# Patient Record
Sex: MALE | Race: WHITE | NOT HISPANIC OR LATINO | URBAN - METROPOLITAN AREA
[De-identification: names, ages, dates, MRNs, and addresses within clinical notes are randomized per-mention and may not be internally consistent; named-entity substitution may affect disease eponyms.]

---

## 2017-09-29 ENCOUNTER — EMERGENCY (EMERGENCY)
Facility: HOSPITAL | Age: 82
LOS: 1 days | Discharge: HOME | End: 2017-09-29
Attending: EMERGENCY MEDICINE | Admitting: EMERGENCY MEDICINE
Payer: COMMERCIAL

## 2017-09-29 VITALS
TEMPERATURE: 98 F | SYSTOLIC BLOOD PRESSURE: 139 MMHG | OXYGEN SATURATION: 94 % | HEART RATE: 97 BPM | WEIGHT: 210.1 LBS | HEIGHT: 69 IN | RESPIRATION RATE: 18 BRPM | DIASTOLIC BLOOD PRESSURE: 82 MMHG

## 2017-09-29 VITALS
SYSTOLIC BLOOD PRESSURE: 124 MMHG | HEART RATE: 72 BPM | OXYGEN SATURATION: 96 % | TEMPERATURE: 98 F | RESPIRATION RATE: 18 BRPM | DIASTOLIC BLOOD PRESSURE: 85 MMHG

## 2017-09-29 PROCEDURE — 99284 EMERGENCY DEPT VISIT MOD MDM: CPT

## 2017-09-29 PROCEDURE — 70450 CT HEAD/BRAIN W/O DYE: CPT

## 2017-09-29 PROCEDURE — 70450 CT HEAD/BRAIN W/O DYE: CPT | Mod: 26

## 2017-09-29 PROCEDURE — 99284 EMERGENCY DEPT VISIT MOD MDM: CPT | Mod: 25
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
## 2017-09-29 NOTE — ED PROVIDER NOTE - MEDICAL DECISION MAKING DETAILS
81 y/o M with headache, R cheek numbness x 3 days; r/o stroke, pt is NVI, no neuro deficits on exam; will get ct head and re-assess

## 2017-09-29 NOTE — ED PROVIDER NOTE - CARE PLAN
Principal Discharge DX:	Nonintractable headache, unspecified chronicity pattern, unspecified headache type  Secondary Diagnosis:	Numbness of face  Secondary Diagnosis:	Meningioma

## 2017-09-29 NOTE — ED ADULT NURSE NOTE - OBJECTIVE STATEMENT
The other day I felt right sided face numbness.  Head pressure.  B/L calf soreness.  Seen by cardiologist yesterday who said to go to ED if symptoms don't go away.

## 2017-09-29 NOTE — ED PROVIDER NOTE - ATTENDING CONTRIBUTION TO CARE
I, Dr Randhawa, performed the initial face to face bedside interview with this patient regarding history of present illness, review of symptoms and relevant past medical, social and family history.  I completed an independent physical examination.  I was the initial provider who evaluated this patient. I have signed out the follow up of any pending tests (i.e. labs, radiological studies) to the ACP.  I have communicated the patient’s plan of care and disposition with the ACP.

## 2017-09-29 NOTE — ED ADULT NURSE NOTE - CHPI ED SYMPTOMS NEG
no vomiting/no loss of consciousness/no change in level of consciousness/no chills/no weakness/no numbness/no blurred vision/no nausea/no fever/no syncope

## 2017-09-29 NOTE — ED PROVIDER NOTE - CHPI ED SYMPTOMS NEG
no nausea/no loss of consciousness/no blurred vision/no vomiting/no change in level of consciousness/no dizziness/no fever/no confusion/no weakness

## 2017-09-29 NOTE — ED PROVIDER NOTE - PROGRESS NOTE DETAILS
Pt was also seen by ED attending, Dr. Randhawa who agreed with disposition and plan.  Pt in NAD, NVI; no neuro deficits, ct head without Pt was also seen by ED attending, Dr. Randhawa who agreed with disposition and plan.  Pt in NAD, NVI; no neuro deficits, ct head without acute bleed. I discussed incidental findings of calcified meningioma on ct scan and report given to pt. Advised pt to f.u with neurologist, Dr. Kumar.

## 2017-09-29 NOTE — ED ADULT TRIAGE NOTE - CHIEF COMPLAINT QUOTE
The other day I felt right sided face numbness.  Head pressure.  B/L calf soreness.  Sent by cardiologist who said to go to ED if symptoms don't go away.

## 2017-09-29 NOTE — ED PROVIDER NOTE - CRANIAL NERVE AND PUPILLARY EXAM
+sensation equal on exam to B face, +5/5 strength B/L UE&LE/cranial nerves 2-12 intact +sensation equal on exam to B face, +5/5 strength B/L UE&LE, no slurred speech, no facial droop on exam B/cranial nerves 2-12 intact

## 2018-06-12 ENCOUNTER — EMERGENCY (EMERGENCY)
Facility: HOSPITAL | Age: 83
LOS: 1 days | Discharge: HOME | End: 2018-06-12
Attending: EMERGENCY MEDICINE | Admitting: EMERGENCY MEDICINE
Payer: COMMERCIAL

## 2018-06-12 VITALS
WEIGHT: 214.95 LBS | HEART RATE: 70 BPM | OXYGEN SATURATION: 96 % | TEMPERATURE: 98 F | DIASTOLIC BLOOD PRESSURE: 85 MMHG | RESPIRATION RATE: 16 BRPM | SYSTOLIC BLOOD PRESSURE: 138 MMHG | HEIGHT: 69 IN

## 2018-06-12 LAB
ALBUMIN SERPL ELPH-MCNC: 3.9 G/DL — SIGNIFICANT CHANGE UP (ref 3.3–5)
ALP SERPL-CCNC: 54 U/L — SIGNIFICANT CHANGE UP (ref 30–120)
ALT FLD-CCNC: 24 U/L DA — SIGNIFICANT CHANGE UP (ref 10–60)
ANION GAP SERPL CALC-SCNC: 8 MMOL/L — SIGNIFICANT CHANGE UP (ref 5–17)
AST SERPL-CCNC: 20 U/L — SIGNIFICANT CHANGE UP (ref 10–40)
BASE EXCESS BLDA CALC-SCNC: 0 MMOL/L — SIGNIFICANT CHANGE UP (ref -2–2)
BASOPHILS # BLD AUTO: 0.1 K/UL — SIGNIFICANT CHANGE UP (ref 0–0.2)
BASOPHILS NFR BLD AUTO: 1.7 % — SIGNIFICANT CHANGE UP (ref 0–2)
BILIRUB SERPL-MCNC: 1.1 MG/DL — SIGNIFICANT CHANGE UP (ref 0.2–1.2)
BLOOD GAS SOURCE: SIGNIFICANT CHANGE UP
BLOOD GAS SOURCE: SIGNIFICANT CHANGE UP
BUN SERPL-MCNC: 11 MG/DL — SIGNIFICANT CHANGE UP (ref 7–23)
CALCIUM SERPL-MCNC: 9.2 MG/DL — SIGNIFICANT CHANGE UP (ref 8.4–10.5)
CHLORIDE SERPL-SCNC: 107 MMOL/L — SIGNIFICANT CHANGE UP (ref 96–108)
CO2 SERPL-SCNC: 28 MMOL/L — SIGNIFICANT CHANGE UP (ref 22–31)
COHGB MFR BLDV: 1.2 % — SIGNIFICANT CHANGE UP (ref 0–1.5)
CREAT SERPL-MCNC: 1.02 MG/DL — SIGNIFICANT CHANGE UP (ref 0.5–1.3)
EOSINOPHIL # BLD AUTO: 0.1 K/UL — SIGNIFICANT CHANGE UP (ref 0–0.5)
EOSINOPHIL NFR BLD AUTO: 1.5 % — SIGNIFICANT CHANGE UP (ref 0–6)
GLUCOSE SERPL-MCNC: 112 MG/DL — HIGH (ref 70–99)
HCO3 BLDA-SCNC: 24 MMOL/L — SIGNIFICANT CHANGE UP (ref 21–29)
HCT VFR BLD CALC: 49.7 % — SIGNIFICANT CHANGE UP (ref 39–50)
HGB BLD-MCNC: 16.6 G/DL — SIGNIFICANT CHANGE UP (ref 13–17)
HOROWITZ INDEX BLDA+IHG-RTO: 21 — SIGNIFICANT CHANGE UP
LYMPHOCYTES # BLD AUTO: 1.2 K/UL — SIGNIFICANT CHANGE UP (ref 1–3.3)
LYMPHOCYTES # BLD AUTO: 21.2 % — SIGNIFICANT CHANGE UP (ref 13–44)
MCHC RBC-ENTMCNC: 31.5 PG — SIGNIFICANT CHANGE UP (ref 27–34)
MCHC RBC-ENTMCNC: 33.3 GM/DL — SIGNIFICANT CHANGE UP (ref 32–36)
MCV RBC AUTO: 94.5 FL — SIGNIFICANT CHANGE UP (ref 80–100)
MONOCYTES # BLD AUTO: 0.6 K/UL — SIGNIFICANT CHANGE UP (ref 0–0.9)
MONOCYTES NFR BLD AUTO: 10.1 % — SIGNIFICANT CHANGE UP (ref 2–14)
NEUTROPHILS # BLD AUTO: 3.7 K/UL — SIGNIFICANT CHANGE UP (ref 1.8–7.4)
NEUTROPHILS NFR BLD AUTO: 65.6 % — SIGNIFICANT CHANGE UP (ref 43–77)
PCO2 BLDA: 39 MMHG — SIGNIFICANT CHANGE UP (ref 32–46)
PH BLD: 7.4 — SIGNIFICANT CHANGE UP (ref 7.35–7.45)
PLATELET # BLD AUTO: 186 K/UL — SIGNIFICANT CHANGE UP (ref 150–400)
PO2 BLDA: 75 MMHG — SIGNIFICANT CHANGE UP (ref 74–108)
POTASSIUM SERPL-MCNC: 3.8 MMOL/L — SIGNIFICANT CHANGE UP (ref 3.5–5.3)
POTASSIUM SERPL-SCNC: 3.8 MMOL/L — SIGNIFICANT CHANGE UP (ref 3.5–5.3)
PROT SERPL-MCNC: 7.9 G/DL — SIGNIFICANT CHANGE UP (ref 6–8.3)
RBC # BLD: 5.26 M/UL — SIGNIFICANT CHANGE UP (ref 4.2–5.8)
RBC # FLD: 12.7 % — SIGNIFICANT CHANGE UP (ref 10.3–14.5)
SAO2 % BLDA: 95 % — SIGNIFICANT CHANGE UP (ref 92–96)
SODIUM SERPL-SCNC: 143 MMOL/L — SIGNIFICANT CHANGE UP (ref 135–145)
WBC # BLD: 5.6 K/UL — SIGNIFICANT CHANGE UP (ref 3.8–10.5)
WBC # FLD AUTO: 5.6 K/UL — SIGNIFICANT CHANGE UP (ref 3.8–10.5)

## 2018-06-12 PROCEDURE — 82375 ASSAY CARBOXYHB QUANT: CPT

## 2018-06-12 PROCEDURE — 96360 HYDRATION IV INFUSION INIT: CPT

## 2018-06-12 PROCEDURE — 80053 COMPREHEN METABOLIC PANEL: CPT

## 2018-06-12 PROCEDURE — 36415 COLL VENOUS BLD VENIPUNCTURE: CPT

## 2018-06-12 PROCEDURE — 99285 EMERGENCY DEPT VISIT HI MDM: CPT

## 2018-06-12 PROCEDURE — 99283 EMERGENCY DEPT VISIT LOW MDM: CPT | Mod: 25

## 2018-06-12 PROCEDURE — 82803 BLOOD GASES ANY COMBINATION: CPT

## 2018-06-12 PROCEDURE — 85027 COMPLETE CBC AUTOMATED: CPT

## 2018-06-12 RX ORDER — SODIUM CHLORIDE 9 MG/ML
1000 INJECTION INTRAMUSCULAR; INTRAVENOUS; SUBCUTANEOUS ONCE
Qty: 0 | Refills: 0 | Status: COMPLETED | OUTPATIENT
Start: 2018-06-12 | End: 2018-06-12

## 2018-06-12 RX ADMIN — SODIUM CHLORIDE 1000 MILLILITER(S): 9 INJECTION INTRAMUSCULAR; INTRAVENOUS; SUBCUTANEOUS at 20:44

## 2018-06-12 NOTE — ED PROVIDER NOTE - PROGRESS NOTE DETAILS
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
## 2019-01-05 ENCOUNTER — EMERGENCY (EMERGENCY)
Facility: HOSPITAL | Age: 84
LOS: 0 days | Discharge: HOME | End: 2019-01-06
Attending: EMERGENCY MEDICINE | Admitting: EMERGENCY MEDICINE
Payer: COMMERCIAL

## 2019-01-05 VITALS — WEIGHT: 197.98 LBS | HEIGHT: 70 IN

## 2019-01-05 DIAGNOSIS — I48.91 UNSPECIFIED ATRIAL FIBRILLATION: ICD-10-CM

## 2019-01-05 DIAGNOSIS — M79.661 PAIN IN RIGHT LOWER LEG: ICD-10-CM

## 2019-01-05 DIAGNOSIS — R20.2 PARESTHESIA OF SKIN: ICD-10-CM

## 2019-01-05 DIAGNOSIS — Z88.0 ALLERGY STATUS TO PENICILLIN: ICD-10-CM

## 2019-01-05 DIAGNOSIS — R25.2 CRAMP AND SPASM: ICD-10-CM

## 2019-01-05 DIAGNOSIS — M79.662 PAIN IN LEFT LOWER LEG: ICD-10-CM

## 2019-01-05 DIAGNOSIS — R60.9 EDEMA, UNSPECIFIED: ICD-10-CM

## 2019-01-05 DIAGNOSIS — K13.79 OTHER LESIONS OF ORAL MUCOSA: ICD-10-CM

## 2019-01-05 DIAGNOSIS — R20.0 ANESTHESIA OF SKIN: ICD-10-CM

## 2019-01-05 DIAGNOSIS — I10 ESSENTIAL (PRIMARY) HYPERTENSION: ICD-10-CM

## 2019-01-05 DIAGNOSIS — Z79.01 LONG TERM (CURRENT) USE OF ANTICOAGULANTS: ICD-10-CM

## 2019-01-05 LAB
BASOPHILS # BLD AUTO: 0.08 K/UL — SIGNIFICANT CHANGE UP (ref 0–0.2)
BASOPHILS NFR BLD AUTO: 1.1 % — SIGNIFICANT CHANGE UP (ref 0–2)
EOSINOPHIL # BLD AUTO: 0.16 K/UL — SIGNIFICANT CHANGE UP (ref 0–0.5)
EOSINOPHIL NFR BLD AUTO: 2.3 % — SIGNIFICANT CHANGE UP (ref 0–6)
HCT VFR BLD CALC: 46.2 % — SIGNIFICANT CHANGE UP (ref 39–50)
HGB BLD-MCNC: 15.7 G/DL — SIGNIFICANT CHANGE UP (ref 13–17)
IMM GRANULOCYTES NFR BLD AUTO: 0.3 % — SIGNIFICANT CHANGE UP (ref 0–1.5)
LYMPHOCYTES # BLD AUTO: 1.08 K/UL — SIGNIFICANT CHANGE UP (ref 1–3.3)
LYMPHOCYTES # BLD AUTO: 15.4 % — SIGNIFICANT CHANGE UP (ref 13–44)
MCHC RBC-ENTMCNC: 31.5 PG — SIGNIFICANT CHANGE UP (ref 27–34)
MCHC RBC-ENTMCNC: 34 GM/DL — SIGNIFICANT CHANGE UP (ref 32–36)
MCV RBC AUTO: 92.8 FL — SIGNIFICANT CHANGE UP (ref 80–100)
MONOCYTES # BLD AUTO: 0.76 K/UL — SIGNIFICANT CHANGE UP (ref 0–0.9)
MONOCYTES NFR BLD AUTO: 10.9 % — SIGNIFICANT CHANGE UP (ref 2–14)
NEUTROPHILS # BLD AUTO: 4.9 K/UL — SIGNIFICANT CHANGE UP (ref 1.8–7.4)
NEUTROPHILS NFR BLD AUTO: 70 % — SIGNIFICANT CHANGE UP (ref 43–77)
NRBC # BLD: 0 /100 WBCS — SIGNIFICANT CHANGE UP (ref 0–0)
PLATELET # BLD AUTO: 201 K/UL — SIGNIFICANT CHANGE UP (ref 150–400)
RBC # BLD: 4.98 M/UL — SIGNIFICANT CHANGE UP (ref 4.2–5.8)
RBC # FLD: 14.3 % — SIGNIFICANT CHANGE UP (ref 10.3–14.5)
S PYO AG SPEC QL IA: NEGATIVE — SIGNIFICANT CHANGE UP
WBC # BLD: 7 K/UL — SIGNIFICANT CHANGE UP (ref 3.8–10.5)
WBC # FLD AUTO: 7 K/UL — SIGNIFICANT CHANGE UP (ref 3.8–10.5)

## 2019-01-05 PROCEDURE — 70450 CT HEAD/BRAIN W/O DYE: CPT | Mod: 26

## 2019-01-05 PROCEDURE — 99284 EMERGENCY DEPT VISIT MOD MDM: CPT | Mod: 25

## 2019-01-05 NOTE — ED PROVIDER NOTE - ENMT, MLM
Airway patent, Nasal mucosa clear. Edentulous without any evidence of mouth ulcers; gums normal.  No evidence of thrush. No mass or pulsation visualized.  Throat has no vesicles, no oropharyngeal exudates and uvula is midline.

## 2019-01-05 NOTE — ED ADULT NURSE NOTE - OBJECTIVE STATEMENT
Pt presents to ER c/o burning sensation in b/l lower extremity and sore throat. Onset of symptoms began 3-4 days ago. B/l lower extremity are non erythremic, non swollen, good ROM, neuro intact. Denies CP/SOB/fever/chills. AO x 3 oriented to baseline, normal breathing pattern with no difficulty

## 2019-01-05 NOTE — ED PROVIDER NOTE - OBJECTIVE STATEMENT
Pt. is an 84 yo M BIB wife for 2 days of lower leg pain, ankle swelling, and posterior calf numbness "like there is bengay on my legs".  Pt. denies weakness or trouble walking.  He also states he has had a soreness on the roof of his mouth for 3 days and intermittent pulsating feeling.  Pt. has had thrush in mouth in the past and wanted it examined as it had felt the same in the past.  For months patient has had difficulty chewing and swallowing so he states he eats less because of this. Denies fever, bleeding, drainage of gums. Pt. used toothache numbing cream on upper gums today without relief.  No chest pain, headache, trauma, falls.  No abdominal pain or back pain.  [facility] [place]

## 2019-01-05 NOTE — ED PROVIDER NOTE - PROGRESS NOTE DETAILS
Patient ambulating normally.  Asymptomatic at this time.  States when he ripped/cut his socks, symptoms went away.  He denies back or abdominal pain or injury.  Currently does not have any numb feeling. Will see PMD for all symptoms.

## 2019-01-05 NOTE — ED PROVIDER NOTE - SKIN, MLM
Skin normal color for race, warm, dry and intact. No evidence of rash.  No discoloration.  Good cap refill.

## 2019-01-05 NOTE — ED ADULT NURSE NOTE - NSIMPLEMENTINTERV_GEN_ALL_ED
Implemented All Universal Safety Interventions:  [place] to call system. Call bell, personal items and telephone within reach. Instruct patient to call for assistance. Room bathroom lighting operational. Non-slip footwear when patient is off stretcher. Physically safe environment: no spills, clutter or unnecessary equipment. Stretcher in lowest position, wheels locked, appropriate side rails in place.

## 2019-01-05 NOTE — ED PROVIDER NOTE - CARE PLAN
Principal Discharge DX:	Peripheral edema  Secondary Diagnosis:	Leg cramping  Secondary Diagnosis:	Paresthesia  Secondary Diagnosis:	Mouth pain

## 2019-01-05 NOTE — ED PROVIDER NOTE - MEDICAL DECISION MAKING DETAILS
Generalized paresthesias and symptoms in mouth and legs after over the counter toothache numbing medicine.  Will check labs and will get US of legs and CT of head.  If these are negative, medicine to be discontinued and patient to see PMD in 1-2 days.

## 2019-01-05 NOTE — ED PROVIDER NOTE - MUSCULOSKELETAL, MLM
Spine appears normal, range of motion is not limited, no muscle or joint tenderness; no calf tenderness; normal 5/5 strength in arms and legs.

## 2019-01-06 VITALS
SYSTOLIC BLOOD PRESSURE: 133 MMHG | RESPIRATION RATE: 17 BRPM | OXYGEN SATURATION: 99 % | DIASTOLIC BLOOD PRESSURE: 88 MMHG | HEART RATE: 79 BPM | TEMPERATURE: 98 F

## 2019-01-06 PROBLEM — I10 ESSENTIAL (PRIMARY) HYPERTENSION: Chronic | Status: ACTIVE | Noted: 2017-09-29

## 2019-01-06 PROBLEM — I48.91 UNSPECIFIED ATRIAL FIBRILLATION: Chronic | Status: ACTIVE | Noted: 2017-09-29

## 2019-01-06 LAB
ALBUMIN SERPL ELPH-MCNC: 3.9 G/DL — SIGNIFICANT CHANGE UP (ref 3.3–5)
ALP SERPL-CCNC: 57 U/L — SIGNIFICANT CHANGE UP (ref 40–120)
ALT FLD-CCNC: 25 U/L — SIGNIFICANT CHANGE UP (ref 12–78)
ANION GAP SERPL CALC-SCNC: 8 MMOL/L — SIGNIFICANT CHANGE UP (ref 5–17)
APTT BLD: 36.2 SEC — SIGNIFICANT CHANGE UP (ref 27.5–36.3)
AST SERPL-CCNC: 17 U/L — SIGNIFICANT CHANGE UP (ref 15–37)
BILIRUB SERPL-MCNC: 0.9 MG/DL — SIGNIFICANT CHANGE UP (ref 0.2–1.2)
BUN SERPL-MCNC: 13 MG/DL — SIGNIFICANT CHANGE UP (ref 7–23)
CALCIUM SERPL-MCNC: 9.1 MG/DL — SIGNIFICANT CHANGE UP (ref 8.5–10.1)
CHLORIDE SERPL-SCNC: 107 MMOL/L — SIGNIFICANT CHANGE UP (ref 96–108)
CO2 SERPL-SCNC: 27 MMOL/L — SIGNIFICANT CHANGE UP (ref 22–31)
CREAT SERPL-MCNC: 1.01 MG/DL — SIGNIFICANT CHANGE UP (ref 0.5–1.3)
GLUCOSE SERPL-MCNC: 100 MG/DL — HIGH (ref 70–99)
INR BLD: 1.8 RATIO — HIGH (ref 0.88–1.16)
MAGNESIUM SERPL-MCNC: 2.1 MG/DL — SIGNIFICANT CHANGE UP (ref 1.6–2.6)
POTASSIUM SERPL-MCNC: 4 MMOL/L — SIGNIFICANT CHANGE UP (ref 3.5–5.3)
POTASSIUM SERPL-SCNC: 4 MMOL/L — SIGNIFICANT CHANGE UP (ref 3.5–5.3)
PROT SERPL-MCNC: 7.4 GM/DL — SIGNIFICANT CHANGE UP (ref 6–8.3)
PROTHROM AB SERPL-ACNC: 20.4 SEC — HIGH (ref 10–12.9)
SODIUM SERPL-SCNC: 142 MMOL/L — SIGNIFICANT CHANGE UP (ref 135–145)

## 2019-01-06 PROCEDURE — 93970 EXTREMITY STUDY: CPT | Mod: 26

## 2019-01-08 LAB
CULTURE RESULTS: SIGNIFICANT CHANGE UP
SPECIMEN SOURCE: SIGNIFICANT CHANGE UP

## 2019-04-02 PROBLEM — Z86.19 PERSONAL HISTORY OF OTHER INFECTIOUS AND PARASITIC DISEASES: Chronic | Status: ACTIVE | Noted: 2019-01-05

## 2019-04-17 ENCOUNTER — APPOINTMENT (OUTPATIENT)
Dept: FAMILY MEDICINE | Facility: CLINIC | Age: 84
End: 2019-04-17
Payer: MEDICARE

## 2019-04-17 VITALS
HEIGHT: 69.5 IN | OXYGEN SATURATION: 96 % | WEIGHT: 198 LBS | TEMPERATURE: 97.9 F | BODY MASS INDEX: 28.67 KG/M2 | DIASTOLIC BLOOD PRESSURE: 70 MMHG | RESPIRATION RATE: 16 BRPM | HEART RATE: 80 BPM | SYSTOLIC BLOOD PRESSURE: 114 MMHG

## 2019-04-17 DIAGNOSIS — Z83.79 FAMILY HISTORY OF OTHER DISEASES OF THE DIGESTIVE SYSTEM: ICD-10-CM

## 2019-04-17 DIAGNOSIS — Z13.1 ENCOUNTER FOR SCREENING FOR DIABETES MELLITUS: ICD-10-CM

## 2019-04-17 DIAGNOSIS — Z82.49 FAMILY HISTORY OF ISCHEMIC HEART DISEASE AND OTHER DISEASES OF THE CIRCULATORY SYSTEM: ICD-10-CM

## 2019-04-17 DIAGNOSIS — Z87.891 PERSONAL HISTORY OF NICOTINE DEPENDENCE: ICD-10-CM

## 2019-04-17 DIAGNOSIS — Z13.29 ENCOUNTER FOR SCREENING FOR OTHER SUSPECTED ENDOCRINE DISORDER: ICD-10-CM

## 2019-04-17 DIAGNOSIS — Z11.4 ENCOUNTER FOR SCREENING FOR HUMAN IMMUNODEFICIENCY VIRUS [HIV]: ICD-10-CM

## 2019-04-17 DIAGNOSIS — Z78.9 OTHER SPECIFIED HEALTH STATUS: ICD-10-CM

## 2019-04-17 DIAGNOSIS — Z13.220 ENCOUNTER FOR SCREENING FOR LIPOID DISORDERS: ICD-10-CM

## 2019-04-17 PROCEDURE — 99204 OFFICE O/P NEW MOD 45 MIN: CPT | Mod: 25

## 2019-04-17 PROCEDURE — 36415 COLL VENOUS BLD VENIPUNCTURE: CPT

## 2019-04-17 PROCEDURE — G0444 DEPRESSION SCREEN ANNUAL: CPT

## 2019-04-17 NOTE — HISTORY OF PRESENT ILLNESS
[FreeTextEntry1] : establish care [de-identified] : Patient is here today to establish care. \par It has been about 2 years since he last saw his previous primary care doctor. \par He has been following regularly with his cardiologist Dr. Martinez.  He was last seen one week ago.  He has his coumadin checked regularly as he has atrial fibrillation.  His last INR was 2.4 and he is taking 1mg of coumadin daily.  \par He takes amlodipine for hypertension and his BP has been well controlled. \par He does mention also that he has had a fungus on his feet that has been bothering him for awhile.  He saw a doctor for this and was given a cream that has been helping but has not cured the problem.  His insurance no longer wants to pay for the cream. \par \par

## 2019-04-17 NOTE — ASSESSMENT
[FreeTextEntry1] : Establish care\par - comprehensive labs\par - will get copy of recent EKG\par - negative depression screening\par - believes he is up to date with vaccines - will get records from PCP\par - had colonoscopy years ago, no longer indicated\par \par Afib/HTN\par - INR checked last week and therapeutic\par - takes coumadin 1mg dialy\par - on amlodipine for BP control - well managed\par - following with cardiology regularly\par - will get records of recent testing, EKG, etc\par \par Fungal infection on feet\par - mild hyperpigmentations on skin \par - ? if actually a fungal rash or just stasis dermatitis\par - try ketaconazole cream\par - if not improving in 1-2 weeks can follow up with dermatology

## 2019-04-17 NOTE — PHYSICAL EXAM
[No Acute Distress] : no acute distress [Well Nourished] : well nourished [Well Developed] : well developed [Normal Sclera/Conjunctiva] : normal sclera/conjunctiva [Well-Appearing] : well-appearing [EOMI] : extraocular movements intact [PERRL] : pupils equal round and reactive to light [Normal Oropharynx] : the oropharynx was normal [Normal TMs] : both tympanic membranes were normal [Normal Outer Ear/Nose] : the outer ears and nose were normal in appearance [Supple] : supple [No Lymphadenopathy] : no lymphadenopathy [Thyroid Normal, No Nodules] : the thyroid was normal and there were no nodules present [No Respiratory Distress] : no respiratory distress  [Clear to Auscultation] : lungs were clear to auscultation bilaterally [No Accessory Muscle Use] : no accessory muscle use [Normal Rate] : normal rate  [Normal S1, S2] : normal S1 and S2 [Pedal Pulses Present] : the pedal pulses are present [No Edema] : there was no peripheral edema [Soft] : abdomen soft [Non Tender] : non-tender [No HSM] : no HSM [Non-distended] : non-distended [No Masses] : no abdominal mass palpated [Normal Posterior Cervical Nodes] : no posterior cervical lymphadenopathy [Normal Anterior Cervical Nodes] : no anterior cervical lymphadenopathy [Normal Bowel Sounds] : normal bowel sounds [No Spinal Tenderness] : no spinal tenderness [Grossly Normal Strength/Tone] : grossly normal strength/tone [Normal Gait] : normal gait [No Focal Deficits] : no focal deficits [Normal Affect] : the affect was normal [Alert and Oriented x3] : oriented to person, place, and time [Normal Insight/Judgement] : insight and judgment were intact [de-identified] : red/ brown skin changes on tops of feet, toenail fungus bilateral all toes [de-identified] : irregular rhythm

## 2019-04-17 NOTE — HEALTH RISK ASSESSMENT
[Good] : ~his/her~  mood as  good [No falls in past year] : Patient reported no falls in the past year [0] : 2) Feeling down, depressed, or hopeless: Not at all (0) [HIV Test offered] : HIV Test offered [None] : None [With Family] : lives with family [Retired] : retired [Feels Safe at Home] : Feels safe at home [] :  [Fully functional (bathing, dressing, toileting, transferring, walking, feeding)] : Fully functional (bathing, dressing, toileting, transferring, walking, feeding) [Fully functional (using the telephone, shopping, preparing meals, housekeeping, doing laundry, using] : Fully functional and needs no help or supervision to perform IADLs (using the telephone, shopping, preparing meals, housekeeping, doing laundry, using transportation, managing medications and managing finances) [Smoke Detector] : smoke detector [Seat Belt] :  uses seat belt [Carbon Monoxide Detector] : carbon monoxide detector [With Patient/Caregiver] : With Patient/Caregiver [Name: ___] : Health Care Proxy's Name: [unfilled]  [Relationship: ___] : Relationship: [unfilled] [] : No [de-identified] : quit 60 years ago [de-identified] : occasional  [[id number]] : 0 [Change in mental status noted] : No change in mental status noted [Language] : denies difficulty with language [Behavior] : denies difficulty with behavior [Employed] : employed [Reasoning] : denies difficulty with reasoning [Reports changes in hearing] : Reports no changes in hearing [Reports changes in vision] : Reports no changes in vision [Reports changes in dental health] : Reports no changes in dental health [ColonoscopyComments] : no longer indicated, had one years ago [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

## 2019-04-18 LAB
25(OH)D3 SERPL-MCNC: 21.3 NG/ML
ALBUMIN SERPL ELPH-MCNC: 4.6 G/DL
ALP BLD-CCNC: 61 U/L
ALT SERPL-CCNC: 23 U/L
ANION GAP SERPL CALC-SCNC: 12 MMOL/L
APPEARANCE: CLEAR
AST SERPL-CCNC: 23 U/L
BASOPHILS # BLD AUTO: 0.09 K/UL
BASOPHILS NFR BLD AUTO: 1.3 %
BILIRUB SERPL-MCNC: 0.7 MG/DL
BILIRUBIN URINE: NEGATIVE
BLOOD URINE: NEGATIVE
BUN SERPL-MCNC: 13 MG/DL
CALCIUM SERPL-MCNC: 9.7 MG/DL
CHLORIDE SERPL-SCNC: 104 MMOL/L
CHOLEST SERPL-MCNC: 177 MG/DL
CHOLEST/HDLC SERPL: 3.6 RATIO
CO2 SERPL-SCNC: 25 MMOL/L
COLOR: YELLOW
CREAT SERPL-MCNC: 1.07 MG/DL
EOSINOPHIL # BLD AUTO: 0.12 K/UL
EOSINOPHIL NFR BLD AUTO: 1.7 %
ESTIMATED AVERAGE GLUCOSE: 111 MG/DL
GLUCOSE QUALITATIVE U: NEGATIVE
GLUCOSE SERPL-MCNC: 99 MG/DL
HBA1C MFR BLD HPLC: 5.5 %
HCT VFR BLD CALC: 51.2 %
HDLC SERPL-MCNC: 49 MG/DL
HGB BLD-MCNC: 16.8 G/DL
HIV1+2 AB SPEC QL IA.RAPID: NONREACTIVE
IMM GRANULOCYTES NFR BLD AUTO: 0.4 %
KETONES URINE: NEGATIVE
LDLC SERPL CALC-MCNC: 112 MG/DL
LEUKOCYTE ESTERASE URINE: NEGATIVE
LYMPHOCYTES # BLD AUTO: 1.18 K/UL
LYMPHOCYTES NFR BLD AUTO: 16.7 %
MAN DIFF?: NORMAL
MCHC RBC-ENTMCNC: 31.8 PG
MCHC RBC-ENTMCNC: 32.8 GM/DL
MCV RBC AUTO: 96.8 FL
MONOCYTES # BLD AUTO: 0.75 K/UL
MONOCYTES NFR BLD AUTO: 10.6 %
NEUTROPHILS # BLD AUTO: 4.9 K/UL
NEUTROPHILS NFR BLD AUTO: 69.3 %
NITRITE URINE: NEGATIVE
PH URINE: 5.5
PLATELET # BLD AUTO: 247 K/UL
POTASSIUM SERPL-SCNC: 4.5 MMOL/L
PROT SERPL-MCNC: 7.3 G/DL
PROTEIN URINE: NORMAL
RBC # BLD: 5.29 M/UL
RBC # FLD: 14.8 %
SODIUM SERPL-SCNC: 141 MMOL/L
SPECIFIC GRAVITY URINE: 1.02
T4 FREE SERPL-MCNC: 1.4 NG/DL
TRIGL SERPL-MCNC: 80 MG/DL
TSH SERPL-ACNC: 1.41 UIU/ML
UROBILINOGEN URINE: NORMAL
WBC # FLD AUTO: 7.07 K/UL

## 2019-04-23 ENCOUNTER — RX RENEWAL (OUTPATIENT)
Age: 84
End: 2019-04-23

## 2019-04-30 ENCOUNTER — APPOINTMENT (OUTPATIENT)
Dept: FAMILY MEDICINE | Facility: CLINIC | Age: 84
End: 2019-04-30
Payer: MEDICARE

## 2019-04-30 ENCOUNTER — APPOINTMENT (OUTPATIENT)
Dept: DERMATOLOGY | Facility: CLINIC | Age: 84
End: 2019-04-30
Payer: MEDICARE

## 2019-04-30 VITALS
HEART RATE: 83 BPM | DIASTOLIC BLOOD PRESSURE: 70 MMHG | HEIGHT: 69.5 IN | WEIGHT: 198 LBS | SYSTOLIC BLOOD PRESSURE: 120 MMHG | RESPIRATION RATE: 16 BRPM | OXYGEN SATURATION: 95 % | BODY MASS INDEX: 28.67 KG/M2 | TEMPERATURE: 98 F

## 2019-04-30 DIAGNOSIS — Z78.9 OTHER SPECIFIED HEALTH STATUS: ICD-10-CM

## 2019-04-30 DIAGNOSIS — Z77.22 CONTACT WITH AND (SUSPECTED) EXPOSURE TO ENVIRONMENTAL TOBACCO SMOKE (ACUTE) (CHRONIC): ICD-10-CM

## 2019-04-30 PROCEDURE — 99202 OFFICE O/P NEW SF 15 MIN: CPT

## 2019-04-30 PROCEDURE — 99213 OFFICE O/P EST LOW 20 MIN: CPT

## 2019-04-30 RX ORDER — VALACYCLOVIR 1 G/1
1 TABLET, FILM COATED ORAL
Qty: 21 | Refills: 0 | Status: COMPLETED | COMMUNITY
Start: 2018-11-09

## 2019-04-30 RX ORDER — CLOTRIMAZOLE AND BETAMETHASONE DIPROPIONATE 10; .5 MG/G; MG/G
1-0.05 CREAM TOPICAL
Qty: 45 | Refills: 0 | Status: COMPLETED | COMMUNITY
Start: 2019-04-03

## 2019-04-30 RX ORDER — GABAPENTIN 100 MG/1
100 CAPSULE ORAL
Qty: 60 | Refills: 0 | Status: COMPLETED | COMMUNITY
Start: 2018-11-23

## 2019-04-30 RX ORDER — CIPROFLOXACIN HYDROCHLORIDE 250 MG/1
250 TABLET, FILM COATED ORAL
Qty: 14 | Refills: 0 | Status: COMPLETED | COMMUNITY
Start: 2018-10-30

## 2019-04-30 RX ORDER — OSELTAMIVIR PHOSPHATE 75 MG/1
75 CAPSULE ORAL
Qty: 7 | Refills: 0 | Status: COMPLETED | COMMUNITY
Start: 2019-01-27

## 2019-04-30 RX ORDER — CLOTRIMAZOLE 10 MG/G
1 CREAM TOPICAL TWICE DAILY
Qty: 45 | Refills: 0 | Status: COMPLETED | COMMUNITY
Start: 2019-04-17 | End: 2019-04-30

## 2019-04-30 NOTE — CONSULT LETTER
[Dear  ___] : Dear  [unfilled], [Consult Letter:] : I had the pleasure of evaluating your patient, [unfilled]. [Consult Closing:] : Thank you very much for allowing me to participate in the care of this patient.  If you have any questions, please do not hesitate to contact me. [Sincerely,] : Sincerely, [FreeTextEntry2] : Allison Gracia DO [FreeTextEntry1] : He has a marked asteatotic dermatitis on the lower shins and dorsum of feet.\par \par Please see attached chart note for further details and treatment plan. [FreeTextEntry3] : Stephane Suero MD\par [address], [address]\par [place] [address]\par Tel ([phone])\par Fax ([phone])\par Private line ([phone])\par

## 2019-04-30 NOTE — PHYSICAL EXAM
[No Acute Distress] : no acute distress [Well Nourished] : well nourished [Well Developed] : well developed [Normal Sclera/Conjunctiva] : normal sclera/conjunctiva [PERRL] : pupils equal round and reactive to light [EOMI] : extraocular movements intact [Normal Outer Ear/Nose] : the outer ears and nose were normal in appearance [Normal Oropharynx] : the oropharynx was normal [Normal TMs] : both tympanic membranes were normal [Supple] : supple [No Lymphadenopathy] : no lymphadenopathy [No Respiratory Distress] : no respiratory distress  [Clear to Auscultation] : lungs were clear to auscultation bilaterally [No Accessory Muscle Use] : no accessory muscle use [Normal Rate] : normal rate  [Regular Rhythm] : with a regular rhythm [Normal S1, S2] : normal S1 and S2 [No Edema] : there was no peripheral edema [Normal Anterior Cervical Nodes] : no anterior cervical lymphadenopathy [No Spinal Tenderness] : no spinal tenderness [Grossly Normal Strength/Tone] : grossly normal strength/tone [Normal Gait] : normal gait [Normal Affect] : the affect was normal [Normal Insight/Judgement] : insight and judgment were intact [de-identified] : hyperpigmented rash on both legs, dry flaking skin

## 2019-04-30 NOTE — PHYSICAL EXAM
[Alert] : alert [Oriented x 3] : ~L oriented x 3 [Well Nourished] : well nourished [Face] : Face [Nose] : Nose [Eyelids] : Eyelids [Ears] : Ears [Lips] : Lips [FreeTextEntry3] : Symmetric erythema craquele-like changes present on the shins and dorsum of the feet\par Smaller erythematous, scaly patch present on the left arm as well

## 2019-04-30 NOTE — HISTORY OF PRESENT ILLNESS
[FreeTextEntry1] : Rash on legs and feet [de-identified] : first visit for 84-year-old white male, referred by Allison Gracia DO with a one-month history of an itchy rash on both legs and feet.  Most recently treated with 2% ketoconazole cream without improvement.  Patient current using a diphenhydramine cream with improvement in the .Itching.\par No previous episodes.\par Note-patient works as a .

## 2019-04-30 NOTE — ASSESSMENT
[FreeTextEntry1] : Dermatitis\par - patient has tried both antifungal creams and steroid creams\par - rash is not improving\par - recommend appointment with dermatology

## 2019-04-30 NOTE — HISTORY OF PRESENT ILLNESS
[FreeTextEntry1] : follow up rash [de-identified] : Patient is here for follow up for a rash on his legs. \par He has had the rash for about one month.  \par He has tried multiple creams - antifungals and steroids. \par The rash is persistent and has not gotten any better. \par The rash is very itchy so he uses an anti-itch cream which does help. \par
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
## 2019-05-14 ENCOUNTER — APPOINTMENT (OUTPATIENT)
Dept: DERMATOLOGY | Facility: CLINIC | Age: 84
End: 2019-05-14
Payer: MEDICARE

## 2019-05-14 PROCEDURE — 99213 OFFICE O/P EST LOW 20 MIN: CPT

## 2019-05-14 NOTE — PHYSICAL EXAM
[Alert] : alert [Oriented x 3] : ~L oriented x 3 [Well Nourished] : well nourished [FreeTextEntry3] : Diffuse faint pinkness and scaling present across the upper back, arms, lower chest\par Lower legs: Mild, dull erythematous patches with slight scaling, right greater than left
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
## 2019-05-28 ENCOUNTER — APPOINTMENT (OUTPATIENT)
Dept: DERMATOLOGY | Facility: CLINIC | Age: 84
End: 2019-05-28

## 2019-06-04 ENCOUNTER — APPOINTMENT (OUTPATIENT)
Dept: DERMATOLOGY | Facility: CLINIC | Age: 84
End: 2019-06-04
Payer: MEDICARE

## 2019-06-04 DIAGNOSIS — B07.8 OTHER VIRAL WARTS: ICD-10-CM

## 2019-06-04 DIAGNOSIS — R20.8 OTHER DISTURBANCES OF SKIN SENSATION: ICD-10-CM

## 2019-06-04 PROCEDURE — 17110 DESTRUCTION B9 LES UP TO 14: CPT

## 2019-06-04 PROCEDURE — 99212 OFFICE O/P EST SF 10 MIN: CPT | Mod: 25

## 2019-06-04 NOTE — PHYSICAL EXAM
[FreeTextEntry3] : Right distal lateral third finger: 2 adjacent 4-5 mm, tan, verrucous papules\par Lower legs, and dorsum of feet: Faint hyperpigmentation and early atrophy

## 2019-06-04 NOTE — HISTORY OF PRESENT ILLNESS
[FreeTextEntry1] : Itchy rash [de-identified] : Followup visit for a 4-year-old white male last seen by me on May 14, 2019, with a history of a severe asteatotic dermatitis on the legs, which more recently, spread to the arms, and chest.\par Treated with betamethasone dipropionate augmented cream 0.05% and diphenhydramine cream p.r.n.  Patient is no longer itchy.\par Patient also complains of tender bumps on the right third finger.

## 2019-07-30 ENCOUNTER — APPOINTMENT (OUTPATIENT)
Dept: FAMILY MEDICINE | Facility: CLINIC | Age: 84
End: 2019-07-30
Payer: MEDICARE

## 2019-07-30 VITALS
HEART RATE: 67 BPM | OXYGEN SATURATION: 97 % | DIASTOLIC BLOOD PRESSURE: 78 MMHG | HEIGHT: 69.5 IN | RESPIRATION RATE: 15 BRPM | SYSTOLIC BLOOD PRESSURE: 116 MMHG | WEIGHT: 198 LBS | BODY MASS INDEX: 28.67 KG/M2

## 2019-07-30 PROCEDURE — 99214 OFFICE O/P EST MOD 30 MIN: CPT

## 2019-07-30 NOTE — REVIEW OF SYSTEMS
[Constipation] : constipation [Skin Rash] : skin rash [Negative] : Musculoskeletal [de-identified] : tremor

## 2019-07-30 NOTE — ASSESSMENT
[FreeTextEntry1] : Dermatitis\par - trial of steroid cream\par - antihistamines\par - follow up with derm\par \par Constipation\par - increase water intake\par - increase fiber\par - eat more regular meals\par - stool softener as needed\par \par Tremor\par - resting hand tremor\par - now having facial tremors\par - recommend neurology evaluation\par \par Afib\par - follow up with cardiology this week\par - has been switched to eliquis but insurance does not cover\par - will discuss restarting coumadin

## 2019-07-30 NOTE — PHYSICAL EXAM
[No Acute Distress] : no acute distress [Well Developed] : well developed [Well Nourished] : well nourished [Normal Sclera/Conjunctiva] : normal sclera/conjunctiva [PERRL] : pupils equal round and reactive to light [EOMI] : extraocular movements intact [Normal Outer Ear/Nose] : the outer ears and nose were normal in appearance [Normal Oropharynx] : the oropharynx was normal [No Lymphadenopathy] : no lymphadenopathy [Supple] : supple [No Respiratory Distress] : no respiratory distress  [No Accessory Muscle Use] : no accessory muscle use [Clear to Auscultation] : lungs were clear to auscultation bilaterally [Normal Rate] : normal rate  [Normal S1, S2] : normal S1 and S2 [Soft] : abdomen soft [Non Tender] : non-tender [Non-distended] : non-distended [Normal Bowel Sounds] : normal bowel sounds [Normal Anterior Cervical Nodes] : no anterior cervical lymphadenopathy [No Spinal Tenderness] : no spinal tenderness [Grossly Normal Strength/Tone] : grossly normal strength/tone [No Focal Deficits] : no focal deficits [Normal Affect] : the affect was normal [Normal Gait] : normal gait [Normal Insight/Judgement] : insight and judgment were intact [de-identified] : irregular [de-identified] : rash on chest, erythematous papules [de-identified] : resting right hand tremor, facial tremor

## 2019-07-30 NOTE — HISTORY OF PRESENT ILLNESS
[FreeTextEntry1] : follow up  [de-identified] : Patient is here today for follow up. \par He has been having trouble with bowel movements.  He is more constipated than he had before.  \par He has been taking stool softener which does help regulate him.  He does admit that his diet is probably the cause as he does not eat regular meals and often eats unhealthy choices. \par \par He has a rash on his chest that has been bothering him for awhile.  He has red spots on his chest and they are very itchy.  He has not shown the dermatologist this yet. \par \par He is having resting tremor in his right hand which has been going on for about the past year. \par He also has noticed recently that he has a tremor in his face mostly in his lower lip.   \par While he was away in [place] he also had an episode of vertigo which lasted for about 5 days.  He got medication from a pharmacist in [place] which took it away.

## 2019-08-04 ENCOUNTER — EMERGENCY (EMERGENCY)
Facility: HOSPITAL | Age: 84
LOS: 1 days | Discharge: HOME | End: 2019-08-04
Attending: EMERGENCY MEDICINE | Admitting: EMERGENCY MEDICINE
Payer: MEDICARE

## 2019-08-04 VITALS
HEIGHT: 69 IN | DIASTOLIC BLOOD PRESSURE: 90 MMHG | HEART RATE: 105 BPM | RESPIRATION RATE: 16 BRPM | TEMPERATURE: 98 F | WEIGHT: 199.96 LBS | OXYGEN SATURATION: 96 % | SYSTOLIC BLOOD PRESSURE: 123 MMHG

## 2019-08-04 VITALS
DIASTOLIC BLOOD PRESSURE: 81 MMHG | OXYGEN SATURATION: 97 % | TEMPERATURE: 98 F | HEART RATE: 85 BPM | SYSTOLIC BLOOD PRESSURE: 125 MMHG | RESPIRATION RATE: 14 BRPM

## 2019-08-04 LAB
ALBUMIN SERPL ELPH-MCNC: 3.7 G/DL — SIGNIFICANT CHANGE UP (ref 3.3–5)
ALP SERPL-CCNC: 52 U/L — SIGNIFICANT CHANGE UP (ref 30–120)
ALT FLD-CCNC: 24 U/L DA — SIGNIFICANT CHANGE UP (ref 10–60)
ANION GAP SERPL CALC-SCNC: 9 MMOL/L — SIGNIFICANT CHANGE UP (ref 5–17)
APPEARANCE UR: CLEAR — SIGNIFICANT CHANGE UP
APTT BLD: 30.9 SEC — SIGNIFICANT CHANGE UP (ref 28.5–37)
AST SERPL-CCNC: 21 U/L — SIGNIFICANT CHANGE UP (ref 10–40)
BASOPHILS # BLD AUTO: 0.05 K/UL — SIGNIFICANT CHANGE UP (ref 0–0.2)
BASOPHILS NFR BLD AUTO: 0.9 % — SIGNIFICANT CHANGE UP (ref 0–2)
BILIRUB SERPL-MCNC: 0.8 MG/DL — SIGNIFICANT CHANGE UP (ref 0.2–1.2)
BILIRUB UR-MCNC: NEGATIVE — SIGNIFICANT CHANGE UP
BUN SERPL-MCNC: 11 MG/DL — SIGNIFICANT CHANGE UP (ref 7–23)
CALCIUM SERPL-MCNC: 9.3 MG/DL — SIGNIFICANT CHANGE UP (ref 8.4–10.5)
CHLORIDE SERPL-SCNC: 104 MMOL/L — SIGNIFICANT CHANGE UP (ref 96–108)
CO2 SERPL-SCNC: 25 MMOL/L — SIGNIFICANT CHANGE UP (ref 22–31)
COLOR SPEC: YELLOW — SIGNIFICANT CHANGE UP
CREAT SERPL-MCNC: 0.87 MG/DL — SIGNIFICANT CHANGE UP (ref 0.5–1.3)
DIFF PNL FLD: ABNORMAL
EOSINOPHIL # BLD AUTO: 0.06 K/UL — SIGNIFICANT CHANGE UP (ref 0–0.5)
EOSINOPHIL NFR BLD AUTO: 1 % — SIGNIFICANT CHANGE UP (ref 0–6)
GLUCOSE SERPL-MCNC: 103 MG/DL — HIGH (ref 70–99)
GLUCOSE UR QL: NEGATIVE MG/DL — SIGNIFICANT CHANGE UP
HCT VFR BLD CALC: 45.6 % — SIGNIFICANT CHANGE UP (ref 39–50)
HGB BLD-MCNC: 16.1 G/DL — SIGNIFICANT CHANGE UP (ref 13–17)
IMM GRANULOCYTES NFR BLD AUTO: 0.2 % — SIGNIFICANT CHANGE UP (ref 0–1.5)
INR BLD: 1.2 RATIO — HIGH (ref 0.88–1.16)
KETONES UR-MCNC: NEGATIVE — SIGNIFICANT CHANGE UP
LEUKOCYTE ESTERASE UR-ACNC: ABNORMAL
LYMPHOCYTES # BLD AUTO: 0.63 K/UL — LOW (ref 1–3.3)
LYMPHOCYTES # BLD AUTO: 10.8 % — LOW (ref 13–44)
MAGNESIUM SERPL-MCNC: 1.9 MG/DL — SIGNIFICANT CHANGE UP (ref 1.6–2.6)
MCHC RBC-ENTMCNC: 32.5 PG — SIGNIFICANT CHANGE UP (ref 27–34)
MCHC RBC-ENTMCNC: 35.3 GM/DL — SIGNIFICANT CHANGE UP (ref 32–36)
MCV RBC AUTO: 92.1 FL — SIGNIFICANT CHANGE UP (ref 80–100)
MONOCYTES # BLD AUTO: 0.7 K/UL — SIGNIFICANT CHANGE UP (ref 0–0.9)
MONOCYTES NFR BLD AUTO: 11.9 % — SIGNIFICANT CHANGE UP (ref 2–14)
NEUTROPHILS # BLD AUTO: 4.41 K/UL — SIGNIFICANT CHANGE UP (ref 1.8–7.4)
NEUTROPHILS NFR BLD AUTO: 75.2 % — SIGNIFICANT CHANGE UP (ref 43–77)
NITRITE UR-MCNC: NEGATIVE — SIGNIFICANT CHANGE UP
NRBC # BLD: 0 /100 WBCS — SIGNIFICANT CHANGE UP (ref 0–0)
PH UR: 6 — SIGNIFICANT CHANGE UP (ref 5–8)
PLATELET # BLD AUTO: 196 K/UL — SIGNIFICANT CHANGE UP (ref 150–400)
POTASSIUM SERPL-MCNC: 4.1 MMOL/L — SIGNIFICANT CHANGE UP (ref 3.5–5.3)
POTASSIUM SERPL-SCNC: 4.1 MMOL/L — SIGNIFICANT CHANGE UP (ref 3.5–5.3)
PROT SERPL-MCNC: 7.3 G/DL — SIGNIFICANT CHANGE UP (ref 6–8.3)
PROT UR-MCNC: NEGATIVE MG/DL — SIGNIFICANT CHANGE UP
PROTHROM AB SERPL-ACNC: 13.1 SEC — HIGH (ref 10–12.9)
RBC # BLD: 4.95 M/UL — SIGNIFICANT CHANGE UP (ref 4.2–5.8)
RBC # FLD: 13.8 % — SIGNIFICANT CHANGE UP (ref 10.3–14.5)
SODIUM SERPL-SCNC: 138 MMOL/L — SIGNIFICANT CHANGE UP (ref 135–145)
SP GR SPEC: 1.02 — SIGNIFICANT CHANGE UP (ref 1.01–1.02)
UROBILINOGEN FLD QL: NEGATIVE MG/DL — SIGNIFICANT CHANGE UP
WBC # BLD: 5.86 K/UL — SIGNIFICANT CHANGE UP (ref 3.8–10.5)
WBC # FLD AUTO: 5.86 K/UL — SIGNIFICANT CHANGE UP (ref 3.8–10.5)

## 2019-08-04 PROCEDURE — 85610 PROTHROMBIN TIME: CPT

## 2019-08-04 PROCEDURE — 80053 COMPREHEN METABOLIC PANEL: CPT

## 2019-08-04 PROCEDURE — 81001 URINALYSIS AUTO W/SCOPE: CPT

## 2019-08-04 PROCEDURE — 99283 EMERGENCY DEPT VISIT LOW MDM: CPT | Mod: 25

## 2019-08-04 PROCEDURE — 36415 COLL VENOUS BLD VENIPUNCTURE: CPT

## 2019-08-04 PROCEDURE — 83735 ASSAY OF MAGNESIUM: CPT

## 2019-08-04 PROCEDURE — 85730 THROMBOPLASTIN TIME PARTIAL: CPT

## 2019-08-04 PROCEDURE — 99283 EMERGENCY DEPT VISIT LOW MDM: CPT

## 2019-08-04 PROCEDURE — 85027 COMPLETE CBC AUTOMATED: CPT

## 2019-08-04 RX ORDER — ALPRAZOLAM 0.25 MG
0.25 TABLET ORAL ONCE
Refills: 0 | Status: DISCONTINUED | OUTPATIENT
Start: 2019-08-04 | End: 2019-08-04

## 2019-08-04 RX ADMIN — Medication 0.25 MILLIGRAM(S): at 14:31

## 2019-08-04 NOTE — ED PROVIDER NOTE - NEUROLOGICAL, MLM
Alert and oriented, no focal deficits, no motor or sensory deficits. + resting tremor, normal rapid alternating movement.

## 2019-08-04 NOTE — ED PROVIDER NOTE - CLINICAL SUMMARY MEDICAL DECISION MAKING FREE TEXT BOX
male present with ongoign tremor x months, has appt for cardio tomotorrow, saw PCP for same on friday and will make appt for naeuologist, likely parkinsons. Labs, EKG, f/up. male present with ongoing tremor x months, has appt for cardio tomorrow, saw PCP for same on Friday and will make appt for neurologist, likely parkinson's. Labs, EKG, f/up.

## 2019-08-04 NOTE — ED PROVIDER NOTE - CHPI ED SYMPTOMS NEG
no vomiting/no confusion/no change in level of consciousness/no blurred vision/no loss of consciousness/no nausea/no numbness/no weakness/no dizziness/no fever

## 2019-08-04 NOTE — ED PROVIDER NOTE - OBJECTIVE STATEMENT
85 yo male with h/o a-fib, HTN, anxiety present to ED c/o right side of body tremor. Has seen his PCP Dr. Watt for same on Friday. Per patient this has been ongoing x months. Has not gone to neurologist. When he visited his PCP on Friday she recc f/up neurologist. Patient was recently in [place] with his wife. Wife is still in [place]. Per son, patient suffers from anxiety. Denies chest pain, shortness of breath, headache, dizziness, lightheadedness.

## 2019-08-04 NOTE — ED ADULT TRIAGE NOTE - CHIEF COMPLAINT QUOTE
84 yr. male with c/o right sided tremors at 1220 last night lasting most of night.  Pt. states he has periods of tremors "for a long time".

## 2019-08-04 NOTE — ED PROVIDER NOTE - NSFOLLOWUPINSTRUCTIONS_ED_ALL_ED_FT
Follow up with your PCP this week. Make an appointment with Neurology. Your INR is 1.2 today. Follow up with PCP or cardiologist to titrate your INR.

## 2019-08-04 NOTE — ED PROVIDER NOTE - PROGRESS NOTE DETAILS
[facility] for Dr. Bejarano: 85 y/o male with a PMHx of Anxiety, Tremors, Afib, HTN presents to the ED c/o tremors. Pt saw PMD Dr. Howard this week for same. was told to make appointment with neurologist for further eval, probable Parkinson's, so pt came to the ED instead. Complaining of anxiety since his wife left several months ago. Tremor in right arm which has been bothering him, has appointment tomorrow with cardiologist Dr. Martinez. Recently restarted coumadin for Afib after returning from trip to [place]. Denies HA, visual disturbances, fever, chest pain, SOB, n/v or other symptoms. PE: Vitals are stable. Heart rate is 100. Irregular. No distress. HEENT is normal. Heart s1 s2 irregular, Neuro is mild tremor at rest, lips and right hand otherwise normal. Impression is tremor, likely Parkinson's needs out patient work up and anxiety. May need psych eval but does not want in hospital but will follow up in the coming week. Will check INR because pt is on AC. [facility] for Dr. Bejarano: 85 y/o male with a PMHx of Anxiety, Tremors, Afib, HTN presents to the ED c/o tremors. Pt saw PMD Dr. Cooper this week for same. was told to make appointment with neurologist for further eval, probable Parkinson's, so pt came to the ED instead. Complaining of anxiety since his wife left several months ago. Tremor in right arm which has been bothering him, has appointment tomorrow with cardiologist Dr. Martinez. Recently restarted coumadin for Afib after returning from trip to [place]. Denies HA, visual disturbances, fever, chest pain, SOB, n/v or other symptoms. PE: Vitals are stable. Heart rate is 100. Irregular. No distress. HEENT is normal. Heart s1 s2 irregular, Neuro is mild tremor at rest, lips and right hand otherwise normal. Impression is tremor, likely Parkinson's needs out patient work up and anxiety. May need psych eval but does not want in hospital but will follow up in the coming week. Will check INR because pt is on AC.

## 2019-08-07 ENCOUNTER — APPOINTMENT (OUTPATIENT)
Dept: NEUROLOGY | Facility: CLINIC | Age: 84
End: 2019-08-07
Payer: MEDICARE

## 2019-08-07 VITALS
WEIGHT: 188 LBS | HEART RATE: 94 BPM | HEIGHT: 69.5 IN | BODY MASS INDEX: 27.22 KG/M2 | DIASTOLIC BLOOD PRESSURE: 82 MMHG | SYSTOLIC BLOOD PRESSURE: 124 MMHG

## 2019-08-07 PROCEDURE — 99205 OFFICE O/P NEW HI 60 MIN: CPT

## 2019-08-07 NOTE — PHYSICAL EXAM
[FreeTextEntry1] : Examination:\par Constitutional: normal, no apparent distress\par Eyes: normal conjunctiva b/l, no ptosis, visual fields full\par Respiratory: no respiratory distress, normal effort, normal auscultation\par Cardiovascular: normal rate, rhythm, no murmurs\par Neck: supple, no masses\par Vascular: carotids normal\par Skin: normal color, no rashes\par Psych: normal mood, affect\par \par Neurological:\par Memory: normal memory, oriented to person, place, time\par Language intact/no aphasia\par Cranial Nerves: II-XII intact, Pupils equally round and reactive to light, ocular muscles/movements intact, no ptosis, no facial weakness, tongue protrudes normally in the midline,. Speech is somewhat gravelly. Mild hypophonia.\par Motor: normal tone, no pronator drift, full strength in all four extremities in the proximal and distal muscle groups\par Coordination: + rest tremor in right side as well as jaw tremor. Pill rolling quality, slightly inc in frequency. Dec. amplitude and precision in rapid alternating movements. Finger nose finger intact. No cogwheel rigidity.\par Sensory: intact to light touch, joint position sense, dec vibration in feet, negative Romberg examination\par DTRs: symmetric, 2+ in b/l triceps, 2+ in b/l biceps, 2+ in b/l brachioradialis, 1+ in bilateral patellars, 1+ in bilateral Achilles, Babinskis negative bilaterally\par Gait: narrow based, steady, able to walk on heels, toes, tandem gait\par \par

## 2019-08-07 NOTE — DATA REVIEWED
[de-identified] : CT brain 1/5/19:\par No acute intracranial hemorrhage, mass effect, or CT evidence of a large \par vascular territory infarct. 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 \par Unchanged sub-cm sella/suprasellar lesion, likely pituitary adenoma. \par Follow-up with MRI as clinically indicated. \par

## 2019-08-07 NOTE — CONSULT LETTER
[Dear  ___] : Dear  [unfilled], [Consult Letter:] : I had the pleasure of evaluating your patient, [unfilled]. [Please see my note below.] : Please see my note below. [Consult Closing:] : Thank you very much for allowing me to participate in the care of this patient.  If you have any questions, please do not hesitate to contact me. [FreeTextEntry2] : Allison Gracia [FreeTextEntry3] : Sincerely,\par \par \par Michelle Rasheed MD\par Diplomate, [employer] Neu[employer]\par Board Certified in the Subspecialty of Clinical Neurophysiology\par Board Certified in the Subspecialty of Sleep Medicine\par Board Certified in the Subspecialty of Epilepsy\par

## 2019-08-07 NOTE — DISCUSSION/SUMMARY
[FreeTextEntry1] : Mr. Hendricks is an 84 year old man presenting with right arm and jaw tremor.\par The right arm tremor is present at rest.\par There is also decreased amplitude/precision of rapid alternating movements and mild hypophonia. \par His gait is intact at this time.\par I am concerned about early Parkinson's Disease.\par He is not on any medications that would cause a secondary parkinsonism.\par I am ordering MRI brain to rule out any structural causes of parkinsonism.\par \par His symptoms are bothersome to him so I think a trial of medication would be worthwhile.\par I am prescribing carbidopa/levodopa 25/100 mg. He should start with 1 pill per day for 3-4 days, then 1 pill BID x 3-4 days and then 1 pill TID.\par We discussed common side effects.\par It is important to space out the dosing during waking hours (for example 7 AM, 12 PM, 5 PM).\par \par We discussed the importance of daily exercise.\par \par He should call if he has any problems with the medication. \par I will follow up with him in about one month, sooner if needed.

## 2019-08-07 NOTE — HISTORY OF PRESENT ILLNESS
[FreeTextEntry1] : He reports having some trembling in his right hand and his jaw when he lies on his right hand.\par He has noticed these symptoms over the last 7-8 months. \par He only notices the tremor in his right hand. There is no tremor in the left. The tremor does not bother him as much with activity. It is more evident at rest.\par He notices that the tremor calms down with alcohol.\par He does not notice any change in the tremor with caffeine.\par He says that once in a while his voice will get squeaky and low in volume.\par He has no difficulty in swallowing.\par He denies having any problems with sense of smell.\par He denies having any episodes of dream enactment behavior.\par He does little walking normally. 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 \par He feels that his memory is impaired. \par \par He was on Tranxene many years ago but not in 25 years.\par He has no history of neuroleptic use.

## 2019-08-07 NOTE — REVIEW OF SYSTEMS
[Dizziness] : dizziness [Negative] : Musculoskeletal [de-identified] : difficulty concentrating [de-identified] : tremors

## 2019-08-19 ENCOUNTER — FORM ENCOUNTER (OUTPATIENT)
Age: 84
End: 2019-08-19

## 2019-08-20 ENCOUNTER — APPOINTMENT (OUTPATIENT)
Dept: MRI IMAGING | Facility: CLINIC | Age: 84
End: 2019-08-20
Payer: MEDICARE

## 2019-08-20 ENCOUNTER — OUTPATIENT (OUTPATIENT)
Dept: OUTPATIENT SERVICES | Facility: HOSPITAL | Age: 84
LOS: 1 days | End: 2019-08-20
Payer: MEDICARE

## 2019-08-20 ENCOUNTER — APPOINTMENT (OUTPATIENT)
Dept: NEUROLOGY | Facility: CLINIC | Age: 84
End: 2019-08-20
Payer: MEDICARE

## 2019-08-20 VITALS
DIASTOLIC BLOOD PRESSURE: 90 MMHG | HEART RATE: 80 BPM | HEIGHT: 69 IN | WEIGHT: 188 LBS | SYSTOLIC BLOOD PRESSURE: 158 MMHG | BODY MASS INDEX: 27.85 KG/M2

## 2019-08-20 DIAGNOSIS — G20 PARKINSON'S DISEASE: ICD-10-CM

## 2019-08-20 DIAGNOSIS — G93.40 ENCEPHALOPATHY, UNSPECIFIED: ICD-10-CM

## 2019-08-20 PROCEDURE — 99213 OFFICE O/P EST LOW 20 MIN: CPT

## 2019-08-20 PROCEDURE — 70551 MRI BRAIN STEM W/O DYE: CPT

## 2019-08-20 PROCEDURE — 70551 MRI BRAIN STEM W/O DYE: CPT | Mod: 26

## 2019-08-20 PROCEDURE — 95816 EEG AWAKE AND DROWSY: CPT

## 2019-08-20 NOTE — HISTORY OF PRESENT ILLNESS
[FreeTextEntry1] : Mr. Hendricks was last seen on 8/7/19.\par He came to the office today asking to be seen for worsening symptoms.\par He states that eh has been feeling a little weak and reports that his head feels foggy. he states that he is not himself and he feels as if his brain is working more slowly than usual.\par He says that his vision is out of focus.\par He has been sleeping.\par He has been taking a medication for dizziness which he got in [place]. He is not sure of the name. \par He did not start the Sinemet because he was worried about side effects when he is home alone. His wife is still in [place]. She is due back at the end of the month. \par He reports feeling depressed and anxious but says that maybe he is just lonely.\par He had his MRI brain earlier today.

## 2019-08-20 NOTE — PHYSICAL EXAM
[FreeTextEntry1] : Examination:\par Constitutional: normal, no apparent distress\par Eyes: normal conjunctiva b/l, no ptosis, visual fields full\par Respiratory: no respiratory distress, normal effort, normal auscultation\par Cardiovascular: normal rate, rhythm, no murmurs\par Neck: supple, no masses\par Vascular: carotids normal\par Skin: normal color, no rashes\par Psych: normal mood, affect\par \par Neurological:\par Memory: normal memory, oriented to person, place, time\par Language intact/no aphasia\par Cranial Nerves: II-XII intact, Pupils equally round and reactive to light, ocular muscles/movements intact, no ptosis, no facial weakness, tongue protrudes normally in the midline, speech gravelly\par Motor: normal tone, no pronator drift, full strength in all four extremities in the proximal and distal muscle groups\par Coordination:+ rest tremor in right side as well as jaw tremor. Pill rolling quality, slightly inc in frequency. Dec. amplitude and precision in rapid alternating movements. Finger nose finger intact. No cogwheel rigidity.\par Sensory: intact to light touch\par DTRs: symmetric, normal\par Gait: narrow based, steady\par

## 2019-08-22 ENCOUNTER — OTHER (OUTPATIENT)
Age: 84
End: 2019-08-22

## 2019-08-23 ENCOUNTER — APPOINTMENT (OUTPATIENT)
Dept: NEUROLOGY | Facility: CLINIC | Age: 84
End: 2019-08-23

## 2019-09-29 ENCOUNTER — TRANSCRIPTION ENCOUNTER (OUTPATIENT)
Age: 84
End: 2019-09-29

## 2019-10-08 ENCOUNTER — APPOINTMENT (OUTPATIENT)
Dept: FAMILY MEDICINE | Facility: CLINIC | Age: 84
End: 2019-10-08
Payer: MEDICARE

## 2019-10-08 VITALS
DIASTOLIC BLOOD PRESSURE: 86 MMHG | BODY MASS INDEX: 26.07 KG/M2 | WEIGHT: 176 LBS | HEART RATE: 76 BPM | SYSTOLIC BLOOD PRESSURE: 142 MMHG | HEIGHT: 69 IN | RESPIRATION RATE: 15 BRPM | OXYGEN SATURATION: 98 %

## 2019-10-08 PROCEDURE — 99214 OFFICE O/P EST MOD 30 MIN: CPT

## 2019-10-08 RX ORDER — TRIAMCINOLONE ACETONIDE 1 MG/G
0.1 OINTMENT TOPICAL
Qty: 1 | Refills: 1 | Status: COMPLETED | COMMUNITY
Start: 2019-04-30 | End: 2019-10-08

## 2019-10-08 RX ORDER — SERTRALINE 25 MG/1
25 TABLET, FILM COATED ORAL DAILY
Qty: 30 | Refills: 1 | Status: COMPLETED | COMMUNITY
Start: 2019-08-20 | End: 2019-10-08

## 2019-10-08 RX ORDER — FLUTICASONE PROPIONATE 50 UG/1
50 SPRAY, METERED NASAL
Refills: 0 | Status: COMPLETED | COMMUNITY
End: 2019-10-08

## 2019-10-08 NOTE — HISTORY OF PRESENT ILLNESS
[FreeTextEntry8] : Patient is here today for an acute visit. \par He has been complaining of difficulty sleeping.\par He finds that his mind is racing at night. \par He does admit to feeling a little anxious and depressed at times.\par He was recently diagnosed with Parkinsons and has been fearful of starting the medication so he has not done so yet. \par He tried tylenol PM last night and he had the best sleep he has had in months. \par His tremors seem to be getting worse, in his hand and lips. \par \par He also mentions that he has been having some difficulty with bowel movements.  He started using colace once per day and he has been more regular.

## 2019-10-08 NOTE — PHYSICAL EXAM
[No Acute Distress] : no acute distress [Well Nourished] : well nourished [Well Developed] : well developed [Normal Sclera/Conjunctiva] : normal sclera/conjunctiva [PERRL] : pupils equal round and reactive to light [Normal Oropharynx] : the oropharynx was normal [No Lymphadenopathy] : no lymphadenopathy [Normal TMs] : both tympanic membranes were normal [Supple] : supple [No Respiratory Distress] : no respiratory distress  [No Accessory Muscle Use] : no accessory muscle use [Clear to Auscultation] : lungs were clear to auscultation bilaterally [Normal Rate] : normal rate  [Regular Rhythm] : with a regular rhythm [Soft] : abdomen soft [Normal S1, S2] : normal S1 and S2 [Non-distended] : non-distended [Non Tender] : non-tender [Normal Bowel Sounds] : normal bowel sounds [No Spinal Tenderness] : no spinal tenderness [Grossly Normal Strength/Tone] : grossly normal strength/tone [Normal Gait] : normal gait [Normal Affect] : the affect was normal [Normal Insight/Judgement] : insight and judgment were intact [de-identified] : resting tremor in right hand, lip tremors

## 2019-10-08 NOTE — ASSESSMENT
[FreeTextEntry1] : Anxiety/ depression\par - did not do well trying zoloft\par - prefers to not take any medication for this at this time\par \par Constipation\par - doing well on colace\par - continue once daily and increase fluids and fiber in diet\par \par Parkinsonism\par - diagnosed recently at neurology\par - was fearful of starting medication - Sinimet\par - discussed with patient that this medication would be very beneficial for numerous symptoms\par - he plans to start this weekend when he is off from work\par \par Insomnia\par - likely a side effect of parkinsons and anxiety\par - recommend starting the sinimet asap\par - can use tylenol PM once in awhile as needed since it helped tremendously\par - would consider another trial period on anti-depressants when patient is ready

## 2019-10-08 NOTE — REVIEW OF SYSTEMS
[Constipation] : constipation [Insomnia] : insomnia [Anxiety] : anxiety [Depression] : depression [Negative] : Integumentary [de-identified] : tremors in hands and lips

## 2019-10-15 ENCOUNTER — APPOINTMENT (OUTPATIENT)
Dept: FAMILY MEDICINE | Facility: CLINIC | Age: 84
End: 2019-10-15
Payer: MEDICARE

## 2019-10-15 ENCOUNTER — OTHER (OUTPATIENT)
Age: 84
End: 2019-10-15

## 2019-10-15 VITALS
DIASTOLIC BLOOD PRESSURE: 80 MMHG | OXYGEN SATURATION: 97 % | BODY MASS INDEX: 26.07 KG/M2 | WEIGHT: 176 LBS | HEIGHT: 69 IN | RESPIRATION RATE: 16 BRPM | SYSTOLIC BLOOD PRESSURE: 110 MMHG | HEART RATE: 81 BPM

## 2019-10-15 DIAGNOSIS — G47.00 INSOMNIA, UNSPECIFIED: ICD-10-CM

## 2019-10-15 PROCEDURE — 99214 OFFICE O/P EST MOD 30 MIN: CPT

## 2019-10-15 RX ORDER — CARBIDOPA AND LEVODOPA 25; 100 MG/1; MG/1
25-100 TABLET ORAL
Qty: 90 | Refills: 2 | Status: COMPLETED | COMMUNITY
Start: 2019-08-07 | End: 2019-10-15

## 2019-10-15 NOTE — PHYSICAL EXAM
[No Acute Distress] : no acute distress [Well Nourished] : well nourished [Well Developed] : well developed [Normal Sclera/Conjunctiva] : normal sclera/conjunctiva [PERRL] : pupils equal round and reactive to light [Normal Outer Ear/Nose] : the outer ears and nose were normal in appearance [Normal Oropharynx] : the oropharynx was normal [No Lymphadenopathy] : no lymphadenopathy [Supple] : supple [No Respiratory Distress] : no respiratory distress  [No Accessory Muscle Use] : no accessory muscle use [Clear to Auscultation] : lungs were clear to auscultation bilaterally [Normal Rate] : normal rate  [Normal S1, S2] : normal S1 and S2 [Non Tender] : non-tender [Soft] : abdomen soft [Non-distended] : non-distended [Normal Bowel Sounds] : normal bowel sounds [Grossly Normal Strength/Tone] : grossly normal strength/tone [No Focal Deficits] : no focal deficits [No Rash] : no rash [Normal Gait] : normal gait [Normal Affect] : the affect was normal [Normal Insight/Judgement] : insight and judgment were intact [de-identified] : tremor

## 2019-10-15 NOTE — HISTORY OF PRESENT ILLNESS
[FreeTextEntry1] : follow up  [de-identified] : Patient is here today for follow up to discuss medication side effects. \par He tried the sinimet over the weekend and had a bad reaction.\par He developed ringing in the ears, numbness around his mouth and tension headaches. \par He had some soreness in his legs as well. \par He stopped taking it and has started to feel a little better but he is feeling very anxious now. \par

## 2019-10-15 NOTE — ASSESSMENT
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

## 2019-10-15 NOTE — REVIEW OF SYSTEMS
[Muscle Pain] : muscle pain [Headache] : headache [Anxiety] : anxiety [Negative] : Integumentary [FreeTextEntry4] : tinnitus

## 2019-10-23 ENCOUNTER — TRANSCRIPTION ENCOUNTER (OUTPATIENT)
Age: 84
End: 2019-10-23

## 2019-10-31 ENCOUNTER — APPOINTMENT (OUTPATIENT)
Dept: NEUROLOGY | Facility: CLINIC | Age: 84
End: 2019-10-31
Payer: MEDICARE

## 2019-10-31 VITALS
BODY MASS INDEX: 25.92 KG/M2 | HEIGHT: 69 IN | HEART RATE: 79 BPM | SYSTOLIC BLOOD PRESSURE: 129 MMHG | DIASTOLIC BLOOD PRESSURE: 90 MMHG | WEIGHT: 175 LBS

## 2019-10-31 PROCEDURE — 99214 OFFICE O/P EST MOD 30 MIN: CPT

## 2019-10-31 NOTE — PHYSICAL EXAM
[FreeTextEntry1] : Examination:\par Constitutional: normal, no apparent distress\par Eyes: normal conjunctiva b/l, no ptosis, visual fields full\par Respiratory: no respiratory distress, normal effort, normal auscultation\par Cardiovascular: normal rate, rhythm, no murmurs\par Neck: supple, no masses\par Vascular: carotids normal\par Skin: normal color, no rashes\par Psych: normal mood, affect\par \par Neurological:\par Memory: normal memory, oriented to person, place, time\par Language intact/no aphasia\par Cranial Nerves: Pupils equally round and reactive to light, ocular muscles/movements intact, no ptosis, no facial weakness, tongue protrudes normally in the midline, hypophonia, mild masked facies\par Motor: + bradykinesia, no pronator drift, full strength in all four extremities in the proximal and distal muscle groups\par Coordination: + rest tremor on right, jaw tremor, slight cogwheeling, + bradykinesia, decreased amplitude of rapid alternating movements.\par Sensory: intact to light touch\par DTRs: symmetric, normal\par Gait: narrow based, steady\par

## 2019-10-31 NOTE — DISCUSSION/SUMMARY
[FreeTextEntry1] : Mr. Hendricks is an 84 year old man presenting with right arm and jaw tremor.\par The right arm tremor is present at rest.\par His exam is suggestive of Parkinson's Disease.\par \par He took Sinemet 25/100 once and felt strange so discontinued it.\par Very low dose Ropinirole has been started.\par He feels some benefit with increased energy. There have been some potential side effects (aggressive thoughts) which we will have to keep an eye on.\par We talked at length about potential side effects including compulsive behavior. I will question him on this at follow-up visits since there is a prior history of gambling.\par \par Continue ropinirole 0.25 mg TID. Will continue this low dose for another month and then gradually increase by 0.25 mg/day each week:\par 0.5-0.25-0.25\par 0.5-0.5-0.25\par 0.5-0.5-0.5\par \par He wakes up at 6 AM. Suggested dosage times are 6 AM- 12 PM - 6 PM.\par At this time, his motor function is not significantly impaired and he can continue to drive.\par \par Increase daily exercise.\par I will follow up with him in about two months. He should call before that time with any questions/concerns.

## 2019-10-31 NOTE — DATA REVIEWED
[de-identified] : MRI brain 8/20/19:\par IMPRESSION: mild periventricular, deep and subcortical white matter ischemia. 5 mm anterior \par medial RIGHT frontal meningioma noted, unchanged. Tiny old lacunar infarction seen in the RIGHT \par cerebellum. 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 Global atrophy. [de-identified] : Routine EEG 8/20/19: normal\par  [de-identified] : CT brain 1/5/19:\par No acute intracranial hemorrhage, mass effect, or CT evidence of a large \par vascular territory infarct. 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 \par Unchanged sub-cm sella/suprasellar lesion, likely pituitary adenoma. \par Follow-up with MRI as clinically indicated. \par

## 2019-10-31 NOTE — HISTORY OF PRESENT ILLNESS
[FreeTextEntry1] : I last saw Mr. Hendricks on 19.\par \par He tried Sinemet once. He felt as if he was "enclosed in a shell" but his mind was fully active and in control. He did not take a second dose.\par \par He has started ropinirole 0.25 mg TID.\par He says that sometimes he wakes up feeling like he is 20 years old.\par He is not sure if he has improvement in his tremors.\par He did have some aggressive thoughts since starting the medication but he did not act on them.\par He thinks this is improving. \par He says that he feels a little dizzy once in a while, but was diagnosed with vertigo in the past.\par Sometimes he hears a phrase and then it continues to repeat in his mind. He was able to stop it after saying a prayer. He has not had any other compulsive behavior.\par He does say that he likes to play horses. About 12 years ago he lost 250,000 in gambling after his wife passed away. He says that he is in complete control of this and he was intentionally trying to get rid of the money after his wife .\par \par He continues to drive a cab.\par He is not having any difficulty driving.\par He feels calmer when he is driving.\par \par He is sleeping well at night. He denies feeling sleepy during the day.\par His wife has not told him that he snores, but he thinks that he does snore.\par He is not aware of any dream enactment behavior.\par \par He denies having any significant loss of balance or falls. At times he feels "tipsy".\par \par He feels a pulsation in his face when he wakes up. He is describing this as a tremor.\par He feels that his palate has increased sensitivity and sometimes touch brings out the tremor.\par He feels better after urinating.\par \par He reports that his mood is "acceptance". He denies any significant depression or anxiety.\par

## 2019-11-22 ENCOUNTER — APPOINTMENT (OUTPATIENT)
Dept: FAMILY MEDICINE | Facility: CLINIC | Age: 84
End: 2019-11-22
Payer: MEDICARE

## 2019-11-22 VITALS
RESPIRATION RATE: 16 BRPM | DIASTOLIC BLOOD PRESSURE: 80 MMHG | SYSTOLIC BLOOD PRESSURE: 130 MMHG | HEART RATE: 83 BPM | BODY MASS INDEX: 27.25 KG/M2 | HEIGHT: 69 IN | OXYGEN SATURATION: 96 % | WEIGHT: 184 LBS

## 2019-11-22 DIAGNOSIS — B49 UNSPECIFIED MYCOSIS: ICD-10-CM

## 2019-11-22 DIAGNOSIS — Z86.69 PERSONAL HISTORY OF OTHER DISEASES OF THE NERVOUS SYSTEM AND SENSE ORGANS: ICD-10-CM

## 2019-11-22 DIAGNOSIS — B02.9 ZOSTER W/OUT COMPLICATIONS: ICD-10-CM

## 2019-11-22 DIAGNOSIS — Z76.89 PERSONS ENCOUNTERING HEALTH SERVICES IN OTHER SPECIFIED CIRCUMSTANCES: ICD-10-CM

## 2019-11-22 LAB — INR PPP: 2.6 RATIO

## 2019-11-22 PROCEDURE — 99213 OFFICE O/P EST LOW 20 MIN: CPT | Mod: 25

## 2019-11-22 PROCEDURE — 85610 PROTHROMBIN TIME: CPT | Mod: QW

## 2019-11-22 RX ORDER — TADALAFIL 5 MG/1
5 TABLET ORAL
Qty: 15 | Refills: 0 | Status: COMPLETED | COMMUNITY
Start: 2019-05-22 | End: 2019-11-22

## 2019-11-22 NOTE — ASSESSMENT
[FreeTextEntry1] : Afib\par - well controlled\par - INR today 2.6\par - continue with 1mg coumadin \par - follow up in one month for INR check\par \par Shingles\par - start valtrex \par - follow up if not improving \par \par Parkinsonism\par - following with Dr. Rasheed\par - started on ropinerole and doing better

## 2019-11-22 NOTE — HISTORY OF PRESENT ILLNESS
[FreeTextEntry1] : follow up  [de-identified] : Patient is here today for follow up. \par He needed an INR check. \par His cardiologist no longer does office INR checks.  \par He has bruising on his arms and his last INR two weeks ago was 4.01.  He held his coumadin for two days and has resumed his normal regimen as advised.\par He also notes that the back of his shoulder/ shoulder blade area on the left has been burning intermittently and he believes there is a rash there.  He has been putting cream on it but it does not seem to help.  He noticed it about 5 days ago.

## 2019-11-22 NOTE — REVIEW OF SYSTEMS
[de-identified] : bruising on arms, burning sensation and mild rash on right shoulder blade [Negative] : Musculoskeletal [de-identified] : hand tremor, lip tremor

## 2019-11-22 NOTE — PHYSICAL EXAM
[No Acute Distress] : no acute distress [Well Nourished] : well nourished [Normal Sclera/Conjunctiva] : normal sclera/conjunctiva [Well Developed] : well developed [PERRL] : pupils equal round and reactive to light [Normal Outer Ear/Nose] : the outer ears and nose were normal in appearance [Normal Oropharynx] : the oropharynx was normal [Normal TMs] : both tympanic membranes were normal [No Lymphadenopathy] : no lymphadenopathy [Supple] : supple [No Respiratory Distress] : no respiratory distress  [No Accessory Muscle Use] : no accessory muscle use [Normal Rate] : normal rate  [Clear to Auscultation] : lungs were clear to auscultation bilaterally [Normal S1, S2] : normal S1 and S2 [Soft] : abdomen soft [Non Tender] : non-tender [Non-distended] : non-distended [Normal Bowel Sounds] : normal bowel sounds [Grossly Normal Strength/Tone] : grossly normal strength/tone [No Focal Deficits] : no focal deficits [Normal Gait] : normal gait [Normal Affect] : the affect was normal [Normal Insight/Judgement] : insight and judgment were intact [de-identified] : mild rash on upper right back, no blistering - bruising on bilateral forearms

## 2019-12-19 ENCOUNTER — APPOINTMENT (OUTPATIENT)
Dept: FAMILY MEDICINE | Facility: CLINIC | Age: 84
End: 2019-12-19
Payer: MEDICARE

## 2019-12-19 LAB — INR PPP: 1.5 RATIO

## 2019-12-19 PROCEDURE — 85610 PROTHROMBIN TIME: CPT | Mod: QW

## 2019-12-19 PROCEDURE — 99214 OFFICE O/P EST MOD 30 MIN: CPT | Mod: 25

## 2019-12-19 RX ORDER — CETIRIZINE HYDROCHLORIDE 10 MG/1
10 TABLET, COATED ORAL
Qty: 30 | Refills: 3 | Status: COMPLETED | COMMUNITY
Start: 2019-07-30 | End: 2019-12-19

## 2019-12-20 VITALS
OXYGEN SATURATION: 96 % | HEART RATE: 89 BPM | RESPIRATION RATE: 16 BRPM | WEIGHT: 184 LBS | HEIGHT: 69 IN | BODY MASS INDEX: 27.25 KG/M2 | DIASTOLIC BLOOD PRESSURE: 78 MMHG | SYSTOLIC BLOOD PRESSURE: 132 MMHG

## 2019-12-20 NOTE — HISTORY OF PRESENT ILLNESS
[FreeTextEntry1] : follow up [de-identified] : Patient is here today for follow up.\par He needed INR check - 1.5 today.\par He also mentions that he has been having some vertigo symptoms for the past day. \par He had meclizine at home and used it but it was .  \par He also has a rash on his arm that is itchy and bumpy.  He has not used any new soaps, clothes, detergent.

## 2019-12-20 NOTE — PHYSICAL EXAM
[No Acute Distress] : no acute distress [Well Nourished] : well nourished [Well Developed] : well developed [Normal Sclera/Conjunctiva] : normal sclera/conjunctiva [PERRL] : pupils equal round and reactive to light [Normal Outer Ear/Nose] : the outer ears and nose were normal in appearance [EOMI] : extraocular movements intact [Normal Oropharynx] : the oropharynx was normal [Normal TMs] : both tympanic membranes were normal [No Lymphadenopathy] : no lymphadenopathy [Supple] : supple [No Respiratory Distress] : no respiratory distress  [No Accessory Muscle Use] : no accessory muscle use [Clear to Auscultation] : lungs were clear to auscultation bilaterally [Normal Rate] : normal rate  [Regular Rhythm] : with a regular rhythm [Normal S1, S2] : normal S1 and S2 [Soft] : abdomen soft [Non Tender] : non-tender [Non-distended] : non-distended [Normal Bowel Sounds] : normal bowel sounds [Grossly Normal Strength/Tone] : grossly normal strength/tone [No Focal Deficits] : no focal deficits [Normal Gait] : normal gait [Normal Affect] : the affect was normal [Normal Insight/Judgement] : insight and judgment were intact [de-identified] : papular rash on upper left arm

## 2019-12-20 NOTE — ASSESSMENT
[FreeTextEntry1] : Afib\par - INR 1.5\par - take 2mg tonight and tomorrow, then resume 1mg daily\par - follow up in 2 weeks for repeat\par \par Vertigo\par - renew meclizine\par \par Dermatitis\par - steroid cream\par - moisturize twice daily

## 2019-12-26 ENCOUNTER — RX RENEWAL (OUTPATIENT)
Age: 84
End: 2019-12-26

## 2020-01-03 ENCOUNTER — APPOINTMENT (OUTPATIENT)
Dept: FAMILY MEDICINE | Facility: CLINIC | Age: 85
End: 2020-01-03
Payer: MEDICARE

## 2020-01-03 VITALS
RESPIRATION RATE: 16 BRPM | DIASTOLIC BLOOD PRESSURE: 76 MMHG | HEIGHT: 69 IN | HEART RATE: 93 BPM | OXYGEN SATURATION: 97 % | SYSTOLIC BLOOD PRESSURE: 122 MMHG | WEIGHT: 184 LBS | BODY MASS INDEX: 27.25 KG/M2

## 2020-01-03 LAB — INR PPP: 2.1 RATIO

## 2020-01-03 PROCEDURE — 85610 PROTHROMBIN TIME: CPT | Mod: QW

## 2020-01-03 PROCEDURE — 99212 OFFICE O/P EST SF 10 MIN: CPT | Mod: 25

## 2020-01-16 ENCOUNTER — APPOINTMENT (OUTPATIENT)
Dept: FAMILY MEDICINE | Facility: CLINIC | Age: 85
End: 2020-01-16
Payer: MEDICARE

## 2020-01-16 VITALS
DIASTOLIC BLOOD PRESSURE: 76 MMHG | HEART RATE: 80 BPM | RESPIRATION RATE: 16 BRPM | WEIGHT: 190 LBS | BODY MASS INDEX: 28.14 KG/M2 | OXYGEN SATURATION: 97 % | SYSTOLIC BLOOD PRESSURE: 114 MMHG | HEIGHT: 69 IN

## 2020-01-16 LAB — INR PPP: 2 RATIO

## 2020-01-16 PROCEDURE — 85610 PROTHROMBIN TIME: CPT | Mod: QW

## 2020-01-16 PROCEDURE — 99212 OFFICE O/P EST SF 10 MIN: CPT | Mod: 25

## 2020-01-20 ENCOUNTER — RX RENEWAL (OUTPATIENT)
Age: 85
End: 2020-01-20

## 2020-01-21 ENCOUNTER — RX RENEWAL (OUTPATIENT)
Age: 85
End: 2020-01-21

## 2020-01-30 ENCOUNTER — APPOINTMENT (OUTPATIENT)
Dept: FAMILY MEDICINE | Facility: CLINIC | Age: 85
End: 2020-01-30

## 2020-01-30 ENCOUNTER — APPOINTMENT (OUTPATIENT)
Dept: FAMILY MEDICINE | Facility: CLINIC | Age: 85
End: 2020-01-30
Payer: MEDICARE

## 2020-01-30 VITALS
RESPIRATION RATE: 16 BRPM | WEIGHT: 190 LBS | BODY MASS INDEX: 28.14 KG/M2 | TEMPERATURE: 98.3 F | OXYGEN SATURATION: 97 % | DIASTOLIC BLOOD PRESSURE: 60 MMHG | SYSTOLIC BLOOD PRESSURE: 90 MMHG | HEIGHT: 69 IN

## 2020-01-30 LAB — INR PPP: 2.4 RATIO

## 2020-01-30 PROCEDURE — 85610 PROTHROMBIN TIME: CPT | Mod: QW

## 2020-01-30 PROCEDURE — 99212 OFFICE O/P EST SF 10 MIN: CPT | Mod: 25

## 2020-01-30 RX ORDER — VALACYCLOVIR 1 G/1
1 TABLET, FILM COATED ORAL 3 TIMES DAILY
Qty: 21 | Refills: 0 | Status: COMPLETED | COMMUNITY
Start: 2019-11-22 | End: 2020-01-30

## 2020-02-06 ENCOUNTER — APPOINTMENT (OUTPATIENT)
Dept: NEUROLOGY | Facility: CLINIC | Age: 85
End: 2020-02-06
Payer: MEDICARE

## 2020-02-06 VITALS
DIASTOLIC BLOOD PRESSURE: 90 MMHG | WEIGHT: 190 LBS | HEIGHT: 69 IN | BODY MASS INDEX: 28.14 KG/M2 | SYSTOLIC BLOOD PRESSURE: 124 MMHG | HEART RATE: 101 BPM

## 2020-02-06 PROCEDURE — 99213 OFFICE O/P EST LOW 20 MIN: CPT

## 2020-02-06 NOTE — DATA REVIEWED
[de-identified] : MRI brain 8/20/19:\par IMPRESSION: mild periventricular, deep and subcortical white matter ischemia. 5 mm anterior \par medial RIGHT frontal meningioma noted, unchanged. Tiny old lacunar infarction seen in the RIGHT \par cerebellum. 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 Global atrophy. [de-identified] : Routine EEG 8/20/19: normal\par  [de-identified] : CT brain 1/5/19:\par No acute intracranial hemorrhage, mass effect, or CT evidence of a large \par vascular territory infarct. 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 \par Unchanged sub-cm sella/suprasellar lesion, likely pituitary adenoma. \par Follow-up with MRI as clinically indicated. \par

## 2020-02-06 NOTE — DISCUSSION/SUMMARY
[FreeTextEntry1] : Mr. Hendricks is an 84 year old man who initially presented with right arm and jaw tremor.\par His exam is suggestive of Parkinson's Disease.\par \par He took Sinemet 25/100 once and felt strange so discontinued it.\par \par He is tolerating Ropinirole and it seems to be providing benefit.\par Review of the notes show that he had rash prior to start of ropinirole.\par \par Will continue Ropinirole 0.5 mg TID.\par Will change pills from 0.25 to 0.5 mg.\par \par Advised again to increase exercise.\par \par Can use hard candy/lollipops to help with excess salivation.\par \par f/u 3 months, sooner if needed

## 2020-02-06 NOTE — HISTORY OF PRESENT ILLNESS
[FreeTextEntry1] : I last saw Mr. Hendricks on 10/31/19.\par He says that he is doing pretty well.\par He says that he is taking Ropinirole diligently. He is taking 0.5 mg TID.\par He feels that his voice is improved. He notes mild improvement in his tremors.\par \par He has intermittent vertigo. He has been taking meclizine. It mostly occurs when he lies down in bed.\par His balance is pretty good when he is not having symptoms of vertigo.\par \par He is not aware of any side effects from Ropinirole.\par He is watching more television. He denies having any compulsive behaviors.\par \par He takes 1/4 of a pill of Tylenol PM when he cannot sleep.\par He is not aware of any dream enactment.\par His wife has not mentioned any recent abnormal movements of his legs during sleep.

## 2020-02-06 NOTE — PHYSICAL EXAM
[FreeTextEntry1] : Examination:\par Constitutional: normal, no apparent distress\par Eyes: normal conjunctiva b/l, no ptosis, visual fields full\par Respiratory: no respiratory distress, normal effort, normal auscultation\par Cardiovascular: normal rate, rhythm, no murmurs\par Neck: supple, no masses\par Vascular: carotids normal\par Skin: patchy raised areas on lower abdomen and back\par Psych: normal mood, affect\par \par Neurological:\par Memory: normal memory, oriented to person, place, time\par Language intact/no aphasia\par Cranial Nerves: Pupils equally round and reactive to light, ocular muscles/movements intact, no ptosis, no facial weakness, tongue protrudes normally in the midline, hypophonia, mild masked facies. Gravelly voice.\par Motor: + bradykinesia, no pronator drift, full strength in all four extremities in the proximal and distal muscle groups\par Coordination: + rest tremor on right, jaw tremor, slight cogwheeling, + bradykinesia, decreased amplitude of rapid alternating movements.\par Sensory: intact to light touch\par DTRs: symmetric, normal\par Gait: narrow based, steady

## 2020-02-12 ENCOUNTER — RX RENEWAL (OUTPATIENT)
Age: 85
End: 2020-02-12

## 2020-02-13 ENCOUNTER — APPOINTMENT (OUTPATIENT)
Dept: FAMILY MEDICINE | Facility: CLINIC | Age: 85
End: 2020-02-13
Payer: MEDICARE

## 2020-02-13 VITALS
HEART RATE: 87 BPM | SYSTOLIC BLOOD PRESSURE: 124 MMHG | RESPIRATION RATE: 16 BRPM | HEIGHT: 69 IN | TEMPERATURE: 97.7 F | WEIGHT: 192 LBS | OXYGEN SATURATION: 98 % | BODY MASS INDEX: 28.44 KG/M2 | DIASTOLIC BLOOD PRESSURE: 80 MMHG

## 2020-02-13 LAB — INR PPP: 2.4 RATIO

## 2020-02-13 PROCEDURE — 85610 PROTHROMBIN TIME: CPT | Mod: QW

## 2020-02-13 PROCEDURE — 99212 OFFICE O/P EST SF 10 MIN: CPT | Mod: 25

## 2020-02-13 RX ORDER — ROPINIROLE 0.25 MG/1
0.25 TABLET, FILM COATED ORAL
Qty: 180 | Refills: 2 | Status: COMPLETED | COMMUNITY
Start: 2019-10-15 | End: 2020-02-13

## 2020-02-13 RX ORDER — AMLODIPINE BESYLATE 5 MG/1
5 TABLET ORAL
Refills: 0 | Status: COMPLETED | COMMUNITY
End: 2020-02-13

## 2020-02-13 RX ORDER — ALPRAZOLAM 0.25 MG/1
0.25 TABLET ORAL
Qty: 10 | Refills: 0 | Status: COMPLETED | COMMUNITY
Start: 2019-08-05 | End: 2020-02-13

## 2020-02-15 ENCOUNTER — NON-APPOINTMENT (OUTPATIENT)
Age: 85
End: 2020-02-15

## 2020-02-27 ENCOUNTER — APPOINTMENT (OUTPATIENT)
Dept: FAMILY MEDICINE | Facility: CLINIC | Age: 85
End: 2020-02-27
Payer: MEDICARE

## 2020-02-27 LAB — INR PPP: 2.2 RATIO

## 2020-02-27 PROCEDURE — 99212 OFFICE O/P EST SF 10 MIN: CPT | Mod: 25

## 2020-02-27 PROCEDURE — 85610 PROTHROMBIN TIME: CPT | Mod: QW

## 2020-03-17 ENCOUNTER — RX RENEWAL (OUTPATIENT)
Age: 85
End: 2020-03-17

## 2020-03-19 ENCOUNTER — APPOINTMENT (OUTPATIENT)
Dept: FAMILY MEDICINE | Facility: CLINIC | Age: 85
End: 2020-03-19

## 2020-03-23 ENCOUNTER — RX RENEWAL (OUTPATIENT)
Age: 85
End: 2020-03-23

## 2020-03-31 ENCOUNTER — APPOINTMENT (OUTPATIENT)
Dept: INTERNAL MEDICINE | Facility: CLINIC | Age: 85
End: 2020-03-31

## 2020-04-01 ENCOUNTER — RX RENEWAL (OUTPATIENT)
Age: 85
End: 2020-04-01

## 2020-04-02 ENCOUNTER — RX RENEWAL (OUTPATIENT)
Age: 85
End: 2020-04-02

## 2020-04-20 ENCOUNTER — APPOINTMENT (OUTPATIENT)
Dept: FAMILY MEDICINE | Facility: CLINIC | Age: 85
End: 2020-04-20
Payer: MEDICARE

## 2020-04-20 PROCEDURE — 99442: CPT

## 2020-04-29 ENCOUNTER — NON-APPOINTMENT (OUTPATIENT)
Age: 85
End: 2020-04-29

## 2020-05-14 ENCOUNTER — APPOINTMENT (OUTPATIENT)
Dept: FAMILY MEDICINE | Facility: CLINIC | Age: 85
End: 2020-05-14
Payer: MEDICARE

## 2020-05-14 ENCOUNTER — RESULT CHARGE (OUTPATIENT)
Age: 85
End: 2020-05-14

## 2020-05-14 VITALS
BODY MASS INDEX: 29.33 KG/M2 | HEART RATE: 110 BPM | RESPIRATION RATE: 16 BRPM | OXYGEN SATURATION: 97 % | WEIGHT: 198 LBS | SYSTOLIC BLOOD PRESSURE: 130 MMHG | DIASTOLIC BLOOD PRESSURE: 90 MMHG | HEIGHT: 69 IN

## 2020-05-14 PROCEDURE — 99214 OFFICE O/P EST MOD 30 MIN: CPT | Mod: 25

## 2020-05-14 PROCEDURE — 36415 COLL VENOUS BLD VENIPUNCTURE: CPT

## 2020-05-14 PROCEDURE — 85610 PROTHROMBIN TIME: CPT | Mod: QW

## 2020-05-14 RX ORDER — PREDNISONE 10 MG/1
10 TABLET ORAL
Qty: 12 | Refills: 0 | Status: DISCONTINUED | COMMUNITY
Start: 2020-04-20 | End: 2020-05-01

## 2020-05-14 RX ORDER — BETAMETHASONE DIPROPIONATE 0.5 MG/G
0.05 CREAM, AUGMENTED TOPICAL
Qty: 50 | Refills: 0 | Status: DISCONTINUED | COMMUNITY
Start: 2019-05-14 | End: 2020-03-14

## 2020-05-14 NOTE — HISTORY OF PRESENT ILLNESS
[Hypertension] : Hypertension [Patient was last seen on ___] : Patient was last seen on [unfilled] [<130/90] : Target blood pressure is  <130/90 [Checks BP Sporadically] : The patient checks ~his/her~ blood pressure sporadically [Target goal met] : BP target goal met [FreeTextEntry6] : HTN stopped medication by Dr Gracia according to patient.\par BP has been high recently and he restarted medication\par Parkinsonism has follow up with Neurologist next week.\par A Fib on Coumadin on 1 mg daily. last INR 1.4\par Dermatitis seen by dermatologist, Bx showed eczema [de-identified] : 527/67

## 2020-05-14 NOTE — ASSESSMENT
[FreeTextEntry1] : Afib\par INR 1.8\par 1mg 5 days a week 2 mg Saturday and Sunday.\par follow up in 2-3 weeks for repeat\par Eczema\par  seen by dermatologist, Bx showed eczema\par steroid cream\par moisturize twice daily\par HTN: BP has been high recently and he restarted medication\par Parkinsonism has follow up with Neurologist next week.\par \par

## 2020-05-14 NOTE — PHYSICAL EXAM
[No Acute Distress] : no acute distress [Well Nourished] : well nourished [Well Developed] : well developed [PERRL] : pupils equal round and reactive to light [Normal Sclera/Conjunctiva] : normal sclera/conjunctiva [EOMI] : extraocular movements intact [Normal Outer Ear/Nose] : the outer ears and nose were normal in appearance [Normal Oropharynx] : the oropharynx was normal [Normal TMs] : both tympanic membranes were normal [No Lymphadenopathy] : no lymphadenopathy [Supple] : supple [No Respiratory Distress] : no respiratory distress  [No Accessory Muscle Use] : no accessory muscle use [Clear to Auscultation] : lungs were clear to auscultation bilaterally [Normal Rate] : normal rate  [Regular Rhythm] : with a regular rhythm [Normal S1, S2] : normal S1 and S2 [Soft] : abdomen soft [Non Tender] : non-tender [Normal Bowel Sounds] : normal bowel sounds [Non-distended] : non-distended [Grossly Normal Strength/Tone] : grossly normal strength/tone [No Focal Deficits] : no focal deficits [Normal Affect] : the affect was normal [Normal Gait] : normal gait [Normal Insight/Judgement] : insight and judgment were intact [de-identified] : papular rash on upper left arm

## 2020-05-18 ENCOUNTER — APPOINTMENT (OUTPATIENT)
Dept: NEUROLOGY | Facility: CLINIC | Age: 85
End: 2020-05-18
Payer: MEDICARE

## 2020-05-18 PROCEDURE — 99442: CPT

## 2020-05-19 LAB
ALBUMIN SERPL ELPH-MCNC: 4.3 G/DL
ALP BLD-CCNC: 58 U/L
ALT SERPL-CCNC: 20 U/L
ANION GAP SERPL CALC-SCNC: 14 MMOL/L
AST SERPL-CCNC: 19 U/L
BASOPHILS # BLD AUTO: 0.09 K/UL
BASOPHILS NFR BLD AUTO: 1.4 %
BILIRUB SERPL-MCNC: 0.6 MG/DL
BUN SERPL-MCNC: 17 MG/DL
CALCIUM SERPL-MCNC: 9.4 MG/DL
CHLORIDE SERPL-SCNC: 106 MMOL/L
CHOLEST SERPL-MCNC: 181 MG/DL
CHOLEST/HDLC SERPL: 4 RATIO
CO2 SERPL-SCNC: 23 MMOL/L
CREAT SERPL-MCNC: 1.1 MG/DL
EOSINOPHIL # BLD AUTO: 0.24 K/UL
EOSINOPHIL NFR BLD AUTO: 3.8 %
ESTIMATED AVERAGE GLUCOSE: 111 MG/DL
GLUCOSE SERPL-MCNC: 120 MG/DL
HBA1C MFR BLD HPLC: 5.5 %
HCT VFR BLD CALC: 49.2 %
HDLC SERPL-MCNC: 45 MG/DL
HGB BLD-MCNC: 16.1 G/DL
IMM GRANULOCYTES NFR BLD AUTO: 0.3 %
INR PPP: 1.8 RATIO
LDLC SERPL CALC-MCNC: 112 MG/DL
LYMPHOCYTES # BLD AUTO: 0.91 K/UL
LYMPHOCYTES NFR BLD AUTO: 14.5 %
MAN DIFF?: NORMAL
MCHC RBC-ENTMCNC: 30.8 PG
MCHC RBC-ENTMCNC: 32.7 GM/DL
MCV RBC AUTO: 94.1 FL
MONOCYTES # BLD AUTO: 0.62 K/UL
MONOCYTES NFR BLD AUTO: 9.9 %
NEUTROPHILS # BLD AUTO: 4.38 K/UL
NEUTROPHILS NFR BLD AUTO: 70.1 %
PLATELET # BLD AUTO: 234 K/UL
POCT-PROTHROMBIN TIME: 21.6 SECS
POTASSIUM SERPL-SCNC: 4.2 MMOL/L
PROT SERPL-MCNC: 7.3 G/DL
RBC # BLD: 5.23 M/UL
RBC # FLD: 15.2 %
SODIUM SERPL-SCNC: 143 MMOL/L
TRIGL SERPL-MCNC: 121 MG/DL
WBC # FLD AUTO: 6.26 K/UL

## 2020-06-19 ENCOUNTER — APPOINTMENT (OUTPATIENT)
Dept: INTERNAL MEDICINE | Facility: CLINIC | Age: 85
End: 2020-06-19
Payer: MEDICARE

## 2020-06-19 ENCOUNTER — LABORATORY RESULT (OUTPATIENT)
Age: 85
End: 2020-06-19

## 2020-06-19 VITALS
HEIGHT: 69 IN | TEMPERATURE: 98.1 F | WEIGHT: 200 LBS | OXYGEN SATURATION: 96 % | DIASTOLIC BLOOD PRESSURE: 70 MMHG | BODY MASS INDEX: 29.62 KG/M2 | SYSTOLIC BLOOD PRESSURE: 124 MMHG | HEART RATE: 68 BPM

## 2020-06-19 DIAGNOSIS — L29.9 PRURITUS, UNSPECIFIED: ICD-10-CM

## 2020-06-19 DIAGNOSIS — Z87.2 PERSONAL HISTORY OF DISEASES OF THE SKIN AND SUBCUTANEOUS TISSUE: ICD-10-CM

## 2020-06-19 DIAGNOSIS — Z11.59 ENCOUNTER FOR SCREENING FOR OTHER VIRAL DISEASES: ICD-10-CM

## 2020-06-19 LAB
INR PPP: 1.6 RATIO
POCT-PROTHROMBIN TIME: 19 SECS

## 2020-06-19 PROCEDURE — 36415 COLL VENOUS BLD VENIPUNCTURE: CPT

## 2020-06-19 PROCEDURE — 99214 OFFICE O/P EST MOD 30 MIN: CPT | Mod: 25

## 2020-06-19 PROCEDURE — G0444 DEPRESSION SCREEN ANNUAL: CPT

## 2020-06-19 PROCEDURE — 85610 PROTHROMBIN TIME: CPT | Mod: QW

## 2020-06-21 LAB
ALBUMIN SERPL ELPH-MCNC: 4.6 G/DL
ALP BLD-CCNC: 63 U/L
ALT SERPL-CCNC: 34 U/L
ANION GAP SERPL CALC-SCNC: 18 MMOL/L
AST SERPL-CCNC: 31 U/L
B BURGDOR IGG+IGM SER QL IB: NORMAL
BASOPHILS # BLD AUTO: 0.1 K/UL
BASOPHILS NFR BLD AUTO: 1.9 %
BILIRUB SERPL-MCNC: 0.7 MG/DL
BUN SERPL-MCNC: 15 MG/DL
CALCIUM SERPL-MCNC: 9.6 MG/DL
CHLORIDE SERPL-SCNC: 104 MMOL/L
CO2 SERPL-SCNC: 21 MMOL/L
CREAT SERPL-MCNC: 1.16 MG/DL
EOSINOPHIL # BLD AUTO: 0.31 K/UL
EOSINOPHIL NFR BLD AUTO: 5.9 %
ERYTHROCYTE [SEDIMENTATION RATE] IN BLOOD BY WESTERGREN METHOD: 35 MM/HR
GLUCOSE SERPL-MCNC: 103 MG/DL
HCT VFR BLD CALC: 53.1 %
HGB BLD-MCNC: 16.4 G/DL
IMM GRANULOCYTES NFR BLD AUTO: 0.4 %
LYMPHOCYTES # BLD AUTO: 0.72 K/UL
LYMPHOCYTES NFR BLD AUTO: 13.7 %
MAN DIFF?: NORMAL
MCHC RBC-ENTMCNC: 30.7 PG
MCHC RBC-ENTMCNC: 30.9 GM/DL
MCV RBC AUTO: 99.3 FL
MONOCYTES # BLD AUTO: 0.62 K/UL
MONOCYTES NFR BLD AUTO: 11.8 %
NEUTROPHILS # BLD AUTO: 3.49 K/UL
NEUTROPHILS NFR BLD AUTO: 66.3 %
PLATELET # BLD AUTO: 240 K/UL
POTASSIUM SERPL-SCNC: 4.5 MMOL/L
PROT SERPL-MCNC: 7.4 G/DL
RBC # BLD: 5.35 M/UL
RBC # FLD: 15.8 %
SARS-COV-2 IGG SERPL IA-ACNC: 0.1 INDEX
SARS-COV-2 IGG SERPL QL IA: NEGATIVE
SODIUM SERPL-SCNC: 143 MMOL/L
WBC # FLD AUTO: 5.26 K/UL

## 2020-06-21 NOTE — REVIEW OF SYSTEMS
[Negative] : Psychiatric [Fever] : no fever [Fatigue] : no fatigue [Chills] : no chills [Nasal Discharge] : no nasal discharge [Recent Change In Weight] : ~T no recent weight change [Earache] : no earache [Sore Throat] : no sore throat [Chest Pain] : no chest pain [Palpitations] : no palpitations [Shortness Of Breath] : no shortness of breath [Wheezing] : no wheezing [Lower Ext Edema] : no lower extremity edema [Nausea] : no nausea [Cough] : no cough [Dyspnea on Exertion] : not dyspnea on exertion [Abdominal Pain] : no abdominal pain [Diarrhea] : no diarrhea [Vomiting] : no vomiting [Anxiety] : no anxiety [Depression] : no depression [de-identified] : see HPI

## 2020-06-21 NOTE — REVIEW OF SYSTEMS
[Negative] : Psychiatric [Chills] : no chills [Fever] : no fever [Fatigue] : no fatigue [Recent Change In Weight] : ~T no recent weight change [Earache] : no earache [Nasal Discharge] : no nasal discharge [Chest Pain] : no chest pain [Palpitations] : no palpitations [Sore Throat] : no sore throat [Lower Ext Edema] : no lower extremity edema [Shortness Of Breath] : no shortness of breath [Wheezing] : no wheezing [Cough] : no cough [Dyspnea on Exertion] : not dyspnea on exertion [Abdominal Pain] : no abdominal pain [Nausea] : no nausea [Vomiting] : no vomiting [Diarrhea] : no diarrhea [Anxiety] : no anxiety [Depression] : no depression [de-identified] : see HPI

## 2020-06-21 NOTE — HISTORY OF PRESENT ILLNESS
[FreeTextEntry8] : Here report he has itching and rash in his groin.  He states he has tried OTC jock itch creams.  he states groin area is still itchy\par \par He states about 3 months ago he saw dermatologist and had bx on left lower side/flank area which got infected.  He sattes he was treated with antibiotics and it got better.   He states he has an oval red area in the dame area now and wonders to if could be infected.  He denies pin, fever/chills, discharge\par \par He states his arms are also very itchy from eczema.  he states he scratches so much he bruising because he is on Coumadin.  He states his dermatologist used to prescribed him triamcinolone 0.1% cream in a jar and he states this was only thing that did help a little

## 2020-06-21 NOTE — PHYSICAL EXAM
[No Acute Distress] : no acute distress [Normal Voice/Communication] : normal voice/communication [Well-Appearing] : well-appearing [Normal Oropharynx] : the oropharynx was normal [PERRL] : pupils equal round and reactive to light [Normal Sclera/Conjunctiva] : normal sclera/conjunctiva [Thyroid Normal, No Nodules] : the thyroid was normal and there were no nodules present [Supple] : supple [No Lymphadenopathy] : no lymphadenopathy [No JVD] : no jugular venous distention [Clear to Auscultation] : lungs were clear to auscultation bilaterally [No Accessory Muscle Use] : no accessory muscle use [No Respiratory Distress] : no respiratory distress  [Regular Rhythm] : with a regular rhythm [Normal S1, S2] : normal S1 and S2 [Normal Rate] : normal rate  [No Murmur] : no murmur heard [No Palpable Aorta] : no palpable aorta [No Edema] : there was no peripheral edema [Non Tender] : non-tender [Non-distended] : non-distended [Soft] : abdomen soft [No Extremity Clubbing/Cyanosis] : no extremity clubbing/cyanosis [No HSM] : no HSM [No Masses] : no abdominal mass palpated [Normal Bowel Sounds] : normal bowel sounds [No Spinal Tenderness] : no spinal tenderness [No Focal Deficits] : no focal deficits [No Joint Swelling] : no joint swelling [Alert and Oriented x3] : oriented to person, place, and time [Normal Insight/Judgement] : insight and judgment were intact [Normal Affect] : the affect was normal [Normal Mood] : the mood was normal [de-identified] : Left groin area he has a small area of erythema and skin appears a littl irritated.  ON B/L forearms he has erythema slightly eared papular skin lesion which appears dry.  He has some areas of eccymoses on B/L arms when he has been screatching.  Left lower flank area he has a 5 cm x 4 cm ovar area of erythema, minimally earmth, no tenderness, no fluctuance

## 2020-06-21 NOTE — ASSESSMENT
[FreeTextEntry1] : \par Parkinsons Disease:\par -he is followed by Neurology\par -he is on ropinirole\par \par Erythematous patch on left flank area\par -not clear if related to biopsy this far out\par -he does not know if he could have had a tick bite or spider bite\par -will check labs including lyme-I did adv he return to office if 6 weeks for repeat Lyme testing\par -he may have mild cellulitis\par -will tx with doxycycline 100mg PO BIS x 10 days  (R/B/A/side effects discussed including pills esophagitis and photosensitivity)\par \par Tinea cruris:\par -will tx with Nystatin /triamcinolone cream BIS x 1-2 weeks\par \par Eczema with chronic pruritus and scratching causing ecchymosis of arms\par -will refill triamcinolone cream ).1 % to affected areas BID prn\par -he should f/u with his dermatologist\par -I advise he use claritin 10mg daily prn for itching\par \par Vertigo:\par -uses meclizine prn\par \par Atrial fibrillation:\par -he denies chest pain, SOB, palpitations\par -he is currently rate controlled\par -INR today was low at 1.6\par -he is taking warfarin 1mg QHS-(appears he was previously told he needed to take 2 mg on St/Sun which he is not doing\par -I did adv he increase Coumadin to 2mg QHS on Friday and Saturday (starting today) and all other days 1mg QHS\par -he has appt to see Dr Paz next week and can have repeat INR done then\par \par HTN:\par -BP at goal on amlodipine\par \par HCM:\par \par CPE-has appt next week with DR Paz\par \par Depression screenin2020 PHQ 2 score 0\par \par Flu shot: he refuses\par \par Pneumovax-will discuss next week at CPE appt\par \par Shingles vaccine: will discuss next week at CPE appt\par \par Covid antibody test offered-he consented to testing\par \par F/U next week for CPE with DR Paz\par \par

## 2020-06-21 NOTE — PHYSICAL EXAM
[No Acute Distress] : no acute distress [Well-Appearing] : well-appearing [Normal Voice/Communication] : normal voice/communication [Normal Sclera/Conjunctiva] : normal sclera/conjunctiva [Normal Oropharynx] : the oropharynx was normal [PERRL] : pupils equal round and reactive to light [Supple] : supple [Thyroid Normal, No Nodules] : the thyroid was normal and there were no nodules present [No JVD] : no jugular venous distention [No Lymphadenopathy] : no lymphadenopathy [No Accessory Muscle Use] : no accessory muscle use [No Respiratory Distress] : no respiratory distress  [Clear to Auscultation] : lungs were clear to auscultation bilaterally [Normal Rate] : normal rate  [Normal S1, S2] : normal S1 and S2 [Regular Rhythm] : with a regular rhythm [No Murmur] : no murmur heard [No Palpable Aorta] : no palpable aorta [No Edema] : there was no peripheral edema [Soft] : abdomen soft [No Extremity Clubbing/Cyanosis] : no extremity clubbing/cyanosis [Non Tender] : non-tender [Non-distended] : non-distended [Normal Bowel Sounds] : normal bowel sounds [No HSM] : no HSM [No Masses] : no abdominal mass palpated [No Spinal Tenderness] : no spinal tenderness [No Focal Deficits] : no focal deficits [No Joint Swelling] : no joint swelling [Normal Insight/Judgement] : insight and judgment were intact [Alert and Oriented x3] : oriented to person, place, and time [Normal Affect] : the affect was normal [Normal Mood] : the mood was normal [de-identified] : Left groin area he has a small area of erythema and skin appears a littl irritated.  ON B/L forearms he has erythema slightly eared papular skin lesion which appears dry.  He has some areas of eccymoses on B/L arms when he has been screatching.  Left lower flank area he has a 5 cm x 4 cm ovar area of erythema, minimally earmth, no tenderness, no fluctuance

## 2020-06-25 ENCOUNTER — NON-APPOINTMENT (OUTPATIENT)
Age: 85
End: 2020-06-25

## 2020-06-25 ENCOUNTER — APPOINTMENT (OUTPATIENT)
Dept: FAMILY MEDICINE | Facility: CLINIC | Age: 85
End: 2020-06-25
Payer: MEDICARE

## 2020-06-25 VITALS
WEIGHT: 195 LBS | SYSTOLIC BLOOD PRESSURE: 128 MMHG | BODY MASS INDEX: 29.55 KG/M2 | OXYGEN SATURATION: 95 % | TEMPERATURE: 98.7 F | DIASTOLIC BLOOD PRESSURE: 78 MMHG | HEART RATE: 71 BPM | HEIGHT: 68 IN

## 2020-06-25 LAB — INR PPP: 1.7 RATIO

## 2020-06-25 PROCEDURE — G0442 ANNUAL ALCOHOL SCREEN 15 MIN: CPT

## 2020-06-25 PROCEDURE — 93000 ELECTROCARDIOGRAM COMPLETE: CPT | Mod: 59

## 2020-06-25 PROCEDURE — G0438: CPT

## 2020-06-25 PROCEDURE — 85610 PROTHROMBIN TIME: CPT | Mod: QW

## 2020-06-25 NOTE — HEALTH RISK ASSESSMENT
[Very Good] : ~his/her~ current health as very good [] : No [Yes] : Yes [1 or 2 (0 pts)] : 1 or 2 (0 points) [2 - 4 times a month (2 pts)] : 2-4 times a month (2 points) [Never (0 pts)] : Never (0 points) [No falls in past year] : Patient reported no falls in the past year [0] : 1) Little interest or pleasure doing things: Not at all (0) [Audit-CScore] : 2 [[id number]] : 0 [Language] : denies difficulty with language [Change in mental status noted] : No change in mental status noted [With Family] : lives with family [] :  [Feels Safe at Home] : Feels safe at home [Fully functional (bathing, dressing, toileting, transferring, walking, feeding)] : Fully functional (bathing, dressing, toileting, transferring, walking, feeding) [Fully functional (using the telephone, shopping, preparing meals, housekeeping, doing laundry, using] : Fully functional and needs no help or supervision to perform IADLs (using the telephone, shopping, preparing meals, housekeeping, doing laundry, using transportation, managing medications and managing finances) [Reports changes in vision] : Reports no changes in vision [Reports changes in hearing] : Reports no changes in hearing [Reports changes in dental health] : Reports no changes in dental health [Smoke Detector] : smoke detector [Carbon Monoxide Detector] : carbon monoxide detector [Sunscreen] : uses sunscreen [Seat Belt] :  uses seat belt [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

## 2020-06-25 NOTE — HISTORY OF PRESENT ILLNESS
[FreeTextEntry1] : Annual [de-identified] : Mr. AURORA SIBLEY is a 85 year old male presenting for an annual\par He has been following regularly with his cardiologist Dr. Martinez.  He has his coumadin checked regularly as he has atrial fibrillation.  His last INR was 2.4 and he is taking 1 mg of Coumadin daily.  \par He takes amlodipine for hypertension and his BP has been well controlled. \par He does mention also that he has had a fungus on his feet that has been bothering him for awhile.  He saw a doctor for this and was given a cream that has been helping but has not cured the problem.  His insurance no longer wants to pay for the cream. \par \par

## 2020-06-25 NOTE — PHYSICAL EXAM
[Well Nourished] : well nourished [No Acute Distress] : no acute distress [Well Developed] : well developed [Normal Sclera/Conjunctiva] : normal sclera/conjunctiva [Well-Appearing] : well-appearing [PERRL] : pupils equal round and reactive to light [Normal Outer Ear/Nose] : the outer ears and nose were normal in appearance [EOMI] : extraocular movements intact [Normal Oropharynx] : the oropharynx was normal [Normal TMs] : both tympanic membranes were normal [Supple] : supple [No Lymphadenopathy] : no lymphadenopathy [Thyroid Normal, No Nodules] : the thyroid was normal and there were no nodules present [No Respiratory Distress] : no respiratory distress  [Clear to Auscultation] : lungs were clear to auscultation bilaterally [No Accessory Muscle Use] : no accessory muscle use [Normal Rate] : normal rate  [Normal S1, S2] : normal S1 and S2 [Pedal Pulses Present] : the pedal pulses are present [Soft] : abdomen soft [No Edema] : there was no peripheral edema [Non-distended] : non-distended [No Masses] : no abdominal mass palpated [Non Tender] : non-tender [No HSM] : no HSM [Normal Bowel Sounds] : normal bowel sounds [Normal Posterior Cervical Nodes] : no posterior cervical lymphadenopathy [Normal Anterior Cervical Nodes] : no anterior cervical lymphadenopathy [No Spinal Tenderness] : no spinal tenderness [Normal Gait] : normal gait [No Focal Deficits] : no focal deficits [Grossly Normal Strength/Tone] : grossly normal strength/tone [Normal Insight/Judgement] : insight and judgment were intact [Alert and Oriented x3] : oriented to person, place, and time [Normal Affect] : the affect was normal [de-identified] : irregular rhythm  [de-identified] : red/ brown skin changes on tops of feet, toenail fungus bilateral all toes

## 2020-07-02 ENCOUNTER — APPOINTMENT (OUTPATIENT)
Dept: FAMILY MEDICINE | Facility: CLINIC | Age: 85
End: 2020-07-02
Payer: MEDICARE

## 2020-07-02 LAB — INR PPP: 1.9 RATIO

## 2020-07-02 PROCEDURE — 99212 OFFICE O/P EST SF 10 MIN: CPT | Mod: 25

## 2020-07-02 PROCEDURE — 85610 PROTHROMBIN TIME: CPT | Mod: QW

## 2020-07-16 ENCOUNTER — APPOINTMENT (OUTPATIENT)
Dept: FAMILY MEDICINE | Facility: CLINIC | Age: 85
End: 2020-07-16
Payer: MEDICARE

## 2020-07-16 LAB — INR PPP: 3.4 RATIO

## 2020-07-16 PROCEDURE — 85610 PROTHROMBIN TIME: CPT | Mod: QW

## 2020-07-16 PROCEDURE — 99212 OFFICE O/P EST SF 10 MIN: CPT | Mod: 25

## 2020-07-27 ENCOUNTER — APPOINTMENT (OUTPATIENT)
Dept: FAMILY MEDICINE | Facility: CLINIC | Age: 85
End: 2020-07-27
Payer: MEDICARE

## 2020-08-05 ENCOUNTER — APPOINTMENT (OUTPATIENT)
Dept: FAMILY MEDICINE | Facility: CLINIC | Age: 85
End: 2020-08-05
Payer: MEDICARE

## 2020-08-05 VITALS — BODY MASS INDEX: 29.55 KG/M2 | WEIGHT: 195 LBS | HEIGHT: 68 IN | HEART RATE: 70 BPM | RESPIRATION RATE: 15 BRPM

## 2020-08-05 DIAGNOSIS — B35.6 TINEA CRURIS: ICD-10-CM

## 2020-08-05 PROCEDURE — 99213 OFFICE O/P EST LOW 20 MIN: CPT | Mod: 25

## 2020-08-05 PROCEDURE — 85610 PROTHROMBIN TIME: CPT | Mod: QW

## 2020-08-05 RX ORDER — NYSTATIN AND TRIAMCINOLONE ACETONIDE 100000; 1 MG/G; MG/G
100000-0.1 CREAM TOPICAL
Qty: 1 | Refills: 0 | Status: DISCONTINUED | COMMUNITY
Start: 2020-06-19 | End: 2020-08-05

## 2020-08-05 RX ORDER — MOMETASONE FUROATE 1 MG/G
0.1 CREAM TOPICAL TWICE DAILY
Qty: 1 | Refills: 1 | Status: DISCONTINUED | COMMUNITY
Start: 2020-05-14 | End: 2020-06-05

## 2020-08-06 LAB — INR PPP: 5.7 RATIO

## 2020-08-10 ENCOUNTER — APPOINTMENT (OUTPATIENT)
Dept: FAMILY MEDICINE | Facility: CLINIC | Age: 85
End: 2020-08-10
Payer: MEDICARE

## 2020-08-10 VITALS — RESPIRATION RATE: 15 BRPM | HEIGHT: 68 IN | WEIGHT: 195 LBS | HEART RATE: 68 BPM | BODY MASS INDEX: 29.55 KG/M2

## 2020-08-10 DIAGNOSIS — S40.811S: ICD-10-CM

## 2020-08-10 LAB — INR PPP: 5 RATIO

## 2020-08-10 PROCEDURE — 85610 PROTHROMBIN TIME: CPT | Mod: QW

## 2020-08-10 PROCEDURE — 99213 OFFICE O/P EST LOW 20 MIN: CPT | Mod: 25

## 2020-08-10 NOTE — PHYSICAL EXAM
[Normal Sclera/Conjunctiva] : normal sclera/conjunctiva [Normal] : affect was normal and insight and judgment were intact [de-identified] : abrasion healing well. Wound clean.

## 2020-08-10 NOTE — ASSESSMENT
[FreeTextEntry1] : Afib\par  INR high. 5.0\par Skip 2 days then restart at  1 mg daily\par Recheck in 10 days\par Eczema on Triamcinolone\par See Dermatology. Referred to [facility] in [place]\par Abrasion:wound healing well\par Saline wet to dry\par

## 2020-08-10 NOTE — HISTORY OF PRESENT ILLNESS
[de-identified] : here for INR\par Abrasion right forearm healing well\par Eczema using Triamcinolone \par Scheduling Dermatology appointment. Reports skin is bruising more. [FreeTextEntry1] : follow up

## 2020-08-10 NOTE — REVIEW OF SYSTEMS
[Easy Bruising] : easy bruising [Easy Bleeding] : easy bleeding [de-identified] : see hpi [Negative] : Psychiatric

## 2020-08-26 ENCOUNTER — APPOINTMENT (OUTPATIENT)
Dept: FAMILY MEDICINE | Facility: CLINIC | Age: 85
End: 2020-08-26
Payer: MEDICARE

## 2020-08-26 DIAGNOSIS — Z23 ENCOUNTER FOR IMMUNIZATION: ICD-10-CM

## 2020-08-26 LAB — INR PPP: 2 RATIO

## 2020-08-26 PROCEDURE — 85610 PROTHROMBIN TIME: CPT | Mod: QW

## 2020-08-26 PROCEDURE — 90662 IIV NO PRSV INCREASED AG IM: CPT

## 2020-08-26 PROCEDURE — G0008: CPT

## 2020-08-26 PROCEDURE — 99212 OFFICE O/P EST SF 10 MIN: CPT | Mod: 25

## 2020-09-01 ENCOUNTER — RX RENEWAL (OUTPATIENT)
Age: 85
End: 2020-09-01

## 2020-09-02 ENCOUNTER — RX RENEWAL (OUTPATIENT)
Age: 85
End: 2020-09-02

## 2020-09-23 ENCOUNTER — APPOINTMENT (OUTPATIENT)
Dept: FAMILY MEDICINE | Facility: CLINIC | Age: 85
End: 2020-09-23
Payer: MEDICARE

## 2020-09-23 PROCEDURE — 85610 PROTHROMBIN TIME: CPT | Mod: QW

## 2020-09-23 PROCEDURE — 99212 OFFICE O/P EST SF 10 MIN: CPT | Mod: 25

## 2020-09-28 LAB — INR PPP: 1.9 RATIO

## 2020-10-07 ENCOUNTER — APPOINTMENT (OUTPATIENT)
Dept: NEUROLOGY | Facility: CLINIC | Age: 85
End: 2020-10-07
Payer: MEDICARE

## 2020-10-07 VITALS
SYSTOLIC BLOOD PRESSURE: 121 MMHG | WEIGHT: 190 LBS | DIASTOLIC BLOOD PRESSURE: 84 MMHG | HEIGHT: 69 IN | HEART RATE: 92 BPM | BODY MASS INDEX: 28.14 KG/M2 | TEMPERATURE: 98 F

## 2020-10-07 PROCEDURE — 99213 OFFICE O/P EST LOW 20 MIN: CPT

## 2020-10-07 NOTE — HISTORY OF PRESENT ILLNESS
[FreeTextEntry1] : I last saw Mr. Hendricks on 2/6/20.\par He also had a telephone visit on 5/18/20.\par He says that his he is feeling well.\par He thinks that the ropinirole is helping. He is taking 0.5 mg TD.\par He denies having any side effects.\par The tremors do not bother him but he becomes self conscious about his tremors when people stare at him. \par He does report some difficulty swallowing. He has been having this problem for ~ 25 years. It takes him a long time to finish a meal.\par He has vivid dreams but they are not violent. He does not have reenactment of his dreams.\par He has been taking a 20 minute walk each day.\par \par \par Sometimes if he moves too quickly in bed he feels the room spinning.\par He does take meclizine daily. He is afraid of losing his balance and falling.\par

## 2020-10-07 NOTE — PHYSICAL EXAM
[FreeTextEntry1] : \par Examination:\par Constitutional: normal, no apparent distress\par Eyes: normal conjunctiva b/l, no ptosis, visual fields full\par Respiratory: no respiratory distress, normal effort, normal auscultation\par Cardiovascular: normal rate, rhythm, no murmurs\par Neck: supple, no masses\par Vascular: carotids normal\par Skin: patchy raised areas on lower abdomen and back\par Psych: normal mood, affect\par \par Neurological:\par Memory: normal memory, oriented to person, place, time\par Language intact/no aphasia\par Cranial Nerves: Pupils equally round and reactive to light, ocular muscles/movements intact, no ptosis, no facial weakness, tongue protrudes normally in the midline, hypophonia, mild masked facies. Gravelly voice.\par Motor: + bradykinesia, no pronator drift, full strength in all four extremities in the proximal and distal muscle groups\par Coordination: + rest tremor on right, jaw tremor, slight cogwheeling, + bradykinesia, decreased amplitude of rapid alternating movements.\par Sensory: intact to light touch\par DTRs: symmetric, normal\par Gait: decreased arm swing, short steps\par

## 2020-10-07 NOTE — DISCUSSION/SUMMARY
[FreeTextEntry1] : Mr. Hendricks is an 85 year old man who initially presented with right arm and jaw tremor.\par His exam is suggestive of Parkinson's Disease.\par \par He took Sinemet 25/100 once and felt strange so discontinued it.\par \par He is tolerating Ropinirole and it seems to be providing benefit.\par Review of the notes show that he had rash prior to start of ropinirole.\par \par Continue Ropinirole 0.5 mg TID.\par Can try to increase first dose to 1 mg to see if he has improvement in symptoms. If so, can gradually increase other doses to 1 mg.\par \par Continue daily exercise.\par Can use hard candy/lollipops to help with excess salivation.\par \par Continue meclizine as needed.\par \par f/u 3-4 months, sooner if needed. \par \par

## 2020-10-07 NOTE — DATA REVIEWED
[de-identified] : MRI brain 8/20/19:\par IMPRESSION: mild periventricular, deep and subcortical white matter ischemia. 5 mm anterior \par medial RIGHT frontal meningioma noted, unchanged. Tiny old lacunar infarction seen in the RIGHT \par cerebellum. 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 Global atrophy. [de-identified] : Routine EEG 8/20/19: normal\par  [de-identified] : CT brain 1/5/19:\par No acute intracranial hemorrhage, mass effect, or CT evidence of a large \par vascular territory infarct. 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 \par Unchanged sub-cm sella/suprasellar lesion, likely pituitary adenoma. \par Follow-up with MRI as clinically indicated. \par

## 2020-10-23 ENCOUNTER — APPOINTMENT (OUTPATIENT)
Dept: FAMILY MEDICINE | Facility: CLINIC | Age: 85
End: 2020-10-23
Payer: MEDICARE

## 2020-10-23 ENCOUNTER — TRANSCRIPTION ENCOUNTER (OUTPATIENT)
Age: 85
End: 2020-10-23

## 2020-10-23 VITALS
HEART RATE: 93 BPM | TEMPERATURE: 98.2 F | OXYGEN SATURATION: 93 % | HEIGHT: 69 IN | WEIGHT: 196 LBS | BODY MASS INDEX: 29.03 KG/M2 | SYSTOLIC BLOOD PRESSURE: 100 MMHG | DIASTOLIC BLOOD PRESSURE: 60 MMHG | RESPIRATION RATE: 15 BRPM

## 2020-10-23 PROCEDURE — 99212 OFFICE O/P EST SF 10 MIN: CPT | Mod: 25

## 2020-10-23 PROCEDURE — 99072 ADDL SUPL MATRL&STAF TM PHE: CPT

## 2020-10-23 PROCEDURE — 85610 PROTHROMBIN TIME: CPT | Mod: QW

## 2020-10-26 LAB — INR PPP: 2.3 RATIO

## 2020-11-05 ENCOUNTER — APPOINTMENT (OUTPATIENT)
Dept: FAMILY MEDICINE | Facility: CLINIC | Age: 85
End: 2020-11-05
Payer: MEDICARE

## 2020-11-05 ENCOUNTER — RESULT CHARGE (OUTPATIENT)
Age: 85
End: 2020-11-05

## 2020-11-05 VITALS
BODY MASS INDEX: 29.62 KG/M2 | SYSTOLIC BLOOD PRESSURE: 104 MMHG | HEART RATE: 97 BPM | DIASTOLIC BLOOD PRESSURE: 70 MMHG | HEIGHT: 69 IN | TEMPERATURE: 98.7 F | WEIGHT: 200 LBS | RESPIRATION RATE: 16 BRPM | OXYGEN SATURATION: 94 %

## 2020-11-05 LAB
BILIRUB UR QL STRIP: NORMAL
GLUCOSE UR-MCNC: NORMAL
HCG UR QL: 1 EU/DL
HGB UR QL STRIP.AUTO: NORMAL
INR PPP: 2 RATIO
KETONES UR-MCNC: NORMAL
LEUKOCYTE ESTERASE UR QL STRIP: NORMAL
NITRITE UR QL STRIP: NORMAL
PH UR STRIP: 5.5
PROT UR STRIP-MCNC: NORMAL
SP GR UR STRIP: 1.03

## 2020-11-05 PROCEDURE — 81003 URINALYSIS AUTO W/O SCOPE: CPT | Mod: QW

## 2020-11-05 PROCEDURE — 85610 PROTHROMBIN TIME: CPT | Mod: QW

## 2020-11-05 PROCEDURE — 99214 OFFICE O/P EST MOD 30 MIN: CPT | Mod: 25

## 2020-11-05 PROCEDURE — 99072 ADDL SUPL MATRL&STAF TM PHE: CPT

## 2020-11-05 NOTE — ASSESSMENT
[FreeTextEntry1] : Hematuria: hold Coumadin for 2 days then restart\par Recheck INR in 2 weeks.\par Advised to see cardiology, ref given\par Reports he saw Dermatologist last week for rash on testicles he is being treated for a fungus. Using anti fungal.\par

## 2020-11-05 NOTE — REVIEW OF SYSTEMS
[Easy Bleeding] : easy bleeding [Easy Bruising] : easy bruising [Negative] : Psychiatric [de-identified] : see hpi

## 2020-11-05 NOTE — HISTORY OF PRESENT ILLNESS
[FreeTextEntry8] : hematuria last week. Went to  they gave him antibiotics. patient reports he stopped the medication for a few days and restarted Coumadin Monday 4 days ago.\par He states his urine is sometimes pink and sometimes bright red.\par He is not sure when he saw his cardiologist last.

## 2020-11-20 ENCOUNTER — APPOINTMENT (OUTPATIENT)
Dept: FAMILY MEDICINE | Facility: CLINIC | Age: 85
End: 2020-11-20
Payer: MEDICARE

## 2020-11-20 VITALS
BODY MASS INDEX: 29.62 KG/M2 | HEART RATE: 58 BPM | TEMPERATURE: 98.6 F | HEIGHT: 69 IN | DIASTOLIC BLOOD PRESSURE: 80 MMHG | SYSTOLIC BLOOD PRESSURE: 124 MMHG | WEIGHT: 200 LBS | OXYGEN SATURATION: 95 % | RESPIRATION RATE: 16 BRPM

## 2020-11-20 LAB — INR PPP: 2.8 RATIO

## 2020-11-20 PROCEDURE — 85610 PROTHROMBIN TIME: CPT | Mod: QW

## 2020-11-20 PROCEDURE — 99214 OFFICE O/P EST MOD 30 MIN: CPT | Mod: 25

## 2020-11-23 NOTE — PHYSICAL EXAM
[Normal Sclera/Conjunctiva] : normal sclera/conjunctiva [Normal Rate] : normal rate  [Normal S1, S2] : normal S1 and S2 [Normal] : affect was normal and insight and judgment were intact [de-identified] : A Fib [de-identified] : abrasion healing well. Wound clean.

## 2020-11-23 NOTE — REVIEW OF SYSTEMS
[Easy Bleeding] : easy bleeding [Easy Bruising] : easy bruising [Negative] : Psychiatric [de-identified] : see hpi

## 2020-11-23 NOTE — HISTORY OF PRESENT ILLNESS
[FreeTextEntry1] : follow up [de-identified] : here for INR\par HAs hematuria gross on and off\par another episode this week \par Spoke to Dr Vang his cardiologist who stated  he ahs retired but patient calls for advise.\par \par Scrotal rash seen by derm twice now on Triamcinolone, helps a little\par Eczema as per Bx \par

## 2020-11-23 NOTE — ASSESSMENT
[FreeTextEntry1] : A fib\par Gross hematuria\par Hold Coumadin\par Go for renal / Bladder US\par See Cardiology Dr Tamayo/ Dr Em

## 2020-12-02 ENCOUNTER — NON-APPOINTMENT (OUTPATIENT)
Age: 85
End: 2020-12-02

## 2020-12-02 ENCOUNTER — APPOINTMENT (OUTPATIENT)
Dept: CARDIOLOGY | Facility: CLINIC | Age: 85
End: 2020-12-02
Payer: MEDICARE

## 2020-12-02 ENCOUNTER — APPOINTMENT (OUTPATIENT)
Dept: DERMATOLOGY | Facility: CLINIC | Age: 85
End: 2020-12-02
Payer: MEDICARE

## 2020-12-02 VITALS
HEART RATE: 97 BPM | WEIGHT: 200 LBS | HEIGHT: 69 IN | BODY MASS INDEX: 29.62 KG/M2 | OXYGEN SATURATION: 97 % | DIASTOLIC BLOOD PRESSURE: 68 MMHG | SYSTOLIC BLOOD PRESSURE: 126 MMHG

## 2020-12-02 DIAGNOSIS — L53.9 ERYTHEMATOUS CONDITION, UNSPECIFIED: ICD-10-CM

## 2020-12-02 PROCEDURE — 93000 ELECTROCARDIOGRAM COMPLETE: CPT

## 2020-12-02 PROCEDURE — 99072 ADDL SUPL MATRL&STAF TM PHE: CPT

## 2020-12-02 PROCEDURE — 99204 OFFICE O/P NEW MOD 45 MIN: CPT | Mod: 25

## 2020-12-02 PROCEDURE — 99213 OFFICE O/P EST LOW 20 MIN: CPT

## 2020-12-02 NOTE — HISTORY OF PRESENT ILLNESS
[FreeTextEntry1] : Pt is an 84 y/o M who is referred here today by their PCP for evaluation.  Pt has PMH AF on coumadin dx about 2008, Parkinsons, HTN.  He states that he has been getting hematuria for the past few weeks.  He saw Dr Paz who stopped his coumadin.  The hematuria has resolved.  Overall he is feeling well and denies CP, SOB, diaphoresis, palpitations, dizziness, syncope, LE edema, PND, orthopnea, fevers, dysuria.   He denies history of stroke, HF, DM\par \par PMH: AF on coumadin, HTN, Parkinsons, vertigo\par Smoking status: quit in his 20's\par social ETOH\par no drug use\par Current exercise: walking\par Daily water intake: none\par Daily caffeine intake: 1 cup coffee\par OTC medications: none\par Family hx: mother MI 60's\par Previous cardiac testing: TTE 2018 "normal"\par Previous hospitalizations: AF\par

## 2020-12-02 NOTE — DISCUSSION/SUMMARY
[FreeTextEntry1] : Pt is an 84 y/o M with PMH AF on coumadin, HTN, Parkinsons who p/w hematuria\par Coumadin has been stopped.  I have advised that he see a urologist.  \par CHADSVasc = 3 (age, HTN)\par Continue to hold coumadin.  Discussed risk of CVA.  He will see urology.  We will discuss risks/benefits of restarting coumadin after urology workup complete\par Will check transthoracic echocardiogram to evaluate left ventricular function and assess for any structural abnormalities\par check CUS and J CARLOS\par HTN: well controlled, c/w current meds\par Pt will return in 1-2 mnths or sooner as needed but I encouraged communication via phone or portal if necessary.\par The described plan was discussed with the pt.  All questions and concerns were addressed to the best of my knowledge.

## 2020-12-02 NOTE — PHYSICAL EXAM
[Alert] : alert [Oriented x 3] : ~L oriented x 3 [Well Nourished] : well nourished [FreeTextEntry3] : Patient wearing a face mask\par \par Focal erythematous scaly patches present on the dorsum of the hands and arms\par Back: Diffuse thin erythematous scaly patches\par Scrotum: Diffuse moderate erythema with fine scaling and underlying atrophy\par Legs: Diffuse ill-defined scaling

## 2020-12-02 NOTE — ASSESSMENT
[FreeTextEntry1] : Eczematous dermatitis,? Asteatotic eczema, present on the arms, legs, and back\par Erythema and atrophy on scrotum-? Exacerbated by prior use of Lotrisone cream

## 2020-12-02 NOTE — HISTORY OF PRESENT ILLNESS
[FreeTextEntry1] : Rash on scrotum [de-identified] : Unscheduled followup visit for 85-year-old white male complaining of a "red scrotum". Apparently not itchy.\par Treated at a walk-in clinic for? Fungus and yeast infection with a variety of topical agents and an oral medication. Not currently using any treatment.\par Patient complains of a diffuse pruritic rash as well-worse at night.  Self treated with over-the-counter cream without improvement.\par Note: Patient has history of eczema treated in the past.

## 2020-12-02 NOTE — PHYSICAL EXAM
[General Appearance - Well Developed] : well developed [Normal Appearance] : normal appearance [Well Groomed] : well groomed [General Appearance - Well Nourished] : well nourished [No Deformities] : no deformities [General Appearance - In No Acute Distress] : no acute distress [Normal Conjunctiva] : the conjunctiva exhibited no abnormalities [Eyelids - No Xanthelasma] : the eyelids demonstrated no xanthelasmas [Normal Oral Mucosa] : normal oral mucosa [No Oral Pallor] : no oral pallor [No Oral Cyanosis] : no oral cyanosis [Heart Sounds] : normal S1 and S2 [Murmurs] : no murmurs present [Arterial Pulses Normal] : the arterial pulses were normal [Edema] : no peripheral edema present [Respiration, Rhythm And Depth] : normal respiratory rhythm and effort [Exaggerated Use Of Accessory Muscles For Inspiration] : no accessory muscle use [Auscultation Breath Sounds / Voice Sounds] : lungs were clear to auscultation bilaterally [Abdomen Soft] : soft [Abdomen Tenderness] : non-tender [Abdomen Mass (___ Cm)] : no abdominal mass palpated [Abnormal Walk] : normal gait [Gait - Sufficient For Exercise Testing] : the gait was sufficient for exercise testing [Nail Clubbing] : no clubbing of the fingernails [Cyanosis, Localized] : no localized cyanosis [Petechial Hemorrhages (___cm)] : no petechial hemorrhages [] : no ischemic changes [Oriented To Time, Place, And Person] : oriented to person, place, and time [Impaired Insight] : insight and judgment were intact [Affect] : the affect was normal [Mood] : the mood was normal [No Anxiety] : not feeling anxious [FreeTextEntry1] : no JVD or bruits  [Irregularly Irregular] : the rhythm was irregularly irregular

## 2020-12-15 ENCOUNTER — APPOINTMENT (OUTPATIENT)
Dept: FAMILY MEDICINE | Facility: CLINIC | Age: 85
End: 2020-12-15
Payer: MEDICARE

## 2020-12-15 VITALS
OXYGEN SATURATION: 97 % | TEMPERATURE: 97.6 F | DIASTOLIC BLOOD PRESSURE: 86 MMHG | RESPIRATION RATE: 14 BRPM | WEIGHT: 198 LBS | BODY MASS INDEX: 29.33 KG/M2 | HEART RATE: 63 BPM | SYSTOLIC BLOOD PRESSURE: 118 MMHG | HEIGHT: 69 IN

## 2020-12-15 DIAGNOSIS — L30.8 OTHER SPECIFIED DERMATITIS: ICD-10-CM

## 2020-12-15 PROCEDURE — 85610 PROTHROMBIN TIME: CPT | Mod: QW

## 2020-12-15 PROCEDURE — 99214 OFFICE O/P EST MOD 30 MIN: CPT | Mod: 25

## 2020-12-15 PROCEDURE — 99072 ADDL SUPL MATRL&STAF TM PHE: CPT

## 2020-12-15 NOTE — ASSESSMENT
[FreeTextEntry1] : A fib\par INR 1.1\par Gross hematuria resolved\par Hold Coumadin\par See Urologistr renal / Bladder US\par \par Seen by Cardiologist Dr Tamayo\par Advised to see urologist\par Which he has no’t done\par CHADS score 3.\par Aware of risk of stroke\par Will restart Coumadin after urology w/u\par \par dermatitis/ Eczema\par Scrotal skin redness\par seen by Dermatologist\par Triamcinolone avoid scrotum

## 2020-12-15 NOTE — PHYSICAL EXAM
[Normal Sclera/Conjunctiva] : normal sclera/conjunctiva [Normal Rate] : normal rate  [Normal S1, S2] : normal S1 and S2 [Normal] : affect was normal and insight and judgment were intact [de-identified] : A Fib [de-identified] : multiple ecchymosis on arms

## 2020-12-15 NOTE — REVIEW OF SYSTEMS
[Easy Bleeding] : easy bleeding [Easy Bruising] : easy bruising [Negative] : Psychiatric [de-identified] : see hpi

## 2020-12-15 NOTE — HISTORY OF PRESENT ILLNESS
[FreeTextEntry1] : follow up [de-identified] : a fib off Coumadin for 4 weeks\par Seen by Cardiologist Dr Tamayo\par Advised to see urologist\par Which he has no’t done\par CHADS score 3.\par dermatitis/ Eczema\par Scrotal skin reness\par seen by Dermatologist\par

## 2020-12-18 LAB — INR PPP: 1.1 RATIO

## 2020-12-23 ENCOUNTER — APPOINTMENT (OUTPATIENT)
Dept: DERMATOLOGY | Facility: CLINIC | Age: 85
End: 2020-12-23
Payer: MEDICARE

## 2020-12-23 ENCOUNTER — NON-APPOINTMENT (OUTPATIENT)
Age: 85
End: 2020-12-23

## 2020-12-23 DIAGNOSIS — L85.3 XEROSIS CUTIS: ICD-10-CM

## 2020-12-23 PROCEDURE — 99213 OFFICE O/P EST LOW 20 MIN: CPT

## 2020-12-23 PROCEDURE — 99072 ADDL SUPL MATRL&STAF TM PHE: CPT

## 2020-12-23 NOTE — PHYSICAL EXAM
[Alert] : alert [Oriented x 3] : ~L oriented x 3 [Well Nourished] : well nourished [FreeTextEntry3] : Patient wearing a face mask\par \par Forearms: Diffuse mild atrophy and marked purpura, left greater than right\par Back: Mild to moderate ill-defined erythematous scaly plaques present, especially on the upper mid back area\par Scrotum: Mild to moderate diffuse erythema, left greater than right\par Legs:  Mild ill-defined scaling with slight smooth erythema present in the left lower shin

## 2020-12-23 NOTE — HISTORY OF PRESENT ILLNESS
[FreeTextEntry1] : Itchy rash [de-identified] : Emergency followup visit for 85-year-old white male last seen by me in December 2, 2020, with an eczematous dermatitis on the arms, back and legs treated with triamcinolone cream 0.1%.\par Patient also has a history of diffuse moderate erythema and underlying atrophy of the scrotum probably exacerbated by prior usage of Lotrisone cream. This was discontinued at the last visit.\par Complains of marked dryness and itching of the skin.\par Note: Patient is applying the medication to his back with a back scratcher.

## 2020-12-27 NOTE — DISCUSSION/SUMMARY
[FreeTextEntry1] : Mr. Hendricks is an 84 year old man presenting with right arm and jaw tremor.\par The right arm tremor is present at rest.\par His exam is suggestive of Parkinson's Disease.\par \par Today he is concerned about a feeling of fogginess and also reports feeling depressed. \par \par His blood pressure is elevated today, possibly related to anxiety.\par \par -MRI brain performed today, results pending.\par -EEG will be performed today to look for any suggestion of encephalopathy.\par -Advised to stop medication for dizziness at it is possible that it is sedating.\par -Low dose sertraline can be tried for depression - 25 mg daily. I explained that this will not work immediately. It is possible to have worsening depression before improvement and he should call immediately with any side effects or worsening of mood.\par -He is holding off on starting Sinemet until his wife comes home.\par \par followup within 3 weeks. Unknown if ever smoked

## 2021-01-04 ENCOUNTER — APPOINTMENT (OUTPATIENT)
Dept: DERMATOLOGY | Facility: CLINIC | Age: 86
End: 2021-01-04
Payer: MEDICARE

## 2021-01-04 PROCEDURE — 99213 OFFICE O/P EST LOW 20 MIN: CPT

## 2021-01-04 PROCEDURE — 99072 ADDL SUPL MATRL&STAF TM PHE: CPT

## 2021-01-04 RX ORDER — CIPROFLOXACIN HYDROCHLORIDE 500 MG/1
500 TABLET, FILM COATED ORAL
Qty: 14 | Refills: 0 | Status: DISCONTINUED | COMMUNITY
Start: 2020-10-23

## 2021-01-04 NOTE — HISTORY OF PRESENT ILLNESS
[FreeTextEntry1] : Itchy rash [de-identified] : Followup visit for 85-year-old white male last seen by me on December 23, 2020, and eczematous dermatitis on the arms, back, and legs.  This was initially treated with triamcinolone cream 0.1% with some improvement in the arms and legs but the rash persisted on the back. (Patient was applying the medication with a back scratcher.)\par This was changed to last visit to betamethasone dipropionate augmented ointment 0.05% to the back.\par Patient also had atrophy on the scrotum, possibly secondary to prolonged use of Lotrisone cream. This had been discontinued although patient continues to have mild to moderate diffuse erythema of the scrotum.\par \par Patient states the upper back is feeling better but now complains of marked itching on the arms,thighs, and lower back

## 2021-01-04 NOTE — PHYSICAL EXAM
[Alert] : alert [Oriented x 3] : ~L oriented x 3 [Well Nourished] : well nourished [FreeTextEntry3] : Volar forearms: Extensive pink scaly patches present on the left forearm\par More focal in the right forearm\par Extensive ecchymoses present on the right arm and forearm, and left arm\par Lower back: Ill-defined scaling present\par Upper and mid back: Clear\par Thighs: Moderate erythematous scaly patches present on the lateral thighs and hips

## 2021-01-05 ENCOUNTER — APPOINTMENT (OUTPATIENT)
Dept: ULTRASOUND IMAGING | Facility: CLINIC | Age: 86
End: 2021-01-05
Payer: MEDICARE

## 2021-01-05 ENCOUNTER — OUTPATIENT (OUTPATIENT)
Dept: OUTPATIENT SERVICES | Facility: HOSPITAL | Age: 86
LOS: 1 days | End: 2021-01-05
Payer: MEDICARE

## 2021-01-05 DIAGNOSIS — R31.0 GROSS HEMATURIA: ICD-10-CM

## 2021-01-05 PROCEDURE — 76770 US EXAM ABDO BACK WALL COMP: CPT | Mod: 26

## 2021-01-05 PROCEDURE — 76770 US EXAM ABDO BACK WALL COMP: CPT

## 2021-01-06 ENCOUNTER — APPOINTMENT (OUTPATIENT)
Dept: UROLOGY | Facility: CLINIC | Age: 86
End: 2021-01-06
Payer: MEDICARE

## 2021-01-06 VITALS
DIASTOLIC BLOOD PRESSURE: 82 MMHG | HEIGHT: 69 IN | HEART RATE: 95 BPM | SYSTOLIC BLOOD PRESSURE: 125 MMHG | BODY MASS INDEX: 29.33 KG/M2 | OXYGEN SATURATION: 94 % | WEIGHT: 198 LBS

## 2021-01-06 DIAGNOSIS — Z87.442 PERSONAL HISTORY OF URINARY CALCULI: ICD-10-CM

## 2021-01-06 DIAGNOSIS — R31.0 GROSS HEMATURIA: ICD-10-CM

## 2021-01-06 DIAGNOSIS — R68.82 DECREASED LIBIDO: ICD-10-CM

## 2021-01-06 PROCEDURE — 99203 OFFICE O/P NEW LOW 30 MIN: CPT

## 2021-01-06 PROCEDURE — 99072 ADDL SUPL MATRL&STAF TM PHE: CPT

## 2021-01-07 LAB
APPEARANCE: CLEAR
BACTERIA: NEGATIVE
BILIRUBIN URINE: NEGATIVE
BLOOD URINE: ABNORMAL
COLOR: NORMAL
GLUCOSE QUALITATIVE U: NEGATIVE
HYALINE CASTS: 0 /LPF
KETONES URINE: NEGATIVE
LEUKOCYTE ESTERASE URINE: NEGATIVE
MICROSCOPIC-UA: NORMAL
NITRITE URINE: NEGATIVE
PH URINE: 5.5
PROTEIN URINE: NORMAL
RED BLOOD CELLS URINE: 20 /HPF
SPECIFIC GRAVITY URINE: 1.02
SQUAMOUS EPITHELIAL CELLS: 1 /HPF
UROBILINOGEN URINE: NORMAL
WHITE BLOOD CELLS URINE: 2 /HPF

## 2021-01-08 LAB — BACTERIA UR CULT: NORMAL

## 2021-01-08 NOTE — ASSESSMENT
[FreeTextEntry1] : Labs, CAT scan and Xray are ordered. Patient will follow up in the office for a cystoscopy and penile duplex.

## 2021-01-08 NOTE — HISTORY OF PRESENT ILLNESS
[FreeTextEntry1] : This patient presents with a complaint of an episode of gross hematuria after he was started on Coumadin 7 weeks ago. Patient states the hematuria seems to be resolving now. He has no other urinary complaints and his sonogram shows 1 cm right renal nonobstructing stone. He would also like to be evaluated for erectile dysfunction.

## 2021-01-11 ENCOUNTER — APPOINTMENT (OUTPATIENT)
Dept: NEUROLOGY | Facility: CLINIC | Age: 86
End: 2021-01-11
Payer: MEDICARE

## 2021-01-11 VITALS
WEIGHT: 198 LBS | BODY MASS INDEX: 29.33 KG/M2 | SYSTOLIC BLOOD PRESSURE: 122 MMHG | HEART RATE: 92 BPM | HEIGHT: 69 IN | TEMPERATURE: 97.5 F | DIASTOLIC BLOOD PRESSURE: 82 MMHG

## 2021-01-11 PROCEDURE — 99072 ADDL SUPL MATRL&STAF TM PHE: CPT

## 2021-01-11 PROCEDURE — 99213 OFFICE O/P EST LOW 20 MIN: CPT

## 2021-01-11 NOTE — DISCUSSION/SUMMARY
[FreeTextEntry1] : Mr. Hendricks is an 85 year old man who initially presented with right arm and jaw tremor.\par His exam is suggestive of Parkinson's Disease.\par \par He took Sinemet 25/100 once and felt strange so discontinued it.\par \par He is tolerating Ropinirole and it seems to be providing benefit.\par Review of the notes show that he had rash prior to start of ropinirole.\par \par Continue Ropinirole 0.5 mg TID.\par Can try to increase first dose to 1 mg to see if he has improvement in symptoms. If so, can gradually increase other doses to 1 mg.\par \par Increase exercise.\par Suggested swallow study but he would like to hold off for now.\par Can use hard candy/lollipops to help with excess salivation.\par \par Continue meclizine as needed for vertigo. Offered referral to vestibular therapy but he rather not.\par \par f/u 2 months, sooner if needed. \par

## 2021-01-11 NOTE — PHYSICAL EXAM
[FreeTextEntry1] : Examination:\par Constitutional: normal, no apparent distress\par Eyes: normal conjunctiva b/l, no ptosis, visual fields full\par Respiratory: no respiratory distress, normal effort, normal auscultation\par Cardiovascular: normal rate, rhythm, no murmurs\par Neck: supple, no masses\par Vascular: carotids normal\par Skin: patchy raised areas on lower abdomen and back\par Psych: normal mood, affect\par \par Neurological:\par Memory: normal memory, oriented to person, place, time\par Language intact/no aphasia\par Cranial Nerves: Pupils equally round and reactive to light, ocular muscles/movements intact, no ptosis, no facial weakness, tongue protrudes normally in the midline, hypophonia, mild masked facies. Gravelly voice.\par Motor: + bradykinesia, no pronator drift, full strength in all four extremities in the proximal and distal muscle groups\par Coordination: + rest tremor on right, jaw tremor, slight cogwheeling, + bradykinesia, decreased amplitude of rapid alternating movements.\par Sensory: intact to light touch\par DTRs: symmetric, normal\par Gait: decreased arm swing, short steps\par

## 2021-01-11 NOTE — DATA REVIEWED
[de-identified] : MRI brain 8/20/19:\par IMPRESSION: mild periventricular, deep and subcortical white matter ischemia. 5 mm anterior \par medial RIGHT frontal meningioma noted, unchanged. Tiny old lacunar infarction seen in the RIGHT \par cerebellum. 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 Global atrophy. [de-identified] : Routine EEG 8/20/19: normal\par  [de-identified] : CT brain 1/5/19:\par No acute intracranial hemorrhage, mass effect, or CT evidence of a large \par vascular territory infarct. 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 \par Unchanged sub-cm sella/suprasellar lesion, likely pituitary adenoma. \par Follow-up with MRI as clinically indicated. \par

## 2021-01-11 NOTE — HISTORY OF PRESENT ILLNESS
[FreeTextEntry1] : I last saw Mr. Hendricks on 10/7/20.\par \par He has some worsening of tremors in his right hand and in his face.\par \par He says that walking and balance is fine.\par \par He is having increased salivation.\par \par He has a hollow feeling when he swallows and every now and then he has a coughing fit.\par \par He notices more dreams but they are not violent and he is not acting them out.\par \par He is currently taking ropinirole 0.5 mg TID.\par \par He denies having any side effects. He plays online poker but not with real money.\par \par He is not exercising as much as he thinks he should due to the cold weather.\par \par He still has some vertigo and takes meclizine as needed.\par \par He is seeing a urologist for hematuria.

## 2021-01-13 ENCOUNTER — EMERGENCY (EMERGENCY)
Facility: HOSPITAL | Age: 86
LOS: 0 days | Discharge: HOME | End: 2021-01-13
Attending: EMERGENCY MEDICINE | Admitting: SURGERY
Payer: MEDICARE

## 2021-01-13 ENCOUNTER — TRANSCRIPTION ENCOUNTER (OUTPATIENT)
Age: 86
End: 2021-01-13

## 2021-01-13 ENCOUNTER — APPOINTMENT (OUTPATIENT)
Dept: CT IMAGING | Facility: CLINIC | Age: 86
End: 2021-01-13
Payer: MEDICARE

## 2021-01-13 ENCOUNTER — OUTPATIENT (OUTPATIENT)
Dept: OUTPATIENT SERVICES | Facility: HOSPITAL | Age: 86
LOS: 1 days | End: 2021-01-13
Payer: MEDICARE

## 2021-01-13 ENCOUNTER — APPOINTMENT (OUTPATIENT)
Dept: RADIOLOGY | Facility: CLINIC | Age: 86
End: 2021-01-13
Payer: MEDICARE

## 2021-01-13 VITALS
HEART RATE: 87 BPM | RESPIRATION RATE: 18 BRPM | SYSTOLIC BLOOD PRESSURE: 125 MMHG | OXYGEN SATURATION: 100 % | DIASTOLIC BLOOD PRESSURE: 76 MMHG | TEMPERATURE: 98 F

## 2021-01-13 VITALS — WEIGHT: 195.11 LBS | HEIGHT: 78 IN

## 2021-01-13 DIAGNOSIS — I10 ESSENTIAL (PRIMARY) HYPERTENSION: ICD-10-CM

## 2021-01-13 DIAGNOSIS — Z88.0 ALLERGY STATUS TO PENICILLIN: ICD-10-CM

## 2021-01-13 DIAGNOSIS — K38.8 OTHER SPECIFIED DISEASES OF APPENDIX: ICD-10-CM

## 2021-01-13 DIAGNOSIS — R31.9 HEMATURIA, UNSPECIFIED: ICD-10-CM

## 2021-01-13 DIAGNOSIS — Z79.01 LONG TERM (CURRENT) USE OF ANTICOAGULANTS: ICD-10-CM

## 2021-01-13 DIAGNOSIS — I48.91 UNSPECIFIED ATRIAL FIBRILLATION: ICD-10-CM

## 2021-01-13 DIAGNOSIS — Z00.8 ENCOUNTER FOR OTHER GENERAL EXAMINATION: ICD-10-CM

## 2021-01-13 DIAGNOSIS — N20.0 CALCULUS OF KIDNEY: ICD-10-CM

## 2021-01-13 LAB
ALBUMIN SERPL ELPH-MCNC: 3.9 G/DL — SIGNIFICANT CHANGE UP (ref 3.3–5)
ALP SERPL-CCNC: 71 U/L — SIGNIFICANT CHANGE UP (ref 40–120)
ALT FLD-CCNC: 30 U/L — SIGNIFICANT CHANGE UP (ref 12–78)
ANION GAP SERPL CALC-SCNC: 3 MMOL/L — LOW (ref 5–17)
APPEARANCE UR: CLEAR — SIGNIFICANT CHANGE UP
APTT BLD: 36.4 SEC — HIGH (ref 27.5–35.5)
AST SERPL-CCNC: 22 U/L — SIGNIFICANT CHANGE UP (ref 15–37)
BASOPHILS # BLD AUTO: 0.09 K/UL — SIGNIFICANT CHANGE UP (ref 0–0.2)
BASOPHILS NFR BLD AUTO: 1.1 % — SIGNIFICANT CHANGE UP (ref 0–2)
BILIRUB SERPL-MCNC: 0.5 MG/DL — SIGNIFICANT CHANGE UP (ref 0.2–1.2)
BILIRUB UR-MCNC: NEGATIVE — SIGNIFICANT CHANGE UP
BUN SERPL-MCNC: 15 MG/DL — SIGNIFICANT CHANGE UP (ref 7–23)
CALCIUM SERPL-MCNC: 9.3 MG/DL — SIGNIFICANT CHANGE UP (ref 8.5–10.1)
CHLORIDE SERPL-SCNC: 107 MMOL/L — SIGNIFICANT CHANGE UP (ref 96–108)
CO2 SERPL-SCNC: 31 MMOL/L — SIGNIFICANT CHANGE UP (ref 22–31)
COLOR SPEC: YELLOW — SIGNIFICANT CHANGE UP
CREAT SERPL-MCNC: 1.12 MG/DL — SIGNIFICANT CHANGE UP (ref 0.5–1.3)
DIFF PNL FLD: ABNORMAL
EOSINOPHIL # BLD AUTO: 0.33 K/UL — SIGNIFICANT CHANGE UP (ref 0–0.5)
EOSINOPHIL NFR BLD AUTO: 4.1 % — SIGNIFICANT CHANGE UP (ref 0–6)
GLUCOSE SERPL-MCNC: 94 MG/DL — SIGNIFICANT CHANGE UP (ref 70–99)
GLUCOSE UR QL: NEGATIVE MG/DL — SIGNIFICANT CHANGE UP
HCT VFR BLD CALC: 48.4 % — SIGNIFICANT CHANGE UP (ref 39–50)
HGB BLD-MCNC: 16.2 G/DL — SIGNIFICANT CHANGE UP (ref 13–17)
IMM GRANULOCYTES NFR BLD AUTO: 0.4 % — SIGNIFICANT CHANGE UP (ref 0–1.5)
INR BLD: 1.07 RATIO — SIGNIFICANT CHANGE UP (ref 0.88–1.16)
KETONES UR-MCNC: NEGATIVE — SIGNIFICANT CHANGE UP
LEUKOCYTE ESTERASE UR-ACNC: ABNORMAL
LYMPHOCYTES # BLD AUTO: 0.78 K/UL — LOW (ref 1–3.3)
LYMPHOCYTES # BLD AUTO: 9.7 % — LOW (ref 13–44)
MCHC RBC-ENTMCNC: 31.6 PG — SIGNIFICANT CHANGE UP (ref 27–34)
MCHC RBC-ENTMCNC: 33.5 GM/DL — SIGNIFICANT CHANGE UP (ref 32–36)
MCV RBC AUTO: 94.3 FL — SIGNIFICANT CHANGE UP (ref 80–100)
MONOCYTES # BLD AUTO: 0.83 K/UL — SIGNIFICANT CHANGE UP (ref 0–0.9)
MONOCYTES NFR BLD AUTO: 10.3 % — SIGNIFICANT CHANGE UP (ref 2–14)
NEUTROPHILS # BLD AUTO: 6 K/UL — SIGNIFICANT CHANGE UP (ref 1.8–7.4)
NEUTROPHILS NFR BLD AUTO: 74.4 % — SIGNIFICANT CHANGE UP (ref 43–77)
NITRITE UR-MCNC: NEGATIVE — SIGNIFICANT CHANGE UP
PH UR: 5 — SIGNIFICANT CHANGE UP (ref 5–8)
PLATELET # BLD AUTO: 239 K/UL — SIGNIFICANT CHANGE UP (ref 150–400)
POTASSIUM SERPL-MCNC: 3.9 MMOL/L — SIGNIFICANT CHANGE UP (ref 3.5–5.3)
POTASSIUM SERPL-SCNC: 3.9 MMOL/L — SIGNIFICANT CHANGE UP (ref 3.5–5.3)
PROT SERPL-MCNC: 8.6 GM/DL — HIGH (ref 6–8.3)
PROT UR-MCNC: 30 MG/DL
PROTHROM AB SERPL-ACNC: 12.5 SEC — SIGNIFICANT CHANGE UP (ref 10.6–13.6)
RBC # BLD: 5.13 M/UL — SIGNIFICANT CHANGE UP (ref 4.2–5.8)
RBC # FLD: 14.2 % — SIGNIFICANT CHANGE UP (ref 10.3–14.5)
SODIUM SERPL-SCNC: 141 MMOL/L — SIGNIFICANT CHANGE UP (ref 135–145)
SP GR SPEC: 1.01 — SIGNIFICANT CHANGE UP (ref 1.01–1.02)
UROBILINOGEN FLD QL: NEGATIVE MG/DL — SIGNIFICANT CHANGE UP
WBC # BLD: 8.06 K/UL — SIGNIFICANT CHANGE UP (ref 3.8–10.5)
WBC # FLD AUTO: 8.06 K/UL — SIGNIFICANT CHANGE UP (ref 3.8–10.5)

## 2021-01-13 PROCEDURE — 86901 BLOOD TYPING SEROLOGIC RH(D): CPT

## 2021-01-13 PROCEDURE — 74178 CT ABD&PLV WO CNTR FLWD CNTR: CPT

## 2021-01-13 PROCEDURE — 71045 X-RAY EXAM CHEST 1 VIEW: CPT | Mod: 26

## 2021-01-13 PROCEDURE — 74178 CT ABD&PLV WO CNTR FLWD CNTR: CPT | Mod: 26

## 2021-01-13 PROCEDURE — 99285 EMERGENCY DEPT VISIT HI MDM: CPT

## 2021-01-13 PROCEDURE — U0003: CPT

## 2021-01-13 PROCEDURE — 85610 PROTHROMBIN TIME: CPT

## 2021-01-13 PROCEDURE — 99234 HOSP IP/OBS SM DT SF/LOW 45: CPT

## 2021-01-13 PROCEDURE — 82565 ASSAY OF CREATININE: CPT

## 2021-01-13 PROCEDURE — 80053 COMPREHEN METABOLIC PANEL: CPT

## 2021-01-13 PROCEDURE — 36415 COLL VENOUS BLD VENIPUNCTURE: CPT

## 2021-01-13 PROCEDURE — 81001 URINALYSIS AUTO W/SCOPE: CPT

## 2021-01-13 PROCEDURE — 85025 COMPLETE CBC W/AUTO DIFF WBC: CPT

## 2021-01-13 PROCEDURE — 71045 X-RAY EXAM CHEST 1 VIEW: CPT

## 2021-01-13 PROCEDURE — 99284 EMERGENCY DEPT VISIT MOD MDM: CPT | Mod: 25

## 2021-01-13 PROCEDURE — U0005: CPT

## 2021-01-13 PROCEDURE — 93005 ELECTROCARDIOGRAM TRACING: CPT

## 2021-01-13 PROCEDURE — 86850 RBC ANTIBODY SCREEN: CPT

## 2021-01-13 PROCEDURE — 86900 BLOOD TYPING SEROLOGIC ABO: CPT

## 2021-01-13 PROCEDURE — 85730 THROMBOPLASTIN TIME PARTIAL: CPT

## 2021-01-13 RX ORDER — WARFARIN SODIUM 2.5 MG/1
1.5 TABLET ORAL
Qty: 0 | Refills: 0 | DISCHARGE

## 2021-01-13 RX ORDER — HEPARIN SODIUM 5000 [USP'U]/ML
5000 INJECTION INTRAVENOUS; SUBCUTANEOUS EVERY 8 HOURS
Refills: 0 | Status: DISCONTINUED | OUTPATIENT
Start: 2021-01-13 | End: 2021-01-13

## 2021-01-13 RX ORDER — SODIUM CHLORIDE 9 MG/ML
1000 INJECTION INTRAMUSCULAR; INTRAVENOUS; SUBCUTANEOUS
Refills: 0 | Status: DISCONTINUED | OUTPATIENT
Start: 2021-01-13 | End: 2021-01-13

## 2021-01-13 RX ORDER — AMLODIPINE BESYLATE 2.5 MG/1
5 TABLET ORAL DAILY
Refills: 0 | Status: DISCONTINUED | OUTPATIENT
Start: 2021-01-13 | End: 2021-01-13

## 2021-01-13 RX ORDER — AMLODIPINE BESYLATE 2.5 MG/1
1 TABLET ORAL
Qty: 0 | Refills: 0 | DISCHARGE
Start: 2021-01-13

## 2021-01-13 NOTE — ED ADULT TRIAGE NOTE - CHIEF COMPLAINT QUOTE
prt states he has had blood in his urine for 1.5 months and today had a CT scan of abdomen and kidney. pt states he was todl to go to ED for abnormality on Ct scan involving his appendix. pt denies pain NVD fever  family

## 2021-01-13 NOTE — DISCHARGE NOTE PROVIDER - CARE PROVIDER_API CALL
Sophia Salmeron)  Surgery; Surgical Critical Care  [address] [facility], [address] [place] [address]  Phone: [phone]  Fax: [phone]  Follow Up Time: 1 week    PMD< cardiologist , primary GI,   Phone: (   )    -  Fax: (   )    -  Follow Up Time:

## 2021-01-13 NOTE — H&P ADULT - NSHPPHYSICALEXAM_GEN_ALL_CORE
Vital Signs Last 24 Hrs  T(C): 36.7 (13 Jan 2021 23:07), Max: 36.7 (13 Jan 2021 23:07)  T(F): 98 (13 Jan 2021 23:07), Max: 98 (13 Jan 2021 23:07)  HR: 87 (13 Jan 2021 23:07) (87 - 91)  BP: 125/76 (13 Jan 2021 23:07) (125/76 - 127/84)  BP(mean): 98 (13 Jan 2021 19:09) (98 - 98)  RR: 18 (13 Jan 2021 23:07) (17 - 18)  SpO2: 100% (13 Jan 2021 23:07) (96% - 100%)    PHYSICAL EXAM:  Constitutional: NAD, GCS: 15/15, has tremors,   AOX3  Eyes:  WNL  ENMT:  WNL  Neck:  WNL, non tender  Back: Non tender  Respiratory: CTABL  Cardiovascular:  S1+S2+0  Gastrointestinal: Soft, ND , NT  Genitourinary:  WNL  Extremities: NV intact  Vascular:  Intact  Neurological: No focal neurological deficit,  CN, motor and sensory system grossly intact.  Skin: WNL  Musculoskeletal: WNL  Psychiatric: Grossly WNL

## 2021-01-13 NOTE — DISCHARGE NOTE PROVIDER - HOSPITAL COURSE
85 Y old male with Afib , parkinson's on A/C with hematuria, had out pt ct  as hematuria work up found ot have mucocele of appendix with possible rupture of cyst. Pt has no  abdominal pain,no fever, no diarrhea. Last colonoscopy was 1o years ago. Pt wa admitted for pre op work up and surgical intervention , but he is requesting to leave tonight an will be  back in 12 hours. he is instructed to see us in office , will get medical, cardiac clearness and GI eval out patient and then schedule for surgery , report to ER if  develop, fever, abdominal pain etc.

## 2021-01-13 NOTE — DISCHARGE NOTE PROVIDER - NSDCFUADDINST_GEN_ALL_CORE_FT
follow up in office ASAP next week, will need medical, cardiac clearance, will need GI for colonoscopy . Seek medical attention if develop fever, abdominal pain.

## 2021-01-13 NOTE — H&P ADULT - NSHPLABSRESULTS_GEN_ALL_CORE
Labs:                          16.2   8.06  )-----------( 239      ( 13 Jan 2021 19:38 )             48.4       01-13    141  |  107  |  15  ----------------------------<  94  3.9   |  31  |  1.12    Ca    9.3      13 Jan 2021 19:38    TPro  8.6<H>  /  Alb  3.9  /  TBili  0.5  /  DBili  x   /  AST  22  /  ALT  30  /  AlkPhos  71  01-13      PT/INR - ( 13 Jan 2021 19:38 )   PT: 12.5 sec;   INR: 1.07 ratio       KIDNEYS/URETERS: Bilateral renal cysts measuring up to 7.2 cm on the right. 1.3 x 1.0 cm nonobstructing stone in the left renal pelvis. 5 mm nonobstructing stone in the lower pole of the right kidney with an adjacent 1 to 2 mm nonobstructing stone. On the delayed images, no gross urothelial lesion is identified.    BLADDER: Partially opacified and unremarkable.  REPRODUCTIVE ORGANS: Mildly prominent prostate gland projecting into the base of the bladder    BOWEL: No bowel obstruction. The appendix is very dilated without thick wall. This may represent a mucocele. There is a collection measuring greater than simple fluid in density just inferior with a small calcification measuring approximately 4.9 x 4.3 cm. No peripheral enhancement is clearly identified.  PERITONEUM: 4.9 x 4.3 cm collection inferior to a distended appendix that resembles a mucocele. This may be due to a perforated mucocele. The collection is measuring greater than simple fluid in density. A small calcification is present. There is mild fluid in the pelvis posterior to the bladder measuring greater than simple fluid in density. Fluid in a right inguinal hernia. Mild enhancement of the periphery.  VESSELS: Infrarenal abdominal aortic aneurysm measuring up to 5.1 x 5.6 cm with a functional lumen at this level of 3.2 x 2.6 cm.  RETROPERITONEUM/LYMPH NODES: No lymphadenopathy.  ABDOMINAL WALL: Right inguinal hernia as above.  BONES: Degenerative changes of bone.    IMPRESSION:  Nonobstructing right staghorn calculus and stones in the lower pole of the right kidney. No gross urothelial lesion.    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 Collection inferior and extending into a right inguinal hernia and fluid in the pelvis measuring greater than simple fluid in density. This critical value was discussed with Dr. Salazar covering for Dr. George at 5:07 PM on 1/13/2021.      PTT - ( 13 Jan 2021 19:38 )  PTT:36.4 sec

## 2021-01-13 NOTE — H&P ADULT - ASSESSMENT
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

## 2021-01-13 NOTE — DISCHARGE NOTE PROVIDER - NSDCCPCAREPLAN_GEN_ALL_CORE_FT
PRINCIPAL DISCHARGE DIAGNOSIS  Diagnosis: Mucocele of appendix  Assessment and Plan of Treatment:

## 2021-01-13 NOTE — H&P ADULT - HISTORY OF PRESENT ILLNESS
85 Y old male with A fib on Coumadin had hematuria  for 1 month, which resolved after stopping  coumadin. He had CT as work up   85 Y old male with A fib on Coumadin had hematuria  for 1 month, which resolved after stopping  coumadin. He had CT as work up of hematuria and found to have stag horn Rt kidney stone, also had incidental finding of mucocele with possible rupture of cyst. Pt has no C/o abdominal pain, nausea fever, any diarrhea or constipation No sick contacts. Last colonoscopy was more then 10 years ago. no recent wt loss

## 2021-01-13 NOTE — ED STATDOCS - OBJECTIVE STATEMENT
84 y/o M with PMHx of HTN and Afib on Coumadin presents to the ED sent by urologist c/o +hematuria x1.5 months. Had outpatient CT abd/pelvis showing mucocele with perforation so called pt and told to come to ED. No abd pain, N/V/D, or fever. Normal appetite. Allergic to penicillin. PCP: Dr. Rita Paz. Urologist: Dr. George.

## 2021-01-13 NOTE — DISCHARGE NOTE NURSING/CASE MANAGEMENT/SOCIAL WORK - PATIENT PORTAL LINK FT
You can access the FollowMyHealth Patient Portal offered by [facility] by registering at the following website: [email or web address]. By joining [employer]’s FollowMyHealth portal, you will also be able to view your health information using other applications (apps) compatible with our system.

## 2021-01-13 NOTE — DISCHARGE NOTE PROVIDER - PROVIDER TOKENS
PROVIDER:[TOKEN:[[id number]],FOLLOWUP:[1 week]],FREE:[LAST:[PMD< cardiologist , primary GI],PHONE:[(   )    -],FAX:[(   )    -]]

## 2021-01-13 NOTE — ED ADULT NURSE NOTE - OBJECTIVE STATEMENT
pt presents to the ED sent by urologist c/o +hematuria x1.5 months. Had outpatient CT abd/pelvis showing mucocele with perforation so called pt and told to come to ED. No abd pain, N/V/D, or fever. Pt AOx4, breathing symmetrical and unlabored, ambulatory to bathroom, urine sample provided and collected, #20 IV inserted into L. AC, bloods drawn and sent to lab, pt in no acute distress at this time

## 2021-01-13 NOTE — DISCHARGE NOTE PROVIDER - NSDCFUSCHEDAPPT_GEN_ALL_CORE_FT
[facility] ; 01/19/2021 ; [facility] [address]  [facility] ; 01/27/2021 ; [facility] [address] [facility] ; 01/27/2021 ; [facility] [address]  [facility] ; 02/08/2021 ; [facility] [address]  [facility] ; 02/08/2021 ; [facility] U[facility] [address]  [facility] ; 02/09/2021 ; [address] [facility] ; 02/11/2021 ; [facility] [address] ; 02/11/2021 ; [facility] Cardio [address] ; 02/11/2021 ; [facility] Cardio [address] ; 02/11/2021 ; [facility] Cardio [address] ; 03/15/2021 ; [facility] [place] [facility] [address]

## 2021-01-13 NOTE — ED STATDOCS - CHIEF COMPLAINT
The patient is a 85y year old Male complaining of see chief complaint quote.
Yes - the patient is able to be screened

## 2021-01-13 NOTE — ED ADULT NURSE REASSESSMENT NOTE - NS ED NURSE REASSESS COMMENT FT1
Patient does not want to stay, requesting to come back in 12 hours. MD Salmeron called to come speak with patient. MD spoke with patient with RN at bedside. All parties in agreement, patient can go home with strict follow up to MD Christensen office. Will call office in AM. All questions answered. Patient cleared for discharge.

## 2021-01-13 NOTE — DISCHARGE NOTE PROVIDER - NSDCMRMEDTOKEN_GEN_ALL_CORE_FT
amLODIPine 5 mg oral tablet: 1 tab(s) orally once a day  Coumadin 1 mg oral tablet: 1 tab(s) orally once a day  Flonase 50 mcg/inh nasal spray: 1 spray(s) nasal once a day

## 2021-01-13 NOTE — ED STATDOCS - PROGRESS NOTE DETAILS
[facility] for ED attending, Dr. Fernández: Spoke with Dr. Salmeron, will consult pt. 86 y/o M presents with abnormal outpt ct. Pt has been having hematuria for over a month, had outpt ct showing mucocele w/ perforation. No other complaints at this time. -Félix Roach PA-C Pt evaluated by Dr. Salmeron who will admit pt. -Félix Roach PA-C

## 2021-01-14 ENCOUNTER — NON-APPOINTMENT (OUTPATIENT)
Age: 86
End: 2021-01-14

## 2021-01-14 ENCOUNTER — APPOINTMENT (OUTPATIENT)
Dept: CARDIOLOGY | Facility: CLINIC | Age: 86
End: 2021-01-14
Payer: MEDICARE

## 2021-01-14 ENCOUNTER — APPOINTMENT (OUTPATIENT)
Dept: FAMILY MEDICINE | Facility: CLINIC | Age: 86
End: 2021-01-14
Payer: MEDICARE

## 2021-01-14 VITALS
WEIGHT: 199 LBS | BODY MASS INDEX: 29.47 KG/M2 | OXYGEN SATURATION: 97 % | HEIGHT: 69 IN | HEART RATE: 89 BPM | DIASTOLIC BLOOD PRESSURE: 80 MMHG | SYSTOLIC BLOOD PRESSURE: 122 MMHG

## 2021-01-14 VITALS
BODY MASS INDEX: 29.47 KG/M2 | WEIGHT: 199 LBS | OXYGEN SATURATION: 96 % | SYSTOLIC BLOOD PRESSURE: 112 MMHG | HEIGHT: 69 IN | HEART RATE: 52 BPM | TEMPERATURE: 98.6 F | RESPIRATION RATE: 14 BRPM | DIASTOLIC BLOOD PRESSURE: 76 MMHG

## 2021-01-14 DIAGNOSIS — Z01.810 ENCOUNTER FOR PREPROCEDURAL CARDIOVASCULAR EXAMINATION: ICD-10-CM

## 2021-01-14 LAB
INR PPP: 1 RATIO
SARS-COV-2 RNA SPEC QL NAA+PROBE: SIGNIFICANT CHANGE UP

## 2021-01-14 PROCEDURE — 99213 OFFICE O/P EST LOW 20 MIN: CPT | Mod: 25

## 2021-01-14 PROCEDURE — 99072 ADDL SUPL MATRL&STAF TM PHE: CPT

## 2021-01-14 PROCEDURE — 85610 PROTHROMBIN TIME: CPT | Mod: QW

## 2021-01-14 PROCEDURE — 99214 OFFICE O/P EST MOD 30 MIN: CPT | Mod: 25

## 2021-01-14 PROCEDURE — 93000 ELECTROCARDIOGRAM COMPLETE: CPT | Mod: NC

## 2021-01-14 NOTE — PHYSICAL EXAM
[General Appearance - Well Developed] : well developed [Normal Appearance] : normal appearance [Well Groomed] : well groomed [General Appearance - Well Nourished] : well nourished [No Deformities] : no deformities [General Appearance - In No Acute Distress] : no acute distress [Normal Conjunctiva] : the conjunctiva exhibited no abnormalities [Eyelids - No Xanthelasma] : the eyelids demonstrated no xanthelasmas [Normal Oral Mucosa] : normal oral mucosa [No Oral Pallor] : no oral pallor [No Oral Cyanosis] : no oral cyanosis [Respiration, Rhythm And Depth] : normal respiratory rhythm and effort [Exaggerated Use Of Accessory Muscles For Inspiration] : no accessory muscle use [Auscultation Breath Sounds / Voice Sounds] : lungs were clear to auscultation bilaterally [Heart Sounds] : normal S1 and S2 [Murmurs] : no murmurs present [Arterial Pulses Normal] : the arterial pulses were normal [Edema] : no peripheral edema present [Irregularly Irregular] : the rhythm was irregularly irregular [Abdomen Soft] : soft [Abdomen Tenderness] : non-tender [Abdomen Mass (___ Cm)] : no abdominal mass palpated [Abnormal Walk] : normal gait [Gait - Sufficient For Exercise Testing] : the gait was sufficient for exercise testing [Nail Clubbing] : no clubbing of the fingernails [Cyanosis, Localized] : no localized cyanosis [Petechial Hemorrhages (___cm)] : no petechial hemorrhages [] : no ischemic changes [Oriented To Time, Place, And Person] : oriented to person, place, and time [Impaired Insight] : insight and judgment were intact [Affect] : the affect was normal [Mood] : the mood was normal [No Anxiety] : not feeling anxious [FreeTextEntry1] : no JVD or bruits

## 2021-01-14 NOTE — DISCUSSION/SUMMARY
[FreeTextEntry1] : Pt is an 84 y/o M who presents today for evaluation prior to appendectomy.  Pt has PMH AF NOT currently on coumadin (due to hematuria) dx about 2008, Parkinsons, HTN.  He states that he was found to have dilated appendix with surrounding collection.  He is meeting with a surgeon, Dr Villegas, next week to discuss possible surgery.   He was also found to have infrarenal AAA measuring 5.1x5.6cm\par CHADSVasc = 3 (age, HTN)\par Continue to hold coumadin.  Discussed risk of CVA.  We will try to restart coumadin after possible appendectomy\par Will check transthoracic echocardiogram to evaluate left ventricular function and assess for any structural abnormalities\par check nuclear stress test to eval for ischemia\par Further preop recommendations will be made once diagnostic testing is complete. \par HTN: well controlled, c/w current meds\par AAA: will refer to vascular for evaluation\par Pt will return in 3 mnths or sooner as needed but I encouraged communication via phone or portal if necessary.\par The described plan was discussed with the pt.  All questions and concerns were addressed to the best of my knowledge.

## 2021-01-14 NOTE — HISTORY OF PRESENT ILLNESS
[FreeTextEntry1] : Pt is an 86 y/o M who presents today for evaluation prior to appendectomy.  Pt has PMH AF NOT currently on coumadin (due to hematuria) dx about 2008, Parkinsons, HTN.  He states that he was found to have dilated appendix with surrounding collection.  He is meeting with a surgeon, Dr Villegas, next week to discuss possible surgery.   He was also found to have infrarenal AAA measuring 5.1x5.6cm\par Overall he is feeling well and denies CP, SOB, diaphoresis, palpitations, dizziness, syncope, LE edema, PND, orthopnea, fevers, dysuria.   He denies history of stroke, HF, DM\par \par PMH: AF on coumadin, HTN, Parkinsons, vertigo\par Smoking status: quit in his 20's\par social ETOH\par no drug use\par Current exercise: walking\par Daily water intake: none\par Daily caffeine intake: 1 cup coffee\par OTC medications: none\par Family hx: mother MI 60's\par Previous cardiac testing: TTE 2018 "normal"\par Previous hospitalizations: AF\par

## 2021-01-14 NOTE — REASON FOR VISIT
[Follow-Up - Clinic] : a clinic follow-up of [Atrial Fibrillation] : atrial fibrillation [Hypertension] : hypertension [FreeTextEntry1] : preop

## 2021-01-16 LAB — URINE CYTOLOGY: NORMAL

## 2021-01-17 NOTE — ASSESSMENT
[FreeTextEntry1] : CT shows Mucocele/ hernia ? appendicitis\par Seen in ER for possible Mucocele/ Appendicitis\par Seen by Surgeon Dr Villegas, has follow up 1/21.\par Seen by Dr Geroge for Hematuria\par Ct was done for w/u\par hematuria resolved after Coumadin stopped. 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 Will refer to vascular and f/u US in 6 months\par Schedule cardiology clearance.

## 2021-01-17 NOTE — PHYSICAL EXAM
[Normal Sclera/Conjunctiva] : normal sclera/conjunctiva [Normal Rate] : normal rate  [Normal S1, S2] : normal S1 and S2 [Normal] : affect was normal and insight and judgment were intact [de-identified] : A Fib [de-identified] : multiple ecchymosis on arms

## 2021-01-17 NOTE — HISTORY OF PRESENT ILLNESS
[FreeTextEntry8] : Seen in ER for possible Mucocele/ Appendicitis\par Seen by Surgeon Dr Villegas, has follow up 1/21.\par Seen by Dr George for Hematuria\par Ct was done for w/u\par hematuria resolved after Coumadin stopped. 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 \par \par

## 2021-01-18 ENCOUNTER — APPOINTMENT (OUTPATIENT)
Dept: DERMATOLOGY | Facility: CLINIC | Age: 86
End: 2021-01-18
Payer: MEDICARE

## 2021-01-18 ENCOUNTER — APPOINTMENT (OUTPATIENT)
Dept: CARDIOLOGY | Facility: CLINIC | Age: 86
End: 2021-01-18
Payer: MEDICARE

## 2021-01-18 DIAGNOSIS — L30.8 OTHER SPECIFIED DERMATITIS: ICD-10-CM

## 2021-01-18 DIAGNOSIS — L90.9 ATROPHIC DISORDER OF SKIN, UNSPECIFIED: ICD-10-CM

## 2021-01-18 PROCEDURE — 99213 OFFICE O/P EST LOW 20 MIN: CPT

## 2021-01-18 PROCEDURE — 93306 TTE W/DOPPLER COMPLETE: CPT

## 2021-01-18 PROCEDURE — 99072 ADDL SUPL MATRL&STAF TM PHE: CPT

## 2021-01-18 NOTE — HISTORY OF PRESENT ILLNESS
[FreeTextEntry1] : Itchy rash [de-identified] : Followup visit for 85-year-old white male last seen by me on January 4, 2021 with an eczematous dermatitis on the arms, back and legs. Initially treated with triamcinolone cream 0.1% with improvement in the arms and legs the persistence of the back. Treatment regimen was changed to triamcinolone ointment 0.1% and betamethasone dipropionate augmented ointment 0.05% to back twice a day as needed.\par Patient also had atrophy of the scrotum, possibly secondary to prolonged usage of Lotrisone cream. This has been discontinued.\par \par Patient stopped using the medications and had no itching at all for 24 hours. Has since  had some itching on the thighs.\par \par

## 2021-01-18 NOTE — ASSESSMENT
[FreeTextEntry1] : Eczematous dermatitis of some type with atrophy from usage of topical steroids on the forearms and antecubital fossa

## 2021-01-18 NOTE — PHYSICAL EXAM
[Alert] : alert [Oriented x 3] : ~L oriented x 3 [Well Nourished] : well nourished [FreeTextEntry3] : Patient wearing a facemask\par \par Left volar forearm: Moderate ill-defined pink patches present with overlying ecchymoses and atrophy present over both antecubital fossa\par Back: Practically clear\par Eyes: Moderate ill-defined pinkness with crusted excoriations present on the lateral portions bilaterally

## 2021-01-19 ENCOUNTER — NON-APPOINTMENT (OUTPATIENT)
Age: 86
End: 2021-01-19

## 2021-01-19 ENCOUNTER — APPOINTMENT (OUTPATIENT)
Dept: DERMATOLOGY | Facility: CLINIC | Age: 86
End: 2021-01-19
Payer: MEDICARE

## 2021-01-21 ENCOUNTER — APPOINTMENT (OUTPATIENT)
Dept: SURGERY | Facility: CLINIC | Age: 86
End: 2021-01-21
Payer: MEDICARE

## 2021-01-21 VITALS
OXYGEN SATURATION: 95 % | DIASTOLIC BLOOD PRESSURE: 74 MMHG | BODY MASS INDEX: 29.47 KG/M2 | WEIGHT: 199 LBS | HEIGHT: 69 IN | SYSTOLIC BLOOD PRESSURE: 108 MMHG | HEART RATE: 96 BPM | TEMPERATURE: 97.4 F

## 2021-01-21 PROCEDURE — 99202 OFFICE O/P NEW SF 15 MIN: CPT

## 2021-01-21 PROCEDURE — 99072 ADDL SUPL MATRL&STAF TM PHE: CPT

## 2021-01-22 ENCOUNTER — APPOINTMENT (OUTPATIENT)
Dept: CARDIOLOGY | Facility: CLINIC | Age: 86
End: 2021-01-22
Payer: MEDICARE

## 2021-01-22 PROCEDURE — 78452 HT MUSCLE IMAGE SPECT MULT: CPT

## 2021-01-22 PROCEDURE — 99072 ADDL SUPL MATRL&STAF TM PHE: CPT

## 2021-01-22 PROCEDURE — 93018 CV STRESS TEST I&R ONLY: CPT

## 2021-01-22 PROCEDURE — A9500: CPT

## 2021-01-22 PROCEDURE — 93017 CV STRESS TEST TRACING ONLY: CPT

## 2021-01-25 ENCOUNTER — APPOINTMENT (OUTPATIENT)
Dept: VASCULAR SURGERY | Facility: CLINIC | Age: 86
End: 2021-01-25
Payer: MEDICARE

## 2021-01-25 VITALS
TEMPERATURE: 97.4 F | OXYGEN SATURATION: 95 % | WEIGHT: 199 LBS | SYSTOLIC BLOOD PRESSURE: 137 MMHG | HEART RATE: 75 BPM | DIASTOLIC BLOOD PRESSURE: 81 MMHG | BODY MASS INDEX: 29.47 KG/M2 | HEIGHT: 69 IN

## 2021-01-25 PROCEDURE — 99204 OFFICE O/P NEW MOD 45 MIN: CPT

## 2021-01-25 PROCEDURE — 99072 ADDL SUPL MATRL&STAF TM PHE: CPT

## 2021-01-25 NOTE — HISTORY OF PRESENT ILLNESS
[FreeTextEntry1] : 85 year old man referred for evaluation of a recently found, incidental 5.6 cm infrarenal AAA.\par As part of a work-up for hematuria, patient underwent a CT abdomen of the abdomen and pelvis which revealed a ruptured appendiceal mucocele, renal calculi and a non ruptured infrarenal AAA.\par \par Patient was initially planned for an appendectomy but due to the large aneurysm size and the potential need for colectomy in addition the appendectomy, the surgery has been postponed until the AAA is repaired\par \par Patient denies previous knowledge of his aneurysm. No family history of aneurysmal disease. Patient quit smoking over 60 years ago but his wife is an active smoker. He states that he is able to walk 1 mile without stopping. He denies CP, SOB, diaphoresis, palpitations, dizziness, syncope, LE edema, PND, orthopnea. No previous stroke or MI\par

## 2021-01-25 NOTE — DATA REVIEWED
[FreeTextEntry1] : CT abdomen 1/13/21 reviewed.\par 5.6 cm infrarenal AAA with good proximal and distal landing zones for EVAR

## 2021-01-25 NOTE — ASSESSMENT
[FreeTextEntry1] : 85 year old male with an incidentally found 5.6 cm infrarenal AAA. Patient also has an appendiceal mucocele and is planned for appendectomy and possible right colectomy.\par I have discussed with his general surgeon (Dr Salmeron). Patient was initially planned for an appendectomy but due to the large aneurysm size and the potential need for colectomy in addition the appendectomy, the surgery has been postponed until the AAA is repaired.\par -Based on his recent contrast CT, patient is a good candidate for endovascular repair (EVAR)\par -I have discussed the planned operation as well as the risks and benefits. He will like to proceed\par -Will obtain cardiac risk assessment from Dr Tamayo and plan for EVAR

## 2021-01-25 NOTE — PHYSICAL EXAM
[Normal Breath Sounds] : Normal breath sounds [2+] : left 2+ [1+] : left 1+ [Varicose Veins Of Lower Extremities] : bilaterally [Ankle Swelling On The Right] : mild [No Rash or Lesion] : No rash or lesion [JVD] : no jugular venous distention  [Ankle Swelling (On Exam)] : not present [] : not present [de-identified] : Well appearing [de-identified] : Pulsatile abdominal mass, non tender

## 2021-01-26 ENCOUNTER — OUTPATIENT (OUTPATIENT)
Dept: OUTPATIENT SERVICES | Facility: HOSPITAL | Age: 86
LOS: 1 days | End: 2021-01-26
Payer: MEDICARE

## 2021-01-26 ENCOUNTER — APPOINTMENT (OUTPATIENT)
Dept: FAMILY MEDICINE | Facility: CLINIC | Age: 86
End: 2021-01-26
Payer: MEDICARE

## 2021-01-26 ENCOUNTER — APPOINTMENT (OUTPATIENT)
Dept: RADIOLOGY | Facility: CLINIC | Age: 86
End: 2021-01-26
Payer: MEDICARE

## 2021-01-26 VITALS
TEMPERATURE: 97.7 F | WEIGHT: 199 LBS | SYSTOLIC BLOOD PRESSURE: 116 MMHG | DIASTOLIC BLOOD PRESSURE: 72 MMHG | OXYGEN SATURATION: 97 % | BODY MASS INDEX: 29.47 KG/M2 | HEIGHT: 69 IN | HEART RATE: 80 BPM | RESPIRATION RATE: 16 BRPM

## 2021-01-26 DIAGNOSIS — Z01.818 ENCOUNTER FOR OTHER PREPROCEDURAL EXAMINATION: ICD-10-CM

## 2021-01-26 PROCEDURE — 99214 OFFICE O/P EST MOD 30 MIN: CPT

## 2021-01-26 PROCEDURE — 71046 X-RAY EXAM CHEST 2 VIEWS: CPT | Mod: 26

## 2021-01-26 PROCEDURE — 99072 ADDL SUPL MATRL&STAF TM PHE: CPT

## 2021-01-26 PROCEDURE — 71046 X-RAY EXAM CHEST 2 VIEWS: CPT

## 2021-01-26 NOTE — ASSESSMENT
[Patient Optimized for Surgery] : Patient optimized for surgery [No Further Testing Recommended] : no further testing recommended [As per surgery] : as per surgery [FreeTextEntry4] : Mr. AURORA SIBLEY is a 85 year old male presenting for pre op evaluation\par CT showed an incidental finding of AAA 5 cm.\par Seen by Vascular surgeon scheduled for repair.\par Cardiac clearance by Dr Tamayo\par CXR 1/13 negative\par COVID test 1/13 negative.\par Recent Cough; Exam normal\par Chest xray ordered. Use saline spray. COVID testing scheduled pre op in 4-5 days.

## 2021-01-26 NOTE — PHYSICAL EXAM
[Normal Sclera/Conjunctiva] : normal sclera/conjunctiva [Normal Rate] : normal rate  [Normal S1, S2] : normal S1 and S2 [Normal] : affect was normal and insight and judgment were intact [de-identified] : A Fib [de-identified] : resolved ecchymosis on arms

## 2021-01-26 NOTE — RESULTS/DATA
[] : results reviewed [de-identified] : 1/14/2021 [de-identified] : 1/14/2021 [de-identified] : normal done today

## 2021-01-26 NOTE — HISTORY OF PRESENT ILLNESS
[Atrial Fibrillation] : atrial fibrillation [No Adverse Anesthesia Reaction] : no adverse anesthesia reaction in self or family member [(Patient denies any chest pain, claudication, dyspnea on exertion, orthopnea, palpitations or syncope)] : Patient denies any chest pain, claudication, dyspnea on exertion, orthopnea, palpitations or syncope [Aortic Stenosis] : no aortic stenosis [Coronary Artery Disease] : no coronary artery disease [Recent Myocardial Infarction] : no recent myocardial infarction [Asthma] : no asthma [COPD] : no COPD [Sleep Apnea] : no sleep apnea [Smoker] : not a smoker [Chronic Anticoagulation] : no chronic anticoagulation [Chronic Kidney Disease] : no chronic kidney disease [Diabetes] : no diabetes [FreeTextEntry1] : Endovascular repair of AA [FreeTextEntry2] : 2/2/2021 [FreeTextEntry3] : Dr Martinez [FreeTextEntry4] : Mr. AURORA SIBLEY is a 85 year old male presenting for pre op evaluation\par CT showed an incidental finding of AAA 5 cm.\par Seen by Vascular surgeon scheduled for repair.\par Seen by Cardiologist Dr Tamayo 1/14.\par Will be scheduled for Appendectomy/ Colectomy after this repair.\par Dry cough for about 10 days. Had CXR 1/13 at hospital was negative.\par He states every winter he gets a dry cough. No Fever. No Sick contacts.\par \par \par

## 2021-01-27 ENCOUNTER — APPOINTMENT (OUTPATIENT)
Dept: UROLOGY | Facility: CLINIC | Age: 86
End: 2021-01-27

## 2021-01-27 ENCOUNTER — NON-APPOINTMENT (OUTPATIENT)
Age: 86
End: 2021-01-27

## 2021-01-28 ENCOUNTER — OUTPATIENT (OUTPATIENT)
Dept: OUTPATIENT SERVICES | Facility: HOSPITAL | Age: 86
LOS: 1 days | End: 2021-01-28
Payer: MEDICARE

## 2021-01-28 VITALS
TEMPERATURE: 98 F | SYSTOLIC BLOOD PRESSURE: 115 MMHG | WEIGHT: 195.11 LBS | RESPIRATION RATE: 16 BRPM | DIASTOLIC BLOOD PRESSURE: 73 MMHG | OXYGEN SATURATION: 98 % | HEART RATE: 69 BPM | HEIGHT: 69 IN

## 2021-01-28 DIAGNOSIS — Z01.818 ENCOUNTER FOR OTHER PREPROCEDURAL EXAMINATION: ICD-10-CM

## 2021-01-28 DIAGNOSIS — Z98.890 OTHER SPECIFIED POSTPROCEDURAL STATES: Chronic | ICD-10-CM

## 2021-01-28 DIAGNOSIS — Z29.9 ENCOUNTER FOR PROPHYLACTIC MEASURES, UNSPECIFIED: ICD-10-CM

## 2021-01-28 DIAGNOSIS — I71.4 ABDOMINAL AORTIC ANEURYSM, WITHOUT RUPTURE: ICD-10-CM

## 2021-01-28 LAB
ANION GAP SERPL CALC-SCNC: 2 MMOL/L — LOW (ref 5–17)
APPEARANCE UR: CLEAR — SIGNIFICANT CHANGE UP
APTT BLD: 31.7 SEC — SIGNIFICANT CHANGE UP (ref 27.5–35.5)
BASOPHILS # BLD AUTO: 0.1 K/UL — SIGNIFICANT CHANGE UP (ref 0–0.2)
BASOPHILS NFR BLD AUTO: 1.5 % — SIGNIFICANT CHANGE UP (ref 0–2)
BILIRUB UR-MCNC: NEGATIVE — SIGNIFICANT CHANGE UP
BUN SERPL-MCNC: 15 MG/DL — SIGNIFICANT CHANGE UP (ref 7–23)
CALCIUM SERPL-MCNC: 9.4 MG/DL — SIGNIFICANT CHANGE UP (ref 8.5–10.1)
CHLORIDE SERPL-SCNC: 109 MMOL/L — HIGH (ref 96–108)
CO2 SERPL-SCNC: 31 MMOL/L — SIGNIFICANT CHANGE UP (ref 22–31)
COLOR SPEC: YELLOW — SIGNIFICANT CHANGE UP
CREAT SERPL-MCNC: 1.07 MG/DL — SIGNIFICANT CHANGE UP (ref 0.5–1.3)
DIFF PNL FLD: ABNORMAL
EOSINOPHIL # BLD AUTO: 0.26 K/UL — SIGNIFICANT CHANGE UP (ref 0–0.5)
EOSINOPHIL NFR BLD AUTO: 3.8 % — SIGNIFICANT CHANGE UP (ref 0–6)
GLUCOSE SERPL-MCNC: 95 MG/DL — SIGNIFICANT CHANGE UP (ref 70–99)
GLUCOSE UR QL: NEGATIVE MG/DL — SIGNIFICANT CHANGE UP
HCT VFR BLD CALC: 48.1 % — SIGNIFICANT CHANGE UP (ref 39–50)
HGB BLD-MCNC: 15.8 G/DL — SIGNIFICANT CHANGE UP (ref 13–17)
IMM GRANULOCYTES NFR BLD AUTO: 0.4 % — SIGNIFICANT CHANGE UP (ref 0–1.5)
INR BLD: 1.13 RATIO — SIGNIFICANT CHANGE UP (ref 0.88–1.16)
KETONES UR-MCNC: ABNORMAL
LEUKOCYTE ESTERASE UR-ACNC: ABNORMAL
LYMPHOCYTES # BLD AUTO: 0.98 K/UL — LOW (ref 1–3.3)
LYMPHOCYTES # BLD AUTO: 14.5 % — SIGNIFICANT CHANGE UP (ref 13–44)
MCHC RBC-ENTMCNC: 31.2 PG — SIGNIFICANT CHANGE UP (ref 27–34)
MCHC RBC-ENTMCNC: 32.8 GM/DL — SIGNIFICANT CHANGE UP (ref 32–36)
MCV RBC AUTO: 95.1 FL — SIGNIFICANT CHANGE UP (ref 80–100)
MONOCYTES # BLD AUTO: 0.76 K/UL — SIGNIFICANT CHANGE UP (ref 0–0.9)
MONOCYTES NFR BLD AUTO: 11.2 % — SIGNIFICANT CHANGE UP (ref 2–14)
NEUTROPHILS # BLD AUTO: 4.63 K/UL — SIGNIFICANT CHANGE UP (ref 1.8–7.4)
NEUTROPHILS NFR BLD AUTO: 68.6 % — SIGNIFICANT CHANGE UP (ref 43–77)
NITRITE UR-MCNC: NEGATIVE — SIGNIFICANT CHANGE UP
PH UR: 5 — SIGNIFICANT CHANGE UP (ref 5–8)
PLATELET # BLD AUTO: 256 K/UL — SIGNIFICANT CHANGE UP (ref 150–400)
POTASSIUM SERPL-MCNC: 4.1 MMOL/L — SIGNIFICANT CHANGE UP (ref 3.5–5.3)
POTASSIUM SERPL-SCNC: 4.1 MMOL/L — SIGNIFICANT CHANGE UP (ref 3.5–5.3)
PROT UR-MCNC: 30 MG/DL
PROTHROM AB SERPL-ACNC: 13 SEC — SIGNIFICANT CHANGE UP (ref 10.6–13.6)
RBC # BLD: 5.06 M/UL — SIGNIFICANT CHANGE UP (ref 4.2–5.8)
RBC # FLD: 14.5 % — SIGNIFICANT CHANGE UP (ref 10.3–14.5)
SODIUM SERPL-SCNC: 142 MMOL/L — SIGNIFICANT CHANGE UP (ref 135–145)
SP GR SPEC: 1.02 — SIGNIFICANT CHANGE UP (ref 1.01–1.02)
UROBILINOGEN FLD QL: NEGATIVE MG/DL — SIGNIFICANT CHANGE UP
WBC # BLD: 6.76 K/UL — SIGNIFICANT CHANGE UP (ref 3.8–10.5)
WBC # FLD AUTO: 6.76 K/UL — SIGNIFICANT CHANGE UP (ref 3.8–10.5)

## 2021-01-28 PROCEDURE — 36415 COLL VENOUS BLD VENIPUNCTURE: CPT

## 2021-01-28 PROCEDURE — 80048 BASIC METABOLIC PNL TOTAL CA: CPT

## 2021-01-28 PROCEDURE — 85610 PROTHROMBIN TIME: CPT

## 2021-01-28 PROCEDURE — 85730 THROMBOPLASTIN TIME PARTIAL: CPT

## 2021-01-28 PROCEDURE — 86923 COMPATIBILITY TEST ELECTRIC: CPT

## 2021-01-28 PROCEDURE — G0463: CPT | Mod: 25

## 2021-01-28 PROCEDURE — 85025 COMPLETE CBC W/AUTO DIFF WBC: CPT

## 2021-01-28 PROCEDURE — 86900 BLOOD TYPING SEROLOGIC ABO: CPT

## 2021-01-28 PROCEDURE — 86901 BLOOD TYPING SEROLOGIC RH(D): CPT

## 2021-01-28 PROCEDURE — 86850 RBC ANTIBODY SCREEN: CPT

## 2021-01-28 PROCEDURE — 81001 URINALYSIS AUTO W/SCOPE: CPT

## 2021-01-28 RX ORDER — WARFARIN SODIUM 2.5 MG/1
1 TABLET ORAL
Qty: 0 | Refills: 0 | DISCHARGE

## 2021-01-28 RX ORDER — FLUTICASONE PROPIONATE 50 MCG
1 SPRAY, SUSPENSION NASAL
Qty: 0 | Refills: 0 | DISCHARGE

## 2021-01-28 NOTE — H&P PST ADULT - MALLAMPATI CLASS
Take 20mg Lasix (not 40mg); BMP (labs) in 1 week and review with Anshul Tucker and Rosendo.  
Class III - visualization of the soft palate and the base of the uvula

## 2021-01-28 NOTE — H&P PST ADULT - NSANTHOSAYNRD_GEN_A_CORE
No. MANOJ screening performed.  STOP BANG Legend: 0-2 = LOW Risk; 3-4 = INTERMEDIATE Risk; 5-8 = HIGH Risk

## 2021-01-28 NOTE — H&P PST ADULT - ASSESSMENT
85 year old male presents to PST for planned AAA repair    Plan:  1. PST instructions given ; NPO status instructions to be given by ASU   2. Pt instructed to take following meds with sip of water :   3. Pt instructed to take routine evening medications unless indicated   4. Stop NSAIDS ( Aspirin Alev Motrin Mobic Diclofenac), herbal supplements , MVI , Vitamin fish oil 7 days prior to surgery  unless   directed by surgeon or cardiologist;   5. Medical Optimization  with    6. EZ wash instructions given & mupirocin instructions given  7. Labs EKG CXR as per surgeon request   8. Pt instructed to self quarantine   9. Covid Testing scheduled Pt notified and aware  10. Pt denies covid symptoms shortness of breath fever cough       CAPRINI SCORE [CLOT]    AGE RELATED RISK FACTORS                                                       MOBILITY RELATED FACTORS  [ ] Age 41-60 years                                            (1 Point)                  [ ] Bed rest                                                        (1 Point)  [ ] Age: 61-74 years                                           (2 Points)                 [ ] Plaster cast                                                   (2 Points)  [x ] Age= 75 years                                              (3 Points)                 [ ] Bed bound for more than 72 hours                 (2 Points)    DISEASE RELATED RISK FACTORS                                               GENDER SPECIFIC FACTORS  [ ] Edema in the lower extremities                       (1 Point)                  [ ] Pregnancy                                                     (1 Point)  [ ] Varicose veins                                               (1 Point)                  [ ] Post-partum < 6 weeks                                   (1 Point)             [x ] BMI > 25 Kg/m2                                            (1 Point)                  [ ] Hormonal therapy  or oral contraception          (1 Point)                 [ ] Sepsis (in the previous month)                        (1 Point)                  [ ] History of pregnancy complications                 (1 point)  [ ] Pneumonia or serious lung disease                                               [ ] Unexplained or recurrent                     (1 Point)           (in the previous month)                               (1 Point)  [ ] Abnormal pulmonary function test                     (1 Point)                 SURGERY RELATED RISK FACTORS  [ ] Acute myocardial infarction                              (1 Point)                 [ ]  Section                                             (1 Point)  [ ] Congestive heart failure (in the previous month)  (1 Point)               [ ] Minor surgery                                                  (1 Point)   [ ] Inflammatory bowel disease                             (1 Point)                 [ ] Arthroscopic surgery                                        (2 Points)  [ ] Central venous access                                      (2 Points)                [ x] General surgery lasting more than 45 minutes   (2 Points)       [ ] Stroke (in the previous month)                          (5 Points)               [ ] Elective arthroplasty                                         (5 Points)            ( ) presents and past malignancy                           (2 points)                                                                                                         HEMATOLOGY RELATED FACTORS                                                 TRAUMA RELATED RISK FACTORS  [ ] Prior episodes of VTE                                     (3 Points)                 [ ] Fracture of the hip, pelvis, or leg                       (5 Points)  [ ] Positive family history for VTE                         (3 Points)                 [ ] Acute spinal cord injury (in the previous month)  (5 Points)  [ ] Prothrombin 00453 A                                     (3 Points)                 [ ] Paralysis  (less than 1 month)                             (5 Points)  [ ] Factor V Leiden                                             (3 Points)                  [ ] Multiple Trauma within 1 month                        (5 Points)  [ ] Lupus anticoagulants                                     (3 Points)                                                           [ ] Anticardiolipin antibodies                               (3 Points)                                                       [ ] High homocysteine in the blood                      (3 Points)                                             [ ] Other congenital or acquired thrombophilia      (3 Points)                                                [ ] Heparin induced thrombocytopenia                  (3 Points)                                          Total Score [    6      ] 85 year old male presents to PST for planned AAA repair    Plan:  1. PST instructions given ; NPO status instructions to be given by ASU   2. Pt instructed to take following meds with sip of water : ropinorole amlodipine metoprolol  3. Pt instructed to take routine evening medications unless indicated   4. Stop NSAIDS ( Aspirin Alev Motrin Mobic Diclofenac), herbal supplements , MVI , Vitamin fish oil 7 days prior to surgery  unless   directed by surgeon or cardiologist;   5. Medical Optimization  with Dr Jackson Tamayo   6. EZ wash instructions given   7. Labs EKG CXR as per surgeon request   8. Pt instructed to self quarantine   9. Covid Testing scheduled Pt notified and aware  10. Pt denies covid symptoms shortness of breath fever cough       CAPRINI SCORE [CLOT]    AGE RELATED RISK FACTORS                                                       MOBILITY RELATED FACTORS  [ ] Age 41-60 years                                            (1 Point)                  [ ] Bed rest                                                        (1 Point)  [ ] Age: 61-74 years                                           (2 Points)                 [ ] Plaster cast                                                   (2 Points)  [x ] Age= 75 years                                              (3 Points)                 [ ] Bed bound for more than 72 hours                 (2 Points)    DISEASE RELATED RISK FACTORS                                               GENDER SPECIFIC FACTORS  [ ] Edema in the lower extremities                       (1 Point)                  [ ] Pregnancy                                                     (1 Point)  [ ] Varicose veins                                               (1 Point)                  [ ] Post-partum < 6 weeks                                   (1 Point)             [x ] BMI > 25 Kg/m2                                            (1 Point)                  [ ] Hormonal therapy  or oral contraception          (1 Point)                 [ ] Sepsis (in the previous month)                        (1 Point)                  [ ] History of pregnancy complications                 (1 point)  [ ] Pneumonia or serious lung disease                                               [ ] Unexplained or recurrent                     (1 Point)           (in the previous month)                               (1 Point)  [ ] Abnormal pulmonary function test                     (1 Point)                 SURGERY RELATED RISK FACTORS  [ ] Acute myocardial infarction                              (1 Point)                 [ ]  Section                                             (1 Point)  [ ] Congestive heart failure (in the previous month)  (1 Point)               [ ] Minor surgery                                                  (1 Point)   [ ] Inflammatory bowel disease                             (1 Point)                 [ ] Arthroscopic surgery                                        (2 Points)  [ ] Central venous access                                      (2 Points)                [ x] General surgery lasting more than 45 minutes   (2 Points)       [ ] Stroke (in the previous month)                          (5 Points)               [ ] Elective arthroplasty                                         (5 Points)            ( ) presents and past malignancy                           (2 points)                                                                                                         HEMATOLOGY RELATED FACTORS                                                 TRAUMA RELATED RISK FACTORS  [ ] Prior episodes of VTE                                     (3 Points)                 [ ] Fracture of the hip, pelvis, or leg                       (5 Points)  [ ] Positive family history for VTE                         (3 Points)                 [ ] Acute spinal cord injury (in the previous month)  (5 Points)  [ ] Prothrombin 55360 A                                     (3 Points)                 [ ] Paralysis  (less than 1 month)                             (5 Points)  [ ] Factor V Leiden                                             (3 Points)                  [ ] Multiple Trauma within 1 month                        (5 Points)  [ ] Lupus anticoagulants                                     (3 Points)                                                           [ ] Anticardiolipin antibodies                               (3 Points)                                                       [ ] High homocysteine in the blood                      (3 Points)                                             [ ] Other congenital or acquired thrombophilia      (3 Points)                                                [ ] Heparin induced thrombocytopenia                  (3 Points)                                          Total Score [    6      ]

## 2021-01-28 NOTE — H&P PST ADULT - ATTENDING COMMENTS
85 year old man with a 5.6cm infrarenal AAA.  Plan is for EVAR today  Risks and benefits of the planned procedure including bleeding, infection, perioperative MI, limb ischemia, endoleaks, infection and death discussed with the patient. He expresses understanding and wants to proceed.

## 2021-01-28 NOTE — H&P PST ADULT - NSICDXPASTMEDICALHX_GEN_ALL_CORE_FT
PAST MEDICAL HISTORY:  AAA (abdominal aortic aneurysm)     Anxiety     Appendicitis     Atrial fibrillation     Eczema     H/O candidiasis of mouth     H/O scarlet fever     [place] (hard of hearing)     HTN (hypertension)     Parkinson disease     Renal calculi     Seasonal allergies      PAST MEDICAL HISTORY:  AAA (abdominal aortic aneurysm)     Anxiety     Atrial fibrillation     Eczema     H/O candidiasis of mouth     H/O scarlet fever     History of shingles     [place] (hard of hearing)     HTN (hypertension)     Mucocele, appendix     Parkinson disease     Renal calculi     Seasonal allergies      PAST MEDICAL HISTORY:  AAA (abdominal aortic aneurysm)     Anxiety     Atrial fibrillation     Eczema     H/O candidiasis of mouth     H/O scarlet fever     History of shingles     [place] (hard of hearing)     HTN (hypertension)     Mucocele, appendix     Parkinson disease     Patent foramen ovale dilated left artrium severe echo 1/2021    Renal calculi     Seasonal allergies

## 2021-01-28 NOTE — H&P PST ADULT - HISTORY OF PRESENT ILLNESS
85 year old male presents to PST for planned AAA repair  85 year old male with incidental  finding of AAA 5 cm; pt pmhx of  afib on coumadin; he developed hematuria and CT scan of abdomen was done; currently off coumadin denies abdominal pain ; he presents  to PST for planned endovascular AAA repair

## 2021-01-29 ENCOUNTER — NON-APPOINTMENT (OUTPATIENT)
Age: 86
End: 2021-01-29

## 2021-01-29 DIAGNOSIS — Z01.818 ENCOUNTER FOR OTHER PREPROCEDURAL EXAMINATION: ICD-10-CM

## 2021-01-29 DIAGNOSIS — I71.4 ABDOMINAL AORTIC ANEURYSM, WITHOUT RUPTURE: ICD-10-CM

## 2021-01-30 ENCOUNTER — APPOINTMENT (OUTPATIENT)
Dept: DISASTER EMERGENCY | Facility: CLINIC | Age: 86
End: 2021-01-30

## 2021-01-30 LAB — SARS-COV-2 N GENE NPH QL NAA+PROBE: NOT DETECTED

## 2021-02-01 RX ORDER — ONDANSETRON 8 MG/1
4 TABLET, FILM COATED ORAL EVERY 6 HOURS
Refills: 0 | Status: DISCONTINUED | OUTPATIENT
Start: 2021-02-02 | End: 2021-02-02

## 2021-02-01 RX ORDER — OXYCODONE HYDROCHLORIDE 5 MG/1
5 TABLET ORAL ONCE
Refills: 0 | Status: DISCONTINUED | OUTPATIENT
Start: 2021-02-02 | End: 2021-02-02

## 2021-02-01 RX ORDER — SODIUM CHLORIDE 9 MG/ML
1000 INJECTION, SOLUTION INTRAVENOUS
Refills: 0 | Status: DISCONTINUED | OUTPATIENT
Start: 2021-02-02 | End: 2021-02-02

## 2021-02-01 RX ORDER — FENTANYL CITRATE 50 UG/ML
50 INJECTION INTRAVENOUS
Refills: 0 | Status: DISCONTINUED | OUTPATIENT
Start: 2021-02-02 | End: 2021-02-02

## 2021-02-02 ENCOUNTER — INPATIENT (INPATIENT)
Facility: HOSPITAL | Age: 86
LOS: 0 days | Discharge: HOME | DRG: 269 | End: 2021-02-03
Payer: MEDICARE

## 2021-02-02 ENCOUNTER — TRANSCRIPTION ENCOUNTER (OUTPATIENT)
Age: 86
End: 2021-02-02

## 2021-02-02 VITALS
HEIGHT: 69 IN | OXYGEN SATURATION: 98 % | WEIGHT: 195.11 LBS | RESPIRATION RATE: 16 BRPM | TEMPERATURE: 98 F | SYSTOLIC BLOOD PRESSURE: 126 MMHG | HEART RATE: 76 BPM | DIASTOLIC BLOOD PRESSURE: 95 MMHG

## 2021-02-02 DIAGNOSIS — I71.4 ABDOMINAL AORTIC ANEURYSM, WITHOUT RUPTURE: ICD-10-CM

## 2021-02-02 DIAGNOSIS — Z98.890 OTHER SPECIFIED POSTPROCEDURAL STATES: Chronic | ICD-10-CM

## 2021-02-02 PROCEDURE — 36415 COLL VENOUS BLD VENIPUNCTURE: CPT

## 2021-02-02 PROCEDURE — C1874: CPT

## 2021-02-02 PROCEDURE — C1894: CPT

## 2021-02-02 PROCEDURE — 34713 PERQ ACCESS & CLSR FEM ART: CPT

## 2021-02-02 PROCEDURE — 85027 COMPLETE CBC AUTOMATED: CPT

## 2021-02-02 PROCEDURE — C9399: CPT

## 2021-02-02 PROCEDURE — C1889: CPT

## 2021-02-02 PROCEDURE — 84100 ASSAY OF PHOSPHORUS: CPT

## 2021-02-02 PROCEDURE — C1760: CPT

## 2021-02-02 PROCEDURE — 83735 ASSAY OF MAGNESIUM: CPT

## 2021-02-02 PROCEDURE — C1887: CPT

## 2021-02-02 PROCEDURE — 80048 BASIC METABOLIC PNL TOTAL CA: CPT

## 2021-02-02 PROCEDURE — 34705 EVAC RPR A-BIILIAC NDGFT: CPT

## 2021-02-02 PROCEDURE — 76000 FLUOROSCOPY <1 HR PHYS/QHP: CPT

## 2021-02-02 PROCEDURE — C1769: CPT

## 2021-02-02 PROCEDURE — C1725: CPT

## 2021-02-02 RX ORDER — CLOPIDOGREL BISULFATE 75 MG/1
75 TABLET, FILM COATED ORAL DAILY
Refills: 0 | Status: DISCONTINUED | OUTPATIENT
Start: 2021-02-02 | End: 2021-02-03

## 2021-02-02 RX ORDER — METOPROLOL TARTRATE 50 MG
12.5 TABLET ORAL
Refills: 0 | Status: DISCONTINUED | OUTPATIENT
Start: 2021-02-02 | End: 2021-02-03

## 2021-02-02 RX ORDER — ASPIRIN/CALCIUM CARB/MAGNESIUM 324 MG
1 TABLET ORAL
Qty: 0 | Refills: 0 | DISCHARGE
Start: 2021-02-02

## 2021-02-02 RX ORDER — ACETAMINOPHEN 500 MG
1 TABLET ORAL
Qty: 28 | Refills: 0
Start: 2021-02-02 | End: 2021-02-08

## 2021-02-02 RX ORDER — ACETAMINOPHEN 500 MG
975 TABLET ORAL ONCE
Refills: 0 | Status: COMPLETED | OUTPATIENT
Start: 2021-02-02 | End: 2021-02-02

## 2021-02-02 RX ORDER — ASPIRIN/CALCIUM CARB/MAGNESIUM 324 MG
1 TABLET ORAL
Qty: 30 | Refills: 3
Start: 2021-02-02

## 2021-02-02 RX ORDER — ROPINIROLE 8 MG/1
0.5 TABLET, FILM COATED, EXTENDED RELEASE ORAL THREE TIMES A DAY
Refills: 0 | Status: DISCONTINUED | OUTPATIENT
Start: 2021-02-02 | End: 2021-02-03

## 2021-02-02 RX ORDER — ASPIRIN/CALCIUM CARB/MAGNESIUM 324 MG
81 TABLET ORAL DAILY
Refills: 0 | Status: DISCONTINUED | OUTPATIENT
Start: 2021-02-02 | End: 2021-02-03

## 2021-02-02 RX ORDER — CLOPIDOGREL BISULFATE 75 MG/1
1 TABLET, FILM COATED ORAL
Qty: 0 | Refills: 0 | DISCHARGE
Start: 2021-02-02

## 2021-02-02 RX ORDER — SODIUM CHLORIDE 9 MG/ML
1000 INJECTION, SOLUTION INTRAVENOUS
Refills: 0 | Status: DISCONTINUED | OUTPATIENT
Start: 2021-02-02 | End: 2021-02-03

## 2021-02-02 RX ORDER — ACETAMINOPHEN 500 MG
650 TABLET ORAL EVERY 6 HOURS
Refills: 0 | Status: DISCONTINUED | OUTPATIENT
Start: 2021-02-02 | End: 2021-02-03

## 2021-02-02 RX ORDER — OXYCODONE HYDROCHLORIDE 5 MG/1
5 TABLET ORAL EVERY 6 HOURS
Refills: 0 | Status: DISCONTINUED | OUTPATIENT
Start: 2021-02-02 | End: 2021-02-03

## 2021-02-02 RX ORDER — FAMOTIDINE 10 MG/ML
20 INJECTION INTRAVENOUS ONCE
Refills: 0 | Status: COMPLETED | OUTPATIENT
Start: 2021-02-02 | End: 2021-02-02

## 2021-02-02 RX ORDER — CLOPIDOGREL BISULFATE 75 MG/1
1 TABLET, FILM COATED ORAL
Qty: 30 | Refills: 3
Start: 2021-02-02

## 2021-02-02 RX ORDER — ACETAMINOPHEN 500 MG
2 TABLET ORAL
Qty: 0 | Refills: 0 | DISCHARGE
Start: 2021-02-02

## 2021-02-02 RX ORDER — AMLODIPINE BESYLATE 2.5 MG/1
5 TABLET ORAL DAILY
Refills: 0 | Status: DISCONTINUED | OUTPATIENT
Start: 2021-02-02 | End: 2021-02-03

## 2021-02-02 RX ADMIN — FAMOTIDINE 20 MILLIGRAM(S): 10 INJECTION INTRAVENOUS at 06:54

## 2021-02-02 RX ADMIN — CLOPIDOGREL BISULFATE 75 MILLIGRAM(S): 75 TABLET, FILM COATED ORAL at 18:05

## 2021-02-02 RX ADMIN — Medication 12.5 MILLIGRAM(S): at 21:43

## 2021-02-02 RX ADMIN — ROPINIROLE 0.5 MILLIGRAM(S): 8 TABLET, FILM COATED, EXTENDED RELEASE ORAL at 21:42

## 2021-02-02 RX ADMIN — ROPINIROLE 0.5 MILLIGRAM(S): 8 TABLET, FILM COATED, EXTENDED RELEASE ORAL at 16:22

## 2021-02-02 RX ADMIN — Medication 975 MILLIGRAM(S): at 06:54

## 2021-02-02 RX ADMIN — SODIUM CHLORIDE 75 MILLILITER(S): 9 INJECTION, SOLUTION INTRAVENOUS at 11:16

## 2021-02-02 RX ADMIN — Medication 81 MILLIGRAM(S): at 18:05

## 2021-02-02 NOTE — DISCHARGE NOTE PROVIDER - HOSPITAL COURSE
84 yo M presents with AAA repaired endo-vascularly. Patient tolerated procedure well stabel for DC home with aspirin and Plavix.

## 2021-02-02 NOTE — DISCHARGE NOTE PROVIDER - NSDCCPTREATMENT_GEN_ALL_CORE_FT
PRINCIPAL PROCEDURE  Procedure: Endovascular repair of infrarenal abdominal aortic aneurysm with extension prosthesis  Findings and Treatment:

## 2021-02-02 NOTE — DISCHARGE NOTE PROVIDER - NSDCCPCAREPLAN_GEN_ALL_CORE_FT
PRINCIPAL DISCHARGE DIAGNOSIS  Diagnosis: H/O abdominal aortic aneurysm repair  Assessment and Plan of Treatment:

## 2021-02-02 NOTE — BRIEF OPERATIVE NOTE - NSICDXBRIEFPROCEDURE_GEN_ALL_CORE_FT
PROCEDURES:  Endovascular repair of descending aortic aneurysm 02-Feb-2021 10:40:26  MATY VARELA  Transcatheter delivery of fixation device to endograft 02-Feb-2021 10:38:49  MATY VARELA

## 2021-02-02 NOTE — DISCHARGE NOTE PROVIDER - NSDCMRMEDTOKEN_GEN_ALL_CORE_FT
acetaminophen 325 mg oral tablet: 2 tab(s) orally every 6 hours, As needed, Temp greater or equal to 38C (100.4F), Mild Pain (1 - 3)  amLODIPine 5 mg oral tablet: 1 tab(s) orally once a day  aspirin 81 mg oral tablet, chewable: 1 tab(s) orally once a day  clopidogrel 75 mg oral tablet: 1 tab(s) orally once a day  Metoprolol Tartrate 25 mg oral tablet: 1/2  tab(s) orally 2 times a day  rOPINIRole 0.5 mg oral tablet: 1 tab(s) orally 3 times a day   acetaminophen 325 mg oral tablet: 2 tab(s) orally every 6 hours, As needed, Temp greater or equal to 38C (100.4F), Mild Pain (1 - 3)  amLODIPine 5 mg oral tablet: 1 tab(s) orally once a day  aspirin 81 mg oral delayed release tablet: 1 tab(s) orally once a day   aspirin 81 mg oral tablet, chewable: 1 tab(s) orally once a day  clopidogrel 75 mg oral tablet: 1 tab(s) orally once a day  clopidogrel 75 mg oral tablet: 1 tab(s) orally once a day   Metoprolol Tartrate 25 mg oral tablet: 1/2  tab(s) orally 2 times a day  rOPINIRole 0.5 mg oral tablet: 1 tab(s) orally 3 times a day  Tylenol 325 mg oral tablet: 1 tab(s) orally every 6 hours as needed for pain

## 2021-02-02 NOTE — DISCHARGE NOTE PROVIDER - CARE PROVIDER_API CALL
Cisco Martinez)  Surgery  [address], [address] [place] [address]  Phone: [phone]  Fax: [phone]  Follow Up Time:    Cisco Martinez)  Surgery  [address], [address] [place] [address]  Phone: [phone]  Fax: [phone]  Follow Up Time: 1 week

## 2021-02-03 ENCOUNTER — TRANSCRIPTION ENCOUNTER (OUTPATIENT)
Age: 86
End: 2021-02-03

## 2021-02-03 VITALS
DIASTOLIC BLOOD PRESSURE: 72 MMHG | OXYGEN SATURATION: 96 % | HEART RATE: 96 BPM | RESPIRATION RATE: 31 BRPM | SYSTOLIC BLOOD PRESSURE: 121 MMHG

## 2021-02-03 LAB
ANION GAP SERPL CALC-SCNC: 5 MMOL/L — SIGNIFICANT CHANGE UP (ref 5–17)
BUN SERPL-MCNC: 11 MG/DL — SIGNIFICANT CHANGE UP (ref 7–23)
CALCIUM SERPL-MCNC: 9 MG/DL — SIGNIFICANT CHANGE UP (ref 8.5–10.1)
CHLORIDE SERPL-SCNC: 111 MMOL/L — HIGH (ref 96–108)
CO2 SERPL-SCNC: 28 MMOL/L — SIGNIFICANT CHANGE UP (ref 22–31)
CREAT SERPL-MCNC: 0.93 MG/DL — SIGNIFICANT CHANGE UP (ref 0.5–1.3)
GLUCOSE SERPL-MCNC: 108 MG/DL — HIGH (ref 70–99)
HCT VFR BLD CALC: 42 % — SIGNIFICANT CHANGE UP (ref 39–50)
HGB BLD-MCNC: 14.2 G/DL — SIGNIFICANT CHANGE UP (ref 13–17)
MAGNESIUM SERPL-MCNC: 2.1 MG/DL — SIGNIFICANT CHANGE UP (ref 1.6–2.6)
MCHC RBC-ENTMCNC: 31.3 PG — SIGNIFICANT CHANGE UP (ref 27–34)
MCHC RBC-ENTMCNC: 33.8 GM/DL — SIGNIFICANT CHANGE UP (ref 32–36)
MCV RBC AUTO: 92.5 FL — SIGNIFICANT CHANGE UP (ref 80–100)
PHOSPHATE SERPL-MCNC: 3.1 MG/DL — SIGNIFICANT CHANGE UP (ref 2.5–4.5)
PLATELET # BLD AUTO: 191 K/UL — SIGNIFICANT CHANGE UP (ref 150–400)
POTASSIUM SERPL-MCNC: 4 MMOL/L — SIGNIFICANT CHANGE UP (ref 3.5–5.3)
POTASSIUM SERPL-SCNC: 4 MMOL/L — SIGNIFICANT CHANGE UP (ref 3.5–5.3)
RBC # BLD: 4.54 M/UL — SIGNIFICANT CHANGE UP (ref 4.2–5.8)
RBC # FLD: 14.1 % — SIGNIFICANT CHANGE UP (ref 10.3–14.5)
SODIUM SERPL-SCNC: 144 MMOL/L — SIGNIFICANT CHANGE UP (ref 135–145)
WBC # BLD: 11.5 K/UL — HIGH (ref 3.8–10.5)
WBC # FLD AUTO: 11.5 K/UL — HIGH (ref 3.8–10.5)

## 2021-02-03 RX ADMIN — ROPINIROLE 0.5 MILLIGRAM(S): 8 TABLET, FILM COATED, EXTENDED RELEASE ORAL at 05:18

## 2021-02-03 RX ADMIN — CLOPIDOGREL BISULFATE 75 MILLIGRAM(S): 75 TABLET, FILM COATED ORAL at 10:03

## 2021-02-03 RX ADMIN — Medication 81 MILLIGRAM(S): at 10:03

## 2021-02-03 RX ADMIN — Medication 12.5 MILLIGRAM(S): at 09:58

## 2021-02-03 RX ADMIN — AMLODIPINE BESYLATE 5 MILLIGRAM(S): 2.5 TABLET ORAL at 09:58

## 2021-02-03 NOTE — DISCHARGE NOTE NURSING/CASE MANAGEMENT/SOCIAL WORK - PATIENT PORTAL LINK FT
You can access the FollowMyHealth Patient Portal offered by [facility] by registering at the following website: [email or web address]. By joining [employer]’s FollowMyHealth portal, you will also be able to view your health information using other applications (apps) compatible with our system.

## 2021-02-04 ENCOUNTER — NON-APPOINTMENT (OUTPATIENT)
Age: 86
End: 2021-02-04

## 2021-02-05 ENCOUNTER — APPOINTMENT (OUTPATIENT)
Dept: CARDIOLOGY | Facility: CLINIC | Age: 86
End: 2021-02-05
Payer: MEDICARE

## 2021-02-05 PROBLEM — K38.8 OTHER SPECIFIED DISEASES OF APPENDIX: Chronic | Status: ACTIVE | Noted: 2021-01-28

## 2021-02-05 PROBLEM — Z86.19 PERSONAL HISTORY OF OTHER INFECTIOUS AND PARASITIC DISEASES: Chronic | Status: ACTIVE | Noted: 2021-01-28

## 2021-02-05 PROBLEM — J30.2 OTHER SEASONAL ALLERGIC RHINITIS: Chronic | Status: ACTIVE | Noted: 2021-01-28

## 2021-02-05 PROBLEM — N20.0 CALCULUS OF KIDNEY: Chronic | Status: ACTIVE | Noted: 2021-01-28

## 2021-02-05 PROBLEM — F41.9 ANXIETY DISORDER, UNSPECIFIED: Chronic | Status: ACTIVE | Noted: 2021-01-28

## 2021-02-05 PROBLEM — G20 PARKINSON'S DISEASE: Chronic | Status: ACTIVE | Noted: 2021-01-28

## 2021-02-05 PROBLEM — Q21.1 ATRIAL SEPTAL DEFECT: Chronic | Status: ACTIVE | Noted: 2021-01-28

## 2021-02-05 PROBLEM — H91.90 UNSPECIFIED HEARING LOSS, UNSPECIFIED EAR: Chronic | Status: ACTIVE | Noted: 2021-01-28

## 2021-02-05 PROBLEM — I71.4 ABDOMINAL AORTIC ANEURYSM, WITHOUT RUPTURE: Chronic | Status: ACTIVE | Noted: 2021-01-28

## 2021-02-05 PROBLEM — L30.9 DERMATITIS, UNSPECIFIED: Chronic | Status: ACTIVE | Noted: 2021-01-28

## 2021-02-05 PROCEDURE — 99442: CPT

## 2021-02-08 ENCOUNTER — APPOINTMENT (OUTPATIENT)
Dept: UROLOGY | Facility: CLINIC | Age: 86
End: 2021-02-08

## 2021-02-08 ENCOUNTER — APPOINTMENT (OUTPATIENT)
Dept: VASCULAR SURGERY | Facility: CLINIC | Age: 86
End: 2021-02-08
Payer: MEDICARE

## 2021-02-08 VITALS
DIASTOLIC BLOOD PRESSURE: 75 MMHG | BODY MASS INDEX: 29.47 KG/M2 | HEIGHT: 69 IN | TEMPERATURE: 97.5 F | OXYGEN SATURATION: 95 % | WEIGHT: 199 LBS | HEART RATE: 91 BPM | SYSTOLIC BLOOD PRESSURE: 117 MMHG

## 2021-02-08 DIAGNOSIS — I48.91 UNSPECIFIED ATRIAL FIBRILLATION: ICD-10-CM

## 2021-02-08 DIAGNOSIS — Z79.01 LONG TERM (CURRENT) USE OF ANTICOAGULANTS: ICD-10-CM

## 2021-02-08 DIAGNOSIS — I10 ESSENTIAL (PRIMARY) HYPERTENSION: ICD-10-CM

## 2021-02-08 DIAGNOSIS — F41.9 ANXIETY DISORDER, UNSPECIFIED: ICD-10-CM

## 2021-02-08 DIAGNOSIS — I71.4 ABDOMINAL AORTIC ANEURYSM, WITHOUT RUPTURE: ICD-10-CM

## 2021-02-08 PROCEDURE — 99024 POSTOP FOLLOW-UP VISIT: CPT

## 2021-02-08 NOTE — HISTORY OF PRESENT ILLNESS
[de-identified] : Incidental finding of possible mucocele of appendix, no pain,no fever, ha renal stones, also 5 cm AAA. was seen in ED, offered admission and work up, could not stay and now presenting outpatient. LAst colonoscopy many years ago was WNL.

## 2021-02-08 NOTE — HISTORY OF PRESENT ILLNESS
[FreeTextEntry1] : 85 year old man referred for evaluation of a recently found, incidental 5.6 cm infrarenal AAA.\par As part of a work-up for hematuria, patient underwent a CT abdomen of the abdomen and pelvis which revealed a ruptured appendiceal mucocele, renal calculi and a non ruptured infrarenal AAA.\par \par Patient was initially planned for an appendectomy but due to the large aneurysm size and the potential need for colectomy in addition the appendectomy, the surgery has been postponed until the AAA is repaired\par \par Patient denies previous knowledge of his aneurysm. No family history of aneurysmal disease. Patient quit smoking over 60 years ago but his wife is an active smoker. He states that he is able to walk 1 mile without stopping. He denies CP, SOB, diaphoresis, palpitations, dizziness, syncope, LE edema, PND, orthopnea. No previous stroke or MI\par  [de-identified] : Patient is s/p percutaneous EVAR on 2/1/21. He did well and was discharged home on 2/2/21\par He denies groin pain or swelling.\par Patient was started on DAPT and has now developed significant bruising in his extremities.

## 2021-02-08 NOTE — ASSESSMENT
[FreeTextEntry1] : 85 year old male with an incidentally found 5.6 cm infrarenal AAA. Patient also has an appendiceal mucocele and is planned for appendectomy and possible right colectomy.\par He is now s/p percutaneous EVAR on 2/1/21.\par Procedure was uncomplicated and patient is doing well\par -I have discontinued his Plavix due to his significant bruising. He should continue daily ASA 81mg\par -Scheduled to see his cardiologist, who would decide whether to restart Coumadin for his atrial fibrillation. There are no vascular objections to therapeutic AC.\par -He is also scheduled to follow up with general surgery for his mucocele. No vascular contraindications to planned appendectomy, possible colectomy\par -He will return to see me in 2 months. I will obtain a CTA prior to that visit for endograft evaluation

## 2021-02-08 NOTE — ASSESSMENT
[FreeTextEntry1] : 82 y old man with incidental mucocele of appendix, larger then 5X5 cm AAA, \par  Medical clearance\par Pt to get stress test  tomorrow\par VAscular consult for AAA, may need repair ist\par Will need bowel prep \par Will schedule for laparoscopic appendectomy, possible rt colectomy after cardiac, clearance, colonoscopy and vascular evaluation.

## 2021-02-08 NOTE — PHYSICAL EXAM
[JVD] : no jugular venous distention  [Normal Breath Sounds] : Normal breath sounds [2+] : left 2+ [1+] : left 1+ [Ankle Swelling (On Exam)] : not present [Varicose Veins Of Lower Extremities] : bilaterally [Ankle Swelling On The Right] : mild [] : not present [No Rash or Lesion] : No rash or lesion [de-identified] : Well appearing [FreeTextEntry1] : No access site hematoma [de-identified] : No pulsatile abdominal mass, non tender

## 2021-02-09 ENCOUNTER — APPOINTMENT (OUTPATIENT)
Dept: FAMILY MEDICINE | Facility: CLINIC | Age: 86
End: 2021-02-09
Payer: MEDICARE

## 2021-02-09 VITALS
TEMPERATURE: 98.8 F | HEART RATE: 82 BPM | WEIGHT: 198 LBS | OXYGEN SATURATION: 97 % | RESPIRATION RATE: 16 BRPM | HEIGHT: 69 IN | BODY MASS INDEX: 29.33 KG/M2 | DIASTOLIC BLOOD PRESSURE: 80 MMHG | SYSTOLIC BLOOD PRESSURE: 110 MMHG

## 2021-02-09 DIAGNOSIS — R58 HEMORRHAGE, NOT ELSEWHERE CLASSIFIED: ICD-10-CM

## 2021-02-09 DIAGNOSIS — Z79.01 LONG TERM (CURRENT) USE OF ANTICOAGULANTS: ICD-10-CM

## 2021-02-09 LAB — INR PPP: 1.1 RATIO

## 2021-02-09 PROCEDURE — 99072 ADDL SUPL MATRL&STAF TM PHE: CPT

## 2021-02-09 PROCEDURE — 99214 OFFICE O/P EST MOD 30 MIN: CPT | Mod: 25

## 2021-02-09 PROCEDURE — 85610 PROTHROMBIN TIME: CPT | Mod: QW

## 2021-02-09 RX ORDER — WARFARIN 1 MG/1
1 TABLET ORAL
Qty: 90 | Refills: 1 | Status: DISCONTINUED | COMMUNITY
End: 2020-11-09

## 2021-02-09 NOTE — HISTORY OF PRESENT ILLNESS
[FreeTextEntry1] : follow up [de-identified] : Mr. AURORA SIBLEY is a 85 year old male presenting for a follow up\par He had AAA repair by Dr Martinez doing well\par Was on Plavix was discontinued by Vascular surgeon because of extensive ecchymosis. Now on Aspirin 81 mg.\par a fib off Coumadin for 3 months\par Seen by Cardiologist Dr Tamayo\par CHADS score 3.\par Mucocele appendix has not followed up with surgeon\par Nasal discharge wants Flonase renewed.

## 2021-02-09 NOTE — PHYSICAL EXAM
[Normal Sclera/Conjunctiva] : normal sclera/conjunctiva [Normal Rate] : normal rate  [Normal S1, S2] : normal S1 and S2 [Normal] : affect was normal and insight and judgment were intact [de-identified] : A Fib [de-identified] : resolved ecchymosis on arms

## 2021-02-09 NOTE — ASSESSMENT
[FreeTextEntry1] : A Fib CHADs score 3\par Held Coumadin, will readdress risk after appendectomy. Cardiologist wants to restart after surgery.\par Parkinsons: stable\par Seen by Neurologist regularly.\par \par CT shows Mucocele/ hernia ? appendicitis with possible perforation\par Seen in ER for possible Mucocele/ Appendicitis\par Seen by Surgeon Dr Villegas,  follow up 1/21. was monitoring \par Did not schedule follow up. Patient concerned about getting surgery, wants second opinion. See Dr Ochoa.\par \par Hematuria on Coumadin\par Seen by Dr George for Hematuria\par Ct was done for w/u\par hematuria resolved after Coumadin stopped. Staghorn calculi seen on CT.\par \par AAA infrarenal 5 cm s/p repair doing well post op s/p percutaneous EVAR.\par no contraindication for anticoagulation. patient has concerns about the ecchymosis.\par

## 2021-02-10 ENCOUNTER — APPOINTMENT (OUTPATIENT)
Dept: CARDIOLOGY | Facility: CLINIC | Age: 86
End: 2021-02-10
Payer: MEDICARE

## 2021-02-10 PROCEDURE — 99441: CPT

## 2021-02-11 ENCOUNTER — APPOINTMENT (OUTPATIENT)
Dept: CARDIOLOGY | Facility: CLINIC | Age: 86
End: 2021-02-11
Payer: MEDICARE

## 2021-02-11 VITALS — HEART RATE: 81 BPM | SYSTOLIC BLOOD PRESSURE: 125 MMHG | DIASTOLIC BLOOD PRESSURE: 81 MMHG

## 2021-02-11 PROCEDURE — 99072 ADDL SUPL MATRL&STAF TM PHE: CPT

## 2021-02-11 PROCEDURE — 93923 UPR/LXTR ART STDY 3+ LVLS: CPT

## 2021-02-11 PROCEDURE — 93880 EXTRACRANIAL BILAT STUDY: CPT | Mod: 26

## 2021-02-11 PROCEDURE — 99214 OFFICE O/P EST MOD 30 MIN: CPT

## 2021-02-11 NOTE — HISTORY OF PRESENT ILLNESS
[FreeTextEntry1] : Pt is an 84 y/o M PMH AAA s/p EVAR with Dr Martinez 02/2021, AF NOT currently on coumadin (due to hematuria) dx about 2008, Parkinsons, HTN.  He states that he was found to have dilated appendix with surrounding collection.  He is meeting with a surgeon to discuss possible surgery.   \par Overall he is feeling well and denies CP, SOB, diaphoresis, palpitations, dizziness, syncope, LE edema, PND, orthopnea, fevers, dysuria.   He denies history of stroke, HF, DM\par He was on plavix and ASA but his arms have had bruising and so plavix has been stopped\par TTE 01/2021 EF 50%\par \par PMH: AF on coumadin, HTN, Parkinsons, vertigo\par Smoking status: quit in his 20's\par social ETOH\par no drug use\par Current exercise: walking\par Daily water intake: none\par Daily caffeine intake: 1 cup coffee\par OTC medications: none\par Family hx: mother MI 60's\par Previous cardiac testing: TTE 2018 "normal"\par Previous hospitalizations: AF\par

## 2021-02-11 NOTE — PHYSICAL EXAM
[General Appearance - Well Developed] : well developed [Normal Appearance] : normal appearance [Well Groomed] : well groomed [General Appearance - Well Nourished] : well nourished [No Deformities] : no deformities [General Appearance - In No Acute Distress] : no acute distress [Normal Conjunctiva] : the conjunctiva exhibited no abnormalities [Eyelids - No Xanthelasma] : the eyelids demonstrated no xanthelasmas [Normal Oral Mucosa] : normal oral mucosa [No Oral Pallor] : no oral pallor [No Oral Cyanosis] : no oral cyanosis [Respiration, Rhythm And Depth] : normal respiratory rhythm and effort [Exaggerated Use Of Accessory Muscles For Inspiration] : no accessory muscle use [Auscultation Breath Sounds / Voice Sounds] : lungs were clear to auscultation bilaterally [Heart Sounds] : normal S1 and S2 [Murmurs] : no murmurs present [Arterial Pulses Normal] : the arterial pulses were normal [Edema] : no peripheral edema present [Irregularly Irregular] : the rhythm was irregularly irregular [Abdomen Soft] : soft [Abdomen Tenderness] : non-tender [Abdomen Mass (___ Cm)] : no abdominal mass palpated [Abnormal Walk] : normal gait [Gait - Sufficient For Exercise Testing] : the gait was sufficient for exercise testing [Nail Clubbing] : no clubbing of the fingernails [Cyanosis, Localized] : no localized cyanosis [Petechial Hemorrhages (___cm)] : no petechial hemorrhages [] : no ischemic changes [Oriented To Time, Place, And Person] : oriented to person, place, and time [Impaired Insight] : insight and judgment were intact [Affect] : the affect was normal [Mood] : the mood was normal [No Anxiety] : not feeling anxious [FreeTextEntry1] : bruising b/l arms

## 2021-02-11 NOTE — DISCUSSION/SUMMARY
[FreeTextEntry1] : Pt is an 84 y/o M PMH AAA s/p EVAR with Dr Martinez 02/2021, AF NOT currently on coumadin (due to hematuria) dx about 2008, Parkinsons, HTN.  He states that he was found to have dilated appendix with surrounding collection.  He is meeting with a surgeon to discuss possible surgery.   \par Overall he is feeling well and denies CP, SOB, diaphoresis, palpitations, dizziness, syncope, LE edema, PND, orthopnea, fevers, dysuria.   He denies history of stroke, HF, DM\par He was on plavix and ASA but his arms have had bruising and so plavix has been stopped\par CHADSVasc = 3 (age, HTN)\par Continue to hold coumadin.  Discussed risk of CVA.  We will try to restart coumadin after possible surgery\par c/w ASA, plavix stopped\par HTN: well controlled, c/w current meds\par Pt will return in 4-6 mnths or sooner as needed but I encouraged communication via phone or portal if necessary.\par The described plan was discussed with the pt.  All questions and concerns were addressed to the best of my knowledge.

## 2021-02-15 ENCOUNTER — APPOINTMENT (OUTPATIENT)
Dept: FAMILY MEDICINE | Facility: CLINIC | Age: 86
End: 2021-02-15
Payer: MEDICARE

## 2021-02-15 ENCOUNTER — APPOINTMENT (OUTPATIENT)
Dept: SURGERY | Facility: CLINIC | Age: 86
End: 2021-02-15
Payer: MEDICARE

## 2021-02-15 VITALS
WEIGHT: 195 LBS | TEMPERATURE: 98.1 F | DIASTOLIC BLOOD PRESSURE: 72 MMHG | OXYGEN SATURATION: 96 % | HEIGHT: 69 IN | SYSTOLIC BLOOD PRESSURE: 118 MMHG | BODY MASS INDEX: 28.88 KG/M2 | HEART RATE: 54 BPM | RESPIRATION RATE: 14 BRPM

## 2021-02-15 PROCEDURE — 99214 OFFICE O/P EST MOD 30 MIN: CPT

## 2021-02-15 PROCEDURE — 99072 ADDL SUPL MATRL&STAF TM PHE: CPT

## 2021-02-15 RX ORDER — AZITHROMYCIN 500 MG/1
0 TABLET, FILM COATED ORAL
Qty: 0 | Refills: 0 | DISCHARGE
Start: 2021-02-15 | End: 2021-02-19

## 2021-02-15 NOTE — PHYSICAL EXAM
[Normal Sclera/Conjunctiva] : normal sclera/conjunctiva [Normal Rate] : normal rate  [Normal S1, S2] : normal S1 and S2 [No Rash] : no rash [Normal] : affect was normal and insight and judgment were intact

## 2021-02-15 NOTE — PHYSICAL EXAM
[JVD] : no jugular venous distention  [Normal Breath Sounds] : Normal breath sounds [Abdominal Masses] : No abdominal masses [Normal Heart Sounds] : normal heart sounds [Abdomen Tenderness] : ~T ~M No abdominal tenderness [Enlarged] : not enlarged [Tender] : was nontender [Alert] : alert [No Rash or Lesion] : No rash or lesion [Oriented to Person] : oriented to person [Oriented to Place] : oriented to place [Oriented to Time] : oriented to time [de-identified] : NAD [de-identified] : NC/AT

## 2021-02-15 NOTE — REVIEW OF SYSTEMS
[Fever] : no fever [Chills] : no chills [Feeling Poorly] : not feeling poorly [Discharge From Eyes] : no purulent discharge from the eyes [Eye Pain] : no eye pain [Earache] : no earache [Loss Of Hearing] : no hearing loss [Heart Rate Is Slow] : the heart rate was not slow [Chest Pain] : no chest pain [Dry Skin] : dry skin [Convulsions] : no convulsions [Confused] : no confusion [Dizziness] : no dizziness

## 2021-02-16 LAB — SARS-COV-2 N GENE NPH QL NAA+PROBE: NOT DETECTED

## 2021-02-23 NOTE — REVIEW OF SYSTEMS
[Wheezing] : wheezing [Cough] : cough [Easy Bleeding] : easy bleeding [Easy Bruising] : easy bruising [Negative] : Psychiatric [Shortness Of Breath] : no shortness of breath [Dyspnea on Exertion] : not dyspnea on exertion

## 2021-02-23 NOTE — ASSESSMENT
[FreeTextEntry1] : Cough\par Symptomatic treatment\par PCR covid done\par Z apck.\par \par \par Addendum: 2/23/2021\par Patient is Scheduled for surgery 3/8. Appendectomy.\par Cardiac Clearance will be done by Dr Tamayo\par No contraindication to proposed procedure pending cardiac clearance.

## 2021-02-23 NOTE — HISTORY OF PRESENT ILLNESS
[Cold Symptoms] : cold symptoms [Mild] : mild [___ Days ago] : [unfilled] days ago [Congestion] : congestion [Cough] : cough [Wheezing] : wheezing [Stable] : stable [Sore Throat] : no sore throat [Chills] : no chills [Anorexia] : no anorexia [Shortness Of Breath] : no shortness of breath [Earache] : no earache [Fatigue] : not fatigue [Fever] : no fever [Headache] : no headache [FreeTextEntry8] : Denies any sick contacts, here to see surgeon today.\par

## 2021-02-26 ENCOUNTER — OUTPATIENT (OUTPATIENT)
Dept: OUTPATIENT SERVICES | Facility: HOSPITAL | Age: 86
LOS: 1 days | End: 2021-02-26
Payer: MEDICARE

## 2021-02-26 DIAGNOSIS — K38.8 OTHER SPECIFIED DISEASES OF APPENDIX: ICD-10-CM

## 2021-02-26 DIAGNOSIS — Z98.890 OTHER SPECIFIED POSTPROCEDURAL STATES: Chronic | ICD-10-CM

## 2021-02-26 DIAGNOSIS — Z01.818 ENCOUNTER FOR OTHER PREPROCEDURAL EXAMINATION: ICD-10-CM

## 2021-02-26 LAB
ANION GAP SERPL CALC-SCNC: 5 MMOL/L — SIGNIFICANT CHANGE UP (ref 5–17)
APPEARANCE UR: CLEAR — SIGNIFICANT CHANGE UP
APTT BLD: 30.7 SEC — SIGNIFICANT CHANGE UP (ref 27.5–35.5)
BASOPHILS # BLD AUTO: 0.09 K/UL — SIGNIFICANT CHANGE UP (ref 0–0.2)
BASOPHILS NFR BLD AUTO: 1.5 % — SIGNIFICANT CHANGE UP (ref 0–2)
BILIRUB UR-MCNC: NEGATIVE — SIGNIFICANT CHANGE UP
BUN SERPL-MCNC: 14 MG/DL — SIGNIFICANT CHANGE UP (ref 7–23)
CALCIUM SERPL-MCNC: 9.2 MG/DL — SIGNIFICANT CHANGE UP (ref 8.5–10.1)
CHLORIDE SERPL-SCNC: 108 MMOL/L — SIGNIFICANT CHANGE UP (ref 96–108)
CO2 SERPL-SCNC: 29 MMOL/L — SIGNIFICANT CHANGE UP (ref 22–31)
COLOR SPEC: YELLOW — SIGNIFICANT CHANGE UP
CREAT SERPL-MCNC: 1.15 MG/DL — SIGNIFICANT CHANGE UP (ref 0.5–1.3)
DIFF PNL FLD: ABNORMAL
EOSINOPHIL # BLD AUTO: 0.37 K/UL — SIGNIFICANT CHANGE UP (ref 0–0.5)
EOSINOPHIL NFR BLD AUTO: 6.1 % — HIGH (ref 0–6)
GLUCOSE SERPL-MCNC: 99 MG/DL — SIGNIFICANT CHANGE UP (ref 70–99)
GLUCOSE UR QL: NEGATIVE MG/DL — SIGNIFICANT CHANGE UP
HCT VFR BLD CALC: 45.1 % — SIGNIFICANT CHANGE UP (ref 39–50)
HGB BLD-MCNC: 14.8 G/DL — SIGNIFICANT CHANGE UP (ref 13–17)
IMM GRANULOCYTES NFR BLD AUTO: 0.3 % — SIGNIFICANT CHANGE UP (ref 0–1.5)
INR BLD: 1.13 RATIO — SIGNIFICANT CHANGE UP (ref 0.88–1.16)
KETONES UR-MCNC: NEGATIVE — SIGNIFICANT CHANGE UP
LEUKOCYTE ESTERASE UR-ACNC: ABNORMAL
LYMPHOCYTES # BLD AUTO: 0.79 K/UL — LOW (ref 1–3.3)
LYMPHOCYTES # BLD AUTO: 12.9 % — LOW (ref 13–44)
MCHC RBC-ENTMCNC: 30.6 PG — SIGNIFICANT CHANGE UP (ref 27–34)
MCHC RBC-ENTMCNC: 32.8 GM/DL — SIGNIFICANT CHANGE UP (ref 32–36)
MCV RBC AUTO: 93.4 FL — SIGNIFICANT CHANGE UP (ref 80–100)
MONOCYTES # BLD AUTO: 0.68 K/UL — SIGNIFICANT CHANGE UP (ref 0–0.9)
MONOCYTES NFR BLD AUTO: 11.1 % — SIGNIFICANT CHANGE UP (ref 2–14)
NEUTROPHILS # BLD AUTO: 4.16 K/UL — SIGNIFICANT CHANGE UP (ref 1.8–7.4)
NEUTROPHILS NFR BLD AUTO: 68.1 % — SIGNIFICANT CHANGE UP (ref 43–77)
NITRITE UR-MCNC: NEGATIVE — SIGNIFICANT CHANGE UP
PH UR: 5 — SIGNIFICANT CHANGE UP (ref 5–8)
PLATELET # BLD AUTO: 252 K/UL — SIGNIFICANT CHANGE UP (ref 150–400)
POTASSIUM SERPL-MCNC: 4.1 MMOL/L — SIGNIFICANT CHANGE UP (ref 3.5–5.3)
POTASSIUM SERPL-SCNC: 4.1 MMOL/L — SIGNIFICANT CHANGE UP (ref 3.5–5.3)
PROT UR-MCNC: 15 MG/DL
PROTHROM AB SERPL-ACNC: 13 SEC — SIGNIFICANT CHANGE UP (ref 10.6–13.6)
RBC # BLD: 4.83 M/UL — SIGNIFICANT CHANGE UP (ref 4.2–5.8)
RBC # FLD: 14.1 % — SIGNIFICANT CHANGE UP (ref 10.3–14.5)
SODIUM SERPL-SCNC: 142 MMOL/L — SIGNIFICANT CHANGE UP (ref 135–145)
SP GR SPEC: 1.02 — SIGNIFICANT CHANGE UP (ref 1.01–1.02)
UROBILINOGEN FLD QL: NEGATIVE MG/DL — SIGNIFICANT CHANGE UP
WBC # BLD: 6.11 K/UL — SIGNIFICANT CHANGE UP (ref 3.8–10.5)
WBC # FLD AUTO: 6.11 K/UL — SIGNIFICANT CHANGE UP (ref 3.8–10.5)

## 2021-02-26 PROCEDURE — 86850 RBC ANTIBODY SCREEN: CPT

## 2021-02-26 PROCEDURE — 86901 BLOOD TYPING SEROLOGIC RH(D): CPT

## 2021-02-26 PROCEDURE — 86900 BLOOD TYPING SEROLOGIC ABO: CPT

## 2021-02-26 PROCEDURE — 85025 COMPLETE CBC W/AUTO DIFF WBC: CPT

## 2021-02-26 PROCEDURE — 85610 PROTHROMBIN TIME: CPT

## 2021-02-26 PROCEDURE — 85730 THROMBOPLASTIN TIME PARTIAL: CPT

## 2021-02-26 PROCEDURE — 93005 ELECTROCARDIOGRAM TRACING: CPT

## 2021-02-26 PROCEDURE — 81001 URINALYSIS AUTO W/SCOPE: CPT

## 2021-02-26 PROCEDURE — 80048 BASIC METABOLIC PNL TOTAL CA: CPT

## 2021-02-26 PROCEDURE — 36415 COLL VENOUS BLD VENIPUNCTURE: CPT

## 2021-02-26 PROCEDURE — 93010 ELECTROCARDIOGRAM REPORT: CPT

## 2021-02-26 RX ORDER — ROPINIROLE 8 MG/1
1 TABLET, FILM COATED, EXTENDED RELEASE ORAL
Qty: 0 | Refills: 0 | DISCHARGE

## 2021-02-26 NOTE — ASU PATIENT PROFILE, ADULT - PMH
AAA (abdominal aortic aneurysm)    Anxiety    Atrial fibrillation    Bruise  to bilateral upper extremity  Eczema    H/O candidiasis of mouth    H/O scarlet fever    History of shingles    [place] (hard of hearing)    HTN (hypertension)    Mucocele, appendix    Parkinson disease    Patent foramen ovale  dilated left artrium severe echo 1/2021  Renal calculi    Seasonal allergies    Vertigo

## 2021-02-26 NOTE — CHART NOTE - NSCHARTNOTEFT_GEN_A_CORE
Vital Signs  Pulse 47  Temperature 98.5  Respirations 16  B/P 117/73  SpO2 96%    Plan   1. NPO after midnight  2. Take the following medications with sips of water on the day of procedure:  Amlodipine, Metoprolol  3. Use E-Z sponge as directed  4. Covid swab scheduled for 3/5/2021  5. Drink a quart of extra  fluids the day before your surgery.  6 Medical optimization for surgery with Dr. Paz and Cardiac evaluation with Dr. Tamayo  7. CBC, BMP, PT/ INR and PTT, Urinalysis, Type and Screen, sent to lab  8. EKG done

## 2021-02-27 DIAGNOSIS — Z01.818 ENCOUNTER FOR OTHER PREPROCEDURAL EXAMINATION: ICD-10-CM

## 2021-02-27 DIAGNOSIS — K38.8 OTHER SPECIFIED DISEASES OF APPENDIX: ICD-10-CM

## 2021-03-04 ENCOUNTER — NON-APPOINTMENT (OUTPATIENT)
Age: 86
End: 2021-03-04

## 2021-03-04 DIAGNOSIS — Z01.818 ENCOUNTER FOR OTHER PREPROCEDURAL EXAMINATION: ICD-10-CM

## 2021-03-05 ENCOUNTER — APPOINTMENT (OUTPATIENT)
Dept: DISASTER EMERGENCY | Facility: CLINIC | Age: 86
End: 2021-03-05

## 2021-03-05 RX ORDER — FENTANYL CITRATE 50 UG/ML
50 INJECTION INTRAVENOUS
Refills: 0 | Status: DISCONTINUED | OUTPATIENT
Start: 2021-03-08 | End: 2021-03-08

## 2021-03-05 RX ORDER — SODIUM CHLORIDE 9 MG/ML
1000 INJECTION, SOLUTION INTRAVENOUS
Refills: 0 | Status: DISCONTINUED | OUTPATIENT
Start: 2021-03-08 | End: 2021-03-08

## 2021-03-05 RX ORDER — ONDANSETRON 8 MG/1
4 TABLET, FILM COATED ORAL ONCE
Refills: 0 | Status: DISCONTINUED | OUTPATIENT
Start: 2021-03-08 | End: 2021-03-08

## 2021-03-05 RX ORDER — OXYCODONE HYDROCHLORIDE 5 MG/1
5 TABLET ORAL ONCE
Refills: 0 | Status: DISCONTINUED | OUTPATIENT
Start: 2021-03-08 | End: 2021-03-08

## 2021-03-06 LAB — SARS-COV-2 N GENE NPH QL NAA+PROBE: NOT DETECTED

## 2021-03-08 ENCOUNTER — RESULT REVIEW (OUTPATIENT)
Age: 86
End: 2021-03-08

## 2021-03-08 ENCOUNTER — INPATIENT (INPATIENT)
Facility: HOSPITAL | Age: 86
LOS: 6 days | Discharge: HOME HEALTH | DRG: 330 | End: 2021-03-15
Attending: SURGERY | Admitting: SURGERY
Payer: MEDICARE

## 2021-03-08 VITALS
TEMPERATURE: 98 F | SYSTOLIC BLOOD PRESSURE: 124 MMHG | DIASTOLIC BLOOD PRESSURE: 84 MMHG | OXYGEN SATURATION: 98 % | WEIGHT: 199.96 LBS | RESPIRATION RATE: 16 BRPM | HEART RATE: 82 BPM | HEIGHT: 69 IN

## 2021-03-08 DIAGNOSIS — D37.3 NEOPLASM OF UNCERTAIN BEHAVIOR OF APPENDIX: ICD-10-CM

## 2021-03-08 DIAGNOSIS — Z98.890 OTHER SPECIFIED POSTPROCEDURAL STATES: Chronic | ICD-10-CM

## 2021-03-08 DIAGNOSIS — K38.8 OTHER SPECIFIED DISEASES OF APPENDIX: ICD-10-CM

## 2021-03-08 PROCEDURE — 88307 TISSUE EXAM BY PATHOLOGIST: CPT | Mod: 26

## 2021-03-08 PROCEDURE — 88304 TISSUE EXAM BY PATHOLOGIST: CPT | Mod: 26

## 2021-03-08 PROCEDURE — 88305 TISSUE EXAM BY PATHOLOGIST: CPT | Mod: 26

## 2021-03-08 RX ORDER — SODIUM CHLORIDE 9 MG/ML
1000 INJECTION INTRAMUSCULAR; INTRAVENOUS; SUBCUTANEOUS
Refills: 0 | Status: DISCONTINUED | OUTPATIENT
Start: 2021-03-08 | End: 2021-03-12

## 2021-03-08 RX ORDER — HEPARIN SODIUM 5000 [USP'U]/ML
5000 INJECTION INTRAVENOUS; SUBCUTANEOUS EVERY 8 HOURS
Refills: 0 | Status: DISCONTINUED | OUTPATIENT
Start: 2021-03-08 | End: 2021-03-09

## 2021-03-08 RX ORDER — AMLODIPINE BESYLATE 2.5 MG/1
5 TABLET ORAL DAILY
Refills: 0 | Status: DISCONTINUED | OUTPATIENT
Start: 2021-03-08 | End: 2021-03-09

## 2021-03-08 RX ORDER — METOPROLOL TARTRATE 50 MG
25 TABLET ORAL
Refills: 0 | Status: DISCONTINUED | OUTPATIENT
Start: 2021-03-08 | End: 2021-03-09

## 2021-03-08 RX ORDER — ASPIRIN/CALCIUM CARB/MAGNESIUM 324 MG
81 TABLET ORAL DAILY
Refills: 0 | Status: DISCONTINUED | OUTPATIENT
Start: 2021-03-08 | End: 2021-03-09

## 2021-03-08 RX ORDER — ROPINIROLE 8 MG/1
0.5 TABLET, FILM COATED, EXTENDED RELEASE ORAL DAILY
Refills: 0 | Status: DISCONTINUED | OUTPATIENT
Start: 2021-03-08 | End: 2021-03-08

## 2021-03-08 RX ORDER — OXYCODONE HYDROCHLORIDE 5 MG/1
5 TABLET ORAL EVERY 6 HOURS
Refills: 0 | Status: DISCONTINUED | OUTPATIENT
Start: 2021-03-08 | End: 2021-03-09

## 2021-03-08 RX ORDER — ACETAMINOPHEN 500 MG
1000 TABLET ORAL ONCE
Refills: 0 | Status: COMPLETED | OUTPATIENT
Start: 2021-03-08 | End: 2021-03-08

## 2021-03-08 RX ORDER — MECLIZINE HCL 12.5 MG
25 TABLET ORAL DAILY
Refills: 0 | Status: DISCONTINUED | OUTPATIENT
Start: 2021-03-08 | End: 2021-03-09

## 2021-03-08 RX ORDER — ROPINIROLE 8 MG/1
0.5 TABLET, FILM COATED, EXTENDED RELEASE ORAL
Refills: 0 | Status: DISCONTINUED | OUTPATIENT
Start: 2021-03-08 | End: 2021-03-09

## 2021-03-08 RX ORDER — ROPINIROLE 8 MG/1
0.5 TABLET, FILM COATED, EXTENDED RELEASE ORAL
Refills: 0 | Status: DISCONTINUED | OUTPATIENT
Start: 2021-03-08 | End: 2021-03-08

## 2021-03-08 RX ORDER — MORPHINE SULFATE 50 MG/1
4 CAPSULE, EXTENDED RELEASE ORAL EVERY 4 HOURS
Refills: 0 | Status: DISCONTINUED | OUTPATIENT
Start: 2021-03-08 | End: 2021-03-09

## 2021-03-08 RX ADMIN — FENTANYL CITRATE 50 MICROGRAM(S): 50 INJECTION INTRAVENOUS at 09:04

## 2021-03-08 RX ADMIN — OXYCODONE HYDROCHLORIDE 5 MILLIGRAM(S): 5 TABLET ORAL at 09:34

## 2021-03-08 RX ADMIN — HEPARIN SODIUM 5000 UNIT(S): 5000 INJECTION INTRAVENOUS; SUBCUTANEOUS at 21:57

## 2021-03-08 RX ADMIN — ROPINIROLE 0.5 MILLIGRAM(S): 8 TABLET, FILM COATED, EXTENDED RELEASE ORAL at 17:52

## 2021-03-08 RX ADMIN — HEPARIN SODIUM 5000 UNIT(S): 5000 INJECTION INTRAVENOUS; SUBCUTANEOUS at 15:41

## 2021-03-08 RX ADMIN — MORPHINE SULFATE 4 MILLIGRAM(S): 50 CAPSULE, EXTENDED RELEASE ORAL at 13:26

## 2021-03-08 RX ADMIN — MORPHINE SULFATE 4 MILLIGRAM(S): 50 CAPSULE, EXTENDED RELEASE ORAL at 18:28

## 2021-03-08 RX ADMIN — MORPHINE SULFATE 4 MILLIGRAM(S): 50 CAPSULE, EXTENDED RELEASE ORAL at 12:53

## 2021-03-08 RX ADMIN — FENTANYL CITRATE 50 MICROGRAM(S): 50 INJECTION INTRAVENOUS at 10:20

## 2021-03-08 RX ADMIN — Medication 100 MILLIGRAM(S): at 15:40

## 2021-03-08 RX ADMIN — OXYCODONE HYDROCHLORIDE 5 MILLIGRAM(S): 5 TABLET ORAL at 09:04

## 2021-03-08 RX ADMIN — MORPHINE SULFATE 4 MILLIGRAM(S): 50 CAPSULE, EXTENDED RELEASE ORAL at 17:52

## 2021-03-08 RX ADMIN — Medication 25 MILLIGRAM(S): at 21:57

## 2021-03-08 RX ADMIN — FENTANYL CITRATE 50 MICROGRAM(S): 50 INJECTION INTRAVENOUS at 09:20

## 2021-03-08 RX ADMIN — Medication 100 MILLIGRAM(S): at 22:00

## 2021-03-08 RX ADMIN — MORPHINE SULFATE 4 MILLIGRAM(S): 50 CAPSULE, EXTENDED RELEASE ORAL at 21:57

## 2021-03-08 RX ADMIN — Medication 400 MILLIGRAM(S): at 23:09

## 2021-03-08 RX ADMIN — FENTANYL CITRATE 50 MICROGRAM(S): 50 INJECTION INTRAVENOUS at 10:05

## 2021-03-08 RX ADMIN — SODIUM CHLORIDE 100 MILLILITER(S): 9 INJECTION INTRAMUSCULAR; INTRAVENOUS; SUBCUTANEOUS at 21:59

## 2021-03-08 RX ADMIN — FENTANYL CITRATE 50 MICROGRAM(S): 50 INJECTION INTRAVENOUS at 09:19

## 2021-03-08 RX ADMIN — MORPHINE SULFATE 4 MILLIGRAM(S): 50 CAPSULE, EXTENDED RELEASE ORAL at 22:45

## 2021-03-08 RX ADMIN — FENTANYL CITRATE 50 MICROGRAM(S): 50 INJECTION INTRAVENOUS at 09:35

## 2021-03-08 NOTE — ASU PATIENT PROFILE, ADULT - PMH
AAA (abdominal aortic aneurysm)    Anxiety    Atrial fibrillation    Bruise  to bilateral upper extremity  Eczema    H/O candidiasis of mouth    H/O scarlet fever    History of shingles    [place] (hard of hearing)    HTN (hypertension)    Mucocele, appendix    Parkinson disease    Patent foramen ovale  dilated left artrium severe echo 1/2021  Renal calculi    Seasonal allergies    Vertigo

## 2021-03-09 ENCOUNTER — RESULT REVIEW (OUTPATIENT)
Age: 86
End: 2021-03-09

## 2021-03-09 LAB
ANION GAP SERPL CALC-SCNC: 4 MMOL/L — LOW (ref 5–17)
BASOPHILS # BLD AUTO: 0.05 K/UL — SIGNIFICANT CHANGE UP (ref 0–0.2)
BASOPHILS NFR BLD AUTO: 0.4 % — SIGNIFICANT CHANGE UP (ref 0–2)
BUN SERPL-MCNC: 16 MG/DL — SIGNIFICANT CHANGE UP (ref 7–23)
CALCIUM SERPL-MCNC: 8.3 MG/DL — LOW (ref 8.5–10.1)
CHLORIDE SERPL-SCNC: 112 MMOL/L — HIGH (ref 96–108)
CO2 SERPL-SCNC: 29 MMOL/L — SIGNIFICANT CHANGE UP (ref 22–31)
CREAT SERPL-MCNC: 1.19 MG/DL — SIGNIFICANT CHANGE UP (ref 0.5–1.3)
EOSINOPHIL # BLD AUTO: 0.02 K/UL — SIGNIFICANT CHANGE UP (ref 0–0.5)
EOSINOPHIL NFR BLD AUTO: 0.2 % — SIGNIFICANT CHANGE UP (ref 0–6)
GLUCOSE SERPL-MCNC: 114 MG/DL — HIGH (ref 70–99)
HCT VFR BLD CALC: 41.3 % — SIGNIFICANT CHANGE UP (ref 39–50)
HGB BLD-MCNC: 13.5 G/DL — SIGNIFICANT CHANGE UP (ref 13–17)
IMM GRANULOCYTES NFR BLD AUTO: 0.4 % — SIGNIFICANT CHANGE UP (ref 0–1.5)
INR BLD: 1.48 RATIO — HIGH (ref 0.88–1.16)
LYMPHOCYTES # BLD AUTO: 0.7 K/UL — LOW (ref 1–3.3)
LYMPHOCYTES # BLD AUTO: 5.7 % — LOW (ref 13–44)
MCHC RBC-ENTMCNC: 31 PG — SIGNIFICANT CHANGE UP (ref 27–34)
MCHC RBC-ENTMCNC: 32.7 GM/DL — SIGNIFICANT CHANGE UP (ref 32–36)
MCV RBC AUTO: 94.9 FL — SIGNIFICANT CHANGE UP (ref 80–100)
MONOCYTES # BLD AUTO: 1.13 K/UL — HIGH (ref 0–0.9)
MONOCYTES NFR BLD AUTO: 9.2 % — SIGNIFICANT CHANGE UP (ref 2–14)
NEUTROPHILS # BLD AUTO: 10.29 K/UL — HIGH (ref 1.8–7.4)
NEUTROPHILS NFR BLD AUTO: 84.1 % — HIGH (ref 43–77)
PLATELET # BLD AUTO: 180 K/UL — SIGNIFICANT CHANGE UP (ref 150–400)
POTASSIUM SERPL-MCNC: 4.5 MMOL/L — SIGNIFICANT CHANGE UP (ref 3.5–5.3)
POTASSIUM SERPL-SCNC: 4.5 MMOL/L — SIGNIFICANT CHANGE UP (ref 3.5–5.3)
PROTHROM AB SERPL-ACNC: 17 SEC — HIGH (ref 10.6–13.6)
RBC # BLD: 4.35 M/UL — SIGNIFICANT CHANGE UP (ref 4.2–5.8)
RBC # FLD: 14.9 % — HIGH (ref 10.3–14.5)
SARS-COV-2 RNA SPEC QL NAA+PROBE: SIGNIFICANT CHANGE UP
SODIUM SERPL-SCNC: 145 MMOL/L — SIGNIFICANT CHANGE UP (ref 135–145)
WBC # BLD: 12.24 K/UL — HIGH (ref 3.8–10.5)
WBC # FLD AUTO: 12.24 K/UL — HIGH (ref 3.8–10.5)

## 2021-03-09 PROCEDURE — 85027 COMPLETE CBC AUTOMATED: CPT

## 2021-03-09 PROCEDURE — 85025 COMPLETE CBC W/AUTO DIFF WBC: CPT

## 2021-03-09 PROCEDURE — 81001 URINALYSIS AUTO W/SCOPE: CPT

## 2021-03-09 PROCEDURE — 36415 COLL VENOUS BLD VENIPUNCTURE: CPT

## 2021-03-09 PROCEDURE — 88309 TISSUE EXAM BY PATHOLOGIST: CPT | Mod: 26

## 2021-03-09 PROCEDURE — 82962 GLUCOSE BLOOD TEST: CPT

## 2021-03-09 PROCEDURE — 80048 BASIC METABOLIC PNL TOTAL CA: CPT

## 2021-03-09 PROCEDURE — 80053 COMPREHEN METABOLIC PANEL: CPT

## 2021-03-09 PROCEDURE — 12345: CPT | Mod: NC

## 2021-03-09 PROCEDURE — 0031A: CPT

## 2021-03-09 PROCEDURE — 44140 PARTIAL REMOVAL OF COLON: CPT | Mod: 78

## 2021-03-09 RX ORDER — METOPROLOL TARTRATE 50 MG
5 TABLET ORAL ONCE
Refills: 0 | Status: COMPLETED | OUTPATIENT
Start: 2021-03-09 | End: 2021-03-09

## 2021-03-09 RX ORDER — ACETAMINOPHEN 500 MG
1000 TABLET ORAL ONCE
Refills: 0 | Status: COMPLETED | OUTPATIENT
Start: 2021-03-09 | End: 2021-03-09

## 2021-03-09 RX ORDER — SODIUM CHLORIDE 9 MG/ML
500 INJECTION INTRAMUSCULAR; INTRAVENOUS; SUBCUTANEOUS ONCE
Refills: 0 | Status: COMPLETED | OUTPATIENT
Start: 2021-03-09 | End: 2021-03-09

## 2021-03-09 RX ORDER — OXYCODONE HYDROCHLORIDE 5 MG/1
5 TABLET ORAL ONCE
Refills: 0 | Status: DISCONTINUED | OUTPATIENT
Start: 2021-03-09 | End: 2021-03-09

## 2021-03-09 RX ORDER — FENTANYL CITRATE 50 UG/ML
50 INJECTION INTRAVENOUS
Refills: 0 | Status: DISCONTINUED | OUTPATIENT
Start: 2021-03-09 | End: 2021-03-09

## 2021-03-09 RX ORDER — HYDROMORPHONE HYDROCHLORIDE 2 MG/ML
1 INJECTION INTRAMUSCULAR; INTRAVENOUS; SUBCUTANEOUS EVERY 4 HOURS
Refills: 0 | Status: DISCONTINUED | OUTPATIENT
Start: 2021-03-09 | End: 2021-03-15

## 2021-03-09 RX ORDER — METOPROLOL TARTRATE 50 MG
5 TABLET ORAL EVERY 6 HOURS
Refills: 0 | Status: DISCONTINUED | OUTPATIENT
Start: 2021-03-09 | End: 2021-03-12

## 2021-03-09 RX ORDER — MORPHINE SULFATE 50 MG/1
6 CAPSULE, EXTENDED RELEASE ORAL EVERY 4 HOURS
Refills: 0 | Status: DISCONTINUED | OUTPATIENT
Start: 2021-03-09 | End: 2021-03-09

## 2021-03-09 RX ORDER — MORPHINE SULFATE 50 MG/1
2 CAPSULE, EXTENDED RELEASE ORAL
Refills: 0 | Status: DISCONTINUED | OUTPATIENT
Start: 2021-03-09 | End: 2021-03-09

## 2021-03-09 RX ORDER — SODIUM CHLORIDE 9 MG/ML
1000 INJECTION, SOLUTION INTRAVENOUS
Refills: 0 | Status: DISCONTINUED | OUTPATIENT
Start: 2021-03-09 | End: 2021-03-09

## 2021-03-09 RX ORDER — HEPARIN SODIUM 5000 [USP'U]/ML
5000 INJECTION INTRAVENOUS; SUBCUTANEOUS EVERY 8 HOURS
Refills: 0 | Status: DISCONTINUED | OUTPATIENT
Start: 2021-03-11 | End: 2021-03-15

## 2021-03-09 RX ORDER — HYDROMORPHONE HYDROCHLORIDE 2 MG/ML
0.5 INJECTION INTRAMUSCULAR; INTRAVENOUS; SUBCUTANEOUS
Refills: 0 | Status: DISCONTINUED | OUTPATIENT
Start: 2021-03-09 | End: 2021-03-15

## 2021-03-09 RX ORDER — ONDANSETRON 8 MG/1
4 TABLET, FILM COATED ORAL EVERY 6 HOURS
Refills: 0 | Status: DISCONTINUED | OUTPATIENT
Start: 2021-03-09 | End: 2021-03-09

## 2021-03-09 RX ADMIN — AMLODIPINE BESYLATE 5 MILLIGRAM(S): 2.5 TABLET ORAL at 09:33

## 2021-03-09 RX ADMIN — SODIUM CHLORIDE 75 MILLILITER(S): 9 INJECTION, SOLUTION INTRAVENOUS at 17:25

## 2021-03-09 RX ADMIN — ROPINIROLE 0.5 MILLIGRAM(S): 8 TABLET, FILM COATED, EXTENDED RELEASE ORAL at 06:05

## 2021-03-09 RX ADMIN — Medication 25 MILLIGRAM(S): at 09:33

## 2021-03-09 RX ADMIN — Medication 100 MILLIGRAM(S): at 23:01

## 2021-03-09 RX ADMIN — SODIUM CHLORIDE 500 MILLILITER(S): 9 INJECTION INTRAMUSCULAR; INTRAVENOUS; SUBCUTANEOUS at 01:09

## 2021-03-09 RX ADMIN — HYDROMORPHONE HYDROCHLORIDE 1 MILLIGRAM(S): 2 INJECTION INTRAMUSCULAR; INTRAVENOUS; SUBCUTANEOUS at 23:07

## 2021-03-09 RX ADMIN — SODIUM CHLORIDE 100 MILLILITER(S): 9 INJECTION INTRAMUSCULAR; INTRAVENOUS; SUBCUTANEOUS at 23:03

## 2021-03-09 RX ADMIN — Medication 1000 MILLIGRAM(S): at 18:02

## 2021-03-09 RX ADMIN — FENTANYL CITRATE 50 MICROGRAM(S): 50 INJECTION INTRAVENOUS at 18:30

## 2021-03-09 RX ADMIN — FENTANYL CITRATE 50 MICROGRAM(S): 50 INJECTION INTRAVENOUS at 19:30

## 2021-03-09 RX ADMIN — Medication 81 MILLIGRAM(S): at 09:33

## 2021-03-09 RX ADMIN — MORPHINE SULFATE 6 MILLIGRAM(S): 50 CAPSULE, EXTENDED RELEASE ORAL at 09:33

## 2021-03-09 RX ADMIN — Medication 1000 MILLIGRAM(S): at 00:30

## 2021-03-09 RX ADMIN — FENTANYL CITRATE 50 MICROGRAM(S): 50 INJECTION INTRAVENOUS at 18:45

## 2021-03-09 RX ADMIN — SODIUM CHLORIDE 100 MILLILITER(S): 9 INJECTION INTRAMUSCULAR; INTRAVENOUS; SUBCUTANEOUS at 17:25

## 2021-03-09 RX ADMIN — FENTANYL CITRATE 50 MICROGRAM(S): 50 INJECTION INTRAVENOUS at 17:32

## 2021-03-09 RX ADMIN — HEPARIN SODIUM 5000 UNIT(S): 5000 INJECTION INTRAVENOUS; SUBCUTANEOUS at 06:05

## 2021-03-09 RX ADMIN — Medication 5 MILLIGRAM(S): at 18:40

## 2021-03-09 RX ADMIN — HYDROMORPHONE HYDROCHLORIDE 1 MILLIGRAM(S): 2 INJECTION INTRAMUSCULAR; INTRAVENOUS; SUBCUTANEOUS at 23:30

## 2021-03-09 RX ADMIN — Medication 400 MILLIGRAM(S): at 17:32

## 2021-03-09 NOTE — PROGRESS NOTE ADULT - SUBJECTIVE AND OBJECTIVE BOX
Subjective: POD#1    Objective: tumor    Heent: N/C, AT PER no scleral icterus, No JVD  Pul: clear  Cor: RRR  Abdomen: soft, normal BS. Wound - clean  Extremities: without edema, motor/sensory intact    03-09    145  |  112<H>  |  16  ----------------------------<  114<H>  4.5   |  29  |  1.19    Ca    8.3<L>      09 Mar 2021 06:17                              13.5   12.24 )-----------( 180      ( 09 Mar 2021 06:17 )             41.3

## 2021-03-09 NOTE — PROGRESS NOTE ADULT - SUBJECTIVE AND OBJECTIVE BOX
Post-Op Note    Pt resting in bed, + Mild Abd discomfort, No CP or dyspnea, N/V    Vital Signs Last 24 Hrs  T(C): 36.7 (09 Mar 2021 19:30), Max: 37.9 (08 Mar 2021 21:56)  T(F): 98.1 (09 Mar 2021 19:30), Max: 100.2 (08 Mar 2021 21:56)  HR: 99 (09 Mar 2021 19:45) (80 - 125)  BP: 113/58 (09 Mar 2021 19:45) (84/48 - 144/62)  BP(mean): --  RR: 15 (09 Mar 2021 19:45) (15 - 20)  SpO2: 99% (09 Mar 2021 19:45) (95% - 99%)    I&O's Detail    08 Mar 2021 07:01  -  09 Mar 2021 07:00  --------------------------------------------------------  IN:    Lactated Ringers: 167 mL    Other (mL): 700 mL    sodium chloride 0.9%: 1350 mL  Total IN: 2217 mL    OUT:    Voided (mL): 350 mL  Total OUT: 350 mL    Total NET: 1867 mL      09 Mar 2021 07:01  -  09 Mar 2021 20:28  --------------------------------------------------------  IN:    Lactated Ringers: 320 mL    Other (mL): 600 mL  Total IN: 920 mL    OUT:    Voided (mL): 300 mL  Total OUT: 300 mL    Total NET: 620 mL    Pt has NOT voided Post-Op, > 500 ml on bladder scan    Heart: S1S2 irate 105, irregular     Lungs:  CTA B/L    Abdomen: ND. midline dressing C/D/I , +hypoactive BS, Soft, mild tenderness to palpation, no rebound/guarding    Extremeties: Venodynes on B/L, soft, Nt, no swelling    A/P:  S/P R hemicolectomy  POD #1 S/P Appendectomy, Cecectomy  NPO  IVF  pain control, PCA if needed  DVT PPX  Incentive Spirometry  Pt has not voided post-op with > 500 ml urine on bladder scan  Insert mejia catheter/monitor I&Os  Above as discussed with Dr Ochoa

## 2021-03-09 NOTE — BRIEF OPERATIVE NOTE - NSICDXBRIEFPROCEDURE_GEN_ALL_CORE_FT
PROCEDURES:  Cecectomy with terminal ileum resection 08-Mar-2021 09:14:39  Desi Smyth  
PROCEDURES:  Right hemicolectomy 09-Mar-2021 17:09:31  Adolfo Sagastume

## 2021-03-09 NOTE — PROGRESS NOTE ADULT - SUBJECTIVE AND OBJECTIVE BOX
Pt resting in bed, was C/O Abd pain-relieved by Dialudid , No CP , dyspnea, N/V    Heart: S1S2 RRR    Lungs: CTA B/L    Abdomen: midline  dressing C/D/I, ND. + mild tenderness, no guarding/rebound    A/P:  S/P R Hemicolectomy  POD # 2 S/P appendectomy, cecectomy  pain management  DVT PPX  Goodson inserted, > 700 cc clear yellow urine  continue to monitor  F/U AM labs

## 2021-03-10 LAB
ANION GAP SERPL CALC-SCNC: 4 MMOL/L — LOW (ref 5–17)
APPEARANCE UR: ABNORMAL
BILIRUB UR-MCNC: NEGATIVE — SIGNIFICANT CHANGE UP
BUN SERPL-MCNC: 20 MG/DL — SIGNIFICANT CHANGE UP (ref 7–23)
CALCIUM SERPL-MCNC: 7.8 MG/DL — LOW (ref 8.5–10.1)
CHLORIDE SERPL-SCNC: 114 MMOL/L — HIGH (ref 96–108)
CO2 SERPL-SCNC: 27 MMOL/L — SIGNIFICANT CHANGE UP (ref 22–31)
COLOR SPEC: ABNORMAL
CREAT SERPL-MCNC: 1.1 MG/DL — SIGNIFICANT CHANGE UP (ref 0.5–1.3)
DIFF PNL FLD: ABNORMAL
GLUCOSE SERPL-MCNC: 136 MG/DL — HIGH (ref 70–99)
GLUCOSE UR QL: NEGATIVE MG/DL — SIGNIFICANT CHANGE UP
HCT VFR BLD CALC: 41.4 % — SIGNIFICANT CHANGE UP (ref 39–50)
HGB BLD-MCNC: 13.2 G/DL — SIGNIFICANT CHANGE UP (ref 13–17)
KETONES UR-MCNC: NEGATIVE — SIGNIFICANT CHANGE UP
LEUKOCYTE ESTERASE UR-ACNC: ABNORMAL
MCHC RBC-ENTMCNC: 30.8 PG — SIGNIFICANT CHANGE UP (ref 27–34)
MCHC RBC-ENTMCNC: 31.9 GM/DL — LOW (ref 32–36)
MCV RBC AUTO: 96.5 FL — SIGNIFICANT CHANGE UP (ref 80–100)
NITRITE UR-MCNC: NEGATIVE — SIGNIFICANT CHANGE UP
PH UR: 5 — SIGNIFICANT CHANGE UP (ref 5–8)
PLATELET # BLD AUTO: 164 K/UL — SIGNIFICANT CHANGE UP (ref 150–400)
POTASSIUM SERPL-MCNC: 4.3 MMOL/L — SIGNIFICANT CHANGE UP (ref 3.5–5.3)
POTASSIUM SERPL-SCNC: 4.3 MMOL/L — SIGNIFICANT CHANGE UP (ref 3.5–5.3)
PROT UR-MCNC: 30 MG/DL
RBC # BLD: 4.29 M/UL — SIGNIFICANT CHANGE UP (ref 4.2–5.8)
RBC # FLD: 14.8 % — HIGH (ref 10.3–14.5)
SODIUM SERPL-SCNC: 145 MMOL/L — SIGNIFICANT CHANGE UP (ref 135–145)
SP GR SPEC: 1.02 — SIGNIFICANT CHANGE UP (ref 1.01–1.02)
UROBILINOGEN FLD QL: NEGATIVE MG/DL — SIGNIFICANT CHANGE UP
WBC # BLD: 13.57 K/UL — HIGH (ref 3.8–10.5)
WBC # FLD AUTO: 13.57 K/UL — HIGH (ref 3.8–10.5)

## 2021-03-10 RX ORDER — SODIUM CHLORIDE 9 MG/ML
500 INJECTION INTRAMUSCULAR; INTRAVENOUS; SUBCUTANEOUS ONCE
Refills: 0 | Status: COMPLETED | OUTPATIENT
Start: 2021-03-10 | End: 2021-03-10

## 2021-03-10 RX ADMIN — HYDROMORPHONE HYDROCHLORIDE 1 MILLIGRAM(S): 2 INJECTION INTRAMUSCULAR; INTRAVENOUS; SUBCUTANEOUS at 05:48

## 2021-03-10 RX ADMIN — Medication 100 MILLIGRAM(S): at 22:34

## 2021-03-10 RX ADMIN — HYDROMORPHONE HYDROCHLORIDE 0.5 MILLIGRAM(S): 2 INJECTION INTRAMUSCULAR; INTRAVENOUS; SUBCUTANEOUS at 17:05

## 2021-03-10 RX ADMIN — SODIUM CHLORIDE 500 MILLILITER(S): 9 INJECTION INTRAMUSCULAR; INTRAVENOUS; SUBCUTANEOUS at 18:42

## 2021-03-10 RX ADMIN — Medication 100 MILLIGRAM(S): at 06:32

## 2021-03-10 RX ADMIN — Medication 5 MILLIGRAM(S): at 16:50

## 2021-03-10 RX ADMIN — HYDROMORPHONE HYDROCHLORIDE 1 MILLIGRAM(S): 2 INJECTION INTRAMUSCULAR; INTRAVENOUS; SUBCUTANEOUS at 06:05

## 2021-03-10 RX ADMIN — HYDROMORPHONE HYDROCHLORIDE 1 MILLIGRAM(S): 2 INJECTION INTRAMUSCULAR; INTRAVENOUS; SUBCUTANEOUS at 22:33

## 2021-03-10 RX ADMIN — HYDROMORPHONE HYDROCHLORIDE 0.5 MILLIGRAM(S): 2 INJECTION INTRAMUSCULAR; INTRAVENOUS; SUBCUTANEOUS at 16:50

## 2021-03-10 RX ADMIN — Medication 100 MILLIGRAM(S): at 14:14

## 2021-03-10 RX ADMIN — HYDROMORPHONE HYDROCHLORIDE 0.5 MILLIGRAM(S): 2 INJECTION INTRAMUSCULAR; INTRAVENOUS; SUBCUTANEOUS at 11:31

## 2021-03-10 RX ADMIN — HYDROMORPHONE HYDROCHLORIDE 0.5 MILLIGRAM(S): 2 INJECTION INTRAMUSCULAR; INTRAVENOUS; SUBCUTANEOUS at 11:45

## 2021-03-10 RX ADMIN — HYDROMORPHONE HYDROCHLORIDE 1 MILLIGRAM(S): 2 INJECTION INTRAMUSCULAR; INTRAVENOUS; SUBCUTANEOUS at 22:34

## 2021-03-10 NOTE — PROGRESS NOTE ADULT - SUBJECTIVE AND OBJECTIVE BOX
Subjective: POD#1    Objective: R colon    Heent: N/C, AT PER no scleral icterus, No JVD  Pul: clear  Cor: RRR  Abdomen: soft, normal BS. Wound - clean  Extremities: without edema, motor/sensory intact    03-10    145  |  114<H>  |  20  ----------------------------<  136<H>  4.3   |  27  |  1.10    Ca    7.8<L>      10 Mar 2021 07:29                              13.2   13.57 )-----------( 164      ( 10 Mar 2021 07:29 )             41.4

## 2021-03-11 LAB
ALBUMIN SERPL ELPH-MCNC: 2.5 G/DL — LOW (ref 3.3–5)
ALP SERPL-CCNC: 49 U/L — SIGNIFICANT CHANGE UP (ref 40–120)
ALT FLD-CCNC: 15 U/L — SIGNIFICANT CHANGE UP (ref 12–78)
ANION GAP SERPL CALC-SCNC: 4 MMOL/L — LOW (ref 5–17)
AST SERPL-CCNC: 20 U/L — SIGNIFICANT CHANGE UP (ref 15–37)
BILIRUB SERPL-MCNC: 0.9 MG/DL — SIGNIFICANT CHANGE UP (ref 0.2–1.2)
BUN SERPL-MCNC: 23 MG/DL — SIGNIFICANT CHANGE UP (ref 7–23)
CALCIUM SERPL-MCNC: 8.2 MG/DL — LOW (ref 8.5–10.1)
CHLORIDE SERPL-SCNC: 113 MMOL/L — HIGH (ref 96–108)
CO2 SERPL-SCNC: 26 MMOL/L — SIGNIFICANT CHANGE UP (ref 22–31)
CREAT SERPL-MCNC: 0.97 MG/DL — SIGNIFICANT CHANGE UP (ref 0.5–1.3)
GLUCOSE SERPL-MCNC: 118 MG/DL — HIGH (ref 70–99)
HCT VFR BLD CALC: 38.3 % — LOW (ref 39–50)
HGB BLD-MCNC: 12.5 G/DL — LOW (ref 13–17)
MCHC RBC-ENTMCNC: 31 PG — SIGNIFICANT CHANGE UP (ref 27–34)
MCHC RBC-ENTMCNC: 32.6 GM/DL — SIGNIFICANT CHANGE UP (ref 32–36)
MCV RBC AUTO: 95 FL — SIGNIFICANT CHANGE UP (ref 80–100)
PLATELET # BLD AUTO: 172 K/UL — SIGNIFICANT CHANGE UP (ref 150–400)
POTASSIUM SERPL-MCNC: 4.6 MMOL/L — SIGNIFICANT CHANGE UP (ref 3.5–5.3)
POTASSIUM SERPL-SCNC: 4.6 MMOL/L — SIGNIFICANT CHANGE UP (ref 3.5–5.3)
PROT SERPL-MCNC: 6.5 GM/DL — SIGNIFICANT CHANGE UP (ref 6–8.3)
RBC # BLD: 4.03 M/UL — LOW (ref 4.2–5.8)
RBC # FLD: 14.8 % — HIGH (ref 10.3–14.5)
SODIUM SERPL-SCNC: 143 MMOL/L — SIGNIFICANT CHANGE UP (ref 135–145)
WBC # BLD: 11.86 K/UL — HIGH (ref 3.8–10.5)
WBC # FLD AUTO: 11.86 K/UL — HIGH (ref 3.8–10.5)

## 2021-03-11 RX ORDER — POLYETHYLENE GLYCOL 3350 17 G/17G
17 POWDER, FOR SOLUTION ORAL ONCE
Refills: 0 | Status: COMPLETED | OUTPATIENT
Start: 2021-03-11 | End: 2021-03-11

## 2021-03-11 RX ORDER — ACETAMINOPHEN 500 MG
1000 TABLET ORAL ONCE
Refills: 0 | Status: COMPLETED | OUTPATIENT
Start: 2021-03-11 | End: 2021-03-11

## 2021-03-11 RX ADMIN — HEPARIN SODIUM 5000 UNIT(S): 5000 INJECTION INTRAVENOUS; SUBCUTANEOUS at 05:36

## 2021-03-11 RX ADMIN — Medication 5 MILLIGRAM(S): at 15:18

## 2021-03-11 RX ADMIN — Medication 400 MILLIGRAM(S): at 15:40

## 2021-03-11 RX ADMIN — HEPARIN SODIUM 5000 UNIT(S): 5000 INJECTION INTRAVENOUS; SUBCUTANEOUS at 21:46

## 2021-03-11 RX ADMIN — POLYETHYLENE GLYCOL 3350 17 GRAM(S): 17 POWDER, FOR SOLUTION ORAL at 15:40

## 2021-03-11 RX ADMIN — Medication 5 MILLIGRAM(S): at 08:39

## 2021-03-11 RX ADMIN — Medication 5 MILLIGRAM(S): at 13:06

## 2021-03-11 RX ADMIN — SODIUM CHLORIDE 100 MILLILITER(S): 9 INJECTION INTRAMUSCULAR; INTRAVENOUS; SUBCUTANEOUS at 08:40

## 2021-03-11 RX ADMIN — Medication 1000 MILLIGRAM(S): at 16:04

## 2021-03-11 RX ADMIN — Medication 100 MILLIGRAM(S): at 05:35

## 2021-03-11 RX ADMIN — HEPARIN SODIUM 5000 UNIT(S): 5000 INJECTION INTRAVENOUS; SUBCUTANEOUS at 13:06

## 2021-03-11 NOTE — PROGRESS NOTE ADULT - SUBJECTIVE AND OBJECTIVE BOX
Subjective: POD#2    Objective: R colon    Heent: N/C, AT PER no scleral icterus, No JVD  Pul: clear  Cor: RRR  Abdomen: soft, normal BS. Wound - clean  Extremities: without edema, motor/sensory intact    03-11    143  |  113<H>  |  23  ----------------------------<  118<H>  4.6   |  26  |  0.97    Ca    8.2<L>      11 Mar 2021 07:13    TPro  6.5  /  Alb  2.5<L>  /  TBili  0.9  /  DBili  x   /  AST  20  /  ALT  15  /  AlkPhos  49  03-11                            12.5   11.86 )-----------( 172      ( 11 Mar 2021 07:13 )             38.3        Subjective: POD#2    Objective: R colon    Heent: N/C, AT PER no scleral icterus, No JVD  Pul: clear  Cor: IRR  Abdomen: soft, normal BS. Wound - clean  Extremities: without edema, motor/sensory intact    03-11    143  |  113<H>  |  23  ----------------------------<  118<H>  4.6   |  26  |  0.97    Ca    8.2<L>      11 Mar 2021 07:13    TPro  6.5  /  Alb  2.5<L>  /  TBili  0.9  /  DBili  x   /  AST  20  /  ALT  15  /  AlkPhos  49  03-11                            12.5   11.86 )-----------( 172      ( 11 Mar 2021 07:13 )             38.3

## 2021-03-12 LAB
ANION GAP SERPL CALC-SCNC: 5 MMOL/L — SIGNIFICANT CHANGE UP (ref 5–17)
BASOPHILS # BLD AUTO: 0.06 K/UL — SIGNIFICANT CHANGE UP (ref 0–0.2)
BASOPHILS NFR BLD AUTO: 0.6 % — SIGNIFICANT CHANGE UP (ref 0–2)
BUN SERPL-MCNC: 12 MG/DL — SIGNIFICANT CHANGE UP (ref 7–23)
CALCIUM SERPL-MCNC: 8.5 MG/DL — SIGNIFICANT CHANGE UP (ref 8.5–10.1)
CHLORIDE SERPL-SCNC: 110 MMOL/L — HIGH (ref 96–108)
CO2 SERPL-SCNC: 25 MMOL/L — SIGNIFICANT CHANGE UP (ref 22–31)
CREAT SERPL-MCNC: 0.75 MG/DL — SIGNIFICANT CHANGE UP (ref 0.5–1.3)
EOSINOPHIL # BLD AUTO: 0.24 K/UL — SIGNIFICANT CHANGE UP (ref 0–0.5)
EOSINOPHIL NFR BLD AUTO: 2.3 % — SIGNIFICANT CHANGE UP (ref 0–6)
GLUCOSE SERPL-MCNC: 111 MG/DL — HIGH (ref 70–99)
HCT VFR BLD CALC: 39.4 % — SIGNIFICANT CHANGE UP (ref 39–50)
HGB BLD-MCNC: 13.3 G/DL — SIGNIFICANT CHANGE UP (ref 13–17)
IMM GRANULOCYTES NFR BLD AUTO: 0.8 % — SIGNIFICANT CHANGE UP (ref 0–1.5)
LYMPHOCYTES # BLD AUTO: 0.59 K/UL — LOW (ref 1–3.3)
LYMPHOCYTES # BLD AUTO: 5.7 % — LOW (ref 13–44)
MCHC RBC-ENTMCNC: 30.7 PG — SIGNIFICANT CHANGE UP (ref 27–34)
MCHC RBC-ENTMCNC: 33.8 GM/DL — SIGNIFICANT CHANGE UP (ref 32–36)
MCV RBC AUTO: 91 FL — SIGNIFICANT CHANGE UP (ref 80–100)
MONOCYTES # BLD AUTO: 0.92 K/UL — HIGH (ref 0–0.9)
MONOCYTES NFR BLD AUTO: 8.9 % — SIGNIFICANT CHANGE UP (ref 2–14)
NEUTROPHILS # BLD AUTO: 8.44 K/UL — HIGH (ref 1.8–7.4)
NEUTROPHILS NFR BLD AUTO: 81.7 % — HIGH (ref 43–77)
PLATELET # BLD AUTO: 226 K/UL — SIGNIFICANT CHANGE UP (ref 150–400)
POTASSIUM SERPL-MCNC: 3.3 MMOL/L — LOW (ref 3.5–5.3)
POTASSIUM SERPL-SCNC: 3.3 MMOL/L — LOW (ref 3.5–5.3)
RBC # BLD: 4.33 M/UL — SIGNIFICANT CHANGE UP (ref 4.2–5.8)
RBC # FLD: 14.3 % — SIGNIFICANT CHANGE UP (ref 10.3–14.5)
SODIUM SERPL-SCNC: 140 MMOL/L — SIGNIFICANT CHANGE UP (ref 135–145)
WBC # BLD: 10.33 K/UL — SIGNIFICANT CHANGE UP (ref 3.8–10.5)
WBC # FLD AUTO: 10.33 K/UL — SIGNIFICANT CHANGE UP (ref 3.8–10.5)

## 2021-03-12 RX ORDER — POTASSIUM CHLORIDE 20 MEQ
40 PACKET (EA) ORAL ONCE
Refills: 0 | Status: COMPLETED | OUTPATIENT
Start: 2021-03-12 | End: 2021-03-12

## 2021-03-12 RX ORDER — POTASSIUM CHLORIDE 20 MEQ
20 PACKET (EA) ORAL ONCE
Refills: 0 | Status: DISCONTINUED | OUTPATIENT
Start: 2021-03-12 | End: 2021-03-12

## 2021-03-12 RX ORDER — ASPIRIN/CALCIUM CARB/MAGNESIUM 324 MG
81 TABLET ORAL DAILY
Refills: 0 | Status: DISCONTINUED | OUTPATIENT
Start: 2021-03-12 | End: 2021-03-15

## 2021-03-12 RX ORDER — OXYCODONE HYDROCHLORIDE 5 MG/1
5 TABLET ORAL EVERY 4 HOURS
Refills: 0 | Status: DISCONTINUED | OUTPATIENT
Start: 2021-03-12 | End: 2021-03-15

## 2021-03-12 RX ORDER — ACETAMINOPHEN 500 MG
1000 TABLET ORAL ONCE
Refills: 0 | Status: COMPLETED | OUTPATIENT
Start: 2021-03-12 | End: 2021-03-12

## 2021-03-12 RX ORDER — OXYCODONE HYDROCHLORIDE 5 MG/1
10 TABLET ORAL EVERY 6 HOURS
Refills: 0 | Status: DISCONTINUED | OUTPATIENT
Start: 2021-03-12 | End: 2021-03-15

## 2021-03-12 RX ORDER — METOPROLOL TARTRATE 50 MG
25 TABLET ORAL
Refills: 0 | Status: DISCONTINUED | OUTPATIENT
Start: 2021-03-12 | End: 2021-03-15

## 2021-03-12 RX ORDER — ACETAMINOPHEN 500 MG
650 TABLET ORAL EVERY 4 HOURS
Refills: 0 | Status: DISCONTINUED | OUTPATIENT
Start: 2021-03-12 | End: 2021-03-15

## 2021-03-12 RX ORDER — MECLIZINE HCL 12.5 MG
25 TABLET ORAL DAILY
Refills: 0 | Status: DISCONTINUED | OUTPATIENT
Start: 2021-03-12 | End: 2021-03-15

## 2021-03-12 RX ORDER — ROPINIROLE 8 MG/1
0.5 TABLET, FILM COATED, EXTENDED RELEASE ORAL DAILY
Refills: 0 | Status: DISCONTINUED | OUTPATIENT
Start: 2021-03-12 | End: 2021-03-15

## 2021-03-12 RX ORDER — AMLODIPINE BESYLATE 2.5 MG/1
5 TABLET ORAL DAILY
Refills: 0 | Status: DISCONTINUED | OUTPATIENT
Start: 2021-03-12 | End: 2021-03-15

## 2021-03-12 RX ADMIN — AMLODIPINE BESYLATE 5 MILLIGRAM(S): 2.5 TABLET ORAL at 12:57

## 2021-03-12 RX ADMIN — Medication 25 MILLIGRAM(S): at 21:48

## 2021-03-12 RX ADMIN — Medication 81 MILLIGRAM(S): at 12:57

## 2021-03-12 RX ADMIN — HYDROMORPHONE HYDROCHLORIDE 1 MILLIGRAM(S): 2 INJECTION INTRAMUSCULAR; INTRAVENOUS; SUBCUTANEOUS at 02:58

## 2021-03-12 RX ADMIN — Medication 40 MILLIEQUIVALENT(S): at 17:55

## 2021-03-12 RX ADMIN — Medication 1000 MILLIGRAM(S): at 20:32

## 2021-03-12 RX ADMIN — ROPINIROLE 0.5 MILLIGRAM(S): 8 TABLET, FILM COATED, EXTENDED RELEASE ORAL at 12:57

## 2021-03-12 RX ADMIN — Medication 400 MILLIGRAM(S): at 20:17

## 2021-03-12 RX ADMIN — Medication 5 MILLIGRAM(S): at 00:29

## 2021-03-12 RX ADMIN — Medication 25 MILLIGRAM(S): at 12:57

## 2021-03-12 RX ADMIN — HEPARIN SODIUM 5000 UNIT(S): 5000 INJECTION INTRAVENOUS; SUBCUTANEOUS at 05:18

## 2021-03-12 RX ADMIN — HYDROMORPHONE HYDROCHLORIDE 1 MILLIGRAM(S): 2 INJECTION INTRAMUSCULAR; INTRAVENOUS; SUBCUTANEOUS at 02:59

## 2021-03-12 RX ADMIN — Medication 5 MILLIGRAM(S): at 09:18

## 2021-03-12 RX ADMIN — Medication 5 MILLIGRAM(S): at 08:40

## 2021-03-12 RX ADMIN — HEPARIN SODIUM 5000 UNIT(S): 5000 INJECTION INTRAVENOUS; SUBCUTANEOUS at 21:48

## 2021-03-12 RX ADMIN — HEPARIN SODIUM 5000 UNIT(S): 5000 INJECTION INTRAVENOUS; SUBCUTANEOUS at 14:03

## 2021-03-12 NOTE — PROGRESS NOTE ADULT - SUBJECTIVE AND OBJECTIVE BOX
Subjective: POD#3    Objective: R colon    Heent: N/C, AT PER no scleral icterus, No JVD  Pul: clear  Cor: IRR  Abdomen: soft, normal BS. Wound - clean  Extremities: without edema, motor/sensory intact    03-12    140  |  110<H>  |  12  ----------------------------<  111<H>  3.3<L>   |  25  |  0.75    Ca    8.5      12 Mar 2021 08:03    TPro  6.5  /  Alb  2.5<L>  /  TBili  0.9  /  DBili  x   /  AST  20  /  ALT  15  /  AlkPhos  49  03-11                            13.3   10.33 )-----------( 226      ( 12 Mar 2021 08:03 )             39.4

## 2021-03-13 LAB
ANION GAP SERPL CALC-SCNC: 5 MMOL/L — SIGNIFICANT CHANGE UP (ref 5–17)
BUN SERPL-MCNC: 12 MG/DL — SIGNIFICANT CHANGE UP (ref 7–23)
CALCIUM SERPL-MCNC: 8 MG/DL — LOW (ref 8.5–10.1)
CHLORIDE SERPL-SCNC: 110 MMOL/L — HIGH (ref 96–108)
CO2 SERPL-SCNC: 27 MMOL/L — SIGNIFICANT CHANGE UP (ref 22–31)
CREAT SERPL-MCNC: 0.7 MG/DL — SIGNIFICANT CHANGE UP (ref 0.5–1.3)
GLUCOSE SERPL-MCNC: 116 MG/DL — HIGH (ref 70–99)
HCT VFR BLD CALC: 33.9 % — LOW (ref 39–50)
HGB BLD-MCNC: 11.8 G/DL — LOW (ref 13–17)
MCHC RBC-ENTMCNC: 31.1 PG — SIGNIFICANT CHANGE UP (ref 27–34)
MCHC RBC-ENTMCNC: 34.8 GM/DL — SIGNIFICANT CHANGE UP (ref 32–36)
MCV RBC AUTO: 89.2 FL — SIGNIFICANT CHANGE UP (ref 80–100)
PLATELET # BLD AUTO: 237 K/UL — SIGNIFICANT CHANGE UP (ref 150–400)
POTASSIUM SERPL-MCNC: 3.3 MMOL/L — LOW (ref 3.5–5.3)
POTASSIUM SERPL-SCNC: 3.3 MMOL/L — LOW (ref 3.5–5.3)
RBC # BLD: 3.8 M/UL — LOW (ref 4.2–5.8)
RBC # FLD: 14.1 % — SIGNIFICANT CHANGE UP (ref 10.3–14.5)
SODIUM SERPL-SCNC: 142 MMOL/L — SIGNIFICANT CHANGE UP (ref 135–145)
WBC # BLD: 11.49 K/UL — HIGH (ref 3.8–10.5)
WBC # FLD AUTO: 11.49 K/UL — HIGH (ref 3.8–10.5)

## 2021-03-13 RX ORDER — ACETAMINOPHEN 500 MG
1000 TABLET ORAL ONCE
Refills: 0 | Status: COMPLETED | OUTPATIENT
Start: 2021-03-13 | End: 2021-03-13

## 2021-03-13 RX ORDER — POTASSIUM CHLORIDE 20 MEQ
20 PACKET (EA) ORAL ONCE
Refills: 0 | Status: COMPLETED | OUTPATIENT
Start: 2021-03-13 | End: 2021-03-13

## 2021-03-13 RX ADMIN — OXYCODONE HYDROCHLORIDE 5 MILLIGRAM(S): 5 TABLET ORAL at 11:53

## 2021-03-13 RX ADMIN — AMLODIPINE BESYLATE 5 MILLIGRAM(S): 2.5 TABLET ORAL at 11:23

## 2021-03-13 RX ADMIN — Medication 20 MILLIEQUIVALENT(S): at 11:24

## 2021-03-13 RX ADMIN — HEPARIN SODIUM 5000 UNIT(S): 5000 INJECTION INTRAVENOUS; SUBCUTANEOUS at 21:37

## 2021-03-13 RX ADMIN — Medication 25 MILLIGRAM(S): at 21:37

## 2021-03-13 RX ADMIN — OXYCODONE HYDROCHLORIDE 5 MILLIGRAM(S): 5 TABLET ORAL at 23:50

## 2021-03-13 RX ADMIN — OXYCODONE HYDROCHLORIDE 5 MILLIGRAM(S): 5 TABLET ORAL at 17:23

## 2021-03-13 RX ADMIN — Medication 25 MILLIGRAM(S): at 11:24

## 2021-03-13 RX ADMIN — Medication 650 MILLIGRAM(S): at 23:38

## 2021-03-13 RX ADMIN — HEPARIN SODIUM 5000 UNIT(S): 5000 INJECTION INTRAVENOUS; SUBCUTANEOUS at 05:03

## 2021-03-13 RX ADMIN — OXYCODONE HYDROCHLORIDE 5 MILLIGRAM(S): 5 TABLET ORAL at 17:53

## 2021-03-13 RX ADMIN — OXYCODONE HYDROCHLORIDE 5 MILLIGRAM(S): 5 TABLET ORAL at 23:20

## 2021-03-13 RX ADMIN — Medication 400 MILLIGRAM(S): at 06:51

## 2021-03-13 RX ADMIN — ROPINIROLE 0.5 MILLIGRAM(S): 8 TABLET, FILM COATED, EXTENDED RELEASE ORAL at 11:25

## 2021-03-13 RX ADMIN — HEPARIN SODIUM 5000 UNIT(S): 5000 INJECTION INTRAVENOUS; SUBCUTANEOUS at 14:08

## 2021-03-13 RX ADMIN — Medication 81 MILLIGRAM(S): at 11:25

## 2021-03-13 RX ADMIN — OXYCODONE HYDROCHLORIDE 5 MILLIGRAM(S): 5 TABLET ORAL at 11:23

## 2021-03-13 RX ADMIN — Medication 25 MILLIGRAM(S): at 11:25

## 2021-03-13 RX ADMIN — Medication 1000 MILLIGRAM(S): at 07:06

## 2021-03-13 NOTE — PROGRESS NOTE ADULT - SUBJECTIVE AND OBJECTIVE BOX
Subjective: POD#4    Objective: R colon    Heent: N/C, AT PER no scleral icterus, No JVD  Pul: clear  Cor: IRR  Abdomen: soft, normal BS. Wound - clean  Extremities: without edema, motor/sensory intact    03-12    140  |  110<H>  |  12  ----------------------------<  111<H>  3.3<L>   |  25  |  0.75    Ca    8.5      12 Mar 2021 08:03                              11.8   11.49 )-----------( 237      ( 13 Mar 2021 07:18 )             33.9

## 2021-03-14 LAB
ANION GAP SERPL CALC-SCNC: 4 MMOL/L — LOW (ref 5–17)
BUN SERPL-MCNC: 15 MG/DL — SIGNIFICANT CHANGE UP (ref 7–23)
CALCIUM SERPL-MCNC: 7.7 MG/DL — LOW (ref 8.5–10.1)
CHLORIDE SERPL-SCNC: 108 MMOL/L — SIGNIFICANT CHANGE UP (ref 96–108)
CO2 SERPL-SCNC: 30 MMOL/L — SIGNIFICANT CHANGE UP (ref 22–31)
CREAT SERPL-MCNC: 0.9 MG/DL — SIGNIFICANT CHANGE UP (ref 0.5–1.3)
GLUCOSE SERPL-MCNC: 93 MG/DL — SIGNIFICANT CHANGE UP (ref 70–99)
POTASSIUM SERPL-MCNC: 3.8 MMOL/L — SIGNIFICANT CHANGE UP (ref 3.5–5.3)
POTASSIUM SERPL-SCNC: 3.8 MMOL/L — SIGNIFICANT CHANGE UP (ref 3.5–5.3)
SODIUM SERPL-SCNC: 142 MMOL/L — SIGNIFICANT CHANGE UP (ref 135–145)

## 2021-03-14 RX ADMIN — AMLODIPINE BESYLATE 5 MILLIGRAM(S): 2.5 TABLET ORAL at 10:45

## 2021-03-14 RX ADMIN — Medication 25 MILLIGRAM(S): at 10:46

## 2021-03-14 RX ADMIN — Medication 650 MILLIGRAM(S): at 00:15

## 2021-03-14 RX ADMIN — HEPARIN SODIUM 5000 UNIT(S): 5000 INJECTION INTRAVENOUS; SUBCUTANEOUS at 05:49

## 2021-03-14 RX ADMIN — OXYCODONE HYDROCHLORIDE 5 MILLIGRAM(S): 5 TABLET ORAL at 22:13

## 2021-03-14 RX ADMIN — OXYCODONE HYDROCHLORIDE 5 MILLIGRAM(S): 5 TABLET ORAL at 11:16

## 2021-03-14 RX ADMIN — Medication 25 MILLIGRAM(S): at 22:13

## 2021-03-14 RX ADMIN — OXYCODONE HYDROCHLORIDE 5 MILLIGRAM(S): 5 TABLET ORAL at 23:10

## 2021-03-14 RX ADMIN — HEPARIN SODIUM 5000 UNIT(S): 5000 INJECTION INTRAVENOUS; SUBCUTANEOUS at 13:38

## 2021-03-14 RX ADMIN — HEPARIN SODIUM 5000 UNIT(S): 5000 INJECTION INTRAVENOUS; SUBCUTANEOUS at 22:13

## 2021-03-14 RX ADMIN — OXYCODONE HYDROCHLORIDE 5 MILLIGRAM(S): 5 TABLET ORAL at 10:46

## 2021-03-14 RX ADMIN — Medication 25 MILLIGRAM(S): at 10:47

## 2021-03-14 RX ADMIN — ROPINIROLE 0.5 MILLIGRAM(S): 8 TABLET, FILM COATED, EXTENDED RELEASE ORAL at 10:47

## 2021-03-14 RX ADMIN — Medication 81 MILLIGRAM(S): at 10:47

## 2021-03-14 NOTE — PROGRESS NOTE ADULT - SUBJECTIVE AND OBJECTIVE BOX
Subjective: POD#5    Objective: R colon    Heent: N/C, AT PER no scleral icterus, No JVD  Pul: clear  Cor: IRR  Abdomen: soft, normal BS. Wound - clean  Extremities: without edema, motor/sensory intact    03-14    142  |  108  |  15  ----------------------------<  93  3.8   |  30  |  0.90    Ca    7.7<L>      14 Mar 2021 06:49                              11.8   11.49 )-----------( 237      ( 13 Mar 2021 07:18 )             33.9

## 2021-03-15 ENCOUNTER — TRANSCRIPTION ENCOUNTER (OUTPATIENT)
Age: 86
End: 2021-03-15

## 2021-03-15 ENCOUNTER — APPOINTMENT (OUTPATIENT)
Dept: NEUROLOGY | Facility: CLINIC | Age: 86
End: 2021-03-15

## 2021-03-15 VITALS
DIASTOLIC BLOOD PRESSURE: 83 MMHG | RESPIRATION RATE: 16 BRPM | OXYGEN SATURATION: 95 % | SYSTOLIC BLOOD PRESSURE: 127 MMHG | HEART RATE: 103 BPM | TEMPERATURE: 101 F

## 2021-03-15 RX ORDER — ENOXAPARIN SODIUM 100 MG/ML
1 INJECTION SUBCUTANEOUS
Qty: 14 | Refills: 0
Start: 2021-03-15 | End: 2021-03-28

## 2021-03-15 RX ORDER — JNJ-78436735 50000000000 [PFU]/.5ML
0.5 SUSPENSION INTRAMUSCULAR ONCE
Refills: 0 | Status: COMPLETED | OUTPATIENT
Start: 2021-03-15 | End: 2021-03-15

## 2021-03-15 RX ADMIN — ROPINIROLE 0.5 MILLIGRAM(S): 8 TABLET, FILM COATED, EXTENDED RELEASE ORAL at 09:47

## 2021-03-15 RX ADMIN — HEPARIN SODIUM 5000 UNIT(S): 5000 INJECTION INTRAVENOUS; SUBCUTANEOUS at 05:27

## 2021-03-15 RX ADMIN — Medication 25 MILLIGRAM(S): at 09:47

## 2021-03-15 RX ADMIN — AMLODIPINE BESYLATE 5 MILLIGRAM(S): 2.5 TABLET ORAL at 09:47

## 2021-03-15 RX ADMIN — Medication 81 MILLIGRAM(S): at 09:47

## 2021-03-15 RX ADMIN — JNJ-78436735 0.5 MILLILITER(S): 50000000000 SUSPENSION INTRAMUSCULAR at 12:03

## 2021-03-15 NOTE — PROGRESS NOTE ADULT - SUBJECTIVE AND OBJECTIVE BOX
Subjective: POD#6    Objective: R colon    Heent: N/C, AT PER no scleral icterus, No JVD  Pul: clear  Cor: IRR  Abdomen: soft, normal BS. Midline wound - opened above and below umbilicus. RLQ wound clean.  Extremities: without edema, motor/sensory intact    03-14    142  |  108  |  15  ----------------------------<  93  3.8   |  30  |  0.90    Ca    7.7<L>      14 Mar 2021 06:49

## 2021-03-15 NOTE — DISCHARGE NOTE PROVIDER - NSDCMRMEDTOKEN_GEN_ALL_CORE_FT
amLODIPine 5 mg oral tablet: 1 tab(s) orally once a day  aspirin 81 mg oral delayed release tablet: 1 tab(s) orally once a day   enoxaparin 40 mg/0.4 mL injectable solution: 1 application subcutaneously once a day   meclizine 25 mg oral tablet: 1 tab(s) orally once a day  Metoprolol Tartrate 25 mg oral tablet: 1/2  tab(s) orally 2 times a day  oxycodone-acetaminophen 5 mg-325 mg oral tablet: 1 tab(s) orally every 6 hours, As Needed -for moderate pain MDD:4 tabs   rOPINIRole 0.5 mg oral tablet: 2 tab(s) orally once a day (in the morning)  rOPINIRole 0.5 mg oral tablet: 1 tab(s) orally 2 times a day

## 2021-03-15 NOTE — PROGRESS NOTE ADULT - ASSESSMENT
Labs reviewed. Operative findings discussed with pt and wife(by phone). Recommend R colectomy because of findings of free mucus in the abdomen.  Pt consents. OR called. Awaiting time.
Multiple BM overnight. Still good BS. Check K+. Pt likely to stay until Monday.
Only 2 BM. Wound clean. Labs OK. Likely home tomorrow. Lovenox. Await path.
Transfer to floor. Restart oral meds (metaprolol and sinimet). Adv diet. Labs in AM.
Will irrigate wound once prior to d/c. Home with Lovenox. Plan 6 weeks if path + for tumor. See me in 1 week.
Labs OK. D/C mejia and ABC. Sips clears. OOB. Doing well.
JENNY stable. Labs OK. Juan David. Hamzah x24h. Labs in AM. Cont SD care.

## 2021-03-15 NOTE — DISCHARGE NOTE PROVIDER - HOSPITAL COURSE
Tolerated surgical procedures well. Final path pending. Tolerated po. Tolerated surgical procedures well. Final path pending. Tolerated po.    homecare for dry dressing changes over abdominal midline wound twice daily. Tolerated surgical procedures well. small tear in kidney cyst incidental finding. no complication. Tolerated po.    homecare for dry dressing changes over abdominal midline wound twice daily.     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    Surgical Final Report  TUMOR  Histologic Type: Low-grade appendiceal mucinous neoplasm  Histologic Grade: Low-grade  Tumor Size: Cannot be determined - Involves dilated appendix  Tumor Deposits: Intraperitoneal metastasis only, including  peritoneal mucinous deposits containing tumor cells  Tumor Extension: Tumor invades lamina propria or muscularis  mucosa

## 2021-03-15 NOTE — DISCHARGE NOTE PROVIDER - CARE PROVIDERS DIRECT ADDRESSES
[email or web address] of [email or web address]

## 2021-03-15 NOTE — DISCHARGE NOTE PROVIDER - CARE PROVIDER_API CALL
Cornelio Ochoa)  Surgery; Vascular Surgery  [facility], [address] [place] [address]  Phone: [phone]  Fax: [phone]  Follow Up Time:

## 2021-03-15 NOTE — DISCHARGE NOTE PROVIDER - NSDCCPCAREPLAN_GEN_ALL_CORE_FT
PRINCIPAL DISCHARGE DIAGNOSIS  Diagnosis: Benign tumor of appendix  Assessment and Plan of Treatment:

## 2021-03-15 NOTE — DISCHARGE NOTE NURSING/CASE MANAGEMENT/SOCIAL WORK - PATIENT PORTAL LINK FT
You can access the FollowMyHealth Patient Portal offered by [facility] by registering at the following website: [email or web address]. By joining [employer]’s FollowMyHealth portal, you will also be able to view your health information using other applications (apps) compatible with our system.

## 2021-03-16 ENCOUNTER — NON-APPOINTMENT (OUTPATIENT)
Age: 86
End: 2021-03-16

## 2021-03-16 PROBLEM — T14.8XXA OTHER INJURY OF UNSPECIFIED BODY REGION, INITIAL ENCOUNTER: Chronic | Status: ACTIVE | Noted: 2021-02-26

## 2021-03-16 PROBLEM — R42 DIZZINESS AND GIDDINESS: Chronic | Status: ACTIVE | Noted: 2021-02-26

## 2021-03-18 ENCOUNTER — INPATIENT (INPATIENT)
Facility: HOSPITAL | Age: 86
LOS: 10 days | Discharge: HOME | DRG: 344 | End: 2021-03-29
Attending: SURGERY | Admitting: SURGERY
Payer: MEDICARE

## 2021-03-18 ENCOUNTER — APPOINTMENT (OUTPATIENT)
Dept: FAMILY MEDICINE | Facility: CLINIC | Age: 86
End: 2021-03-18

## 2021-03-18 VITALS
RESPIRATION RATE: 20 BRPM | SYSTOLIC BLOOD PRESSURE: 111 MMHG | WEIGHT: 195.11 LBS | TEMPERATURE: 98 F | HEIGHT: 69 IN | DIASTOLIC BLOOD PRESSURE: 59 MMHG | OXYGEN SATURATION: 97 % | HEART RATE: 65 BPM

## 2021-03-18 DIAGNOSIS — Z90.49 ACQUIRED ABSENCE OF OTHER SPECIFIED PARTS OF DIGESTIVE TRACT: Chronic | ICD-10-CM

## 2021-03-18 DIAGNOSIS — Z98.890 OTHER SPECIFIED POSTPROCEDURAL STATES: Chronic | ICD-10-CM

## 2021-03-18 DIAGNOSIS — A41.9 SEPSIS, UNSPECIFIED ORGANISM: ICD-10-CM

## 2021-03-18 LAB
ALBUMIN SERPL ELPH-MCNC: 2.1 G/DL — LOW (ref 3.3–5)
ALP SERPL-CCNC: 77 U/L — SIGNIFICANT CHANGE UP (ref 40–120)
ALT FLD-CCNC: 48 U/L — SIGNIFICANT CHANGE UP (ref 12–78)
ANION GAP SERPL CALC-SCNC: 11 MMOL/L — SIGNIFICANT CHANGE UP (ref 5–17)
APPEARANCE UR: CLEAR — SIGNIFICANT CHANGE UP
APTT BLD: 29 SEC — SIGNIFICANT CHANGE UP (ref 27.5–35.5)
AST SERPL-CCNC: 25 U/L — SIGNIFICANT CHANGE UP (ref 15–37)
BACTERIA # UR AUTO: ABNORMAL
BASE EXCESS BLDV CALC-SCNC: -4.3 MMOL/L — LOW (ref -2–2)
BASOPHILS # BLD AUTO: 0.28 K/UL — HIGH (ref 0–0.2)
BASOPHILS NFR BLD AUTO: 1 % — SIGNIFICANT CHANGE UP (ref 0–2)
BILIRUB SERPL-MCNC: 0.5 MG/DL — SIGNIFICANT CHANGE UP (ref 0.2–1.2)
BILIRUB UR-MCNC: NEGATIVE — SIGNIFICANT CHANGE UP
BUN SERPL-MCNC: 45 MG/DL — HIGH (ref 7–23)
CALCIUM SERPL-MCNC: 8.6 MG/DL — SIGNIFICANT CHANGE UP (ref 8.5–10.1)
CHLORIDE SERPL-SCNC: 106 MMOL/L — SIGNIFICANT CHANGE UP (ref 96–108)
CO2 SERPL-SCNC: 22 MMOL/L — SIGNIFICANT CHANGE UP (ref 22–31)
COLOR SPEC: YELLOW — SIGNIFICANT CHANGE UP
CREAT SERPL-MCNC: 1.76 MG/DL — HIGH (ref 0.5–1.3)
DIFF PNL FLD: ABNORMAL
EOSINOPHIL # BLD AUTO: 0 K/UL — SIGNIFICANT CHANGE UP (ref 0–0.5)
EOSINOPHIL NFR BLD AUTO: 0 % — SIGNIFICANT CHANGE UP (ref 0–6)
EPI CELLS # UR: SIGNIFICANT CHANGE UP
GLUCOSE SERPL-MCNC: 227 MG/DL — HIGH (ref 70–99)
GLUCOSE UR QL: NEGATIVE MG/DL — SIGNIFICANT CHANGE UP
HCO3 BLDV-SCNC: 20 MMOL/L — LOW (ref 21–29)
HCT VFR BLD CALC: 21.4 % — LOW (ref 39–50)
HCT VFR BLD CALC: 22.3 % — LOW (ref 39–50)
HCT VFR BLD CALC: 23.4 % — LOW (ref 39–50)
HGB BLD-MCNC: 7.2 G/DL — LOW (ref 13–17)
HGB BLD-MCNC: 7.3 G/DL — LOW (ref 13–17)
HGB BLD-MCNC: 7.6 G/DL — LOW (ref 13–17)
INR BLD: 1.46 RATIO — HIGH (ref 0.88–1.16)
KETONES UR-MCNC: NEGATIVE — SIGNIFICANT CHANGE UP
LACTATE SERPL-SCNC: 2.4 MMOL/L — HIGH (ref 0.7–2)
LACTATE SERPL-SCNC: 6.1 MMOL/L — CRITICAL HIGH (ref 0.7–2)
LACTATE SERPL-SCNC: 8.8 MMOL/L — CRITICAL HIGH (ref 0.7–2)
LEUKOCYTE ESTERASE UR-ACNC: ABNORMAL
LIDOCAIN IGE QN: 187 U/L — SIGNIFICANT CHANGE UP (ref 73–393)
LYMPHOCYTES # BLD AUTO: 0.84 K/UL — LOW (ref 1–3.3)
LYMPHOCYTES # BLD AUTO: 3 % — LOW (ref 13–44)
MCHC RBC-ENTMCNC: 30.6 PG — SIGNIFICANT CHANGE UP (ref 27–34)
MCHC RBC-ENTMCNC: 30.8 PG — SIGNIFICANT CHANGE UP (ref 27–34)
MCHC RBC-ENTMCNC: 32.7 GM/DL — SIGNIFICANT CHANGE UP (ref 32–36)
MCHC RBC-ENTMCNC: 33.6 GM/DL — SIGNIFICANT CHANGE UP (ref 32–36)
MCV RBC AUTO: 91.1 FL — SIGNIFICANT CHANGE UP (ref 80–100)
MCV RBC AUTO: 94.1 FL — SIGNIFICANT CHANGE UP (ref 80–100)
MONOCYTES # BLD AUTO: 0.56 K/UL — SIGNIFICANT CHANGE UP (ref 0–0.9)
MONOCYTES NFR BLD AUTO: 2 % — SIGNIFICANT CHANGE UP (ref 2–14)
NEUTROPHILS # BLD AUTO: 26.24 K/UL — HIGH (ref 1.8–7.4)
NEUTROPHILS NFR BLD AUTO: 94 % — HIGH (ref 43–77)
NITRITE UR-MCNC: NEGATIVE — SIGNIFICANT CHANGE UP
NRBC # BLD: SIGNIFICANT CHANGE UP /100 WBCS (ref 0–0)
PCO2 BLDV: 38 MMHG — SIGNIFICANT CHANGE UP (ref 35–50)
PH BLDV: 7.35 — SIGNIFICANT CHANGE UP (ref 7.35–7.45)
PH UR: 5 — SIGNIFICANT CHANGE UP (ref 5–8)
PLATELET # BLD AUTO: 347 K/UL — SIGNIFICANT CHANGE UP (ref 150–400)
PLATELET # BLD AUTO: 501 K/UL — HIGH (ref 150–400)
PO2 BLDV: 119 MMHG — HIGH (ref 25–45)
POTASSIUM SERPL-MCNC: 3.7 MMOL/L — SIGNIFICANT CHANGE UP (ref 3.5–5.3)
POTASSIUM SERPL-SCNC: 3.7 MMOL/L — SIGNIFICANT CHANGE UP (ref 3.5–5.3)
PROT SERPL-MCNC: 5.4 GM/DL — LOW (ref 6–8.3)
PROT UR-MCNC: 15 MG/DL
PROTHROM AB SERPL-ACNC: 16.6 SEC — HIGH (ref 10.6–13.6)
RAPID RVP RESULT: SIGNIFICANT CHANGE UP
RBC # BLD: 2.35 M/UL — LOW (ref 4.2–5.8)
RBC # BLD: 2.37 M/UL — LOW (ref 4.2–5.8)
RBC # FLD: 15.2 % — HIGH (ref 10.3–14.5)
RBC # FLD: 15.3 % — HIGH (ref 10.3–14.5)
RBC CASTS # UR COMP ASSIST: SIGNIFICANT CHANGE UP /HPF (ref 0–4)
SAO2 % BLDV: 99 % — HIGH (ref 67–88)
SARS-COV-2 RNA SPEC QL NAA+PROBE: SIGNIFICANT CHANGE UP
SODIUM SERPL-SCNC: 139 MMOL/L — SIGNIFICANT CHANGE UP (ref 135–145)
SP GR SPEC: 1.01 — SIGNIFICANT CHANGE UP (ref 1.01–1.02)
UROBILINOGEN FLD QL: NEGATIVE MG/DL — SIGNIFICANT CHANGE UP
WBC # BLD: 20.33 K/UL — HIGH (ref 3.8–10.5)
WBC # BLD: 27.92 K/UL — HIGH (ref 3.8–10.5)
WBC # FLD AUTO: 20.33 K/UL — HIGH (ref 3.8–10.5)
WBC # FLD AUTO: 27.92 K/UL — HIGH (ref 3.8–10.5)
WBC UR QL: ABNORMAL

## 2021-03-18 PROCEDURE — P9016: CPT

## 2021-03-18 PROCEDURE — 36430 TRANSFUSION BLD/BLD COMPNT: CPT

## 2021-03-18 PROCEDURE — 82962 GLUCOSE BLOOD TEST: CPT

## 2021-03-18 PROCEDURE — 85027 COMPLETE CBC AUTOMATED: CPT

## 2021-03-18 PROCEDURE — 97162 PT EVAL MOD COMPLEX 30 MIN: CPT | Mod: GP

## 2021-03-18 PROCEDURE — 83605 ASSAY OF LACTIC ACID: CPT

## 2021-03-18 PROCEDURE — 85018 HEMOGLOBIN: CPT

## 2021-03-18 PROCEDURE — 81001 URINALYSIS AUTO W/SCOPE: CPT

## 2021-03-18 PROCEDURE — 86850 RBC ANTIBODY SCREEN: CPT

## 2021-03-18 PROCEDURE — C9113: CPT

## 2021-03-18 PROCEDURE — 99291 CRITICAL CARE FIRST HOUR: CPT

## 2021-03-18 PROCEDURE — 97116 GAIT TRAINING THERAPY: CPT | Mod: GP

## 2021-03-18 PROCEDURE — 86769 SARS-COV-2 COVID-19 ANTIBODY: CPT

## 2021-03-18 PROCEDURE — 86900 BLOOD TYPING SEROLOGIC ABO: CPT

## 2021-03-18 PROCEDURE — 86923 COMPATIBILITY TEST ELECTRIC: CPT

## 2021-03-18 PROCEDURE — 93005 ELECTROCARDIOGRAM TRACING: CPT

## 2021-03-18 PROCEDURE — 83735 ASSAY OF MAGNESIUM: CPT

## 2021-03-18 PROCEDURE — P9037: CPT

## 2021-03-18 PROCEDURE — 82803 BLOOD GASES ANY COMBINATION: CPT

## 2021-03-18 PROCEDURE — 85025 COMPLETE CBC W/AUTO DIFF WBC: CPT

## 2021-03-18 PROCEDURE — 85014 HEMATOCRIT: CPT

## 2021-03-18 PROCEDURE — 97530 THERAPEUTIC ACTIVITIES: CPT | Mod: GP

## 2021-03-18 PROCEDURE — 85610 PROTHROMBIN TIME: CPT

## 2021-03-18 PROCEDURE — 74177 CT ABD & PELVIS W/CONTRAST: CPT | Mod: 26

## 2021-03-18 PROCEDURE — 99223 1ST HOSP IP/OBS HIGH 75: CPT

## 2021-03-18 PROCEDURE — 93010 ELECTROCARDIOGRAM REPORT: CPT

## 2021-03-18 PROCEDURE — 71045 X-RAY EXAM CHEST 1 VIEW: CPT

## 2021-03-18 PROCEDURE — 85730 THROMBOPLASTIN TIME PARTIAL: CPT

## 2021-03-18 PROCEDURE — 80048 BASIC METABOLIC PNL TOTAL CA: CPT

## 2021-03-18 PROCEDURE — 86901 BLOOD TYPING SEROLOGIC RH(D): CPT

## 2021-03-18 PROCEDURE — 36415 COLL VENOUS BLD VENIPUNCTURE: CPT

## 2021-03-18 PROCEDURE — 85384 FIBRINOGEN ACTIVITY: CPT

## 2021-03-18 PROCEDURE — 71045 X-RAY EXAM CHEST 1 VIEW: CPT | Mod: 26

## 2021-03-18 PROCEDURE — P9059: CPT

## 2021-03-18 PROCEDURE — 80053 COMPREHEN METABOLIC PANEL: CPT

## 2021-03-18 PROCEDURE — 87086 URINE CULTURE/COLONY COUNT: CPT

## 2021-03-18 PROCEDURE — 74177 CT ABD & PELVIS W/CONTRAST: CPT

## 2021-03-18 PROCEDURE — 99292 CRITICAL CARE ADDL 30 MIN: CPT

## 2021-03-18 PROCEDURE — 84100 ASSAY OF PHOSPHORUS: CPT

## 2021-03-18 RX ORDER — ROPINIROLE 8 MG/1
0.5 TABLET, FILM COATED, EXTENDED RELEASE ORAL
Refills: 0 | Status: DISCONTINUED | OUTPATIENT
Start: 2021-03-18 | End: 2021-03-29

## 2021-03-18 RX ORDER — SODIUM CHLORIDE 9 MG/ML
2000 INJECTION INTRAMUSCULAR; INTRAVENOUS; SUBCUTANEOUS ONCE
Refills: 0 | Status: COMPLETED | OUTPATIENT
Start: 2021-03-18 | End: 2021-03-18

## 2021-03-18 RX ORDER — CHLORHEXIDINE GLUCONATE 213 G/1000ML
1 SOLUTION TOPICAL
Refills: 0 | Status: DISCONTINUED | OUTPATIENT
Start: 2021-03-18 | End: 2021-03-29

## 2021-03-18 RX ORDER — OXYCODONE HYDROCHLORIDE 5 MG/1
10 TABLET ORAL EVERY 4 HOURS
Refills: 0 | Status: DISCONTINUED | OUTPATIENT
Start: 2021-03-18 | End: 2021-03-20

## 2021-03-18 RX ORDER — PANTOPRAZOLE SODIUM 20 MG/1
8 TABLET, DELAYED RELEASE ORAL
Qty: 80 | Refills: 0 | Status: DISCONTINUED | OUTPATIENT
Start: 2021-03-18 | End: 2021-03-19

## 2021-03-18 RX ORDER — CEFEPIME 1 G/1
2000 INJECTION, POWDER, FOR SOLUTION INTRAMUSCULAR; INTRAVENOUS EVERY 12 HOURS
Refills: 0 | Status: DISCONTINUED | OUTPATIENT
Start: 2021-03-19 | End: 2021-03-20

## 2021-03-18 RX ORDER — CEFEPIME 1 G/1
2000 INJECTION, POWDER, FOR SOLUTION INTRAMUSCULAR; INTRAVENOUS ONCE
Refills: 0 | Status: COMPLETED | OUTPATIENT
Start: 2021-03-18 | End: 2021-03-18

## 2021-03-18 RX ORDER — OXYCODONE HYDROCHLORIDE 5 MG/1
5 TABLET ORAL EVERY 4 HOURS
Refills: 0 | Status: DISCONTINUED | OUTPATIENT
Start: 2021-03-18 | End: 2021-03-25

## 2021-03-18 RX ORDER — PANTOPRAZOLE SODIUM 20 MG/1
80 TABLET, DELAYED RELEASE ORAL ONCE
Refills: 0 | Status: COMPLETED | OUTPATIENT
Start: 2021-03-18 | End: 2021-03-18

## 2021-03-18 RX ORDER — SODIUM CHLORIDE 9 MG/ML
1000 INJECTION, SOLUTION INTRAVENOUS
Refills: 0 | Status: DISCONTINUED | OUTPATIENT
Start: 2021-03-18 | End: 2021-03-21

## 2021-03-18 RX ORDER — METOPROLOL TARTRATE 50 MG
12.5 TABLET ORAL
Refills: 0 | Status: DISCONTINUED | OUTPATIENT
Start: 2021-03-18 | End: 2021-03-18

## 2021-03-18 RX ORDER — SODIUM CHLORIDE 9 MG/ML
1000 INJECTION INTRAMUSCULAR; INTRAVENOUS; SUBCUTANEOUS ONCE
Refills: 0 | Status: COMPLETED | OUTPATIENT
Start: 2021-03-18 | End: 2021-03-18

## 2021-03-18 RX ORDER — SODIUM CHLORIDE 9 MG/ML
1000 INJECTION INTRAMUSCULAR; INTRAVENOUS; SUBCUTANEOUS
Refills: 0 | Status: DISCONTINUED | OUTPATIENT
Start: 2021-03-18 | End: 2021-03-18

## 2021-03-18 RX ORDER — ACETAMINOPHEN 500 MG
1000 TABLET ORAL ONCE
Refills: 0 | Status: COMPLETED | OUTPATIENT
Start: 2021-03-18 | End: 2021-03-18

## 2021-03-18 RX ORDER — AMLODIPINE BESYLATE 2.5 MG/1
5 TABLET ORAL DAILY
Refills: 0 | Status: DISCONTINUED | OUTPATIENT
Start: 2021-03-18 | End: 2021-03-18

## 2021-03-18 RX ORDER — ROPINIROLE 8 MG/1
1 TABLET, FILM COATED, EXTENDED RELEASE ORAL DAILY
Refills: 0 | Status: DISCONTINUED | OUTPATIENT
Start: 2021-03-18 | End: 2021-03-29

## 2021-03-18 RX ORDER — VANCOMYCIN HCL 1 G
1250 VIAL (EA) INTRAVENOUS ONCE
Refills: 0 | Status: COMPLETED | OUTPATIENT
Start: 2021-03-18 | End: 2021-03-18

## 2021-03-18 RX ADMIN — PANTOPRAZOLE SODIUM 10 MG/HR: 20 TABLET, DELAYED RELEASE ORAL at 20:37

## 2021-03-18 RX ADMIN — Medication 166.67 MILLIGRAM(S): at 16:50

## 2021-03-18 RX ADMIN — Medication 1000 MILLIGRAM(S): at 19:28

## 2021-03-18 RX ADMIN — SODIUM CHLORIDE 1000 MILLILITER(S): 9 INJECTION INTRAMUSCULAR; INTRAVENOUS; SUBCUTANEOUS at 16:49

## 2021-03-18 RX ADMIN — SODIUM CHLORIDE 100 MILLILITER(S): 9 INJECTION, SOLUTION INTRAVENOUS at 22:47

## 2021-03-18 RX ADMIN — Medication 400 MILLIGRAM(S): at 15:51

## 2021-03-18 RX ADMIN — PANTOPRAZOLE SODIUM 80 MILLIGRAM(S): 20 TABLET, DELAYED RELEASE ORAL at 19:49

## 2021-03-18 RX ADMIN — ROPINIROLE 0.5 MILLIGRAM(S): 8 TABLET, FILM COATED, EXTENDED RELEASE ORAL at 23:22

## 2021-03-18 RX ADMIN — CEFEPIME 100 MILLIGRAM(S): 1 INJECTION, POWDER, FOR SOLUTION INTRAMUSCULAR; INTRAVENOUS at 15:55

## 2021-03-18 RX ADMIN — SODIUM CHLORIDE 2000 MILLILITER(S): 9 INJECTION INTRAMUSCULAR; INTRAVENOUS; SUBCUTANEOUS at 16:49

## 2021-03-18 RX ADMIN — OXYCODONE HYDROCHLORIDE 5 MILLIGRAM(S): 5 TABLET ORAL at 23:35

## 2021-03-18 NOTE — CONSULT NOTE ADULT - SUBJECTIVE AND OBJECTIVE BOX
Patient is a 85y old  Male who presents with a chief complaint of GI Bleed (18 Mar 2021 17:24)      BRIEF HOSPITAL COURSE:  85 year old male with PMH of HTN, parkinson's Disease, vertigo, patent foramen ovale, shingles, anxiety, AAA s/p repair, AFib, mucocele appendix s/p right hemicolectomy on 3/8 with Dr. Ochoa d/jennie home on [facility], who presents to ED s/p fall. Patient states he has had melanotic stool for the past 24 hours. Today, he took multiple laxatives and had 5 episodes of black tarry stool. He was en route to his doctor for a follow-up visit and had a sudden urge to have a BM. They stopped at a laundromat, and while on the way in his "feet became tangled" and he tripped and fell. He states he caught himself, denies any sort of trauma, head trauma, or LOC. No preceding dizziness or lightheadedness. He was brought by EMS to ED, and upon arrival, was found to be hypotensive, tachycardic, and febrile 101.3'F MO. His labs revealed     Pt was on his way to outpt follow up appt and he felt a sudden urge to have a BM. He stopped at a laundromat and fell on the way in. He was BIBA and was found to have a GI Bleed in the ED. He states he took aspirin and laxatives today. Pt was hypotensive, tachycardic, with a fever of 101. He received 2 units PRBC and 2 fluid boluses in ED. Pt is now stable. He admits to chills but no N/V, fevers, CP, SOB, Abdominal Pain, Diarrhea or dark stools at home.      Events last 24 hours: ***      PAST MEDICAL & SURGICAL HISTORY:  Vertigo    Bruise  to bilateral upper extremity    Patent foramen ovale  dilated left artrium severe echo 1/2021    History of shingles    Mucocele, appendix    Eczema    Anxiety    Seasonal allergies    Renal calculi    Parkinson disease    [place] (hard of hearing)    H/O scarlet fever    AAA (abdominal aortic aneurysm)    H/O candidiasis of mouth    HTN (hypertension)    Atrial fibrillation    H/O right hemicolectomy    History of appendectomy    S/P AAA repair  1/2021    H/O colonoscopy            SOCIAL HISTORY:        Review of Systems:  CONSTITUTIONAL: No fever, chills, or fatigue  EYES: No visual disturbances  ENMT:  No difficulty hearing  NECK: No pain  RESPIRATORY: No cough. No shortness of breath  CARDIOVASCULAR: No chest pain, palpitations, or leg swelling  GASTROINTESTINAL: No abdominal pain. No nausea, vomiting, diarrhea, or constipation. No hematemesis, melena or hematochezia  GENITOURINARY: No dysuria, frequency, hematuria, or incontinence  NEUROLOGICAL: No headaches, loss of strength, numbness, or tremors  SKIN: No rashes  MUSCULOSKELETAL: No back or extremity pain. No calf pain  PSYCHIATRIC: No depression, anxiety, or difficulty sleeping      [  ] Due to altered mental status/intubation, subjective information was not able to be obtained from the patient. History was obtained, to the extent possible, from review of the chart and collateral sources of information.        Medications:        oxyCODONE    IR 5 milliGRAM(s) Oral every 4 hours PRN  oxyCODONE    IR 10 milliGRAM(s) Oral every 4 hours PRN  rOPINIRole 1 milliGRAM(s) Oral daily  rOPINIRole 0.5 milliGRAM(s) Oral two times a day              sodium chloride 0.9%. 1000 milliLiter(s) IV Continuous <Continuous>      chlorhexidine 4% Liquid 1 Application(s) Topical <User Schedule>            ICU Vital Signs Last 24 Hrs  T(C): 36.4 (18 Mar 2021 17:50), Max: 38.5 (18 Mar 2021 14:50)  T(F): 97.6 (18 Mar 2021 17:50), Max: 101.3 (18 Mar 2021 14:50)  HR: 97 (18 Mar 2021 17:50) (65 - 124)  BP: 98/58 (18 Mar 2021 17:50) (89/63 - 111/59)  BP(mean): 71 (18 Mar 2021 17:50) (62 - 92)  ABP: --  ABP(mean): --  RR: 17 (18 Mar 2021 17:50) (16 - 20)  SpO2: 98% (18 Mar 2021 17:50) (94% - 99%)          I&O's Detail        LABS:                        7.6    x     )-----------( x        ( 18 Mar 2021 16:05 )             23.4     03-18    139  |  106  |  45<H>  ----------------------------<  227<H>  3.7   |  22  |  1.76<H>    Ca    8.6      18 Mar 2021 15:22    TPro  5.4<L>  /  Alb  2.1<L>  /  TBili  0.5  /  DBili  x   /  AST  25  /  ALT  48  /  AlkPhos  77  03-18          CAPILLARY BLOOD GLUCOSE        PT/INR - ( 18 Mar 2021 15:22 )   PT: 16.6 sec;   INR: 1.46 ratio         PTT - ( 18 Mar 2021 15:22 )  PTT:29.0 sec    CULTURES:  Rapid RVP Result: NotDetec (03-18 @ 15:57)            Physical Examination:    General: No acute distress.      HEENT: Pupils equal, reactive to light.  Symmetric.    PULM: Clear to auscultation bilaterally, no significant sputum production    CVS: Regular rate and rhythm, no murmurs, rubs, or gallops    ABD: Soft, nondistended, nontender, normoactive bowel sounds, no masses    EXT: No edema, nontender    SKIN: Warm and well perfused, no rashes noted.    NEURO: Alert, oriented, interactive, nonfocal        RADIOLOGY: ***        INVASIVE LINES:  INDWELLING [place]JICA:  VTE PROPHYLAXIS:  CAM ICU:  CODE STATUS:        CRITICAL CARE TIME SPENT: *** minutes spent performing frequent bedside reassessments and augmenting plan of care to address problems of acute critical illness that pose high probability of life threatening deterioration and/or end organ damage/dysfunction and discussing goals of care, non-inclusive of time spent on procedures performed. Patient is a 85y old  Male who presents with a chief complaint of GI Bleed (18 Mar 2021 17:24)      BRIEF HOSPITAL COURSE:  85 year old male with PMH of HTN, parkinson's Disease, vertigo, patent foramen ovale, shingles, anxiety, AAA s/p repair, AFib, mucocele appendix s/p right hemicolectomy on 3/8 with Dr. Ochoa d/jennie home on [facility], who presents to ED s/p fall. Patient states he has had melanotic stool for the past 24 hours. Today, he took multiple laxatives and had 5 episodes of black tarry stool. He was en route to his doctor for a follow-up visit and had a sudden urge to have a BM. They stopped at a laundromat, and while on the way in his "feet became tangled" and he tripped and fell. He states he caught himself, denies any sort of trauma, head trauma, or LOC. No preceding dizziness or lightheadedness.     He was brought by EMS to ED, and upon arrival, was found to be hypotensive, tachycardic, and febrile 101.3'F MO. His labs revealed acute blood loss anemia with Hgb 7.3 down from 11.8 on discharge 5 days prior. Also noted to have lactate 8.8 and CAM.        PAST MEDICAL & SURGICAL HISTORY:  Vertigo    Bruise  to bilateral upper extremity    Patent foramen ovale  dilated left artrium severe echo 1/2021    History of shingles    Mucocele, appendix    Eczema    Anxiety    Seasonal allergies    Renal calculi    Parkinson disease    [place] (hard of hearing)    H/O scarlet fever    AAA (abdominal aortic aneurysm)    H/O candidiasis of mouth    HTN (hypertension)    Atrial fibrillation    H/O right hemicolectomy    History of appendectomy    S/P AAA repair  1/2021    H/O colonoscopy          Review of Systems: see HPI         Medications:  oxyCODONE    IR 5 milliGRAM(s) Oral every 4 hours PRN  oxyCODONE    IR 10 milliGRAM(s) Oral every 4 hours PRN  rOPINIRole 1 milliGRAM(s) Oral daily  rOPINIRole 0.5 milliGRAM(s) Oral two times a day  sodium chloride 0.9%. 1000 milliLiter(s) IV Continuous <Continuous>  chlorhexidine 4% Liquid 1 Application(s) Topical <User Schedule>          ICU Vital Signs Last 24 Hrs  T(C): 36.4 (18 Mar 2021 17:50), Max: 38.5 (18 Mar 2021 14:50)  T(F): 97.6 (18 Mar 2021 17:50), Max: 101.3 (18 Mar 2021 14:50)  HR: 97 (18 Mar 2021 17:50) (65 - 124)  BP: 98/58 (18 Mar 2021 17:50) (89/63 - 111/59)  BP(mean): 71 (18 Mar 2021 17:50) (62 - 92)  ABP: --  ABP(mean): --  RR: 17 (18 Mar 2021 17:50) (16 - 20)  SpO2: 98% (18 Mar 2021 17:50) (94% - 99%)          I&O's Detail        LABS:                        7.6    x     )-----------( x        ( 18 Mar 2021 16:05 )             23.4     03-18    139  |  106  |  45<H>  ----------------------------<  227<H>  3.7   |  22  |  1.76<H>    Ca    8.6      18 Mar 2021 15:22    TPro  5.4<L>  /  Alb  2.1<L>  /  TBili  0.5  /  DBili  x   /  AST  25  /  ALT  48  /  AlkPhos  77  03-18          CAPILLARY BLOOD GLUCOSE        PT/INR - ( 18 Mar 2021 15:22 )   PT: 16.6 sec;   INR: 1.46 ratio         PTT - ( 18 Mar 2021 15:22 )  PTT:29.0 sec    CULTURES:  Rapid RVP Result: NotDetec (03-18 @ 15:57)            Physical Examination:    General: No acute distress.      HEENT: Pupils equal, reactive to light.  Symmetric.    PULM: Clear to auscultation bilaterally, no significant sputum production    CVS: Regular rate and rhythm, no murmurs, rubs, or gallops    ABD: Softly distended, nontender, normoactive bowel sounds, surgical incision with staples and dressing in place     EXT: No edema, nontender    SKIN: Warm and well perfused, no rashes noted.    NEURO: Alert, oriented, interactive, nonfocal        RADIOLOGY: < from: CT Abdomen and Pelvis w/ IV Cont (03.18.21 @ 16:32) >    EXAM:  CT ABDOMEN AND PELVIS IC                            PROCEDURE DATE:  03/18/2021          INTERPRETATION:  CLINICAL INFORMATION: Sepsis. Surgical dehiscence. Fall    COMPARISON: 1/13/2021    CONTRAST/COMPLICATIONS:  IV Contrast: Omnipaque 350 90 cc administered   10 cc discarded  Oral Contrast: NONE  Complications: None reported at time of study completion    PROCEDURE:  CT of the Abdomen and Pelvis was performed.  Sagittal and coronal reformats were performed.    FINDINGS:  LOWER CHEST:Coronary artery calcification is seen    LIVER: Tiny hypodensity in the lateral segment of the left lobe on image 24 is unchanged from the prior study and of unlikely clinical significance.  BILE DUCTS: Normal caliber.  GALLBLADDER: Within normal limits.  SPLEEN: Within normal limits.  PANCREAS: Within normal limits.  ADRENALS: Within normal limits.  KIDNEYS/URETERS: Large right renal cysts in the upper pole and smaller right lower pole cyst. Large left lower pole renal cyst and smaller upper pole left renal cysts. Large right renal pelvis calculus again seen measuring up to 1.3 cm..    BLADDER: Mojica catheter.  REPRODUCTIVE ORGANS: Prostate within normal limits.    BOWEL: No bowel obstruction. Appendix surgically absent. Stomach is moderately distended. There is a suture line appreciated in the ascending colon status post right hemicolectomy. There is diverticulosis without diverticulitis  PERITONEUM: No ascites.  VESSELS: Since the prior study, patient has undergone endovascular repair of abdominal aortic aneurysm with patent lumen of the bypass..  RETROPERITONEUM/LYMPH NODES: No lymphadenopathy.  ABDOMINAL WALL: Staple line is seen in the right lower anterior abdominal wall. There are also midline anterior abdominal wall staples. Small amount of fluid and air in the subcutaneous soft tissues of the right lower anterior abdominal wall consistent with recent postoperative state. There is fluid again seen in the right inguinal canal. There is diastases of the rectus abdominis muscles with fat seen between the muscles.  BONES: Degenerative changes.    IMPRESSION:  Status post right hemicolectomy and endovascular abdominal aortic aneurysm repair.  Diverticulosis without diverticulitis.  Bilateral renal cysts  Large right renal pelvis calculus without obstruction      NADIYA ZABALA MD; Attending Radiologist  This document has been electronically signed. Mar 18 2021  4:48PM        INVASIVE LINES:  INDWELLING MOJICA:  VTE PROPHYLAXIS:  CAM ICU:  CODE STATUS:        CRITICAL CARE TIME SPENT: 60 minutes spent performing frequent bedside reassessments and augmenting plan of care to address problems of acute critical illness that pose high probability of life threatening deterioration and/or end organ damage/dysfunction and discussing goals of care, non-inclusive of time spent on procedures performed.

## 2021-03-18 NOTE — ED PROVIDER NOTE - PMH
AAA (abdominal aortic aneurysm)    Anxiety    Atrial fibrillation    Bruise  to bilateral upper extremity  Eczema    H/O candidiasis of mouth    H/O scarlet fever    History of shingles    [place] (hard of hearing)    HTN (hypertension)    Mucocele, appendix    Parkinson disease    Patent foramen ovale  dilated left artrium severe echo 1/2021  Renal calculi    Seasonal allergies    Vertigo

## 2021-03-18 NOTE — H&P ADULT - HISTORY OF PRESENT ILLNESS
85 year old male with PMH of HTN, parkinson's Disease, vertigo, patent foramen ovale, shingles, anxiety, AAA s/p repair, afib on Lovenox, mucocele appendix with recent right colectomy with Dr. Ochoa a week ago presents to ED s/p syncopal episode and fall. Pt was on his way to outpt follow up appt and he felt a sudden urge to have a BM. He stopped at a laundromat and fell on the way in. He was BIBA and was found to have a GI Bleed in the ED. He states he took aspirin and laxatives today. Pt was hypotensive, tachycardic, with a fever of 101. He received 2 units PRBC and 2 fluid boluses in ED. Pt is now stable. He admits to chills but no N/V, fevers, CP, SOB, Abdominal Pain, Diarrhea or dark stools at home.

## 2021-03-18 NOTE — SEPSIS NOTE - ASSESSMENT
85 year old male with PMH of HTN, parkinson's Disease, vertigo, patent foramen ovale, shingles, anxiety, AAA s/p repair, AFib, mucocele appendix s/p right hemicolectomy on 3/8 with Dr. Ochoa d/c home on Lovenox, with 1 day history of melena who presents to ED s/p non-traumatic mechanical fall, found to be febrile and hypotensive, labs revealed acute blood loss anemia, metabolic lactic acidosis and CAM.     Admit to ICU with hypovolemic shock, r/o septic shock  s/p 30cc/kg IBW fluid resuscitation and blood product resuscitation (received 2u PRBC) with improvement in hemodynamics  MAP borderline, start vasopressors if needed to keep MAP >65  Repeat H/H and lactate improved in response, will trend closely may need additional resuscitation  Monitoring end points of perfusion  Blood cultures sent and are pending, CT without abscess, check CXR and UA for possible focus of infection. RVP and COVID-19 negative.  Given empiric abx, continue Cefepime   Start PPI gtt for presumed UGIB. GI consult, may need EGD keep NPO   No further pharmacologic DVT ppx due to GI bleed

## 2021-03-18 NOTE — H&P ADULT - NSICDXPASTMEDICALHX_GEN_ALL_CORE_FT
PAST MEDICAL HISTORY:  AAA (abdominal aortic aneurysm)     Anxiety     Atrial fibrillation     Bruise to bilateral upper extremity    Eczema     H/O candidiasis of mouth     H/O scarlet fever     History of shingles     [place] (hard of hearing)     HTN (hypertension)     Mucocele, appendix     Parkinson disease     Patent foramen ovale dilated left artrium severe echo 1/2021    Renal calculi     Seasonal allergies     Vertigo

## 2021-03-18 NOTE — ED PROVIDER NOTE - CLINICAL SUMMARY MEDICAL DECISION MAKING FREE TEXT BOX
despite recorded triage vitals, pt found to be hypotensived to 70s SBP and tachycardic when first at pt bedside at 3pm.  pt additionally found to have large melenic BM.  MTP called in addition to crystalloid resuscitation.  surgery called.  Found to be febrile, abx ordered and sepsis care initiated.  Dr Tan came to bedside until Dr Ochoa could arrive.  Trauma alert also called given fall and h/o AC, though INR found to be subtherapeutic and no traumatic injury on exam.  pt admitted to surgical service.  pt treated for septic and hemorrhagic shock.

## 2021-03-18 NOTE — ED PROVIDER NOTE - CARE PLAN
Principal Discharge DX:	Septic shock  Secondary Diagnosis:	Hemorrhagic shock  Secondary Diagnosis:	Wound dehiscence

## 2021-03-18 NOTE — ED PROVIDER NOTE - GASTROINTESTINAL, MLM
Abdomen soft, non-tender, no guarding. abd distended, diffusely TTP, + serosanguinous soaked gauze dressing to ventral surgical wound

## 2021-03-18 NOTE — ED PROVIDER NOTE - PSH
H/O colonoscopy    S/P AAA repair  1/2021   H/O colonoscopy    H/O right hemicolectomy    History of appendectomy    S/P AAA repair  1/2021

## 2021-03-18 NOTE — H&P ADULT - NSHPPHYSICALEXAM_GEN_ALL_CORE
PHYSICAL EXAM:  GENERAL: NAD, lying in bed comfortably  HEAD:  Atraumatic, Normocephalic  EYES: EOMI, PERRLA, conjunctiva and sclera clear  ENT: dry mucous membranes  NECK: Supple, No JVD  CHEST/LUNG: Clear to auscultation bilaterally; No rales, rhonchi, wheezing, or rubs. Unlabored respirations  HEART: Regular rate and rhythm; No murmurs, rubs, or gallops  ABDOMEN: Bowel sounds present; Soft, Nontender, Nondistended. midline incision with staples- upper portion of wound with about 2 inch opening draining serous fluid   EXTREMITIES:  2+ Peripheral Pulses, brisk capillary refill. No clubbing, cyanosis, or edema  NERVOUS SYSTEM:  Alert & Oriented X3, speech clear. No deficits   MSK: FROM all 4 extremities, full and equal strength  SKIN: No rashes or lesions

## 2021-03-18 NOTE — ED PROVIDER NOTE - ENMT, MLM
Airway patent, Nasal mucosa clear. Mouth with normal mucosa. Throat has no vesicles, no oropharyngeal exudates and uvula is midline. Airway patent, dry MM

## 2021-03-18 NOTE — ED PROVIDER NOTE - SKIN, MLM
Skin normal color for race, warm, dry and intact. No evidence of rash. pale but skin otherwise warm and dry no rash

## 2021-03-18 NOTE — SEPSIS NOTE - NSSUBJFT_GEN_A_CORE
85 year old male with PMH of HTN, parkinson's Disease, vertigo, patent foramen ovale, shingles, anxiety, AAA s/p repair, AFib, mucocele appendix s/p right hemicolectomy on 3/8 with Dr. Don llanes/jennie home on [facility], who presents to ED s/p fall. Patient states he has had melanotic stool for the past 24 hours. Today, he took multiple laxatives and had 5 episodes of black tarry stool. He was en route to his doctor for a follow-up visit and had a sudden urge to have a BM. They stopped at a laundromat, and while on the way in his "feet became tangled" and he tripped and fell. He states he caught himself, denies any sort of trauma, head trauma, or LOC. No preceding dizziness or lightheadedness.     He was brought by EMS to ED, and upon arrival, was found to be hypotensive, tachycardic, and febrile 101.3'F ND. His labs revealed acute blood loss anemia with Hgb 7.3 down from 11.8 on discharge 5 days prior. Also noted to have lactate 8.8 and CAM.    Blood cultures were drawn, he was given 3L IVF crystalloid resuscitation, and started on empiric antibiotics with Cefepime and Vancomycin. Repeat lactate downtrending in response to resuscitation and hemodynamics stabilized with MAP >65 without need for vasopressors.

## 2021-03-18 NOTE — H&P ADULT - NSICDXPASTSURGICALHX_GEN_ALL_CORE_FT
PAST SURGICAL HISTORY:  H/O colonoscopy     H/O right hemicolectomy     History of appendectomy     S/P AAA repair 1/2021

## 2021-03-18 NOTE — ED PROVIDER NOTE - CONSTITUTIONAL, MLM
normal... Well appearing, awake, alert, oriented to person, place, time/situation and in no apparent distress. awake, alert, NAD, GCS 15

## 2021-03-18 NOTE — H&P ADULT - ASSESSMENT
85 year old male presented with septic shock/hemorrhagic shock with GI Bleed s/p 2 units PRBC and IVF boluses is now stable  -admit to ICU for monitoring  -hold lovenox/aspirin  -NPO  -IVF  -repeat H/H q6  -?reverse anticoagulation, FFP  plan d/w Dr. Ochoa

## 2021-03-18 NOTE — CONSULT NOTE ADULT - ASSESSMENT
85 year old man with HTN, parkinson's, AAA s/p endovascular repair, appendiceal mucocele s/p R hemicolectomy d/c'd on lovenox a week ago, admitted with GI bleeding.     With brbpr and recent surgery and since on A/C, most likely bleeding from staple line. Since he stabilized hemodynamically with blood and h/h now stable (last measurement 7), likely stopped/self limited. After my discussion with Dr. Ochoa, endoscopic eval would be safe 1 week post operatively, but nonetheless not ideal. Discussed prepping patient empirically but will hold off for now. Will just watch. The CT did not show any concerning findings in terms of infection, feel like white count and fever could have been a stress response. NPO for now. 
85 year old male with PMH of HTN, parkinson's Disease, vertigo, patent foramen ovale, shingles, anxiety, AAA s/p repair, AFib, mucocele appendix s/p right hemicolectomy on 3/8 with Dr. Ochoa d/c home on Lovenox, with 1 day history of melena who presents to ED s/p non-traumatic mechanical fall, found to be febrile and hypotensive, labs revealed acute blood loss anemia, metabolic lactic acidosis and CAM.     Admit to ICU with hypovolemic shock, r/o septic shock  s/p 30cc/kg IBW fluid resuscitation and blood product resuscitation (received 2u PRBC) with improvement in hemodynamics  MAP borderline, start vasopressors if needed to keep MAP >65  Repeat H/H and lactate improved in response, will trend closely may need additional resuscitation  Monitoring end points of perfusion  Blood cultures sent and are pending, CT without abscess, check CXR and UA for possible focus of infection. RVP and COVID-19 negative.  Given empiric abx, continue Cefepime   Start PPI gtt for presumed UGIB. GI consult, may need EGD keep NPO   No further pharmacologic DVT ppx due to GI bleed

## 2021-03-18 NOTE — ED PROVIDER NOTE - OBJECTIVE STATEMENT
84 y/o male with PMH of vertigo, patent foramen ovale, shingles, mucocele appendix, eczema, anxiety, renal calculi, Parkinson disease, [place], scarlet fever, AAA s/p repair, candidiasis of mouth, HTN, Afib on Coumadin presents to ED s/p fall. Pt is s/p appendectomy last week. Pt was on his way to outpatient follow up and felt a sudden urge to have a BM. Went to the nearest bathroom in a laundromat where he fell.  Took 3 baby ASA, a laxative, Coumadin. 86 y/o male with PMH of vertigo, patent foramen ovale, shingles, mucocele appendix, eczema, anxiety, renal calculi, Parkinson disease, [place], scarlet fever, AAA s/p repair, candidiasis of mouth, HTN, Afib on Coumadin presents to ED s/p fall. Pt is s/p R hemicolectomy last week with Dr Ochoa. Pt was on his way to outpatient follow up and felt a sudden urge to have a BM. Went to the nearest bathroom in a laundromat where he fell.  then BIBA.  Took 3 baby ASA, a laxative, Coumadin.  denies pain.  denies head trauma.  denies dark blood stools at home.  denies f/c/n/v/d.  states his wife has been caring for his wound at home.

## 2021-03-18 NOTE — CONSULT NOTE ADULT - SUBJECTIVE AND OBJECTIVE BOX
Patient is a 85y old  Male who presents with a chief complaint of GI Bleed (18 Mar 2021 17:24)    85 year old man with HTN, parkinson's, AAA s/p endovascular repair, appendiceal mucocele s/p R hemicolectomy d/c'd on Lovenox a week ago, admitted with GI bleeding.     Patient states that he was on his way to an appointment and felt weak/dizzy and had a fall in a laundromat. He was brought in to the hospital where he was hypotensive, tachycardic and febrile with bright red blood per rectum. MTP was called in the ED but was called off after two units of PRBC were given. Prior to these events patient was feeling in his usual state of healthy and otherwise denies any overt signs of GI bleeding. No [place] or vomiting, no hematemesis.       PAST MEDICAL & SURGICAL HISTORY:  Vertigo    Bruise  to bilateral upper extremity    Patent foramen ovale  dilated left artrium severe echo 1/2021    History of shingles    Mucocele, appendix    Eczema    Anxiety    Seasonal allergies    Renal calculi    Parkinson disease    [place] (hard of hearing)    H/O scarlet fever    AAA (abdominal aortic aneurysm)    H/O candidiasis of mouth    HTN (hypertension)    Atrial fibrillation    H/O right hemicolectomy    History of appendectomy    S/P AAA repair  1/2021    H/O colonoscopy        MEDICATIONS  (STANDING):  chlorhexidine 4% Liquid 1 Application(s) Topical <User Schedule>  lactated ringers. 1000 milliLiter(s) (100 mL/Hr) IV Continuous <Continuous>  pantoprazole Infusion 8 mG/Hr (10 mL/Hr) IV Continuous <Continuous>  rOPINIRole 1 milliGRAM(s) Oral daily  rOPINIRole 0.5 milliGRAM(s) Oral two times a day    MEDICATIONS  (PRN):  oxyCODONE    IR 5 milliGRAM(s) Oral every 4 hours PRN Moderate Pain (4 - 6)  oxyCODONE    IR 10 milliGRAM(s) Oral every 4 hours PRN Severe Pain (7 - 10)      Allergies    penicillin (Rash)    Intolerances        SOCIAL HISTORY:    FAMILY HISTORY:  No pertinent family history in first degree relatives        Vital Signs Last 24 Hrs  T(C): 36.9 (18 Mar 2021 20:04), Max: 38.5 (18 Mar 2021 14:50)  T(F): 98.5 (18 Mar 2021 20:04), Max: 101.3 (18 Mar 2021 14:50)  HR: 89 (18 Mar 2021 20:04) (65 - 124)  BP: 89/56 (18 Mar 2021 20:04) (89/56 - 111/59)  BP(mean): 69 (18 Mar 2021 20:04) (62 - 92)  RR: 16 (18 Mar 2021 20:04) (16 - 20)  SpO2: 100% (18 Mar 2021 20:04) (94% - 100%)    PHYSICAL EXAM:  Gen: critically ill in ED  overt bleeding/brbpr    LABS:                        7.2    20.33 )-----------( 347      ( 18 Mar 2021 20:34 )             21.4     03-18    139  |  106  |  45<H>  ----------------------------<  227<H>  3.7   |  22  |  1.76<H>    Ca    8.6      18 Mar 2021 15:22    TPro  5.4<L>  /  Alb  2.1<L>  /  TBili  0.5  /  DBili  x   /  AST  25  /  ALT  48  /  AlkPhos  77  03-18    PT/INR - ( 18 Mar 2021 15:22 )   PT: 16.6 sec;   INR: 1.46 ratio         PTT - ( 18 Mar 2021 15:22 )  PTT:29.0 sec  LIVER FUNCTIONS - ( 18 Mar 2021 15:22 )  Alb: 2.1 g/dL / Pro: 5.4 gm/dL / ALK PHOS: 77 U/L / ALT: 48 U/L / AST: 25 U/L / GGT: x             RADIOLOGY & ADDITIONAL STUDIES: CT reviewed, post operative changes

## 2021-03-18 NOTE — ED ADULT TRIAGE NOTE - CHIEF COMPLAINT QUOTE
Pt arrives to ED s/p unwitnessed fall in laundromat. pt had appendectomy last week. some light red drainage from wound site. pt denies hitting head, denies LOC. blood glucose 329. denies daily blood thinners.

## 2021-03-18 NOTE — H&P ADULT - NSHPLABSRESULTS_GEN_ALL_CORE
ICU Vital Signs Last 24 Hrs  T(C): 36.6 (18 Mar 2021 14:24), Max: 36.6 (18 Mar 2021 14:24)  T(F): 97.9 (18 Mar 2021 14:24), Max: 97.9 (18 Mar 2021 14:24)  HR: 65 (18 Mar 2021 14:24) (65 - 65)  BP: 111/59 (18 Mar 2021 14:24) (111/59 - 111/59)  BP(mean): --  ABP: --  ABP(mean): --  RR: 20 (18 Mar 2021 14:24) (20 - 20)  SpO2: 97% (18 Mar 2021 14:24) (97% - 97%)                          7.6    x     )-----------( x        ( 18 Mar 2021 16:05 )             23.4     03-18    139  |  106  |  45<H>  ----------------------------<  227<H>  3.7   |  22  |  1.76<H>    Ca    8.6      18 Mar 2021 15:22    TPro  5.4<L>  /  Alb  2.1<L>  /  TBili  0.5  /  DBili  x   /  AST  25  /  ALT  48  /  AlkPhos  77  03-18    PT/INR - ( 18 Mar 2021 15:22 )   PT: 16.6 sec;   INR: 1.46 ratio         PTT - ( 18 Mar 2021 15:22 )  PTT:29.0 sec    < from: CT Abdomen and Pelvis w/ IV Cont (03.18.21 @ 16:32) >    EXAM:  CT ABDOMEN AND PELVIS IC                            PROCEDURE DATE:  03/18/2021          INTERPRETATION:  CLINICAL INFORMATION: Sepsis. Surgical dehiscence. Fall    COMPARISON: 1/13/2021    CONTRAST/COMPLICATIONS:  IV Contrast: Omnipaque 350 90 cc administered   10 cc discarded  Oral Contrast: NONE  Complications: None reported at time of study completion    PROCEDURE:  CT of the Abdomen and Pelvis was performed.  Sagittal and coronal reformats were performed.    FINDINGS:  LOWER CHEST:Coronary artery calcification is seen    LIVER: Tiny hypodensity in the lateral segment of the left lobe on image 24 is unchanged from the prior study and of unlikely clinical significance.  BILE DUCTS: Normal caliber.  GALLBLADDER: Within normal limits.  SPLEEN: Within normal limits.  PANCREAS: Within normal limits.  ADRENALS: Within normal limits.  KIDNEYS/URETERS: Large right renal cysts in the upper pole and smaller right lower pole cyst. Large left lower pole renal cyst and smaller upper pole left renal cysts. Large right renal pelvis calculus again seen measuring up to 1.3 cm..    BLADDER: Goodson catheter.  REPRODUCTIVE ORGANS: Prostate within normal limits.    BOWEL: No bowel obstruction. Appendix surgically absent. Stomach is moderately distended. There is a suture line appreciated in the ascending colon status post right hemicolectomy. There is diverticulosis without diverticulitis  PERITONEUM: No ascites.  VESSELS: Since the prior study, patient has undergone endovascular repair of abdominal aortic aneurysm with patent lumen of the bypass..  RETROPERITONEUM/LYMPH NODES: No lymphadenopathy.  ABDOMINAL WALL: Staple line is seen in the right lower anterior abdominal wall. There are also midline anterior abdominal wall staples. Small amount of fluid and air in the subcutaneous soft tissues of the right lower anterior abdominal wall consistent with recent postoperative state. There is fluid again seen in the right inguinal canal. There is diastases of the rectus abdominis muscles with fat seen between the muscles.  BONES: Degenerative changes.    IMPRESSION:  Status post right hemicolectomy and endovascular abdominal aortic aneurysm repair.  Diverticulosis without diverticulitis.  Bilateral renal cysts  Large right renal pelvis calculus without obstruction            NADIYA ZABALA MD; Attending Radiologist  This document has been electronically signed. Mar 18 2021  4:48PM    < end of copied text >

## 2021-03-18 NOTE — ED PROVIDER NOTE - PROGRESS NOTE DETAILS
Ramsey Burk for attending Dr. Yeboah: Discussed case with Dr. Phillips Ramsey Burk for attending Dr. Yeboah: Discussed case with Dr. Ochoa Ramsey Burk for attending Dr. Yeboah: Discussed case with Dr. Gray Ramsey Gaspar for attending Dr. Yeboah: Pt feeling better, /70, going to CT scan, admitted to surgery who are considering taking him to the OR immediately. I Nikita Gaspar attest that this documentation has been prepared under the direction and in the presence of Doctor Yeboah. Ramsey Burk for attending Dr. Yeboah: Discussed case with Dr. Miller seen and evaluated by Dr Ochoa.  Pt will not be going to OR.  will admit to ICU. I attest to accuracy of notes by marybeth Gaspar.  written in my presence.  MD Nicolás

## 2021-03-19 LAB
ALBUMIN SERPL ELPH-MCNC: 1.6 G/DL — LOW (ref 3.3–5)
ALBUMIN SERPL ELPH-MCNC: 1.8 G/DL — LOW (ref 3.3–5)
ALP SERPL-CCNC: 51 U/L — SIGNIFICANT CHANGE UP (ref 40–120)
ALP SERPL-CCNC: 59 U/L — SIGNIFICANT CHANGE UP (ref 40–120)
ALT FLD-CCNC: 31 U/L — SIGNIFICANT CHANGE UP (ref 12–78)
ALT FLD-CCNC: 39 U/L — SIGNIFICANT CHANGE UP (ref 12–78)
ANION GAP SERPL CALC-SCNC: 4 MMOL/L — LOW (ref 5–17)
ANION GAP SERPL CALC-SCNC: 7 MMOL/L — SIGNIFICANT CHANGE UP (ref 5–17)
APTT BLD: 29.6 SEC — SIGNIFICANT CHANGE UP (ref 27.5–35.5)
AST SERPL-CCNC: 19 U/L — SIGNIFICANT CHANGE UP (ref 15–37)
AST SERPL-CCNC: 21 U/L — SIGNIFICANT CHANGE UP (ref 15–37)
BASOPHILS # BLD AUTO: 0.07 K/UL — SIGNIFICANT CHANGE UP (ref 0–0.2)
BASOPHILS NFR BLD AUTO: 0.4 % — SIGNIFICANT CHANGE UP (ref 0–2)
BILIRUB SERPL-MCNC: 0.5 MG/DL — SIGNIFICANT CHANGE UP (ref 0.2–1.2)
BILIRUB SERPL-MCNC: 0.7 MG/DL — SIGNIFICANT CHANGE UP (ref 0.2–1.2)
BUN SERPL-MCNC: 36 MG/DL — HIGH (ref 7–23)
BUN SERPL-MCNC: 37 MG/DL — HIGH (ref 7–23)
CALCIUM SERPL-MCNC: 7.3 MG/DL — LOW (ref 8.5–10.1)
CALCIUM SERPL-MCNC: 7.4 MG/DL — LOW (ref 8.5–10.1)
CHLORIDE SERPL-SCNC: 114 MMOL/L — HIGH (ref 96–108)
CHLORIDE SERPL-SCNC: 115 MMOL/L — HIGH (ref 96–108)
CO2 SERPL-SCNC: 25 MMOL/L — SIGNIFICANT CHANGE UP (ref 22–31)
CO2 SERPL-SCNC: 25 MMOL/L — SIGNIFICANT CHANGE UP (ref 22–31)
CREAT SERPL-MCNC: 0.91 MG/DL — SIGNIFICANT CHANGE UP (ref 0.5–1.3)
CREAT SERPL-MCNC: 1.09 MG/DL — SIGNIFICANT CHANGE UP (ref 0.5–1.3)
EOSINOPHIL # BLD AUTO: 0.09 K/UL — SIGNIFICANT CHANGE UP (ref 0–0.5)
EOSINOPHIL NFR BLD AUTO: 0.5 % — SIGNIFICANT CHANGE UP (ref 0–6)
FIBRINOGEN PPP-MCNC: 402 MG/DL — SIGNIFICANT CHANGE UP (ref 290–520)
GLUCOSE SERPL-MCNC: 103 MG/DL — HIGH (ref 70–99)
GLUCOSE SERPL-MCNC: 106 MG/DL — HIGH (ref 70–99)
HCT VFR BLD CALC: 18.9 % — CRITICAL LOW (ref 39–50)
HCT VFR BLD CALC: 19.6 % — CRITICAL LOW (ref 39–50)
HCT VFR BLD CALC: 19.7 % — CRITICAL LOW (ref 39–50)
HCT VFR BLD CALC: 24.5 % — LOW (ref 39–50)
HGB BLD-MCNC: 6.5 G/DL — CRITICAL LOW (ref 13–17)
HGB BLD-MCNC: 6.6 G/DL — CRITICAL LOW (ref 13–17)
HGB BLD-MCNC: 6.6 G/DL — CRITICAL LOW (ref 13–17)
HGB BLD-MCNC: 8.2 G/DL — LOW (ref 13–17)
IMM GRANULOCYTES NFR BLD AUTO: 3.1 % — HIGH (ref 0–1.5)
INR BLD: 1.36 RATIO — HIGH (ref 0.88–1.16)
INR BLD: 1.4 RATIO — HIGH (ref 0.88–1.16)
LACTATE SERPL-SCNC: 2.4 MMOL/L — HIGH (ref 0.7–2)
LYMPHOCYTES # BLD AUTO: 1.05 K/UL — SIGNIFICANT CHANGE UP (ref 1–3.3)
LYMPHOCYTES # BLD AUTO: 6 % — LOW (ref 13–44)
MAGNESIUM SERPL-MCNC: 1.8 MG/DL — SIGNIFICANT CHANGE UP (ref 1.6–2.6)
MAGNESIUM SERPL-MCNC: 2.3 MG/DL — SIGNIFICANT CHANGE UP (ref 1.6–2.6)
MCHC RBC-ENTMCNC: 30.1 PG — SIGNIFICANT CHANGE UP (ref 27–34)
MCHC RBC-ENTMCNC: 30.7 PG — SIGNIFICANT CHANGE UP (ref 27–34)
MCHC RBC-ENTMCNC: 33.5 GM/DL — SIGNIFICANT CHANGE UP (ref 32–36)
MCHC RBC-ENTMCNC: 33.5 GM/DL — SIGNIFICANT CHANGE UP (ref 32–36)
MCV RBC AUTO: 90.1 FL — SIGNIFICANT CHANGE UP (ref 80–100)
MCV RBC AUTO: 91.6 FL — SIGNIFICANT CHANGE UP (ref 80–100)
MONOCYTES # BLD AUTO: 0.87 K/UL — SIGNIFICANT CHANGE UP (ref 0–0.9)
MONOCYTES NFR BLD AUTO: 5 % — SIGNIFICANT CHANGE UP (ref 2–14)
NEUTROPHILS # BLD AUTO: 14.8 K/UL — HIGH (ref 1.8–7.4)
NEUTROPHILS NFR BLD AUTO: 85 % — HIGH (ref 43–77)
PHOSPHATE SERPL-MCNC: 2 MG/DL — LOW (ref 2.5–4.5)
PHOSPHATE SERPL-MCNC: 2 MG/DL — LOW (ref 2.5–4.5)
PLATELET # BLD AUTO: 305 K/UL — SIGNIFICANT CHANGE UP (ref 150–400)
PLATELET # BLD AUTO: 327 K/UL — SIGNIFICANT CHANGE UP (ref 150–400)
POTASSIUM SERPL-MCNC: 3.6 MMOL/L — SIGNIFICANT CHANGE UP (ref 3.5–5.3)
POTASSIUM SERPL-MCNC: 4 MMOL/L — SIGNIFICANT CHANGE UP (ref 3.5–5.3)
POTASSIUM SERPL-SCNC: 3.6 MMOL/L — SIGNIFICANT CHANGE UP (ref 3.5–5.3)
POTASSIUM SERPL-SCNC: 4 MMOL/L — SIGNIFICANT CHANGE UP (ref 3.5–5.3)
PROT SERPL-MCNC: 4.2 GM/DL — LOW (ref 6–8.3)
PROT SERPL-MCNC: 4.7 GM/DL — LOW (ref 6–8.3)
PROTHROM AB SERPL-ACNC: 15.7 SEC — HIGH (ref 10.6–13.6)
PROTHROM AB SERPL-ACNC: 16 SEC — HIGH (ref 10.6–13.6)
RBC # BLD: 2.15 M/UL — LOW (ref 4.2–5.8)
RBC # BLD: 2.72 M/UL — LOW (ref 4.2–5.8)
RBC # FLD: 15 % — HIGH (ref 10.3–14.5)
RBC # FLD: 15.1 % — HIGH (ref 10.3–14.5)
SODIUM SERPL-SCNC: 144 MMOL/L — SIGNIFICANT CHANGE UP (ref 135–145)
SODIUM SERPL-SCNC: 146 MMOL/L — HIGH (ref 135–145)
WBC # BLD: 13.54 K/UL — HIGH (ref 3.8–10.5)
WBC # BLD: 17.42 K/UL — HIGH (ref 3.8–10.5)
WBC # FLD AUTO: 13.54 K/UL — HIGH (ref 3.8–10.5)
WBC # FLD AUTO: 17.42 K/UL — HIGH (ref 3.8–10.5)

## 2021-03-19 PROCEDURE — 99231 SBSQ HOSP IP/OBS SF/LOW 25: CPT

## 2021-03-19 PROCEDURE — 99291 CRITICAL CARE FIRST HOUR: CPT

## 2021-03-19 PROCEDURE — 45378 DIAGNOSTIC COLONOSCOPY: CPT

## 2021-03-19 RX ORDER — MECLIZINE HCL 12.5 MG
25 TABLET ORAL DAILY
Refills: 0 | Status: DISCONTINUED | OUTPATIENT
Start: 2021-03-19 | End: 2021-03-29

## 2021-03-19 RX ORDER — FENTANYL CITRATE 50 UG/ML
50 INJECTION INTRAVENOUS
Refills: 0 | Status: DISCONTINUED | OUTPATIENT
Start: 2021-03-19 | End: 2021-03-19

## 2021-03-19 RX ORDER — POTASSIUM PHOSPHATE, MONOBASIC POTASSIUM PHOSPHATE, DIBASIC 236; 224 MG/ML; MG/ML
15 INJECTION, SOLUTION INTRAVENOUS ONCE
Refills: 0 | Status: COMPLETED | OUTPATIENT
Start: 2021-03-19 | End: 2021-03-19

## 2021-03-19 RX ORDER — SOD SULF/SODIUM/NAHCO3/KCL/PEG
4000 SOLUTION, RECONSTITUTED, ORAL ORAL ONCE
Refills: 0 | Status: DISCONTINUED | OUTPATIENT
Start: 2021-03-19 | End: 2021-03-19

## 2021-03-19 RX ORDER — SOD SULF/SODIUM/NAHCO3/KCL/PEG
2000 SOLUTION, RECONSTITUTED, ORAL ORAL ONCE
Refills: 0 | Status: COMPLETED | OUTPATIENT
Start: 2021-03-19 | End: 2021-03-19

## 2021-03-19 RX ORDER — ONDANSETRON 8 MG/1
4 TABLET, FILM COATED ORAL ONCE
Refills: 0 | Status: DISCONTINUED | OUTPATIENT
Start: 2021-03-19 | End: 2021-03-19

## 2021-03-19 RX ORDER — PANTOPRAZOLE SODIUM 20 MG/1
40 TABLET, DELAYED RELEASE ORAL EVERY 12 HOURS
Refills: 0 | Status: DISCONTINUED | OUTPATIENT
Start: 2021-03-19 | End: 2021-03-21

## 2021-03-19 RX ORDER — MAGNESIUM SULFATE 500 MG/ML
2 VIAL (ML) INJECTION ONCE
Refills: 0 | Status: COMPLETED | OUTPATIENT
Start: 2021-03-19 | End: 2021-03-19

## 2021-03-19 RX ADMIN — POTASSIUM PHOSPHATE, MONOBASIC POTASSIUM PHOSPHATE, DIBASIC 62.5 MILLIMOLE(S): 236; 224 INJECTION, SOLUTION INTRAVENOUS at 10:18

## 2021-03-19 RX ADMIN — Medication 50 GRAM(S): at 03:40

## 2021-03-19 RX ADMIN — CEFEPIME 100 MILLIGRAM(S): 1 INJECTION, POWDER, FOR SOLUTION INTRAMUSCULAR; INTRAVENOUS at 15:11

## 2021-03-19 RX ADMIN — Medication 2000 MILLILITER(S): at 13:36

## 2021-03-19 RX ADMIN — ROPINIROLE 0.5 MILLIGRAM(S): 8 TABLET, FILM COATED, EXTENDED RELEASE ORAL at 21:17

## 2021-03-19 RX ADMIN — SODIUM CHLORIDE 100 MILLILITER(S): 9 INJECTION, SOLUTION INTRAVENOUS at 10:18

## 2021-03-19 RX ADMIN — OXYCODONE HYDROCHLORIDE 5 MILLIGRAM(S): 5 TABLET ORAL at 09:22

## 2021-03-19 RX ADMIN — PANTOPRAZOLE SODIUM 40 MILLIGRAM(S): 20 TABLET, DELAYED RELEASE ORAL at 09:28

## 2021-03-19 RX ADMIN — CHLORHEXIDINE GLUCONATE 1 APPLICATION(S): 213 SOLUTION TOPICAL at 14:53

## 2021-03-19 RX ADMIN — ROPINIROLE 1 MILLIGRAM(S): 8 TABLET, FILM COATED, EXTENDED RELEASE ORAL at 09:28

## 2021-03-19 RX ADMIN — Medication 25 MILLIGRAM(S): at 10:18

## 2021-03-19 RX ADMIN — OXYCODONE HYDROCHLORIDE 5 MILLIGRAM(S): 5 TABLET ORAL at 22:15

## 2021-03-19 RX ADMIN — PANTOPRAZOLE SODIUM 40 MILLIGRAM(S): 20 TABLET, DELAYED RELEASE ORAL at 21:17

## 2021-03-19 RX ADMIN — CEFEPIME 100 MILLIGRAM(S): 1 INJECTION, POWDER, FOR SOLUTION INTRAMUSCULAR; INTRAVENOUS at 06:02

## 2021-03-19 NOTE — PROGRESS NOTE ADULT - SUBJECTIVE AND OBJECTIVE BOX
Patient is a 85y old  Male who presents with a chief complaint of GI Bleed (18 Mar 2021 21:46)      BRIEF HOSPITAL COURSE: ***    Events last 24 hours: ***    PAST MEDICAL & SURGICAL HISTORY:  Vertigo    Bruise  to bilateral upper extremity    Patent foramen ovale  dilated left artrium severe echo 2021    History of shingles    Mucocele, appendix    Eczema    Anxiety    Seasonal allergies    Renal calculi    Parkinson disease    [place] (hard of hearing)    H/O scarlet fever    AAA (abdominal aortic aneurysm)    H/O candidiasis of mouth    HTN (hypertension)    Atrial fibrillation    H/O right hemicolectomy    History of appendectomy    S/P AAA repair  2021    H/O colonoscopy          Medications:  cefepime   IVPB 2000 milliGRAM(s) IV Intermittent every 12 hours        oxyCODONE    IR 5 milliGRAM(s) Oral every 4 hours PRN  oxyCODONE    IR 10 milliGRAM(s) Oral every 4 hours PRN  rOPINIRole 1 milliGRAM(s) Oral daily  rOPINIRole 0.5 milliGRAM(s) Oral two times a day        pantoprazole Infusion 8 mG/Hr IV Continuous <Continuous>        lactated ringers. 1000 milliLiter(s) IV Continuous <Continuous>      chlorhexidine 4% Liquid 1 Application(s) Topical <User Schedule>            ICU Vital Signs Last 24 Hrs  T(C): 36.9 (18 Mar 2021 20:04), Max: 38.5 (18 Mar 2021 14:50)  T(F): 98.5 (18 Mar 2021 20:04), Max: 101.3 (18 Mar 2021 14:50)  HR: 89 (18 Mar 2021 20:04) (65 - 124)  BP: 89/56 (18 Mar 2021 20:04) (89/56 - 111/59)  BP(mean): 69 (18 Mar 2021 20:04) (62 - 92)  ABP: --  ABP(mean): --  RR: 16 (18 Mar 2021 20:04) (16 - 20)  SpO2: 100% (18 Mar 2021 20:04) (94% - 100%)          I&O's Detail    18 Mar 2021 07:01  -  19 Mar 2021 00:09  --------------------------------------------------------  IN:  Total IN: 0 mL    OUT:    Indwelling Catheter - Urethral (mL): 700 mL  Total OUT: 700 mL    Total NET: -700 mL            LABS:                        7.2    20.33 )-----------( 347      ( 18 Mar 2021 20:34 )             21.4         139  |  106  |  45<H>  ----------------------------<  227<H>  3.7   |  22  |  1.76<H>    Ca    8.6      18 Mar 2021 15:22    TPro  5.4<L>  /  Alb  2.1<L>  /  TBili  0.5  /  DBili  x   /  AST  25  /  ALT  48  /  AlkPhos  77            CAPILLARY BLOOD GLUCOSE        PT/INR - ( 18 Mar 2021 15:22 )   PT: 16.6 sec;   INR: 1.46 ratio         PTT - ( 18 Mar 2021 15:22 )  PTT:29.0 sec  Urinalysis Basic - ( 18 Mar 2021 22:45 )    Color: Yellow / Appearance: Clear / S.010 / pH: x  Gluc: x / Ketone: Negative  / Bili: Negative / Urobili: Negative mg/dL   Blood: x / Protein: 15 mg/dL / Nitrite: Negative   Leuk Esterase: Small / RBC: 0-2 /HPF / WBC 11-25   Sq Epi: x / Non Sq Epi: Occasional / Bacteria: Occasional      CULTURES:  Rapid RVP Result: NotDetec ( @ 15:57)      Physical Examination:    General: No acute distress.      HEENT: Pupils equal, reactive to light.  Symmetric.    PULM: Clear to auscultation bilaterally, no significant sputum production    NECK: Supple, no lymphadenopathy, trachea midline    CVS: Regular rate and rhythm, no murmurs, rubs, or gallops    ABD: Soft, nondistended, nontender, normoactive bowel sounds, no masses    EXT: No edema, nontender    SKIN: Warm and well perfused, no rashes noted.    NEURO: Alert, oriented, interactive, nonfocal    DEVICES:     RADIOLOGY: ***    CRITICAL CARE TIME SPENT: ***   Patient is a 85y old  Male who presents with a chief complaint of GI Bleed (18 Mar 2021 21:46)      BRIEF HOSPITAL COURSE: 85 year old male with PMH of HTN, parkinson's Disease, vertigo, patent foramen ovale, shingles, anxiety, AAA s/p repair, AFib, mucocele appendix s/p right hemicolectomy on 3/8 with Dr. Ochoa d/jennie home on [facility], who presents to ED s/p fall. Patient states he has had melanotic stool for the past 24 hours. Today, he took multiple laxatives and had 5 episodes of black tarry stool. He was en route to his doctor for a follow-up visit and had a sudden urge to have a BM. They stopped at a laundromat, and while on the way in his "feet became tangled" and he tripped and fell. He states he caught himself, denies any sort of trauma, head trauma, or LOC. No preceding dizziness or lightheadedness.     He was brought by EMS to ED, and upon arrival, was found to be hypotensive, tachycardic, and febrile 101.3'F IN. His labs revealed acute blood loss anemia with Hgb 7.3 down from 11.8 on discharge 5 days prior. Also noted to have lactate 8.8 and CAM.    Events last 24 hours: MTP called in ED, received 2U PRBC, last Hg 7.2.  Pt arrived to CCU not on any pressors, mildly tachycardic with SBP in 100s. Lactate improved to 2.4 with volume resucitation. Patient had 2 episodes of melanotic stool since arrival to CCU.  Also noted to have bleeding from midline incision, dressing changed by bedside nurse.      Patient seen and examined in CCU. Pt denies SOB, endorses mild abdominal pain with movement, otherwise no other complaints.    PAST MEDICAL & SURGICAL HISTORY:  Vertigo    Bruise  to bilateral upper extremity    Patent foramen ovale  dilated left artrium severe echo 2021    History of shingles    Mucocele, appendix    Eczema    Anxiety    Seasonal allergies    Renal calculi    Parkinson disease    [place] (hard of hearing)    H/O scarlet fever    AAA (abdominal aortic aneurysm)    H/O candidiasis of mouth    HTN (hypertension)    Atrial fibrillation    H/O right hemicolectomy    History of appendectomy    S/P AAA repair  2021    H/O colonoscopy          Medications:  cefepime   IVPB 2000 milliGRAM(s) IV Intermittent every 12 hours        oxyCODONE    IR 5 milliGRAM(s) Oral every 4 hours PRN  oxyCODONE    IR 10 milliGRAM(s) Oral every 4 hours PRN  rOPINIRole 1 milliGRAM(s) Oral daily  rOPINIRole 0.5 milliGRAM(s) Oral two times a day        pantoprazole Infusion 8 mG/Hr IV Continuous <Continuous>        lactated ringers. 1000 milliLiter(s) IV Continuous <Continuous>      chlorhexidine 4% Liquid 1 Application(s) Topical <User Schedule>            ICU Vital Signs Last 24 Hrs  T(C): 36.9 (18 Mar 2021 20:04), Max: 38.5 (18 Mar 2021 14:50)  T(F): 98.5 (18 Mar 2021 20:04), Max: 101.3 (18 Mar 2021 14:50)  HR: 89 (18 Mar 2021 20:04) (65 - 124)  BP: 89/56 (18 Mar 2021 20:04) (89/56 - 111/59)  BP(mean): 69 (18 Mar 2021 20:04) (62 - 92)  ABP: --  ABP(mean): --  RR: 16 (18 Mar 2021 20:04) (16 - 20)  SpO2: 100% (18 Mar 2021 20:04) (94% - 100%)          I&O's Detail    18 Mar 2021 07:01  -  19 Mar 2021 00:09  --------------------------------------------------------  IN:  Total IN: 0 mL    OUT:    Indwelling Catheter - Urethral (mL): 700 mL  Total OUT: 700 mL    Total NET: -700 mL            LABS:                        7.2    20.33 )-----------( 347      ( 18 Mar 2021 20:34 )             21.4     0318    139  |  106  |  45<H>  ----------------------------<  227<H>  3.7   |  22  |  1.76<H>    Ca    8.6      18 Mar 2021 15:22    TPro  5.4<L>  /  Alb  2.1<L>  /  TBili  0.5  /  DBili  x   /  AST  25  /  ALT  48  /  AlkPhos  77  -          CAPILLARY BLOOD GLUCOSE        PT/INR - ( 18 Mar 2021 15:22 )   PT: 16.6 sec;   INR: 1.46 ratio         PTT - ( 18 Mar 2021 15:22 )  PTT:29.0 sec  Urinalysis Basic - ( 18 Mar 2021 22:45 )    Color: Yellow / Appearance: Clear / S.010 / pH: x  Gluc: x / Ketone: Negative  / Bili: Negative / Urobili: Negative mg/dL   Blood: x / Protein: 15 mg/dL / Nitrite: Negative   Leuk Esterase: Small / RBC: 0-2 /HPF / WBC 11-25   Sq Epi: x / Non Sq Epi: Occasional / Bacteria: Occasional      CULTURES:  Rapid RVP Result: NotDetec ( @ 15:57)      Physical Examination:    General: No acute distress.      HEENT: Pupils equal, reactive to light.  Symmetric.    PULM: non labored breathing pattern, protecting airway    NECK: Supple, no lymphadenopathy, trachea midline    CVS: sinus tach on monitor,     ABD: distended, mild TTP in RLQ,     EXT: No edema, nontender    SKIN: Warm and well perfused, no rashes noted.    NEURO: Alert, oriented, interactive, nonfocal, UE resting tremor     DEVICES: "[employer]

## 2021-03-19 NOTE — BRIEF OPERATIVE NOTE - OPERATION/FINDINGS
Colonoscopy with a very large amount of blood and clot. Water immersion used to reach anastomosis. Large 3 cm ulcer found at anastomosis with ahderent clot. All lavaged revealing clean base. The entire area of surgery was lavaged and cleared. The small bowel was cleared. The rest of the colon could not be cleared with lavage due the amount of old blood and clot.

## 2021-03-19 NOTE — PROGRESS NOTE ADULT - SUBJECTIVE AND OBJECTIVE BOX
Subjective: HD#1    Objective: GIB    Heent: N/C, AT PER no scleral icterus, No JVD  Pul: clear  Cor: IRR  Abdomen: soft, normal BS. Wound -  - serous d/c only  Extremities: without edema, motor/sensory intact    03-19    144  |  115<H>  |  36<H>  ----------------------------<  106<H>  4.0   |  25  |  0.91    Ca    7.4<L>      19 Mar 2021 07:26  Phos  2.0     03-19  Mg     2.3     03-19    TPro  4.2<L>  /  Alb  1.6<L>  /  TBili  0.7  /  DBili  x   /  AST  19  /  ALT  31  /  AlkPhos  51  03-19                            8.2    17.42 )-----------( 327      ( 19 Mar 2021 07:26 )             24.5

## 2021-03-19 NOTE — BRIEF OPERATIVE NOTE - COMMENTS
Bleeding from anastomotic ulcer without high risk vessel needing treatment.   Bleeding has stopped.   Of note, patient filled with blood, will have many more bloody bowel movements. Need to monitor for rebleeding based on h/h, hemodynamics.   If re-bleeds will need a stat CT angio. Think endoscopic evaluation for second time will only waste time for IR or operative intervention.

## 2021-03-19 NOTE — PROGRESS NOTE ADULT - SUBJECTIVE AND OBJECTIVE BOX
Patient is a 85y old  Male who presents with a chief complaint of GI Bleed (19 Mar 2021 13:19). Follow up for ongoing bleeding.     I have been in constant communication with Dr. Ochoa and Dr. Puentes of ICU about this gentelman throughout the day, seen in Am and this afternoon, note being written now.     Patient has had more bleeding requiring more units. Is       MEDICATIONS  (STANDING):  cefepime   IVPB 2000 milliGRAM(s) IV Intermittent every 12 hours  chlorhexidine 4% Liquid 1 Application(s) Topical <User Schedule>  lactated ringers. 1000 milliLiter(s) (70 mL/Hr) IV Continuous <Continuous>  meclizine 25 milliGRAM(s) Oral daily  pantoprazole  Injectable 40 milliGRAM(s) IV Push every 12 hours  rOPINIRole 1 milliGRAM(s) Oral daily  rOPINIRole 0.5 milliGRAM(s) Oral two times a day    MEDICATIONS  (PRN):  oxyCODONE    IR 5 milliGRAM(s) Oral every 4 hours PRN Moderate Pain (4 - 6)  oxyCODONE    IR 10 milliGRAM(s) Oral every 4 hours PRN Severe Pain (7 - 10)      Vital Signs Last 24 Hrs  T(C): 36.2 (19 Mar 2021 14:15), Max: 38.5 (18 Mar 2021 14:50)  T(F): 97.2 (19 Mar 2021 14:15), Max: 101.3 (18 Mar 2021 14:50)  HR: 129 (19 Mar 2021 14:15) (0 - 142)  BP: 105/62 (19 Mar 2021 14:15) (83/44 - 125/70)  BP(mean): 73 (19 Mar 2021 14:15) (51 - 92)  RR: 12 (19 Mar 2021 14:15) (12 - 28)  SpO2: 100% (19 Mar 2021 14:15) (94% - 100%)    PHYSICAL EXAM:    Constitutional: No acute distress, well-developed, non-toxic appearing  HEENT: masked, good phonation, not icteric  Neck: supple, no lymphadenopathy  Respiratory: clear to ascultation bilaterally, no wheezing  Cardiovascular: S1 and S2, regular rate and rhythm, no murmurs rubs or gallops  Gastrointestinal: soft, non-tender, non-distended, +bowel sounds, no rebound or guarding, no surgical scars, no drains  Extremities: No peripheral edema, no cyanosis or clubbing  Vascular: 2+ peripheral pulses, no venous stasis  Neurological: A/O x 3, no focal deficits, no asterixis  Psychiatric: Normal mood, normal affect  Skin: No rashes, not jaundiced    LABS:                        6.5    x     )-----------( x        ( 19 Mar 2021 11:54 )             18.9     03-19    144  |  115<H>  |  36<H>  ----------------------------<  106<H>  4.0   |  25  |  0.91    Ca    7.4<L>      19 Mar 2021 07:26  Phos  2.0     03-19  Mg     2.3     03-19    TPro  4.2<L>  /  Alb  1.6<L>  /  TBili  0.7  /  DBili  x   /  AST  19  /  ALT  31  /  AlkPhos  51  03-19    PT/INR - ( 19 Mar 2021 11:54 )   PT: 15.7 sec;   INR: 1.36 ratio         PTT - ( 19 Mar 2021 07:26 )  PTT:29.6 sec  LIVER FUNCTIONS - ( 19 Mar 2021 07:26 )  Alb: 1.6 g/dL / Pro: 4.2 gm/dL / ALK PHOS: 51 U/L / ALT: 31 U/L / AST: 19 U/L / GGT: x             RADIOLOGY & ADDITIONAL STUDIES: Patient is a 85y old  Male who presents with a chief complaint of GI Bleed (19 Mar 2021 13:19). Follow up for ongoing bleeding.     I have been in constant communication with Dr. Ochoa and Dr. Puentes of ICU about this gentelman throughout the day, seen in Am and this afternoon, note being written now.     Patient has had more bleeding requiring more units. After discussing with Dr. Ceja and Dr. Puentes, will do a rapid bowel purge.       MEDICATIONS  (STANDING):  cefepime   IVPB 2000 milliGRAM(s) IV Intermittent every 12 hours  chlorhexidine 4% Liquid 1 Application(s) Topical <User Schedule>  lactated ringers. 1000 milliLiter(s) (70 mL/Hr) IV Continuous <Continuous>  meclizine 25 milliGRAM(s) Oral daily  pantoprazole  Injectable 40 milliGRAM(s) IV Push every 12 hours  rOPINIRole 1 milliGRAM(s) Oral daily  rOPINIRole 0.5 milliGRAM(s) Oral two times a day    MEDICATIONS  (PRN):  oxyCODONE    IR 5 milliGRAM(s) Oral every 4 hours PRN Moderate Pain (4 - 6)  oxyCODONE    IR 10 milliGRAM(s) Oral every 4 hours PRN Severe Pain (7 - 10)      Vital Signs Last 24 Hrs  T(C): 36.2 (19 Mar 2021 14:15), Max: 38.5 (18 Mar 2021 14:50)  T(F): 97.2 (19 Mar 2021 14:15), Max: 101.3 (18 Mar 2021 14:50)  HR: 129 (19 Mar 2021 14:15) (0 - 142)  BP: 105/62 (19 Mar 2021 14:15) (83/44 - 125/70)  BP(mean): 73 (19 Mar 2021 14:15) (51 - 92)  RR: 12 (19 Mar 2021 14:15) (12 - 28)  SpO2: 100% (19 Mar 2021 14:15) (94% - 100%)    PHYSICAL EXAM:    Constitutional: No acute distress, well-developed, non-toxic appearing  HEENT: un-masked, good phonation, not icteric  Neck: supple, no lymphadenopathy  Respiratory: clear to ascultation bilaterally anteriorly, no wheezing  Cardiovascular: S1 and S2, regular rate and rhythm, no murmurs rubs or gallops  Gastrointestinal: soft, non-tender, non-distended, +bowel sounds, no rebound or guarding, +surgical scars, no drains  Extremities: No peripheral edema, no cyanosis or clubbing  Vascular: 2+ peripheral pulses, no venous stasis  Neurological: A/O x 3, parkinsonian, no asterixis  Psychiatric: Normal mood, normal affect  Skin: No rashes, not jaundiced    LABS:                        6.5    x     )-----------( x        ( 19 Mar 2021 11:54 )             18.9     03-19    144  |  115<H>  |  36<H>  ----------------------------<  106<H>  4.0   |  25  |  0.91    Ca    7.4<L>      19 Mar 2021 07:26  Phos  2.0     03-19  Mg     2.3     03-19    TPro  4.2<L>  /  Alb  1.6<L>  /  TBili  0.7  /  DBili  x   /  AST  19  /  ALT  31  /  AlkPhos  51  03-19    PT/INR - ( 19 Mar 2021 11:54 )   PT: 15.7 sec;   INR: 1.36 ratio         PTT - ( 19 Mar 2021 07:26 )  PTT:29.6 sec  LIVER FUNCTIONS - ( 19 Mar 2021 07:26 )  Alb: 1.6 g/dL / Pro: 4.2 gm/dL / ALK PHOS: 51 U/L / ALT: 31 U/L / AST: 19 U/L / GGT: x             RADIOLOGY & ADDITIONAL STUDIES: CT on admission reviewed

## 2021-03-19 NOTE — PROVIDER CONTACT NOTE (CRITICAL VALUE NOTIFICATION) - ACTION/TREATMENT ORDERED:
Critical Car Richie made aware and orders for transfusion received. Critical Care JERICA Quiroz made aware and orders for transfusion received.

## 2021-03-19 NOTE — PROGRESS NOTE ADULT - SUBJECTIVE AND OBJECTIVE BOX
Colonoscopy showed ulcer but no active bleeding. Manage nonoperatively. ? adv diet in AM. Follow H/H.

## 2021-03-19 NOTE — PROGRESS NOTE ADULT - ASSESSMENT
85 year old man with R hemicolectomy one week ago, admitted with GI bleeding.     We gave patient best shot at conservative management but despite holding A/C, still dropping hgb and bleeding. He rapidly purged (at timing of re-evaluation it was 2 pm) so ok for anesthesia after clears two hours after completion so will plan for 4 pm.   Understanding that if this is in-fact a staple-line bleed, may need operative intervention as typically bleed from both sides but if there is a vessel other than a diffuse staple line we can treat will do and then avoid surgery. Per my discussion with Dr. Ochoa, insufflation is ok at this point but despite this will use water immersion and no air/Co2 or limit as much as possible. Will perform in advanced endoscopy room in Main OR.

## 2021-03-19 NOTE — PROGRESS NOTE ADULT - ASSESSMENT
Neuro: resume home meclinzine  CVS: not on pressors currently, mildly tachycArdic,   GI: Spoke with Surgical PA who spoke with Dr. Lin who has high suspicion for anastomotic bleed, he re requested to D/C protonix gtt, however per nursing has ore melanotic stool,  will switch protonix gtt to protonix BID  Heme: serial CBC, send repeat Coags with fibrinogen  ID: continue cefepime, send MRSA swab, UCx cancelled, will resend    Access: PIV x3 (18gx1, 20g x 2), [place] Impression  Acute Blood Loss Anemia  GIB, possible anastomotic bleeding  Lactic Acidosis, improving  CAM, prerenal  Leukocytosis    Neuro: PD, vertigo - resume home meclizine, continue home ropinirole.  Pt has reported h/o fall however given no head trauma sustained no CTH obtained, will obtain CTH if any change in mental status  CVS: HTN, PFO, AF, rate controlled, currently hemodynamically stable and not on pressors currently, mildly tachycardic to 100s-110s but BP borderline, will hold home metoprolol / amlodipine, AAA s/p repair no abdominal aortic pathology noted on CTAP w/ contrast  GI: mucocele appendix s/p R hemicolectomy, melena / BRBPR - Spoke with Surgical PA who spoke with surgeon Dr. Lin who has high suspicion for anastomotic bleed, he requested to D/C protonix gtt, however per nursing has ore melanotic stool,  will switch protonix gtt to protonix BID, evaluated by GI (Roby), will hold off on prepping for possible endoscopic intervention for now. NPO, sips with meds, serial abdominal exams  : Lactic Acidosis / CAM- place mejia, strict I/O, left renal pelvis renal calculi noted on CTAP w/o hydronephrosis, check BMP now, making adequate urine, lactate improving to 2.4  Heme: bleeding possible provoked by home lovenox, will hold AC/DVT chemoprophylaxis for now, pt did not receive protamine for reversal, likely outside the window for reversal for now, hold home ASA, send serial CBC, Initial INR 1.46, send repeat Coags with fibrinogen, consider repeat CTA if patient develops significant bleeding hemodynamic instability  ID: leukocytosis improving, now afebrile, continue cefepime (h/o PCN allergy), no intraabdominal fluid collection noted on CT.  UA mildly positive. UCx cancelled, will resend. F/U Cx sent in ED, COVID neg.   ENDO: FS goal 140-180, FSq6, insulin PRN    Access: PIV x3 (18gx1, 20g x 2), Mejia, if patient develops hypotension requiring pressors will place central line    Greater than 38 minutes assessing presenting problems of acute illness, which pose high probability of life threatening deterioration or end organ damage/dysfunction, as well as medical decision making including initiating plan of care, reviewing data, reviewing radiologic exams, discussing with multidisciplinary team,  discussing goals of care with patient/family, and writing this note.  Non-inclusive of procedures performed.

## 2021-03-19 NOTE — PROGRESS NOTE ADULT - ASSESSMENT
. Cont CCU care. Plan discussed with CCU staff. Consider scope if continues to bleed. FFP to be given.

## 2021-03-19 NOTE — PROGRESS NOTE ADULT - ASSESSMENT
85 year old male presented with septic shock/hemorrhagic shock with GI Bleed    Plan:  CCU    Pulm: CXR reviewed and No infiltrates    Cardio: Patient was hypotensive from hemorrhagic shock and less likely septic, no AC or ASA at this time    Renal: Cr Stable    GI: NPO, to start bowl prep, CBC q6, Colonoscopy today, transfusing 2 More U PRBC at this time    Venodynes    D/W GI and Surgery 85 year old male presented with septic shock/hemorrhagic shock with GI Bleed    Plan:  CCU    Pulm: CXR reviewed and No infiltrates    Cardio: Patient was hypotensive from hemorrhagic shock and less likely septic, no AC or ASA at this time    Renal: Cr Stable    GI: NPO, to start bowl prep, CBC q6, Colonoscopy today, transfusing 2 More U PRBC at this time    ID: No clear source of infection at this time, continue Cefepime pending cultures    Venodynes    D/W GI and Surgery

## 2021-03-19 NOTE — PROGRESS NOTE ADULT - SUBJECTIVE AND OBJECTIVE BOX
85 year old male with PMH of HTN, parkinson's Disease, vertigo, patent foramen ovale, shingles, anxiety, AAA s/p repair, afib on Lovenox, mucocele appendix with recent right colectomy with Dr. Ochoa a week ago presents to ED s/p syncopal episode and fall. Pt was on his way to outpt follow up appt and he felt a sudden urge to have a BM. He stopped at a laundromat and fell on the way in. He was BIBA and was found to have a GI Bleed in the ED. He states he took aspirin and laxatives today. Pt was hypotensive, tachycardic, with a fever of 101.     3/19: PAtient seen in bed.  He had a bloody BM this am at 7AM.  He is S/P 3U PRBC, and remains anemic to 6.5. S/P 2U FFP as well.  Is will receive 2 2 more U PRBC and 1 Platelets and is to start a bowel prep and have an colonoscopy later today.             PAST MEDICAL & SURGICAL HISTORY:  Vertigo    Bruise  to bilateral upper extremity    Patent foramen ovale  dilated left atrium severe echo 2021    History of shingles    Mucocele, appendix    Eczema    Anxiety    Seasonal allergies    Renal calculi    Parkinson disease    [place] (hard of hearing)    H/O scarlet fever    AAA (abdominal aortic aneurysm)    H/O candidiasis of mouth    HTN (hypertension)    Atrial fibrillation    H/O right hemicolectomy    History of appendectomy    S/P AAA repair  2021    H/O colonoscopy        FAMILY HISTORY:  No pertinent family history in first degree relatives        Social Hx:    Allergies    penicillin (Rash)    Intolerances        Height (cm): 175.3 ( @ 14:24)  Weight (kg): 88.5 ( @ 14:24)  BMI (kg/m2): 28.8 ( @ 14:24)    ICU Vital Signs Last 24 Hrs  T(C): 36.7 (19 Mar 2021 13:00), Max: 38.5 (18 Mar 2021 14:50)  T(F): 98 (19 Mar 2021 13:00), Max: 101.3 (18 Mar 2021 14:50)  HR: 134 (19 Mar 2021 13:00) (0 - 142)  BP: 98/55 (19 Mar 2021 13:00) (83/44 - 125/70)  BP(mean): 63 (19 Mar 2021 13:00) (51 - 92)  ABP: --  ABP(mean): --  RR: 16 (19 Mar 2021 12:13) (15 - 28)  SpO2: 96% (19 Mar 2021 13:00) (94% - 100%)          I&O's Summary    18 Mar 2021 07:01  -  19 Mar 2021 07:00  --------------------------------------------------------  IN: 0 mL / OUT: 1200 mL / NET: -1200 mL    19 Mar 2021 07:01  -  19 Mar 2021 13:19  --------------------------------------------------------  IN: 301 mL / OUT: 0 mL / NET: 301 mL                              6.5    x     )-----------( x        ( 19 Mar 2021 11:54 )             18.9       03-19    144  |  115<H>  |  36<H>  ----------------------------<  106<H>  4.0   |  25  |  0.91    Ca    7.4<L>      19 Mar 2021 07:26  Phos  2.0     -  Mg     2.3     -    TPro  4.2<L>  /  Alb  1.6<L>  /  TBili  0.7  /  DBili  x   /  AST  19  /  ALT  31  /  AlkPhos  51  03-19                Urinalysis Basic - ( 18 Mar 2021 22:45 )    Color: Yellow / Appearance: Clear / S.010 / pH: x  Gluc: x / Ketone: Negative  / Bili: Negative / Urobili: Negative mg/dL   Blood: x / Protein: 15 mg/dL / Nitrite: Negative   Leuk Esterase: Small / RBC: 0-2 /HPF / WBC 11-25   Sq Epi: x / Non Sq Epi: Occasional / Bacteria: Occasional        MEDICATIONS  (STANDING):  cefepime   IVPB 2000 milliGRAM(s) IV Intermittent every 12 hours  chlorhexidine 4% Liquid 1 Application(s) Topical <User Schedule>  lactated ringers. 1000 milliLiter(s) (70 mL/Hr) IV Continuous <Continuous>  meclizine 25 milliGRAM(s) Oral daily  pantoprazole  Injectable 40 milliGRAM(s) IV Push every 12 hours  polyethylene glycol/electrolyte Solution 2000 milliLiter(s) Oral once  rOPINIRole 1 milliGRAM(s) Oral daily  rOPINIRole 0.5 milliGRAM(s) Oral two times a day    MEDICATIONS  (PRN):  oxyCODONE    IR 5 milliGRAM(s) Oral every 4 hours PRN Moderate Pain (4 - 6)  oxyCODONE    IR 10 milliGRAM(s) Oral every 4 hours PRN Severe Pain (7 - 10)      DVT Prophylaxis:    Advanced Directives:  Discussed with:    Visit Information: 3o min    ** Time is exclusive of billed procedures and/or teaching and/or routine family updates.

## 2021-03-20 LAB
ANION GAP SERPL CALC-SCNC: 3 MMOL/L — LOW (ref 5–17)
BUN SERPL-MCNC: 22 MG/DL — SIGNIFICANT CHANGE UP (ref 7–23)
CALCIUM SERPL-MCNC: 6.8 MG/DL — LOW (ref 8.5–10.1)
CHLORIDE SERPL-SCNC: 117 MMOL/L — HIGH (ref 96–108)
CO2 SERPL-SCNC: 27 MMOL/L — SIGNIFICANT CHANGE UP (ref 22–31)
CREAT SERPL-MCNC: 0.8 MG/DL — SIGNIFICANT CHANGE UP (ref 0.5–1.3)
CULTURE RESULTS: NO GROWTH — SIGNIFICANT CHANGE UP
GLUCOSE SERPL-MCNC: 111 MG/DL — HIGH (ref 70–99)
HCT VFR BLD CALC: 23.1 % — LOW (ref 39–50)
HCT VFR BLD CALC: 23.7 % — LOW (ref 39–50)
HCT VFR BLD CALC: 23.7 % — LOW (ref 39–50)
HCT VFR BLD CALC: 23.9 % — LOW (ref 39–50)
HGB BLD-MCNC: 7.7 G/DL — LOW (ref 13–17)
HGB BLD-MCNC: 7.8 G/DL — LOW (ref 13–17)
HGB BLD-MCNC: 8 G/DL — LOW (ref 13–17)
HGB BLD-MCNC: 8 G/DL — LOW (ref 13–17)
LACTATE SERPL-SCNC: 1.7 MMOL/L — SIGNIFICANT CHANGE UP (ref 0.7–2)
MAGNESIUM SERPL-MCNC: 2.1 MG/DL — SIGNIFICANT CHANGE UP (ref 1.6–2.6)
MCHC RBC-ENTMCNC: 29.7 PG — SIGNIFICANT CHANGE UP (ref 27–34)
MCHC RBC-ENTMCNC: 29.8 PG — SIGNIFICANT CHANGE UP (ref 27–34)
MCHC RBC-ENTMCNC: 30.5 PG — SIGNIFICANT CHANGE UP (ref 27–34)
MCHC RBC-ENTMCNC: 32.6 GM/DL — SIGNIFICANT CHANGE UP (ref 32–36)
MCHC RBC-ENTMCNC: 33.3 GM/DL — SIGNIFICANT CHANGE UP (ref 32–36)
MCHC RBC-ENTMCNC: 33.8 GM/DL — SIGNIFICANT CHANGE UP (ref 32–36)
MCV RBC AUTO: 89.5 FL — SIGNIFICANT CHANGE UP (ref 80–100)
MCV RBC AUTO: 90.5 FL — SIGNIFICANT CHANGE UP (ref 80–100)
MCV RBC AUTO: 90.9 FL — SIGNIFICANT CHANGE UP (ref 80–100)
PHOSPHATE SERPL-MCNC: 2.3 MG/DL — LOW (ref 2.5–4.5)
PLATELET # BLD AUTO: 263 K/UL — SIGNIFICANT CHANGE UP (ref 150–400)
PLATELET # BLD AUTO: 265 K/UL — SIGNIFICANT CHANGE UP (ref 150–400)
PLATELET # BLD AUTO: 275 K/UL — SIGNIFICANT CHANGE UP (ref 150–400)
POTASSIUM SERPL-MCNC: 4.1 MMOL/L — SIGNIFICANT CHANGE UP (ref 3.5–5.3)
POTASSIUM SERPL-SCNC: 4.1 MMOL/L — SIGNIFICANT CHANGE UP (ref 3.5–5.3)
RBC # BLD: 2.58 M/UL — LOW (ref 4.2–5.8)
RBC # BLD: 2.62 M/UL — LOW (ref 4.2–5.8)
RBC # BLD: 2.63 M/UL — LOW (ref 4.2–5.8)
RBC # FLD: 14.5 % — SIGNIFICANT CHANGE UP (ref 10.3–14.5)
RBC # FLD: 15.2 % — HIGH (ref 10.3–14.5)
RBC # FLD: 15.3 % — HIGH (ref 10.3–14.5)
SODIUM SERPL-SCNC: 147 MMOL/L — HIGH (ref 135–145)
SPECIMEN SOURCE: SIGNIFICANT CHANGE UP
WBC # BLD: 12.97 K/UL — HIGH (ref 3.8–10.5)
WBC # BLD: 13.35 K/UL — HIGH (ref 3.8–10.5)
WBC # BLD: 13.78 K/UL — HIGH (ref 3.8–10.5)
WBC # FLD AUTO: 12.97 K/UL — HIGH (ref 3.8–10.5)
WBC # FLD AUTO: 13.35 K/UL — HIGH (ref 3.8–10.5)
WBC # FLD AUTO: 13.78 K/UL — HIGH (ref 3.8–10.5)

## 2021-03-20 PROCEDURE — 99291 CRITICAL CARE FIRST HOUR: CPT

## 2021-03-20 PROCEDURE — 99231 SBSQ HOSP IP/OBS SF/LOW 25: CPT

## 2021-03-20 RX ORDER — CALCIUM GLUCONATE 100 MG/ML
2 VIAL (ML) INTRAVENOUS ONCE
Refills: 0 | Status: COMPLETED | OUTPATIENT
Start: 2021-03-20 | End: 2021-03-20

## 2021-03-20 RX ORDER — LIDOCAINE 4 G/100G
1 CREAM TOPICAL EVERY 24 HOURS
Refills: 0 | Status: DISCONTINUED | OUTPATIENT
Start: 2021-03-20 | End: 2021-03-29

## 2021-03-20 RX ADMIN — CHLORHEXIDINE GLUCONATE 1 APPLICATION(S): 213 SOLUTION TOPICAL at 11:41

## 2021-03-20 RX ADMIN — SODIUM CHLORIDE 70 MILLILITER(S): 9 INJECTION, SOLUTION INTRAVENOUS at 05:05

## 2021-03-20 RX ADMIN — OXYCODONE HYDROCHLORIDE 5 MILLIGRAM(S): 5 TABLET ORAL at 18:50

## 2021-03-20 RX ADMIN — OXYCODONE HYDROCHLORIDE 10 MILLIGRAM(S): 5 TABLET ORAL at 22:00

## 2021-03-20 RX ADMIN — ROPINIROLE 0.5 MILLIGRAM(S): 8 TABLET, FILM COATED, EXTENDED RELEASE ORAL at 15:26

## 2021-03-20 RX ADMIN — SODIUM CHLORIDE 70 MILLILITER(S): 9 INJECTION, SOLUTION INTRAVENOUS at 17:59

## 2021-03-20 RX ADMIN — OXYCODONE HYDROCHLORIDE 10 MILLIGRAM(S): 5 TABLET ORAL at 21:11

## 2021-03-20 RX ADMIN — CEFEPIME 100 MILLIGRAM(S): 1 INJECTION, POWDER, FOR SOLUTION INTRAMUSCULAR; INTRAVENOUS at 05:05

## 2021-03-20 RX ADMIN — OXYCODONE HYDROCHLORIDE 5 MILLIGRAM(S): 5 TABLET ORAL at 17:52

## 2021-03-20 RX ADMIN — PANTOPRAZOLE SODIUM 40 MILLIGRAM(S): 20 TABLET, DELAYED RELEASE ORAL at 11:40

## 2021-03-20 RX ADMIN — Medication 200 GRAM(S): at 11:41

## 2021-03-20 RX ADMIN — Medication 25 MILLIGRAM(S): at 11:41

## 2021-03-20 RX ADMIN — PANTOPRAZOLE SODIUM 40 MILLIGRAM(S): 20 TABLET, DELAYED RELEASE ORAL at 21:05

## 2021-03-20 RX ADMIN — ROPINIROLE 0.5 MILLIGRAM(S): 8 TABLET, FILM COATED, EXTENDED RELEASE ORAL at 21:05

## 2021-03-20 RX ADMIN — ROPINIROLE 1 MILLIGRAM(S): 8 TABLET, FILM COATED, EXTENDED RELEASE ORAL at 11:41

## 2021-03-20 RX ADMIN — LIDOCAINE 1 PATCH: 4 CREAM TOPICAL at 21:04

## 2021-03-20 NOTE — PROGRESS NOTE ADULT - SUBJECTIVE AND OBJECTIVE BOX
85 year old male with PMH of HTN, parkinson's Disease, vertigo, patent foramen ovale, shingles, anxiety, AAA s/p repair, afib on Lovenox, mucocele appendix with recent right colectomy with Dr. Ochoa a week ago presents to ED s/p syncopal episode and fall. Pt was on his way to outpt follow up appt and he felt a sudden urge to have a BM. He stopped at a laundromat and fell on the way in. He was BIBA and was found to have a GI Bleed in the ED. He states he took aspirin and laxatives today. Pt was hypotensive, tachycardic, with a fever of 101.     3/19: Patient seen in bed.  He had a bloody BM this am at 7AM.  He is S/P 3U PRBC, and remains anemic to 6.5. S/P 2U FFP as well.  Is will receive 2 2 more U PRBC and 1 Platelets and is to start a bowel prep and have an colonoscopy later today.       3/20: Patient required 2 more U PRBC over night.  He is now up to 8U PRBC, 2 FFP, and 1U Platelets.  Patient had one maroon BM this AM about 100cc        PAST MEDICAL & SURGICAL HISTORY:  Vertigo    Bruise  to bilateral upper extremity    Patent foramen ovale  dilated left artrium severe echo 2021    History of shingles    Mucocele, appendix    Eczema    Anxiety    Seasonal allergies    Renal calculi    Parkinson disease    [place] (hard of hearing)    H/O scarlet fever    AAA (abdominal aortic aneurysm)    H/O candidiasis of mouth    HTN (hypertension)    Atrial fibrillation    H/O right hemicolectomy    History of appendectomy    S/P AAA repair  2021    H/O colonoscopy        FAMILY HISTORY:  No pertinent family history in first degree relatives        Social Hx:    Allergies    penicillin (Rash)    Intolerances            ICU Vital Signs Last 24 Hrs  T(C): 36.3 (20 Mar 2021 06:36), Max: 36.9 (19 Mar 2021 18:00)  T(F): 97.3 (20 Mar 2021 06:36), Max: 98.4 (19 Mar 2021 18:00)  HR: 93 (20 Mar 2021 05:30) (93 - 142)  BP: 94/56 (20 Mar 2021 05:30) (88/57 - 121/44)  BP(mean): 65 (20 Mar 2021 05:30) (54 - 105)  ABP: --  ABP(mean): --  RR: 21 (20 Mar 2021 05:30) (12 - 26)  SpO2: 100% (20 Mar 2021 05:30) (95% - 100%)          I&O's Summary    19 Mar 2021 07:01  -  20 Mar 2021 07:00  --------------------------------------------------------  IN: 3335 mL / OUT: 1725 mL / NET: 1610 mL                              8.0    x     )-----------( x        ( 20 Mar 2021 07:35 )             23.7       03-20    147<H>  |  117<H>  |  22  ----------------------------<  111<H>  4.1   |  27  |  0.80    Ca    6.8<L>      20 Mar 2021 03:42  Phos  2.3     03-20  Mg     2.1     03-20    TPro  4.2<L>  /  Alb  1.6<L>  /  TBili  0.7  /  DBili  x   /  AST  19  /  ALT  31  /  AlkPhos  51  03-19                Urinalysis Basic - ( 18 Mar 2021 22:45 )    Color: Yellow / Appearance: Clear / S.010 / pH: x  Gluc: x / Ketone: Negative  / Bili: Negative / Urobili: Negative mg/dL   Blood: x / Protein: 15 mg/dL / Nitrite: Negative   Leuk Esterase: Small / RBC: 0-2 /HPF / WBC 11-25   Sq Epi: x / Non Sq Epi: Occasional / Bacteria: Occasional        MEDICATIONS  (STANDING):  calcium gluconate IVPB 2 Gram(s) IV Intermittent once  cefepime   IVPB 2000 milliGRAM(s) IV Intermittent every 12 hours  chlorhexidine 4% Liquid 1 Application(s) Topical <User Schedule>  lactated ringers. 1000 milliLiter(s) (70 mL/Hr) IV Continuous <Continuous>  meclizine 25 milliGRAM(s) Oral daily  pantoprazole  Injectable 40 milliGRAM(s) IV Push every 12 hours  rOPINIRole 1 milliGRAM(s) Oral daily  rOPINIRole 0.5 milliGRAM(s) Oral two times a day    MEDICATIONS  (PRN):  oxyCODONE    IR 5 milliGRAM(s) Oral every 4 hours PRN Moderate Pain (4 - 6)  oxyCODONE    IR 10 milliGRAM(s) Oral every 4 hours PRN Severe Pain (7 - 10)      DVT Prophylaxis:    Advanced Directives:  Discussed with:    Visit Information: 30     ** Time is exclusive of billed procedures and/or teaching and/or routine family updates.

## 2021-03-20 NOTE — PROGRESS NOTE ADULT - ASSESSMENT
85 yo male with history of right hemicolectomy, and bleeding at anastamosis.  Stable overnight. To advance diet as per surgery. This is not amenable to further endoscopic intervention if bleeding recurs.

## 2021-03-20 NOTE — PROGRESS NOTE ADULT - SUBJECTIVE AND OBJECTIVE BOX
Subjective: HD#2    Objective: GIB    Heent: N/C, AT PER no scleral icterus, No JVD  Pul: clear  Cor: IRR  Abdomen: soft, normal BS. Wound - serous d/c  Extremities: without edema, motor/sensory intact    03-20    147<H>  |  117<H>  |  22  ----------------------------<  111<H>  4.1   |  27  |  0.80    Ca    6.8<L>      20 Mar 2021 03:42  Phos  2.3     03-20  Mg     2.1     03-20    TPro  4.2<L>  /  Alb  1.6<L>  /  TBili  0.7  /  DBili  x   /  AST  19  /  ALT  31  /  AlkPhos  51  03-19                            8.0    x     )-----------( x        ( 20 Mar 2021 07:35 )             23.7

## 2021-03-20 NOTE — PROGRESS NOTE ADULT - SUBJECTIVE AND OBJECTIVE BOX
Patient is a 86y old  Male who presents with a chief complaint of GI Bleed (19 Mar 2021 18:48)      BRIEF HOSPITAL COURSE: 85 year old male with PMH of HTN, parkinson's Disease, vertigo, patent foramen ovale, shingles, anxiety, AAA s/p repair, AFib, mucocele appendix s/p right hemicolectomy on 3/8 with Dr. Ochoa d/c home on Lovenox ppx who presents HH with LGIB, acute blood loss anemia, failed conservative management now s/p 6U PRBC, 2U FFP, 1U PLT since admission, now s/p rapid prep and colonoscopy showing large 3cm anastomotic ulcer.  Pt has remained borderline hypotensive and in AFib 100s-120s.  CAM on admission has improved.  Events last 24 hours: Pt received 2u PRBC earlier without response overnight 3/19.  Additional 2U PRBC ordered in conjunction with surgical service. Pt has had multiple bloody BMs since colonoscopy.    Pt complains of some mild R sided abdominal pain.    PAST MEDICAL & SURGICAL HISTORY:  Vertigo    Bruise  to bilateral upper extremity    Patent foramen ovale  dilated left artrium severe echo 2021    History of shingles    Mucocele, appendix    Eczema    Anxiety    Seasonal allergies    Renal calculi    Parkinson disease    [place] (hard of hearing)    H/O scarlet fever    AAA (abdominal aortic aneurysm)    H/O candidiasis of mouth    HTN (hypertension)    Atrial fibrillation    H/O right hemicolectomy    History of appendectomy    S/P AAA repair  2021    H/O colonoscopy          Medications:  cefepime   IVPB 2000 milliGRAM(s) IV Intermittent every 12 hours        meclizine 25 milliGRAM(s) Oral daily  oxyCODONE    IR 5 milliGRAM(s) Oral every 4 hours PRN  oxyCODONE    IR 10 milliGRAM(s) Oral every 4 hours PRN  rOPINIRole 1 milliGRAM(s) Oral daily  rOPINIRole 0.5 milliGRAM(s) Oral two times a day        pantoprazole  Injectable 40 milliGRAM(s) IV Push every 12 hours        lactated ringers. 1000 milliLiter(s) IV Continuous <Continuous>      chlorhexidine 4% Liquid 1 Application(s) Topical <User Schedule>            ICU Vital Signs Last 24 Hrs  T(C): 36.2 (19 Mar 2021 23:34), Max: 37.1 (19 Mar 2021 04:59)  T(F): 97.1 (19 Mar 2021 23:34), Max: 98.7 (19 Mar 2021 04:59)  HR: 98 (20 Mar 2021 00:19) (0 - 142)  BP: 91/51 (20 Mar 2021 00:19) (83/44 - 125/70)  BP(mean): 57 (20 Mar 2021 00:19) (51 - 105)  ABP: --  ABP(mean): --  RR: 18 (20 Mar 2021 00:19) (12 - 28)  SpO2: 100% (20 Mar 2021 00:19) (95% - 100%)          I&O's Detail    18 Mar 2021 07:01  -  19 Mar 2021 07:00  --------------------------------------------------------  IN:  Total IN: 0 mL    OUT:    Indwelling Catheter - Urethral (mL): 1200 mL  Total OUT: 1200 mL    Total NET: -1200 mL      19 Mar 2021 07:01  -  20 Mar 2021 00:24  --------------------------------------------------------  IN:    IV PiggyBack: 50 mL    Lactated Ringers: 700 mL    Other (mL): 500 mL    Plasma: 593 mL    Platelets - Single Donor: 215 mL    PRBCs (Packed Red Blood Cells): 624 mL  Total IN: 2682 mL    OUT:    Indwelling Catheter - Urethral (mL): 275 mL  Total OUT: 275 mL    Total NET: 2407 mL            LABS:                        6.6    13.54 )-----------( 305      ( 19 Mar 2021 21:50 )             19.7     03-    144  |  115<H>  |  36<H>  ----------------------------<  106<H>  4.0   |  25  |  0.91    Ca    7.4<L>      19 Mar 2021 07:26  Phos  2.0     -  Mg     2.3     -    TPro  4.2<L>  /  Alb  1.6<L>  /  TBili  0.7  /  DBili  x   /  AST  19  /  ALT  31  /  AlkPhos  51            CAPILLARY BLOOD GLUCOSE      POCT Blood Glucose.: 139 mg/dL (19 Mar 2021 15:22)    PT/INR - ( 19 Mar 2021 11:54 )   PT: 15.7 sec;   INR: 1.36 ratio         PTT - ( 19 Mar 2021 07:26 )  PTT:29.6 sec  Urinalysis Basic - ( 18 Mar 2021 22:45 )    Color: Yellow / Appearance: Clear / S.010 / pH: x  Gluc: x / Ketone: Negative  / Bili: Negative / Urobili: Negative mg/dL   Blood: x / Protein: 15 mg/dL / Nitrite: Negative   Leuk Esterase: Small / RBC: 0-2 /HPF / WBC 11-25   Sq Epi: x / Non Sq Epi: Occasional / Bacteria: Occasional      CULTURES:  Rapid RVP Result: NotDetec ( @ 15:57)  Culture Results:   No growth to date. ( @ 15:37)  Culture Results:   No growth to date. ( @ 15:37)      Physical Examination:    General: No acute distress.      HEENT: Pupils equal, reactive to light.  Symmetric.    PULM: non labored breathing pattern, protecting airway, lungs clear    NECK: Supple, no lymphadenopathy, trachea midline    CVS: AF in monitor    ABD: distended, mild TTP in RLQ / RUQ, surgical dressing in place C/D/I     EXT: No edema, nontender    SKIN: Warm and well perfused, no rashes noted.    NEURO: Alert, oriented, interactive, nonfocal, UE resting tremor     DEVICES: [place]  RADIOLOGY: ***    CRITICAL CARE TIME SPENT: ***

## 2021-03-20 NOTE — PROGRESS NOTE ADULT - ASSESSMENT
85 year old male presented with septic shock/hemorrhagic shock with GI Bleed    Plan:  CCU    Pulm: CXR reviewed and No infiltrates    Cardio: Patient was hypotensive from hemorrhagic shock and less likely septic, no AC or ASA at this time    Renal: Cr Stable    GI: NPO, next H & H noon, if H & H stable will give clears, a bleeding ulcer was near the anastomotic site    ID: No clear source of infection at this time, will D/C Cefepime    Venodynes    D/W Surgery  D/W GI and Surgery

## 2021-03-20 NOTE — PROGRESS NOTE ADULT - SUBJECTIVE AND OBJECTIVE BOX
Patient is a 86y old  Male who presents with a chief complaint of GI Bleed (20 Mar 2021 08:33)      HPI:  85 year old male with PMH of HTN, parkinson's Disease, vertigo, patent foramen ovale, shingles, anxiety, AAA s/p repair, afib on Lovenox, mucocele appendix with recent right colectomy with Dr. Ochoa a week ago presents to ED s/p syncopal episode and fall. Pt was on his way to outpt follow up appt and he felt a sudden urge to have a BM. He stopped at a laundromat and fell on the way in. He was BIBA and was found to have a GI Bleed in the ED. He states he took aspirin and laxatives today. Pt was hypotensive, tachycardic, with a fever of 101. He received 2 units PRBC and 2 fluid boluses in ED. Pt is now stable. He admits to chills but no N/V, fevers, CP, SOB, Abdominal Pain, Diarrhea or dark stools at home. (18 Mar 2021 17:24)    Patient s/p colonoscopy yesterday with anastamotic ulcer. No bleeding overnight. Hct stable.       PAST MEDICAL & SURGICAL HISTORY:  Vertigo    Bruise  to bilateral upper extremity    Patent foramen ovale  dilated left artrium severe echo 1/2021    History of shingles    Mucocele, appendix    Eczema    Anxiety    Seasonal allergies    Renal calculi    Parkinson disease    [place] (hard of hearing)    H/O scarlet fever    AAA (abdominal aortic aneurysm)    H/O candidiasis of mouth    HTN (hypertension)    Atrial fibrillation    H/O right hemicolectomy    History of appendectomy    S/P AAA repair  1/2021    H/O colonoscopy        MEDICATIONS  (STANDING):  calcium gluconate IVPB 2 Gram(s) IV Intermittent once  cefepime   IVPB 2000 milliGRAM(s) IV Intermittent every 12 hours  chlorhexidine 4% Liquid 1 Application(s) Topical <User Schedule>  lactated ringers. 1000 milliLiter(s) (70 mL/Hr) IV Continuous <Continuous>  meclizine 25 milliGRAM(s) Oral daily  pantoprazole  Injectable 40 milliGRAM(s) IV Push every 12 hours  rOPINIRole 1 milliGRAM(s) Oral daily  rOPINIRole 0.5 milliGRAM(s) Oral two times a day    MEDICATIONS  (PRN):  oxyCODONE    IR 5 milliGRAM(s) Oral every 4 hours PRN Moderate Pain (4 - 6)  oxyCODONE    IR 10 milliGRAM(s) Oral every 4 hours PRN Severe Pain (7 - 10)      Allergies    penicillin (Rash)    Intolerances          Vital Signs Last 24 Hrs  T(C): 36.3 (20 Mar 2021 06:36), Max: 36.9 (19 Mar 2021 18:00)  T(F): 97.3 (20 Mar 2021 06:36), Max: 98.4 (19 Mar 2021 18:00)  HR: 93 (20 Mar 2021 05:30) (93 - 142)  BP: 94/56 (20 Mar 2021 05:30) (88/57 - 121/44)  BP(mean): 65 (20 Mar 2021 05:30) (54 - 105)  RR: 21 (20 Mar 2021 05:30) (12 - 26)  SpO2: 100% (20 Mar 2021 05:30) (95% - 100%)    PHYSICAL EXAM:      Respiratory: CTAB  Cardiovascular: S1 and S2, RRR, no M/G/R  Gastrointestinal: BS+, soft, NT/ND  LABS:                        8.0    x     )-----------( x        ( 20 Mar 2021 07:35 )             23.7     03-20    147<H>  |  117<H>  |  22  ----------------------------<  111<H>  4.1   |  27  |  0.80    Ca    6.8<L>      20 Mar 2021 03:42  Phos  2.3     03-20  Mg     2.1     03-20    TPro  4.2<L>  /  Alb  1.6<L>  /  TBili  0.7  /  DBili  x   /  AST  19  /  ALT  31  /  AlkPhos  51  03-19    PT/INR - ( 19 Mar 2021 11:54 )   PT: 15.7 sec;   INR: 1.36 ratio         PTT - ( 19 Mar 2021 07:26 )  PTT:29.6 sec  LIVER FUNCTIONS - ( 19 Mar 2021 07:26 )  Alb: 1.6 g/dL / Pro: 4.2 gm/dL / ALK PHOS: 51 U/L / ALT: 31 U/L / AST: 19 U/L / GGT: x             RADIOLOGY & ADDITIONAL STUDIES:

## 2021-03-20 NOTE — PROGRESS NOTE ADULT - ASSESSMENT
Impression  Acute Blood Loss Anemia  GIB, 2/2 large 3cm anastamotic ulcer  CAM, prerenal, improved  Leukocytosis, improving  AFib    Neuro: PD, vertigo - resume home meclizine, continue home ropinirole.  Pt has reported h/o fall however given no head trauma sustained no CTH obtained, will obtain CTH if any change in mental status  CVS: HTN, PFO, AF, -120's  BP borderline hypotensive, will hold home metoprolol / amlodipine, AAA s/p repair no abdominal aortic pathology noted on CTAP w/ contrast. Plan to amio bolus followed by amio infusion if patient goes into RVR  GI: mucocele appendix s/p R hemicolectomy, c/b acute blood loss anemia 2/2 large 3cm anastomotic ulcer, now s/p colonoscopy 3/19,  CLD cleared by surgical service, serial abdominal exams. Surgical service aware of ongoing transfusion requirement.  : Lactic Acidosis / CAM both improved, place mejia, strict I/O, left renal pelvis renal calculi noted on CTAP w/o hydronephrosis, check BMP now, making adequate urine, lactate improved to 2.4, will recheck lactate given ongoing borderline hypotension and hypovolemia from blood loss.   Heme: acute blood loss anemia with ongoing lower GIB, received total of 8U PRBC, 2U FFP, 1 PLT, now getting an additional 2U PRBC overnight.  Will check CBC post transfusion, will hold AC/DVT chemoprophylaxis for now, hold home ASA, send serial CBC, INR 1.36, Fibr 402, send repeat Coags in AM, consider repeat CTA if patient develops significant bleeding hemodynamic instability, if additional PRBC transfusion requirement overnight will transfuse 1U FFP to attempt to maintain balanced ration of blood products (6/2/1)  ID: leukocytosis improving, afebrile, continue cefepime (h/o PCN allergy), no intraabdominal fluid collection noted on CT. COVID neg.   ENDO: FS goal 140-180, FSq6, insulin PRN    Access: PIV x3 (18gx1, 20g x 2), Mejia, if patient develops hypotension requiring pressors will place central line    Greater than 38 minutes assessing presenting problems of acute illness, which pose high probability of life threatening deterioration or end organ damage/dysfunction, as well as medical decision making including initiating plan of care, reviewing data, reviewing radiologic exams, discussing with multidisciplinary team,  discussing goals of care with patient/family, and writing this note.  Non-inclusive of procedures performed. Impression  Acute Blood Loss Anemia  GIB, 2/2 large 3cm anastamotic ulcer  CAM, prerenal, improved  Leukocytosis, improving  AFib    Neuro: PD, vertigo - resume home meclizine, continue home ropinirole.  Pt has reported h/o fall however given no head trauma sustained no CTH obtained, will obtain CTH if any change in mental status  CVS: HTN, PFO, AF, -120's  BP borderline hypotensive, will hold home metoprolol / amlodipine, AAA s/p repair no abdominal aortic pathology noted on CTAP w/ contrast. Plan to amio bolus followed by amio infusion if patient goes into RVR  GI: mucocele appendix s/p R hemicolectomy, c/b acute blood loss anemia 2/2 large 3cm anastomotic ulcer, now s/p colonoscopy 3/19,  CLD cleared by surgical service, serial abdominal exams. Surgical service aware of ongoing transfusion requirement.  : Lactic Acidosis / CAM both improved, place mejia, strict I/O, left renal pelvis renal calculi noted on CTAP w/o hydronephrosis, check BMP now, making adequate urine, lactate improved to 2.4, will recheck lactate given ongoing borderline hypotension and hypovolemia from blood loss.   Heme: acute blood loss anemia with ongoing lower GIB, received total of 8U PRBC, 2U FFP, 1 PLT, now getting an additional 2U PRBC overnight.  Will check CBC post transfusion, will hold AC/DVT chemoprophylaxis for now, hold home ASA, send serial CBC, INR 1.36, Fibr 402, send repeat Coags in AM, consider repeat CTA if patient develops significant bleeding hemodynamic instability, if additional PRBC transfusion requirement overnight will transfuse 1U FFP to attempt to maintain balanced ration of blood products (6/2/1), maintain active T&S  ID: leukocytosis improving, afebrile, continue cefepime (h/o PCN allergy), no intraabdominal fluid collection noted on CT. COVID neg.   ENDO: FS goal 140-180, FSq6, insulin PRN    Access: PIV x3 (18gx1, 20g x 2), Mejia, if patient develops hypotension requiring pressors will place central line    Greater than 38 minutes assessing presenting problems of acute illness, which pose high probability of life threatening deterioration or end organ damage/dysfunction, as well as medical decision making including initiating plan of care, reviewing data, reviewing radiologic exams, discussing with multidisciplinary team,  discussing goals of care with patient/family, and writing this note.  Non-inclusive of procedures performed.    UPDATE: Repeat Hg sent around 3AM, 7.7  from 6.6 after 2u PRBC, will recheck with AM labs. Pt blood presssure remains borderline, -120s. Will continue to monitor

## 2021-03-21 LAB
ANION GAP SERPL CALC-SCNC: 3 MMOL/L — LOW (ref 5–17)
BUN SERPL-MCNC: 14 MG/DL — SIGNIFICANT CHANGE UP (ref 7–23)
CALCIUM SERPL-MCNC: 7.4 MG/DL — LOW (ref 8.5–10.1)
CHLORIDE SERPL-SCNC: 112 MMOL/L — HIGH (ref 96–108)
CO2 SERPL-SCNC: 27 MMOL/L — SIGNIFICANT CHANGE UP (ref 22–31)
CREAT SERPL-MCNC: 0.92 MG/DL — SIGNIFICANT CHANGE UP (ref 0.5–1.3)
GLUCOSE SERPL-MCNC: 93 MG/DL — SIGNIFICANT CHANGE UP (ref 70–99)
HCT VFR BLD CALC: 23.3 % — LOW (ref 39–50)
HCT VFR BLD CALC: 23.9 % — LOW (ref 39–50)
HGB BLD-MCNC: 7.6 G/DL — LOW (ref 13–17)
HGB BLD-MCNC: 7.9 G/DL — LOW (ref 13–17)
MAGNESIUM SERPL-MCNC: 1.9 MG/DL — SIGNIFICANT CHANGE UP (ref 1.6–2.6)
MCHC RBC-ENTMCNC: 29.9 PG — SIGNIFICANT CHANGE UP (ref 27–34)
MCHC RBC-ENTMCNC: 31 PG — SIGNIFICANT CHANGE UP (ref 27–34)
MCHC RBC-ENTMCNC: 32.6 GM/DL — SIGNIFICANT CHANGE UP (ref 32–36)
MCHC RBC-ENTMCNC: 33.1 GM/DL — SIGNIFICANT CHANGE UP (ref 32–36)
MCV RBC AUTO: 91.7 FL — SIGNIFICANT CHANGE UP (ref 80–100)
MCV RBC AUTO: 93.7 FL — SIGNIFICANT CHANGE UP (ref 80–100)
NRBC # BLD: 1 /100 WBCS — HIGH (ref 0–0)
PHOSPHATE SERPL-MCNC: 2.7 MG/DL — SIGNIFICANT CHANGE UP (ref 2.5–4.5)
PLATELET # BLD AUTO: 322 K/UL — SIGNIFICANT CHANGE UP (ref 150–400)
PLATELET # BLD AUTO: 364 K/UL — SIGNIFICANT CHANGE UP (ref 150–400)
POTASSIUM SERPL-MCNC: 3.8 MMOL/L — SIGNIFICANT CHANGE UP (ref 3.5–5.3)
POTASSIUM SERPL-SCNC: 3.8 MMOL/L — SIGNIFICANT CHANGE UP (ref 3.5–5.3)
RBC # BLD: 2.54 M/UL — LOW (ref 4.2–5.8)
RBC # BLD: 2.55 M/UL — LOW (ref 4.2–5.8)
RBC # FLD: 15.3 % — HIGH (ref 10.3–14.5)
RBC # FLD: 15.4 % — HIGH (ref 10.3–14.5)
SODIUM SERPL-SCNC: 142 MMOL/L — SIGNIFICANT CHANGE UP (ref 135–145)
WBC # BLD: 12.15 K/UL — HIGH (ref 3.8–10.5)
WBC # BLD: 12.68 K/UL — HIGH (ref 3.8–10.5)
WBC # FLD AUTO: 12.15 K/UL — HIGH (ref 3.8–10.5)
WBC # FLD AUTO: 12.68 K/UL — HIGH (ref 3.8–10.5)

## 2021-03-21 PROCEDURE — 99233 SBSQ HOSP IP/OBS HIGH 50: CPT

## 2021-03-21 RX ORDER — FUROSEMIDE 40 MG
20 TABLET ORAL ONCE
Refills: 0 | Status: COMPLETED | OUTPATIENT
Start: 2021-03-21 | End: 2021-03-21

## 2021-03-21 RX ADMIN — LIDOCAINE 1 PATCH: 4 CREAM TOPICAL at 07:42

## 2021-03-21 RX ADMIN — CHLORHEXIDINE GLUCONATE 1 APPLICATION(S): 213 SOLUTION TOPICAL at 09:48

## 2021-03-21 RX ADMIN — OXYCODONE HYDROCHLORIDE 5 MILLIGRAM(S): 5 TABLET ORAL at 04:22

## 2021-03-21 RX ADMIN — OXYCODONE HYDROCHLORIDE 5 MILLIGRAM(S): 5 TABLET ORAL at 21:22

## 2021-03-21 RX ADMIN — OXYCODONE HYDROCHLORIDE 5 MILLIGRAM(S): 5 TABLET ORAL at 04:56

## 2021-03-21 RX ADMIN — LIDOCAINE 1 PATCH: 4 CREAM TOPICAL at 09:48

## 2021-03-21 RX ADMIN — Medication 25 MILLIGRAM(S): at 09:47

## 2021-03-21 RX ADMIN — ROPINIROLE 1 MILLIGRAM(S): 8 TABLET, FILM COATED, EXTENDED RELEASE ORAL at 09:48

## 2021-03-21 RX ADMIN — ROPINIROLE 0.5 MILLIGRAM(S): 8 TABLET, FILM COATED, EXTENDED RELEASE ORAL at 21:22

## 2021-03-21 RX ADMIN — Medication 20 MILLIGRAM(S): at 09:48

## 2021-03-21 RX ADMIN — LIDOCAINE 1 PATCH: 4 CREAM TOPICAL at 21:22

## 2021-03-21 RX ADMIN — ROPINIROLE 0.5 MILLIGRAM(S): 8 TABLET, FILM COATED, EXTENDED RELEASE ORAL at 15:28

## 2021-03-21 NOTE — PROGRESS NOTE ADULT - SUBJECTIVE AND OBJECTIVE BOX
Subjective: HD#3    Objective: GIB    Heent: N/C, AT PER no scleral icterus, No JVD  Pul: clear  Cor: IRR  Abdomen: soft, normal BS. Wound - serous d/c  Extremities: without edema, motor/sensory intact    03-21    142  |  112<H>  |  14  ----------------------------<  93  3.8   |  27  |  0.92    Ca    7.4<L>      21 Mar 2021 06:06  Phos  2.7     03-21  Mg     1.9     03-21                              7.6    12.15 )-----------( 322      ( 21 Mar 2021 06:06 )             23.3

## 2021-03-21 NOTE — PROGRESS NOTE ADULT - ASSESSMENT
IMP:    87 y/o male with HTN, parkinson's Disease, vertigo, patent foramen ovale, shingles, anxiety, AAA s/p repair, AFib on Lovenox, mucocele appendix with recent right colectomy with Dr. Ochoa a week ago presents to ED s/p syncopal episode and fall found to have LGIB--w/u reveal ulcer by anastomotic site--total 8u PC   Acute blood loss anemia  Anasarca from all the blood product tx    High risk for acute decompensation and deterioration including death     Suggest:    Furosemide 20 IV X 1 now  DC mejia later today  OOB  PT  PO diet  Pain control  IS  FS have been fine  DVT prophy--SCD    CCU care-- d/w ICU staff on multi disciplinary rounds and dr ochoa and pt-- All concerns addressed including but not limited to diagnosis, treatment plan and overall prognosis

## 2021-03-21 NOTE — PROGRESS NOTE ADULT - SUBJECTIVE AND OBJECTIVE BOX
Hospital D # 3  CCU # 3    CC:  GIB    HPI:    85 y/o male with HTN, parkinson's Disease, vertigo, patent foramen ovale, shingles, anxiety, AAA s/p repair, AFib on Lovenox, mucocele appendix with recent right colectomy with Dr. Ochoa a week ago presents to ED s/p syncopal episode and fall found to have LGIB--w/u reveal ulcer by anastomotic site--total 8u PC given.      3/21:  Pt seen and examined in CCU--Awake and alert--Tolerating reg diet.  Very edmatous.  No fevers.  AFIB 100s.  Case d/w Dr Ochoa at bedside    PMH:  As above.     PSH:  As above.     FH: Non Contributory other than those listed in HPI    Social History:  NC    MEDICATIONS  (STANDING):  chlorhexidine 4% Liquid 1 Application(s) Topical <User Schedule>  furosemide   Injectable 20 milliGRAM(s) IV Push once  lidocaine   Patch 1 Patch Transdermal every 24 hours  meclizine 25 milliGRAM(s) Oral daily  rOPINIRole 1 milliGRAM(s) Oral daily  rOPINIRole 0.5 milliGRAM(s) Oral two times a day    MEDICATIONS  (PRN):  oxyCODONE    IR 5 milliGRAM(s) Oral every 4 hours PRN Moderate Pain (4 - 6)  oxyCODONE    IR 10 milliGRAM(s) Oral every 4 hours PRN Severe Pain (7 - 10)      Allergies: NKDA    ROS:  SEE BELOW        ICU Vital Signs Last 24 Hrs  T(C): 36.7 (21 Mar 2021 01:54), Max: 36.7 (21 Mar 2021 01:54)  T(F): 98.1 (21 Mar 2021 01:54), Max: 98.1 (21 Mar 2021 01:54)  HR: 101 (21 Mar 2021 06:00) (82 - 118)  BP: 99/65 (21 Mar 2021 06:00) (89/50 - 128/79)  BP(mean): 73 (21 Mar 2021 06:00) (57 - 89)  ABP: --  ABP(mean): --  RR: 19 (21 Mar 2021 06:00) (17 - 27)  SpO2: 96% (21 Mar 2021 06:00) (73% - 100%)          I&O's Summary    20 Mar 2021 07:01  -  21 Mar 2021 07:00  --------------------------------------------------------  IN: 2187 mL / OUT: 1425 mL / NET: 762 mL        Physical Exam:  SEE BELOW                          7.6    12.15 )-----------( 322      ( 21 Mar 2021 06:06 )             23.3       03-21    142  |  112<H>  |  14  ----------------------------<  93  3.8   |  27  |  0.92    Ca    7.4<L>      21 Mar 2021 06:06  Phos  2.7     03-21  Mg     1.9     03-21                      DVT Prophylaxis:                                                            Contraindication:     Advanced Directives:    Discussed with:    Visit Information:  Time spent excluding procedure:      ** Time is exclusive of billed procedures and/or teaching and/or routine family updates.

## 2021-03-22 LAB
ANION GAP SERPL CALC-SCNC: 1 MMOL/L — LOW (ref 5–17)
BUN SERPL-MCNC: 9 MG/DL — SIGNIFICANT CHANGE UP (ref 7–23)
CALCIUM SERPL-MCNC: 7.7 MG/DL — LOW (ref 8.5–10.1)
CHLORIDE SERPL-SCNC: 108 MMOL/L — SIGNIFICANT CHANGE UP (ref 96–108)
CO2 SERPL-SCNC: 31 MMOL/L — SIGNIFICANT CHANGE UP (ref 22–31)
CREAT SERPL-MCNC: 0.85 MG/DL — SIGNIFICANT CHANGE UP (ref 0.5–1.3)
GLUCOSE SERPL-MCNC: 84 MG/DL — SIGNIFICANT CHANGE UP (ref 70–99)
HCT VFR BLD CALC: 22.7 % — LOW (ref 39–50)
HGB BLD-MCNC: 7.5 G/DL — LOW (ref 13–17)
MAGNESIUM SERPL-MCNC: 2.1 MG/DL — SIGNIFICANT CHANGE UP (ref 1.6–2.6)
MCHC RBC-ENTMCNC: 30.9 PG — SIGNIFICANT CHANGE UP (ref 27–34)
MCHC RBC-ENTMCNC: 33 GM/DL — SIGNIFICANT CHANGE UP (ref 32–36)
MCV RBC AUTO: 93.4 FL — SIGNIFICANT CHANGE UP (ref 80–100)
PHOSPHATE SERPL-MCNC: 2.8 MG/DL — SIGNIFICANT CHANGE UP (ref 2.5–4.5)
PLATELET # BLD AUTO: 360 K/UL — SIGNIFICANT CHANGE UP (ref 150–400)
POTASSIUM SERPL-MCNC: 3.6 MMOL/L — SIGNIFICANT CHANGE UP (ref 3.5–5.3)
POTASSIUM SERPL-SCNC: 3.6 MMOL/L — SIGNIFICANT CHANGE UP (ref 3.5–5.3)
RBC # BLD: 2.43 M/UL — LOW (ref 4.2–5.8)
RBC # FLD: 15.8 % — HIGH (ref 10.3–14.5)
SODIUM SERPL-SCNC: 140 MMOL/L — SIGNIFICANT CHANGE UP (ref 135–145)
WBC # BLD: 12.05 K/UL — HIGH (ref 3.8–10.5)
WBC # FLD AUTO: 12.05 K/UL — HIGH (ref 3.8–10.5)

## 2021-03-22 PROCEDURE — 99233 SBSQ HOSP IP/OBS HIGH 50: CPT

## 2021-03-22 RX ADMIN — OXYCODONE HYDROCHLORIDE 5 MILLIGRAM(S): 5 TABLET ORAL at 20:08

## 2021-03-22 RX ADMIN — OXYCODONE HYDROCHLORIDE 5 MILLIGRAM(S): 5 TABLET ORAL at 20:30

## 2021-03-22 RX ADMIN — ROPINIROLE 0.5 MILLIGRAM(S): 8 TABLET, FILM COATED, EXTENDED RELEASE ORAL at 17:15

## 2021-03-22 RX ADMIN — OXYCODONE HYDROCHLORIDE 5 MILLIGRAM(S): 5 TABLET ORAL at 00:30

## 2021-03-22 RX ADMIN — ROPINIROLE 0.5 MILLIGRAM(S): 8 TABLET, FILM COATED, EXTENDED RELEASE ORAL at 20:09

## 2021-03-22 RX ADMIN — LIDOCAINE 1 PATCH: 4 CREAM TOPICAL at 07:30

## 2021-03-22 RX ADMIN — ROPINIROLE 1 MILLIGRAM(S): 8 TABLET, FILM COATED, EXTENDED RELEASE ORAL at 10:18

## 2021-03-22 RX ADMIN — OXYCODONE HYDROCHLORIDE 5 MILLIGRAM(S): 5 TABLET ORAL at 08:07

## 2021-03-22 RX ADMIN — OXYCODONE HYDROCHLORIDE 5 MILLIGRAM(S): 5 TABLET ORAL at 03:20

## 2021-03-22 RX ADMIN — CHLORHEXIDINE GLUCONATE 1 APPLICATION(S): 213 SOLUTION TOPICAL at 07:30

## 2021-03-22 RX ADMIN — OXYCODONE HYDROCHLORIDE 5 MILLIGRAM(S): 5 TABLET ORAL at 10:00

## 2021-03-22 RX ADMIN — Medication 25 MILLIGRAM(S): at 10:18

## 2021-03-22 RX ADMIN — LIDOCAINE 1 PATCH: 4 CREAM TOPICAL at 20:04

## 2021-03-22 RX ADMIN — LIDOCAINE 1 PATCH: 4 CREAM TOPICAL at 10:15

## 2021-03-22 NOTE — PHYSICAL THERAPY INITIAL EVALUATION ADULT - GAIT PATTERN USED, PT EVAL
HISTORY OF PRESENT ILLNESS:  Dariusz Sky was discharged from [facility] on January 27, 2017. He was status post right primary total hip replacement on January 24, 2017. He presents to the office today, January 31, 2017, with severe pain and swelling of his right lower leg to include the dorsum of his foot. He has gotten, by his report, poor care at the [facility]. His medication for pain was Dilaudid 4 mg written at discharge to be given every four to six hours. The medication was reduced by the skilled nursing facility to 2 mg and he was only getting it barely every four to six hours. When he arrived at the facility on January 27, 2017, he was not met by a nurse practitioner or a physician and felt that he needed to be seen within the first 24-48 hours to associate evaluating his health condition. Today, he denies any chest pain and has no shortness of breath other than chronic. No fevers, chills, or night sweats. His pain is poorly managed, as mentioned in the HPI. The patient is here with his daughter today demanding care for his right leg swelling and to change his pain medication back to the appropriate Dilaudid 4 mg that he was taking pre-hospital transfer. PHYSICAL EXAM:  He is a somewhat curmudgeony, angry, 24-year-old, obese, -American male, atraumatic, normocephalic, alert and oriented times three. He moves with purpose but his motions are very directed yet have an angry component. His right posterolateral hip incision is intact measuring 20 cm in length. Skin staples are noted. Wound borders are well approximated. There are no indurations, no erythema, and no evidence of skin breakdown or infection. Both his quadriceps function and femoral nerve are intact. He has 2+ pitting edema of the right lower leg below the knee to include the dorsum of the foot. He has a positive Tenas sign with the right foot dorsiflexed.   Distal sensation is intact fully to the right lower extremity. PLAN:  His surgical site was recovered with a sterile dressing. He will continue daily wound care. Also, PT/OT will continue per protocol. A STAT Dopper was ordered for the right lower extremity to rule out clot. He was going to do that at 2:00 oclock at [facility]. A call was placed to [employer] at [facility]. My concerns were echoed to her and the concerns of Dr. Meredith Barraza were also addressed for all of his patients postoperatively. The patient is to follow with Jennifer Pedro P.A.-C, next week. The report for his visit today was written and faxed to [phone]. The patient left the office safe, alert, and oriented in wheelchair with an improved attitude. His daughter was in agreement that he had good care today and was looking forward to his return to the nursing center. 3-point gait

## 2021-03-22 NOTE — PROGRESS NOTE ADULT - RS GEN PE MLT RESP DETAILS PC
breath sounds equal/no rales/no rhonchi/no wheezes
airway patent/breath sounds equal

## 2021-03-22 NOTE — PROGRESS NOTE ADULT - SUBJECTIVE AND OBJECTIVE BOX
Subjective: HD#4    Objective: GIB    Heent: N/C, AT PER no scleral icterus, No JVD  Pul: clear  Cor: IRR  Abdomen: soft, normal BS. Wound - serous d/c  Extremities: without edema, motor/sensory intact. mod swelling R hand    03-22    140  |  108  |  9   ----------------------------<  84  3.6   |  31  |  0.85    Ca    7.7<L>      22 Mar 2021 06:50  Phos  2.8     03-22  Mg     2.1     03-22                              7.5    12.05 )-----------( 360      ( 22 Mar 2021 06:50 )             22.7

## 2021-03-22 NOTE — PROGRESS NOTE ADULT - SUBJECTIVE AND OBJECTIVE BOX
85 year old male with PMH of HTN, parkinson's Disease, vertigo, patent foramen ovale, shingles, anxiety, AAA s/p repair, afib on Lovenox, mucocele appendix with recent right colectomy with Dr. Ochoa a week ago presents to ED s/p syncopal episode and fall. Pt was on his way to outpt follow up appt and he felt a sudden urge to have a BM. He stopped at a laundromat and fell on the way in. He was BIBA and was found to have a GI Bleed in the ED. He states he took aspirin and laxatives today. Pt was hypotensive, tachycardic, with a fever of 101.     3/19: Patient seen in bed.  He had a bloody BM this am at 7AM.  He is S/P 3U PRBC, and remains anemic to 6.5. S/P 2U FFP as well.  Is will receive 2 2 more U PRBC and 1 Platelets and is to start a bowel prep and have an colonoscopy later today.       3/20: Patient required 2 more U PRBC over night.  He is now up to 8U PRBC, 2 FFP, and 1U Platelets.  Patient had one maroon BM this AM about 100cc    3/22: No further bleeding noted.  H & H stable       PAST MEDICAL & SURGICAL HISTORY:  Vertigo    Bruise  to bilateral upper extremity    Patent foramen ovale  dilated left artrium severe echo 1/2021    History of shingles    Mucocele, appendix    Eczema    Anxiety    Seasonal allergies    Renal calculi    Parkinson disease    [place] (hard of hearing)    H/O scarlet fever    AAA (abdominal aortic aneurysm)    H/O candidiasis of mouth    HTN (hypertension)    Atrial fibrillation    H/O right hemicolectomy    History of appendectomy    S/P AAA repair  1/2021    H/O colonoscopy        FAMILY HISTORY:  No pertinent family history in first degree relatives        Social Hx:    Allergies    penicillin (Rash)    Intolerances            ICU Vital Signs Last 24 Hrs  T(C): 36.6 (22 Mar 2021 06:53), Max: 37.4 (21 Mar 2021 16:04)  T(F): 97.9 (22 Mar 2021 06:53), Max: 99.4 (21 Mar 2021 16:04)  HR: 98 (22 Mar 2021 07:00) (85 - 117)  BP: 92/47 (22 Mar 2021 07:00) (85/64 - 123/97)  BP(mean): 56 (22 Mar 2021 07:00) (45 - 104)  ABP: --  ABP(mean): --  RR: 26 (22 Mar 2021 07:00) (15 - 29)  SpO2: 96% (22 Mar 2021 07:00) (94% - 98%)          I&O's Summary    21 Mar 2021 07:01  -  22 Mar 2021 07:00  --------------------------------------------------------  IN: 1030 mL / OUT: 2550 mL / NET: -1520 mL                              7.5    12.05 )-----------( 360      ( 22 Mar 2021 06:50 )             22.7       03-22    140  |  108  |  9   ----------------------------<  84  3.6   |  31  |  0.85    Ca    7.7<L>      22 Mar 2021 06:50  Phos  2.8     03-22  Mg     2.1     03-22                      MEDICATIONS  (STANDING):  chlorhexidine 4% Liquid 1 Application(s) Topical <User Schedule>  lidocaine   Patch 1 Patch Transdermal every 24 hours  meclizine 25 milliGRAM(s) Oral daily  rOPINIRole 1 milliGRAM(s) Oral daily  rOPINIRole 0.5 milliGRAM(s) Oral two times a day    MEDICATIONS  (PRN):  oxyCODONE    IR 5 milliGRAM(s) Oral every 4 hours PRN Moderate Pain (4 - 6)  oxyCODONE    IR 10 milliGRAM(s) Oral every 4 hours PRN Severe Pain (7 - 10)      DVT Prophylaxis:    Advanced Directives:  Discussed with:    Visit Information:    ** Time is exclusive of billed procedures and/or teaching and/or routine family updates.

## 2021-03-22 NOTE — PROGRESS NOTE ADULT - MS EXT PE MLT D E PC
no clubbing/no cyanosis
no clubbing/no pedal edema
no cyanosis/pedal edema
no clubbing/no cyanosis/no pedal edema

## 2021-03-22 NOTE — PHYSICAL THERAPY INITIAL EVALUATION ADULT - PERTINENT HX OF CURRENT PROBLEM, REHAB EVAL
87 y/o male with HTN, parkinson's Disease, vertigo, patent foramen ovale, shingles, anxiety, AAA s/p repair, AFib on Lovenox, mucocele appendix with recent right colectomy with Dr. Ochoa a week ago presents to ED s/p syncopal episode and fall found to have LGIB--w/u reveal ulcer by anastomotic site--total 8u PC

## 2021-03-22 NOTE — PROGRESS NOTE ADULT - ASSESSMENT
85 year old male presented with septic shock/hemorrhagic shock with GI Bleed    Plan:  CCU    Pulm: on ROOM Air    Cardio: Patient was hypotensive from hemorrhagic shock and less likely septic, no AC or ASA at this time    Renal: Cr Stable    GI: PO Diet.  A bleeding ulcer was near the anastomotic site    ID: Off Antibiotics    Donna    D/W Surgery      Transfer to a [facility] Floor

## 2021-03-22 NOTE — PHYSICAL THERAPY INITIAL EVALUATION ADULT - GENERAL OBSERVATIONS, REHAB EVAL
Pt rec'd supine in bed, pleasant and cooperative with PT, no c/o pain, endorses sitting up in bedside chair for most of the day today.

## 2021-03-23 LAB
ANION GAP SERPL CALC-SCNC: 5 MMOL/L — SIGNIFICANT CHANGE UP (ref 5–17)
BUN SERPL-MCNC: 7 MG/DL — SIGNIFICANT CHANGE UP (ref 7–23)
CALCIUM SERPL-MCNC: 7.7 MG/DL — LOW (ref 8.5–10.1)
CHLORIDE SERPL-SCNC: 107 MMOL/L — SIGNIFICANT CHANGE UP (ref 96–108)
CO2 SERPL-SCNC: 29 MMOL/L — SIGNIFICANT CHANGE UP (ref 22–31)
CREAT SERPL-MCNC: 0.74 MG/DL — SIGNIFICANT CHANGE UP (ref 0.5–1.3)
CULTURE RESULTS: SIGNIFICANT CHANGE UP
CULTURE RESULTS: SIGNIFICANT CHANGE UP
GLUCOSE SERPL-MCNC: 93 MG/DL — SIGNIFICANT CHANGE UP (ref 70–99)
HCT VFR BLD CALC: 24.4 % — LOW (ref 39–50)
HGB BLD-MCNC: 7.7 G/DL — LOW (ref 13–17)
MAGNESIUM SERPL-MCNC: 2 MG/DL — SIGNIFICANT CHANGE UP (ref 1.6–2.6)
MCHC RBC-ENTMCNC: 29.7 PG — SIGNIFICANT CHANGE UP (ref 27–34)
MCHC RBC-ENTMCNC: 31.6 GM/DL — LOW (ref 32–36)
MCV RBC AUTO: 94.2 FL — SIGNIFICANT CHANGE UP (ref 80–100)
PHOSPHATE SERPL-MCNC: 2.8 MG/DL — SIGNIFICANT CHANGE UP (ref 2.5–4.5)
PLATELET # BLD AUTO: 387 K/UL — SIGNIFICANT CHANGE UP (ref 150–400)
POTASSIUM SERPL-MCNC: 3.7 MMOL/L — SIGNIFICANT CHANGE UP (ref 3.5–5.3)
POTASSIUM SERPL-SCNC: 3.7 MMOL/L — SIGNIFICANT CHANGE UP (ref 3.5–5.3)
RBC # BLD: 2.59 M/UL — LOW (ref 4.2–5.8)
RBC # FLD: 16.3 % — HIGH (ref 10.3–14.5)
SODIUM SERPL-SCNC: 141 MMOL/L — SIGNIFICANT CHANGE UP (ref 135–145)
SPECIMEN SOURCE: SIGNIFICANT CHANGE UP
SPECIMEN SOURCE: SIGNIFICANT CHANGE UP
WBC # BLD: 13.49 K/UL — HIGH (ref 3.8–10.5)
WBC # FLD AUTO: 13.49 K/UL — HIGH (ref 3.8–10.5)

## 2021-03-23 RX ADMIN — ROPINIROLE 1 MILLIGRAM(S): 8 TABLET, FILM COATED, EXTENDED RELEASE ORAL at 10:04

## 2021-03-23 RX ADMIN — OXYCODONE HYDROCHLORIDE 5 MILLIGRAM(S): 5 TABLET ORAL at 01:15

## 2021-03-23 RX ADMIN — ROPINIROLE 0.5 MILLIGRAM(S): 8 TABLET, FILM COATED, EXTENDED RELEASE ORAL at 17:07

## 2021-03-23 RX ADMIN — OXYCODONE HYDROCHLORIDE 5 MILLIGRAM(S): 5 TABLET ORAL at 14:36

## 2021-03-23 RX ADMIN — OXYCODONE HYDROCHLORIDE 5 MILLIGRAM(S): 5 TABLET ORAL at 14:58

## 2021-03-23 RX ADMIN — ROPINIROLE 0.5 MILLIGRAM(S): 8 TABLET, FILM COATED, EXTENDED RELEASE ORAL at 23:11

## 2021-03-23 RX ADMIN — OXYCODONE HYDROCHLORIDE 5 MILLIGRAM(S): 5 TABLET ORAL at 09:00

## 2021-03-23 RX ADMIN — OXYCODONE HYDROCHLORIDE 5 MILLIGRAM(S): 5 TABLET ORAL at 08:35

## 2021-03-23 RX ADMIN — OXYCODONE HYDROCHLORIDE 5 MILLIGRAM(S): 5 TABLET ORAL at 00:15

## 2021-03-23 RX ADMIN — CHLORHEXIDINE GLUCONATE 1 APPLICATION(S): 213 SOLUTION TOPICAL at 07:43

## 2021-03-23 RX ADMIN — Medication 25 MILLIGRAM(S): at 10:04

## 2021-03-23 RX ADMIN — LIDOCAINE 1 PATCH: 4 CREAM TOPICAL at 23:11

## 2021-03-23 NOTE — CDI QUERY NOTE - NSCDIOTHERTXTBX_GEN_ALL_CORE_HH
Patient admitted with : Clinical History  Appendiceal mucinous tumor    Specimen(s) Submitted  1     Right hemicolectomy      Final Diagnosis  Colon, appendix and terminal ileum (right hemicolectomy):  - LOW-GRADE APPENDICEAL MUCINOUS NEOPLASM  - 12 lymph nodes negative for neoplasm (0/12).    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

## 2021-03-23 NOTE — CDI QUERY NOTE - NSCDIOTHERTXTBX2_GEN_ALL_CORE_FT
Operative report reveals: " small tear of kidney cyst oversewn with 3-0 silk"  " small area of bleeding is oversewn with 3-0 silk"    Please clarify if these findings are  a) Complication of procedure  b) Incidental findings, no complication  c) other, please clarify

## 2021-03-23 NOTE — PROGRESS NOTE ADULT - SUBJECTIVE AND OBJECTIVE BOX
Called to evaluate saturated abdominal dressing     The pt is a 84y/o male who is HD #5 from a GI bleed.  Pt is stable and without complaints this morning.  RN requested that I come evaluate as the pts midline abdominal dressing is saturated with serous fluid and the LLQ staple line has some increased redness.    T(C): 37.3 (03-23-21 @ 06:18), Max: 37.8 (03-23-21 @ 00:20)  HR: 93 (03-23-21 @ 00:20) (86 - 112)  BP: 105/60 (03-23-21 @ 00:20) (87/70 - 123/65)  RR: 20 (03-23-21 @ 00:20) (18 - 28)  SpO2: 95% (03-23-21 @ 00:20) (94% - 97%)  Wt(kg): --    Abd:  midline dressing saturated with yellow drainage, dressing removed no active drainage or bleeding, no pus noted 2 staples removed at the upper and lower portion of wound, staple line is mildly erythematous along the midline and the LLQ staple line.  soft, +BS, non-tender

## 2021-03-23 NOTE — PROGRESS NOTE ADULT - ASSESSMENT
Problem: Safety  Goal: Free from accidental injury  Outcome: PROGRESSING SLOWER THAN EXPECTED     Problem: Knowledge Deficit  Goal: Patient/Significant other demonstrates understanding of disease process, treatment plan, medications and discharge instructions  Outcome: PROGRESSING SLOWER THAN EXPECTED     Problem: Pain  Goal: Alleviation of Pain or a reduction in pain to the patient's comfort goal  Outcome: PROGRESSING SLOWER THAN EXPECTED      Ass:  HD #5 GI bleed  Plan:  Dressing changed, continue to monitor

## 2021-03-23 NOTE — PROGRESS NOTE ADULT - SUBJECTIVE AND OBJECTIVE BOX
Dressing change:    Dressing from this morning remains dry.  No active drainage or bleeding, no pus noted.   2 staples removed at the upper and lower portion of wound, staple line is mildly erythematous along the midline and the LLQ staple line.      Continue dressing changes several times per day.

## 2021-03-23 NOTE — PROGRESS NOTE ADULT - SUBJECTIVE AND OBJECTIVE BOX
Subjective: HD#5    Objective: GIB    Heent: N/C, AT PER no scleral icterus, No JVD  Pul: clear  Cor: IRR  Abdomen: soft, normal BS. Wound - serous d/c, but less  Extremities: mild edema, motor/sensory intact    03-23    141  |  107  |  7   ----------------------------<  93  3.7   |  29  |  0.74    Ca    7.7<L>      23 Mar 2021 07:40  Phos  2.8     03-22  Mg     2.1     03-22                              7.7    13.49 )-----------( 387      ( 23 Mar 2021 07:40 )             24.4

## 2021-03-24 DIAGNOSIS — K38.8 OTHER SPECIFIED DISEASES OF APPENDIX: ICD-10-CM

## 2021-03-24 DIAGNOSIS — I48.91 UNSPECIFIED ATRIAL FIBRILLATION: ICD-10-CM

## 2021-03-24 DIAGNOSIS — Z87.891 PERSONAL HISTORY OF NICOTINE DEPENDENCE: ICD-10-CM

## 2021-03-24 DIAGNOSIS — Z88.0 ALLERGY STATUS TO PENICILLIN: ICD-10-CM

## 2021-03-24 DIAGNOSIS — I10 ESSENTIAL (PRIMARY) HYPERTENSION: ICD-10-CM

## 2021-03-24 DIAGNOSIS — R42 DIZZINESS AND GIDDINESS: ICD-10-CM

## 2021-03-24 DIAGNOSIS — C18.1 MALIGNANT NEOPLASM OF APPENDIX: ICD-10-CM

## 2021-03-24 DIAGNOSIS — N28.1 CYST OF KIDNEY, ACQUIRED: ICD-10-CM

## 2021-03-24 DIAGNOSIS — C78.6 SECONDARY MALIGNANT NEOPLASM OF RETROPERITONEUM AND PERITONEUM: ICD-10-CM

## 2021-03-24 DIAGNOSIS — G20 PARKINSON'S DISEASE: ICD-10-CM

## 2021-03-24 LAB
HCT VFR BLD CALC: 23.2 % — LOW (ref 39–50)
HGB BLD-MCNC: 7.4 G/DL — LOW (ref 13–17)
MCHC RBC-ENTMCNC: 30.5 PG — SIGNIFICANT CHANGE UP (ref 27–34)
MCHC RBC-ENTMCNC: 31.9 GM/DL — LOW (ref 32–36)
MCV RBC AUTO: 95.5 FL — SIGNIFICANT CHANGE UP (ref 80–100)
PLATELET # BLD AUTO: 409 K/UL — HIGH (ref 150–400)
RBC # BLD: 2.43 M/UL — LOW (ref 4.2–5.8)
RBC # FLD: 16 % — HIGH (ref 10.3–14.5)
WBC # BLD: 11.92 K/UL — HIGH (ref 3.8–10.5)
WBC # FLD AUTO: 11.92 K/UL — HIGH (ref 3.8–10.5)

## 2021-03-24 RX ADMIN — ROPINIROLE 1 MILLIGRAM(S): 8 TABLET, FILM COATED, EXTENDED RELEASE ORAL at 09:41

## 2021-03-24 RX ADMIN — OXYCODONE HYDROCHLORIDE 5 MILLIGRAM(S): 5 TABLET ORAL at 20:46

## 2021-03-24 RX ADMIN — Medication 25 MILLIGRAM(S): at 09:41

## 2021-03-24 RX ADMIN — LIDOCAINE 1 PATCH: 4 CREAM TOPICAL at 11:14

## 2021-03-24 RX ADMIN — ROPINIROLE 0.5 MILLIGRAM(S): 8 TABLET, FILM COATED, EXTENDED RELEASE ORAL at 16:18

## 2021-03-24 RX ADMIN — LIDOCAINE 1 PATCH: 4 CREAM TOPICAL at 07:57

## 2021-03-24 RX ADMIN — OXYCODONE HYDROCHLORIDE 5 MILLIGRAM(S): 5 TABLET ORAL at 21:15

## 2021-03-24 RX ADMIN — ROPINIROLE 0.5 MILLIGRAM(S): 8 TABLET, FILM COATED, EXTENDED RELEASE ORAL at 20:47

## 2021-03-24 NOTE — PROGRESS NOTE ADULT - SUBJECTIVE AND OBJECTIVE BOX
Subjective: HD#6    Objective: GIB    Heent: N/C, AT PER no scleral icterus, No JVD  Pul: clear  Cor: IRR  Abdomen: soft, normal BS. Wound - clean, less serous d/c  Extremities: without edema, motor/sensory intact    03-23    141  |  107  |  7   ----------------------------<  93  3.7   |  29  |  0.74    Ca    7.7<L>      23 Mar 2021 07:40  Phos  2.8     03-23  Mg     2.0     03-23                              7.7    13.49 )-----------( 387      ( 23 Mar 2021 07:40 )             24.4

## 2021-03-24 NOTE — PROGRESS NOTE ADULT - SUBJECTIVE AND OBJECTIVE BOX
Mid day dressing change.      Some mild serous drainage on the dressing.  Mid-line incision unchanged, mild erythema, no purulence.      Continue tid dressing changes.

## 2021-03-25 LAB
ANION GAP SERPL CALC-SCNC: 2 MMOL/L — LOW (ref 5–17)
BUN SERPL-MCNC: 8 MG/DL — SIGNIFICANT CHANGE UP (ref 7–23)
CALCIUM SERPL-MCNC: 7.5 MG/DL — LOW (ref 8.5–10.1)
CHLORIDE SERPL-SCNC: 107 MMOL/L — SIGNIFICANT CHANGE UP (ref 96–108)
CO2 SERPL-SCNC: 32 MMOL/L — HIGH (ref 22–31)
CREAT SERPL-MCNC: 0.82 MG/DL — SIGNIFICANT CHANGE UP (ref 0.5–1.3)
GLUCOSE SERPL-MCNC: 87 MG/DL — SIGNIFICANT CHANGE UP (ref 70–99)
HCT VFR BLD CALC: 22.7 % — LOW (ref 39–50)
HCT VFR BLD CALC: 26.6 % — LOW (ref 39–50)
HGB BLD-MCNC: 7.1 G/DL — LOW (ref 13–17)
HGB BLD-MCNC: 8.5 G/DL — LOW (ref 13–17)
MCHC RBC-ENTMCNC: 29.8 PG — SIGNIFICANT CHANGE UP (ref 27–34)
MCHC RBC-ENTMCNC: 30 PG — SIGNIFICANT CHANGE UP (ref 27–34)
MCHC RBC-ENTMCNC: 31.3 GM/DL — LOW (ref 32–36)
MCHC RBC-ENTMCNC: 32 GM/DL — SIGNIFICANT CHANGE UP (ref 32–36)
MCV RBC AUTO: 93.3 FL — SIGNIFICANT CHANGE UP (ref 80–100)
MCV RBC AUTO: 95.8 FL — SIGNIFICANT CHANGE UP (ref 80–100)
PLATELET # BLD AUTO: 402 K/UL — HIGH (ref 150–400)
PLATELET # BLD AUTO: 442 K/UL — HIGH (ref 150–400)
POTASSIUM SERPL-MCNC: 4.1 MMOL/L — SIGNIFICANT CHANGE UP (ref 3.5–5.3)
POTASSIUM SERPL-SCNC: 4.1 MMOL/L — SIGNIFICANT CHANGE UP (ref 3.5–5.3)
RBC # BLD: 2.37 M/UL — LOW (ref 4.2–5.8)
RBC # BLD: 2.85 M/UL — LOW (ref 4.2–5.8)
RBC # FLD: 16 % — HIGH (ref 10.3–14.5)
RBC # FLD: 16.6 % — HIGH (ref 10.3–14.5)
SODIUM SERPL-SCNC: 141 MMOL/L — SIGNIFICANT CHANGE UP (ref 135–145)
WBC # BLD: 8.64 K/UL — SIGNIFICANT CHANGE UP (ref 3.8–10.5)
WBC # BLD: 9.09 K/UL — SIGNIFICANT CHANGE UP (ref 3.8–10.5)
WBC # FLD AUTO: 8.64 K/UL — SIGNIFICANT CHANGE UP (ref 3.8–10.5)
WBC # FLD AUTO: 9.09 K/UL — SIGNIFICANT CHANGE UP (ref 3.8–10.5)

## 2021-03-25 PROCEDURE — 93010 ELECTROCARDIOGRAM REPORT: CPT

## 2021-03-25 RX ORDER — CALCIUM GLUCONATE 100 MG/ML
2 VIAL (ML) INTRAVENOUS ONCE
Refills: 0 | Status: COMPLETED | OUTPATIENT
Start: 2021-03-25 | End: 2021-03-25

## 2021-03-25 RX ORDER — SODIUM CHLORIDE 9 MG/ML
500 INJECTION INTRAMUSCULAR; INTRAVENOUS; SUBCUTANEOUS ONCE
Refills: 0 | Status: COMPLETED | OUTPATIENT
Start: 2021-03-25 | End: 2021-03-25

## 2021-03-25 RX ADMIN — ROPINIROLE 1 MILLIGRAM(S): 8 TABLET, FILM COATED, EXTENDED RELEASE ORAL at 10:33

## 2021-03-25 RX ADMIN — OXYCODONE HYDROCHLORIDE 5 MILLIGRAM(S): 5 TABLET ORAL at 22:43

## 2021-03-25 RX ADMIN — ROPINIROLE 0.5 MILLIGRAM(S): 8 TABLET, FILM COATED, EXTENDED RELEASE ORAL at 22:17

## 2021-03-25 RX ADMIN — OXYCODONE HYDROCHLORIDE 5 MILLIGRAM(S): 5 TABLET ORAL at 22:17

## 2021-03-25 RX ADMIN — SODIUM CHLORIDE 500 MILLILITER(S): 9 INJECTION INTRAMUSCULAR; INTRAVENOUS; SUBCUTANEOUS at 02:52

## 2021-03-25 RX ADMIN — ROPINIROLE 0.5 MILLIGRAM(S): 8 TABLET, FILM COATED, EXTENDED RELEASE ORAL at 16:06

## 2021-03-25 RX ADMIN — Medication 200 GRAM(S): at 05:37

## 2021-03-25 RX ADMIN — Medication 25 MILLIGRAM(S): at 10:33

## 2021-03-25 NOTE — PROVIDER CONTACT NOTE (OTHER) - SITUATION
Notified PA that there is an increase in drainage and redness, swelling at the surgical incision site on abdomen
post op pt admitted w/ GI bleed/ hypovolemic shock w/ hx of Afib tachycardic, hypotensive, and w/ decrease in o2. pt has not been getting cardiac meds since admission on 3/18

## 2021-03-25 NOTE — PROGRESS NOTE ADULT - SUBJECTIVE AND OBJECTIVE BOX
called this morning as it was noted when taking patients vitals that his heart rate was 108 and irregular, BP 90/50, Temp 99, O2 sat 93%on RA    Upon my arrival, pt is sleeping in bed without any complaints.  Pt denies chest pain, palpitations, or shortness of breath    Patient is a 86y old  Male who presents with a chief complaint of GI Bleed (24 Mar 2021 12:38)    HPI:  85 year old male with PMH of HTN, parkinson's Disease, vertigo, patent foramen ovale, shingles, anxiety, AAA s/p repair, afib on Lovenox, mucocele appendix with recent right colectomy with Dr. Ochoa a week ago presents to ED s/p syncopal episode and fall. Pt was on his way to outpt follow up appt and he felt a sudden urge to have a BM. He stopped at a laundromat and fell on the way in. He was BIBA and was found to have a GI Bleed in the ED. He states he took aspirin and laxatives today. Pt was hypotensive, tachycardic, with a fever of 101. He received 2 units PRBC and 2 fluid boluses in ED. Pt is now stable. He admits to chills but no N/V, fevers, CP, SOB, Abdominal Pain, Diarrhea or dark stools at home. (18 Mar 2021 17:24)      PAST MEDICAL & SURGICAL HISTORY:  Vertigo    Bruise  to bilateral upper extremity    Patent foramen ovale  dilated left atrium severe echo 1/2021    History of shingles    Mucocele, appendix    Eczema    Anxiety    Seasonal allergies    Renal calculi    Parkinson disease    [place] (hard of hearing)    H/O scarlet fever    AAA (abdominal aortic aneurysm)    H/O candidiasis of mouth    HTN (hypertension)    Atrial fibrillation    H/O right hemicolectomy    History of appendectomy    S/P AAA repair  1/2021    H/O colonoscopy    MEDICATIONS  (STANDING):  calcium gluconate IVPB 2 Gram(s) IV Intermittent once  chlorhexidine 4% Liquid 1 Application(s) Topical <User Schedule>  lidocaine   Patch 1 Patch Transdermal every 24 hours  meclizine 25 milliGRAM(s) Oral daily  rOPINIRole 1 milliGRAM(s) Oral daily  rOPINIRole 0.5 milliGRAM(s) Oral two times a day      PHYSICAL EXAM:  T(C): 36.8 (03-25-21 @ 03:42), Max: 37.5 (03-24-21 @ 15:07)  HR: 105 (03-25-21 @ 03:42) (101 - 108)  BP: 104/60 (03-25-21 @ 03:42) (90/50 - 112/59)  RR: 18 (03-25-21 @ 03:42) (18 - 18)  SpO2: 96% (03-25-21 @ 03:42) (92% - 98%)    Skin:  warm and dry    Respiratory: Lungs clear and equal bilat, no r/r/w    Cardiovascular:  S1S2 irreg, no M appreciated    Gastrointestinal:  non-distended, dressing clean and dry, +NABS, soft, non-tender    Extremities:  no c/c/e, venodynes in place    Vascular:  2+/4+ bilateral    Neurological:  A & O X3, CNII-XII grossly intact                            7.1    9.09  )-----------( 402      ( 25 Mar 2021 02:47 )             22.7       03-25    141  |  107  |  8   ----------------------------<  87  4.1   |  32<H>  |  0.82    Ca    7.5<L>      25 Mar 2021 02:47  Phos  2.8     03-23  Mg     2.0     03-23    EKG:  Afib [id number]

## 2021-03-25 NOTE — PROGRESS NOTE ADULT - SUBJECTIVE AND OBJECTIVE BOX
Subjective: HD#7    Objective: GIB    Heent: N/C, AT PER no scleral icterus, No JVD  Pul: clear  Cor: IRR  Abdomen: soft, normal BS. Wound -   Extremities: without edema, motor/sensory intact    03-25    141  |  107  |  8   ----------------------------<  87  4.1   |  32<H>  |  0.82    Ca    7.5<L>      25 Mar 2021 02:47  Phos  2.8     03-23  Mg     2.0     03-23                              7.1    9.09  )-----------( 402      ( 25 Mar 2021 02:47 )             22.7

## 2021-03-25 NOTE — PROVIDER CONTACT NOTE (OTHER) - REASON
Increase in drainage and swelling and surgical incision site
post op pt tachy w/ changes in o2 and hypotensive

## 2021-03-25 NOTE — PROVIDER CONTACT NOTE (OTHER) - ACTION/TREATMENT ORDERED:
OK as per JERICA Garrett, she will come and assess patient. Will con't to monitor patient.
STAT CBC, BMP, EKG, 500cc Bolus NS over 1 hour, will continue to monitor pt. pt placed on 2L o2 at this time. will continue to monitor. Calcium replacement ordered, Carol to speak w/ MD Ochoa in AM

## 2021-03-25 NOTE — PROGRESS NOTE ADULT - ASSESSMENT
Ass:  Afib, HD#7 GI Bleed  Plan:  500cc normal saline bolus, will continue to monitor and discuss with Dr. Ochoa in am.

## 2021-03-26 PROCEDURE — 99231 SBSQ HOSP IP/OBS SF/LOW 25: CPT

## 2021-03-26 RX ORDER — ACETAMINOPHEN 500 MG
650 TABLET ORAL EVERY 4 HOURS
Refills: 0 | Status: DISCONTINUED | OUTPATIENT
Start: 2021-03-26 | End: 2021-03-29

## 2021-03-26 RX ORDER — ACETAMINOPHEN 500 MG
325 TABLET ORAL EVERY 4 HOURS
Refills: 0 | Status: DISCONTINUED | OUTPATIENT
Start: 2021-03-26 | End: 2021-03-29

## 2021-03-26 RX ADMIN — LIDOCAINE 1 PATCH: 4 CREAM TOPICAL at 21:41

## 2021-03-26 RX ADMIN — Medication 25 MILLIGRAM(S): at 09:18

## 2021-03-26 RX ADMIN — CHLORHEXIDINE GLUCONATE 1 APPLICATION(S): 213 SOLUTION TOPICAL at 09:16

## 2021-03-26 RX ADMIN — ROPINIROLE 1 MILLIGRAM(S): 8 TABLET, FILM COATED, EXTENDED RELEASE ORAL at 11:57

## 2021-03-26 RX ADMIN — Medication 650 MILLIGRAM(S): at 18:08

## 2021-03-26 RX ADMIN — ROPINIROLE 0.5 MILLIGRAM(S): 8 TABLET, FILM COATED, EXTENDED RELEASE ORAL at 21:40

## 2021-03-26 RX ADMIN — ROPINIROLE 0.5 MILLIGRAM(S): 8 TABLET, FILM COATED, EXTENDED RELEASE ORAL at 17:03

## 2021-03-26 NOTE — PROGRESS NOTE ADULT - SUBJECTIVE AND OBJECTIVE BOX
Subjective: HD#9    Objective: GIB    Heent: N/C, AT PER no scleral icterus, No JVD  Pul: clear  Cor: IRR  Abdomen: soft, normal BS. Wound - still serous d/c  Extremities: without edema, motor/sensory intact    03-25    141  |  107  |  8   ----------------------------<  87  4.1   |  32<H>  |  0.82    Ca    7.5<L>      25 Mar 2021 02:47                              8.5    8.64  )-----------( 442      ( 25 Mar 2021 13:58 )             26.6

## 2021-03-26 NOTE — CDI QUERY NOTE - NSCDIOTHERTXTBX_GEN_ALL_CORE_HH
PA documentation  GI: mucocele appendix s/p R hemicolectomy, c/b acute blood loss anemia 2/2 large 3cm anastomotic ulcer, now s/p colonoscopy 3/19,      GI documentation:  · Assessment	  85 yo male with history of right hemicolectomy, and bleeding at anastomosis.  Stable overnight. To advance diet as per surgery. This is not amenable to further endoscopic intervention if bleeding recurs    Please document the relationship between the following conditions:  A) GI Bleed/Acute blood loss anemia associated with anastomotic ulcer that is due to previous right hemicolectomy  B) GI Bleed/Acute blood loss anemia is not associated with anastomotic ulcer that is due to previous right hemicolectomy  C) GI Bleed/Acute blood loss anemia is not associated with anastomotic ulcer that is not due to previous right hemicolectomy  D) Unable to determine  E) Other ( Please specify)

## 2021-03-27 PROCEDURE — 93010 ELECTROCARDIOGRAM REPORT: CPT

## 2021-03-27 RX ORDER — SODIUM CHLORIDE 0.65 %
1 AEROSOL, SPRAY (ML) NASAL
Refills: 0 | Status: DISCONTINUED | OUTPATIENT
Start: 2021-03-27 | End: 2021-03-27

## 2021-03-27 RX ORDER — OXYCODONE HYDROCHLORIDE 5 MG/1
5 TABLET ORAL EVERY 4 HOURS
Refills: 0 | Status: DISCONTINUED | OUTPATIENT
Start: 2021-03-27 | End: 2021-03-29

## 2021-03-27 RX ORDER — SODIUM CHLORIDE 0.65 %
1 AEROSOL, SPRAY (ML) NASAL
Refills: 0 | Status: DISCONTINUED | OUTPATIENT
Start: 2021-03-27 | End: 2021-03-29

## 2021-03-27 RX ORDER — BENZOCAINE AND MENTHOL 5; 1 G/100ML; G/100ML
1 LIQUID ORAL ONCE
Refills: 0 | Status: COMPLETED | OUTPATIENT
Start: 2021-03-27 | End: 2021-03-27

## 2021-03-27 RX ADMIN — ROPINIROLE 0.5 MILLIGRAM(S): 8 TABLET, FILM COATED, EXTENDED RELEASE ORAL at 20:47

## 2021-03-27 RX ADMIN — Medication 650 MILLIGRAM(S): at 21:55

## 2021-03-27 RX ADMIN — LIDOCAINE 1 PATCH: 4 CREAM TOPICAL at 09:32

## 2021-03-27 RX ADMIN — LIDOCAINE 1 PATCH: 4 CREAM TOPICAL at 20:47

## 2021-03-27 RX ADMIN — OXYCODONE HYDROCHLORIDE 5 MILLIGRAM(S): 5 TABLET ORAL at 00:52

## 2021-03-27 RX ADMIN — BENZOCAINE AND MENTHOL 1 LOZENGE: 5; 1 LIQUID ORAL at 20:48

## 2021-03-27 RX ADMIN — ROPINIROLE 0.5 MILLIGRAM(S): 8 TABLET, FILM COATED, EXTENDED RELEASE ORAL at 15:30

## 2021-03-27 RX ADMIN — ROPINIROLE 1 MILLIGRAM(S): 8 TABLET, FILM COATED, EXTENDED RELEASE ORAL at 10:39

## 2021-03-27 RX ADMIN — Medication 25 MILLIGRAM(S): at 10:39

## 2021-03-27 NOTE — PROGRESS NOTE ADULT - SUBJECTIVE AND OBJECTIVE BOX
HD#9  Abd soft. No fever. Wound - still serous d/c. Dssg changed.  Acute blood loss anemia due to anastomotic ulcer which occurred post R colectomy.

## 2021-03-28 LAB
HCT VFR BLD CALC: 28.9 % — LOW (ref 39–50)
HGB BLD-MCNC: 9.3 G/DL — LOW (ref 13–17)
MCHC RBC-ENTMCNC: 29.7 PG — SIGNIFICANT CHANGE UP (ref 27–34)
MCHC RBC-ENTMCNC: 32.2 GM/DL — SIGNIFICANT CHANGE UP (ref 32–36)
MCV RBC AUTO: 92.3 FL — SIGNIFICANT CHANGE UP (ref 80–100)
PLATELET # BLD AUTO: 375 K/UL — SIGNIFICANT CHANGE UP (ref 150–400)
RBC # BLD: 3.13 M/UL — LOW (ref 4.2–5.8)
RBC # FLD: 15.9 % — HIGH (ref 10.3–14.5)
WBC # BLD: 6.84 K/UL — SIGNIFICANT CHANGE UP (ref 3.8–10.5)
WBC # FLD AUTO: 6.84 K/UL — SIGNIFICANT CHANGE UP (ref 3.8–10.5)

## 2021-03-28 RX ADMIN — ROPINIROLE 1 MILLIGRAM(S): 8 TABLET, FILM COATED, EXTENDED RELEASE ORAL at 09:14

## 2021-03-28 RX ADMIN — OXYCODONE HYDROCHLORIDE 5 MILLIGRAM(S): 5 TABLET ORAL at 20:17

## 2021-03-28 RX ADMIN — ROPINIROLE 0.5 MILLIGRAM(S): 8 TABLET, FILM COATED, EXTENDED RELEASE ORAL at 21:56

## 2021-03-28 RX ADMIN — ROPINIROLE 0.5 MILLIGRAM(S): 8 TABLET, FILM COATED, EXTENDED RELEASE ORAL at 16:46

## 2021-03-28 RX ADMIN — OXYCODONE HYDROCHLORIDE 5 MILLIGRAM(S): 5 TABLET ORAL at 04:17

## 2021-03-28 RX ADMIN — OXYCODONE HYDROCHLORIDE 5 MILLIGRAM(S): 5 TABLET ORAL at 05:17

## 2021-03-28 RX ADMIN — Medication 25 MILLIGRAM(S): at 09:14

## 2021-03-28 NOTE — CHART NOTE - NSCHARTNOTEFT_GEN_A_CORE
Midline Abdominal wound dressing changed  Wound NT. No active drainage or bleeding, no pus noted. Staple lines mildly erythematous  Cont TID and prn dressing changes
pt seen and examined at bedside for dressing change. purulent discharge between two staple in two spot noted. no active discharge. Dressing changed with 4x4 and abd pad. Pt also found irregular heart rate . S/P EKG found Afib with some PVC HR@95. Spoke with Dr. Don garrett for Chronic Condition. No intervention at present.
pts repeated cbc                          6.6    13.54 )-----------( 305      ( 19 Mar 2021 21:50 )             19.7     after 2 units resulted.  Pt has also received platelets.  Pts seen and examined resting comfortably states he feels better.  Has had 2 dark bloody BW's tonight.  Denies abd pain.    ICU Vital Signs Last 24 Hrs  T(C): 36.4 (19 Mar 2021 21:40), Max: 37.1 (19 Mar 2021 04:59)  T(F): 97.6 (19 Mar 2021 21:40), Max: 98.7 (19 Mar 2021 04:59)  HR: 140 (19 Mar 2021 21:40) (0 - 142)  BP: 121/44 (19 Mar 2021 21:40) (83/44 - 125/70)  BP(mean): 64 (19 Mar 2021 21:40) (51 - 105)  ABP: --  ABP(mean): --  RR: 24 (19 Mar 2021 21:40) (12 - 28)  SpO2: 95% (19 Mar 2021 21:40) (95% - 100%)      PE: looks pale  Heat: s1,s2  lungs: clear  abd: soft, mild distention, nontender, staples in place no redness or discharge      Plan: will give 2 more units of PRBC and follow up CBC    discussed with Dr. Ochoa
pt seen and examined at bedside for dressing change. Purulent discharge and serous fluid seen where staples removed in two areas and at inferior aspect of wound. Wound cleaned with sterile water and dressing changed with 4x4s, ABD pad and tape. pt was left in stable condition.

## 2021-03-28 NOTE — PROGRESS NOTE ADULT - SUBJECTIVE AND OBJECTIVE BOX
Subjective: HD#10    Objective: GIB    Heent: N/C, AT PER no scleral icterus, No JVD  Pul: clear  Cor: IRR  Abdomen: soft, normal BS. Wound - serous d/c, but less  Extremities: without edema, motor/sensory intact                                  9.3    6.84  )-----------( 375      ( 28 Mar 2021 07:47 )             28.9

## 2021-03-29 ENCOUNTER — TRANSCRIPTION ENCOUNTER (OUTPATIENT)
Age: 86
End: 2021-03-29

## 2021-03-29 VITALS
RESPIRATION RATE: 18 BRPM | DIASTOLIC BLOOD PRESSURE: 66 MMHG | TEMPERATURE: 99 F | SYSTOLIC BLOOD PRESSURE: 112 MMHG | OXYGEN SATURATION: 94 % | HEART RATE: 95 BPM

## 2021-03-29 RX ADMIN — Medication 25 MILLIGRAM(S): at 10:49

## 2021-03-29 RX ADMIN — ROPINIROLE 1 MILLIGRAM(S): 8 TABLET, FILM COATED, EXTENDED RELEASE ORAL at 10:49

## 2021-03-29 NOTE — DISCHARGE NOTE PROVIDER - NSDCMRMEDTOKEN_GEN_ALL_CORE_FT
amLODIPine 5 mg oral tablet: 1 tab(s) orally once a day  meclizine 25 mg oral tablet: 1 tab(s) orally once a day  Metoprolol Tartrate 25 mg oral tablet: 1/2  tab(s) orally 2 times a day  rOPINIRole 0.5 mg oral tablet: 2 tab(s) orally once a day (in the morning)  rOPINIRole 0.5 mg oral tablet: 1 tab(s) orally 2 times a day

## 2021-03-29 NOTE — DISCHARGE NOTE PROVIDER - NSDCCPCAREPLAN_GEN_ALL_CORE_FT
PRINCIPAL DISCHARGE DIAGNOSIS  Diagnosis: Lower GI bleed  Assessment and Plan of Treatment:       SECONDARY DISCHARGE DIAGNOSES  Diagnosis: Hemorrhagic shock  Assessment and Plan of Treatment:

## 2021-03-29 NOTE — DISCHARGE NOTE PROVIDER - CARE PROVIDER_API CALL
Cornelio Ochoa)  Surgery; Vascular Surgery  [facility], [address] [place] [address]  Phone: [phone]  Fax: [phone]  Follow Up Time:

## 2021-03-29 NOTE — DISCHARGE NOTE NURSING/CASE MANAGEMENT/SOCIAL WORK - NSDCVIVACCINE_GEN_ALL_CORE_FT
Severe acute respiratory syndrome coronavirus 2 (SARS-CoV-2) (Coronavirus disease [COVID-19]) vaccine , 2021/3/15 12:03 , Annita MelloRN)

## 2021-03-29 NOTE — PROGRESS NOTE ADULT - REASON FOR ADMISSION
GI Bleed

## 2021-03-29 NOTE — DISCHARGE NOTE NURSING/CASE MANAGEMENT/SOCIAL WORK - NSDCCRNAME_GEN_ALL_CORE_FT
yellow discharge planning folder provided to patient including community resources and private hire list

## 2021-03-29 NOTE — PROGRESS NOTE ADULT - PROVIDER SPECIALTY LIST ADULT
Surgery
Critical Care
Gastroenterology
Gastroenterology
Surgery
Surgery
Critical Care
Surgery
Critical Care

## 2021-03-29 NOTE — DISCHARGE NOTE PROVIDER - NSDCFUADDINST_GEN_ALL_CORE_FT
dressing Change: Dressing changes with 4x4 dry gauge and cover with tape. Change 3 times a day. dressing Change: Dressing changes clean with Sterile water and cover with 4x4 dry gauge and and tape. Change 3 times a day.

## 2021-03-29 NOTE — DISCHARGE NOTE PROVIDER - NSDCFUSCHEDAPPT_GEN_ALL_CORE_FT
AURORA SIBLEY ; 04/12/2021 ; [facility] [place] [address] [facility] AURORA SIBLEY ; 03/31/2021 ; N[facility] [place] [address] ; 04/12/2021 ; N[facility] [place] [address] AURORA SIBLEY ; 04/01/2021 ; [facility] [place] [address] ; 04/02/2021 ; [facility] [place] [address]  AURORA SIBLEY ; 04/12/2021 ; N[facility] [place] [address]  AURORA SIBLEY ; 04/27/2021 ; [facility] [address]

## 2021-03-29 NOTE — DISCHARGE NOTE PROVIDER - HOSPITAL COURSE
Placed on anticoagulants post op due to pathology and intra-abd surgery. Post op bleed. Stopped post transfusion and cessation of meds.  Tolerated po. Dssg change at home tid. See me Wednesday. Placed on anticoagulants post op due to pathology and intra-abd surgery. Post op bleed. GI Bleed/Acute blood loss anemia associated with anastomotic ulcer that was likely due to previous right hemicolectomy. Stopped post transfusion and cessation of meds.  Tolerated po. Dssg change at home tid. See me Wednesday.

## 2021-03-29 NOTE — PROGRESS NOTE ADULT - SUBJECTIVE AND OBJECTIVE BOX
Subjective: HD#11    Objective: GIB    Heent: N/C, AT PER no scleral icterus, No JVD  Pul: clear  Cor: IRR  Abdomen: soft, normal BS. Wound - serous d/c continues. Alb up. Cont to encourage po  Extremities: without edema, motor/sensory intact    03-28    140  |  110<H>  |  7   ----------------------------<  74  4.5   |  26  |  0.91    Ca    8.0<L>      28 Mar 2021 07:47    TPro  5.5<L>  /  Alb  1.9<L>  /  TBili  0.4  /  DBili  x   /  AST  30  /  ALT  34  /  AlkPhos  82  03-28                            9.3    6.84  )-----------( 375      ( 28 Mar 2021 07:47 )             28.9

## 2021-03-29 NOTE — DISCHARGE NOTE NURSING/CASE MANAGEMENT/SOCIAL WORK - PATIENT PORTAL LINK FT
You can access the FollowMyHealth Patient Portal offered by [facility] by registering at the following website: [email or web address]. By joining [employer]’s FollowMyHealth portal, you will also be able to view your health information using other applications (apps) compatible with our system.

## 2021-03-30 ENCOUNTER — NON-APPOINTMENT (OUTPATIENT)
Age: 86
End: 2021-03-30

## 2021-03-30 ENCOUNTER — APPOINTMENT (OUTPATIENT)
Dept: SURGERY | Facility: CLINIC | Age: 86
End: 2021-03-30
Payer: MEDICARE

## 2021-03-30 ENCOUNTER — APPOINTMENT (OUTPATIENT)
Dept: FAMILY MEDICINE | Facility: CLINIC | Age: 86
End: 2021-03-30
Payer: MEDICARE

## 2021-03-30 VITALS
RESPIRATION RATE: 16 BRPM | DIASTOLIC BLOOD PRESSURE: 74 MMHG | OXYGEN SATURATION: 96 % | SYSTOLIC BLOOD PRESSURE: 128 MMHG | BODY MASS INDEX: 27.4 KG/M2 | HEART RATE: 74 BPM | TEMPERATURE: 98 F | WEIGHT: 185 LBS | HEIGHT: 69 IN

## 2021-03-30 PROCEDURE — 99495 TRANSJ CARE MGMT MOD F2F 14D: CPT

## 2021-03-30 PROCEDURE — 99072 ADDL SUPL MATRL&STAF TM PHE: CPT

## 2021-03-30 PROCEDURE — 99024 POSTOP FOLLOW-UP VISIT: CPT

## 2021-03-30 RX ORDER — ASPIRIN 81 MG/1
81 TABLET, COATED ORAL
Qty: 30 | Refills: 0 | Status: DISCONTINUED | COMMUNITY
Start: 2021-02-02 | End: 2021-03-30

## 2021-03-30 RX ORDER — FLUCONAZOLE 200 MG/1
200 TABLET ORAL
Qty: 1 | Refills: 0 | Status: COMPLETED | COMMUNITY
Start: 2020-11-10 | End: 2021-03-30

## 2021-03-30 RX ORDER — HYDROCORTISONE 1 %
12 CREAM (GRAM) TOPICAL
Qty: 1 | Refills: 5 | Status: COMPLETED | COMMUNITY
Start: 2021-01-18 | End: 2021-03-30

## 2021-03-30 RX ORDER — NICOTINE POLACRILEX 2 MG
GUM BUCCAL
Refills: 0 | Status: COMPLETED | COMMUNITY
End: 2021-03-30

## 2021-03-30 RX ORDER — DOXYCYCLINE HYCLATE 100 MG/1
100 TABLET ORAL
Qty: 20 | Refills: 0 | Status: COMPLETED | COMMUNITY
Start: 2020-06-19 | End: 2021-03-30

## 2021-03-30 RX ORDER — TRIAMCINOLONE ACETONIDE 0.25 MG/G
0.03 OINTMENT TOPICAL
Qty: 15 | Refills: 0 | Status: COMPLETED | COMMUNITY
Start: 2020-11-16 | End: 2021-03-30

## 2021-03-30 RX ORDER — TRIAMCINOLONE ACETONIDE 0.25 MG/G
0.03 CREAM TOPICAL
Qty: 80 | Refills: 0 | Status: COMPLETED | COMMUNITY
Start: 2020-10-20 | End: 2021-03-30

## 2021-03-30 RX ORDER — AZITHROMYCIN 250 MG/1
250 TABLET, FILM COATED ORAL
Qty: 1 | Refills: 0 | Status: COMPLETED | COMMUNITY
Start: 2021-02-15 | End: 2021-03-30

## 2021-03-30 RX ORDER — TRIAMCINOLONE ACETONIDE 1 MG/G
0.1 CREAM TOPICAL
Qty: 1 | Refills: 2 | Status: COMPLETED | COMMUNITY
Start: 2020-12-02 | End: 2021-03-30

## 2021-03-30 RX ORDER — ECONAZOLE NITRATE 10 MG/G
1 CREAM TOPICAL
Qty: 85 | Refills: 0 | Status: COMPLETED | COMMUNITY
Start: 2020-10-27 | End: 2021-03-30

## 2021-03-30 RX ORDER — BETAMETHASONE DIPROPIONATE 0.5 MG/G
0.05 OINTMENT TOPICAL
Qty: 1 | Refills: 3 | Status: COMPLETED | COMMUNITY
Start: 2020-12-23 | End: 2021-03-30

## 2021-03-30 RX ORDER — TRIAMCINOLONE ACETONIDE 1 MG/G
0.1 OINTMENT TOPICAL
Qty: 1 | Refills: 2 | Status: COMPLETED | COMMUNITY
Start: 2021-01-04 | End: 2021-03-30

## 2021-03-30 NOTE — REVIEW OF SYSTEMS
[Shortness Of Breath] : no shortness of breath [Wheezing] : wheezing [Cough] : cough [Dyspnea on Exertion] : not dyspnea on exertion [Easy Bleeding] : easy bleeding [Easy Bruising] : easy bruising [Negative] : Psychiatric

## 2021-03-30 NOTE — HISTORY OF PRESENT ILLNESS
[de-identified] : Mucinous tumor of the appendix. Post op complication: Anastomotic bleed. Wound closing Cont 3x/d dssg change.

## 2021-03-30 NOTE — ASSESSMENT
[FreeTextEntry1] : Status post colectomy\par Rectal bleeding hemorrhagic shock\par Hemoglobin was 9.3 on discharge.\par Low-grade appendiceal mucinous carcinoma.  Schedule oncologist appointment in 3 to 4 weeks.\par Follow-up in 4 weeks.\par

## 2021-03-30 NOTE — HISTORY OF PRESENT ILLNESS
[Post-hospitalization from ___ Hospital] : Post-hospitalization from [unfilled] Hospital [Admitted on: ___] : The patient was admitted on [unfilled] [Discharged on ___] : discharged on [unfilled] [Pertinent Labs] : pertinent labs [Discharge Med List] : discharge medication list [Patient Contacted By: ____] : and contacted by [unfilled] [FreeTextEntry2] : Admitted to the hospital with rectal bleeding noted to be in hemorrhagic shock.\par Seen by surgeon Dr. Ochoa today.\par Some discharge noted around incision.  Site was opened up.  Dressing done by surgeon today.

## 2021-03-30 NOTE — PHYSICAL EXAM
[Normal Sclera/Conjunctiva] : normal sclera/conjunctiva [Normal Rate] : normal rate  [Normal S1, S2] : normal S1 and S2 [No Rash] : no rash [Normal] : affect was normal and insight and judgment were intact [de-identified] : Open area of surgical incision.  Around the umbilicus.

## 2021-03-31 ENCOUNTER — APPOINTMENT (OUTPATIENT)
Dept: SURGERY | Facility: CLINIC | Age: 86
End: 2021-03-31
Payer: MEDICARE

## 2021-03-31 VITALS
DIASTOLIC BLOOD PRESSURE: 56 MMHG | TEMPERATURE: 98.3 F | OXYGEN SATURATION: 98 % | RESPIRATION RATE: 14 BRPM | SYSTOLIC BLOOD PRESSURE: 94 MMHG | HEART RATE: 84 BPM | HEIGHT: 69 IN

## 2021-03-31 PROCEDURE — 99024 POSTOP FOLLOW-UP VISIT: CPT

## 2021-03-31 NOTE — PHYSICAL EXAM
[JVD] : no jugular venous distention  [Normal Breath Sounds] : Normal breath sounds [Abdominal Masses] : No abdominal masses [Alert] : alert [Oriented to Person] : oriented to person [Oriented to Place] : oriented to place [Oriented to Time] : oriented to time [de-identified] : NAD [de-identified] : NC/AT PER [de-identified] : midline wound - . RLQ wound almost closed.

## 2021-04-01 ENCOUNTER — APPOINTMENT (OUTPATIENT)
Dept: SURGERY | Facility: CLINIC | Age: 86
End: 2021-04-01
Payer: MEDICARE

## 2021-04-01 VITALS
SYSTOLIC BLOOD PRESSURE: 102 MMHG | BODY MASS INDEX: 27.4 KG/M2 | DIASTOLIC BLOOD PRESSURE: 60 MMHG | HEIGHT: 69 IN | TEMPERATURE: 98 F | WEIGHT: 185 LBS

## 2021-04-01 PROCEDURE — 99024 POSTOP FOLLOW-UP VISIT: CPT

## 2021-04-01 RX ORDER — ENOXAPARIN SODIUM 100 MG/ML
40 INJECTION SUBCUTANEOUS
Qty: 4 | Refills: 0 | Status: DISCONTINUED | COMMUNITY
Start: 2021-03-15

## 2021-04-01 RX ORDER — AMLODIPINE BESYLATE 5 MG/1
5 TABLET ORAL
Qty: 135 | Refills: 1 | Status: DISCONTINUED | COMMUNITY
End: 2021-04-01

## 2021-04-02 ENCOUNTER — APPOINTMENT (OUTPATIENT)
Dept: SURGERY | Facility: CLINIC | Age: 86
End: 2021-04-02
Payer: MEDICARE

## 2021-04-02 VITALS
DIASTOLIC BLOOD PRESSURE: 64 MMHG | TEMPERATURE: 97.7 F | WEIGHT: 185 LBS | RESPIRATION RATE: 16 BRPM | HEART RATE: 73 BPM | BODY MASS INDEX: 27.4 KG/M2 | HEIGHT: 69 IN | OXYGEN SATURATION: 98 % | SYSTOLIC BLOOD PRESSURE: 102 MMHG

## 2021-04-02 PROCEDURE — 99024 POSTOP FOLLOW-UP VISIT: CPT

## 2021-04-04 DIAGNOSIS — H91.90 UNSPECIFIED HEARING LOSS, UNSPECIFIED EAR: ICD-10-CM

## 2021-04-04 DIAGNOSIS — Z79.82 LONG TERM (CURRENT) USE OF ASPIRIN: ICD-10-CM

## 2021-04-04 DIAGNOSIS — Z88.0 ALLERGY STATUS TO PENICILLIN: ICD-10-CM

## 2021-04-04 DIAGNOSIS — N17.9 ACUTE KIDNEY FAILURE, UNSPECIFIED: ICD-10-CM

## 2021-04-04 DIAGNOSIS — G20 PARKINSON'S DISEASE: ICD-10-CM

## 2021-04-04 DIAGNOSIS — K28.4 CHRONIC OR UNSPECIFIED GASTROJEJUNAL ULCER WITH HEMORRHAGE: ICD-10-CM

## 2021-04-04 DIAGNOSIS — I10 ESSENTIAL (PRIMARY) HYPERTENSION: ICD-10-CM

## 2021-04-04 DIAGNOSIS — N28.1 CYST OF KIDNEY, ACQUIRED: ICD-10-CM

## 2021-04-04 DIAGNOSIS — E87.2 ACIDOSIS: ICD-10-CM

## 2021-04-04 DIAGNOSIS — K57.90 DIVERTICULOSIS OF INTESTINE, PART UNSPECIFIED, WITHOUT PERFORATION OR ABSCESS WITHOUT BLEEDING: ICD-10-CM

## 2021-04-04 DIAGNOSIS — R57.8 OTHER SHOCK: ICD-10-CM

## 2021-04-04 DIAGNOSIS — D62 ACUTE POSTHEMORRHAGIC ANEMIA: ICD-10-CM

## 2021-04-04 DIAGNOSIS — B02.9 ZOSTER WITHOUT COMPLICATIONS: ICD-10-CM

## 2021-04-04 DIAGNOSIS — I95.9 HYPOTENSION, UNSPECIFIED: ICD-10-CM

## 2021-04-04 DIAGNOSIS — N20.0 CALCULUS OF KIDNEY: ICD-10-CM

## 2021-04-04 DIAGNOSIS — K91.89 OTHER POSTPROCEDURAL COMPLICATIONS AND DISORDERS OF DIGESTIVE SYSTEM: ICD-10-CM

## 2021-04-04 DIAGNOSIS — I48.91 UNSPECIFIED ATRIAL FIBRILLATION: ICD-10-CM

## 2021-04-04 DIAGNOSIS — R60.1 GENERALIZED EDEMA: ICD-10-CM

## 2021-04-05 ENCOUNTER — APPOINTMENT (OUTPATIENT)
Dept: FAMILY MEDICINE | Facility: CLINIC | Age: 86
End: 2021-04-05
Payer: MEDICARE

## 2021-04-05 ENCOUNTER — APPOINTMENT (OUTPATIENT)
Dept: SURGERY | Facility: CLINIC | Age: 86
End: 2021-04-05
Payer: MEDICARE

## 2021-04-05 VITALS
WEIGHT: 185 LBS | SYSTOLIC BLOOD PRESSURE: 108 MMHG | BODY MASS INDEX: 27.4 KG/M2 | DIASTOLIC BLOOD PRESSURE: 62 MMHG | OXYGEN SATURATION: 97 % | HEIGHT: 69 IN | TEMPERATURE: 98.1 F | RESPIRATION RATE: 16 BRPM | HEART RATE: 80 BPM

## 2021-04-05 DIAGNOSIS — E88.09 OTHER DISORDERS OF PLASMA-PROTEIN METABOLISM, NOT ELSEWHERE CLASSIFIED: ICD-10-CM

## 2021-04-05 PROCEDURE — 99072 ADDL SUPL MATRL&STAF TM PHE: CPT

## 2021-04-05 PROCEDURE — 99214 OFFICE O/P EST MOD 30 MIN: CPT

## 2021-04-05 PROCEDURE — 99024 POSTOP FOLLOW-UP VISIT: CPT

## 2021-04-05 NOTE — HISTORY OF PRESENT ILLNESS
[FreeTextEntry1] : follow up [de-identified] : Mr. AURORA SIBLEY is a 85 year old male presenting for a follow up\par s/p Colectomy admission for Rectal bleed and severe anemia.\par Hypotension off Amlodipine \par Metoprolol did not take.\par Diet is poor, reports he cannot eat the food that his wife makes\par Low protein/ albumin. taking protein shakes once a day that his wife makes.\par \par He had AAA repair by Dr Martinez \par Was on Plavix was discontinued by Vascular surgeon because of extensive ecchymosis. Now on Aspirin 81 mg.\par a fib off Coumadin for 3 months\par Seen by Cardiologist Dr Tamayo\par CHADS score 3.\par Mucocele appendix has not followed up with surgeon\par Nasal discharge wants Flonase renewed.

## 2021-04-05 NOTE — ASSESSMENT
[FreeTextEntry1] : Status post colectomy\par Rectal bleeding hemorrhagic shock\par Hemoglobin was 9.3 on discharge.\par Low-grade appendiceal mucinous carcinoma.  Schedule oncologist appointment in 3 to 4 weeks.\par \par Hypotension off Amlodipine \par Metoprolol did not take.\par Diet is poor, reports he cannot eat the food that his wife makes\par Low protein/ albumin. taking protein shakes once a day that his wife makes.\par advised to increase food intake and increase protein in Diet\par A Fib: Resume metoprolol 1/2 BID\par Follow-up in 4-6 weeks.

## 2021-04-05 NOTE — PHYSICAL EXAM
[Normal Sclera/Conjunctiva] : normal sclera/conjunctiva [Normal Rate] : normal rate  [Normal S1, S2] : normal S1 and S2 [No Rash] : no rash [Normal] : affect was normal and insight and judgment were intact [de-identified] : Open area of surgical incision.  Around the umbilicus.

## 2021-04-08 ENCOUNTER — APPOINTMENT (OUTPATIENT)
Dept: SURGERY | Facility: CLINIC | Age: 86
End: 2021-04-08
Payer: MEDICARE

## 2021-04-08 VITALS
RESPIRATION RATE: 16 BRPM | BODY MASS INDEX: 26.81 KG/M2 | WEIGHT: 181 LBS | HEIGHT: 69 IN | SYSTOLIC BLOOD PRESSURE: 106 MMHG | DIASTOLIC BLOOD PRESSURE: 60 MMHG | TEMPERATURE: 97.9 F

## 2021-04-08 PROCEDURE — 99024 POSTOP FOLLOW-UP VISIT: CPT

## 2021-04-09 ENCOUNTER — APPOINTMENT (OUTPATIENT)
Dept: SURGERY | Facility: CLINIC | Age: 86
End: 2021-04-09
Payer: MEDICARE

## 2021-04-09 PROCEDURE — 99024 POSTOP FOLLOW-UP VISIT: CPT

## 2021-04-09 NOTE — PHYSICAL EXAM
[de-identified] : Wound closing nicely. Surface clean. Treated with silver nitrate.\par F/U tomorrow at 10.

## 2021-04-12 ENCOUNTER — APPOINTMENT (OUTPATIENT)
Age: 86
End: 2021-04-12
Payer: MEDICARE

## 2021-04-12 ENCOUNTER — APPOINTMENT (OUTPATIENT)
Dept: VASCULAR SURGERY | Facility: CLINIC | Age: 86
End: 2021-04-12

## 2021-04-12 VITALS
HEART RATE: 73 BPM | RESPIRATION RATE: 14 BRPM | BODY MASS INDEX: 26.81 KG/M2 | SYSTOLIC BLOOD PRESSURE: 100 MMHG | OXYGEN SATURATION: 97 % | DIASTOLIC BLOOD PRESSURE: 64 MMHG | HEIGHT: 69 IN | WEIGHT: 181 LBS | TEMPERATURE: 98.3 F

## 2021-04-12 PROCEDURE — 99024 POSTOP FOLLOW-UP VISIT: CPT

## 2021-04-15 ENCOUNTER — APPOINTMENT (OUTPATIENT)
Dept: SURGERY | Facility: CLINIC | Age: 86
End: 2021-04-15
Payer: MEDICARE

## 2021-04-15 PROCEDURE — 99024 POSTOP FOLLOW-UP VISIT: CPT

## 2021-04-16 ENCOUNTER — APPOINTMENT (OUTPATIENT)
Age: 86
End: 2021-04-16
Payer: MEDICARE

## 2021-04-16 PROCEDURE — 99024 POSTOP FOLLOW-UP VISIT: CPT

## 2021-04-21 ENCOUNTER — APPOINTMENT (OUTPATIENT)
Age: 86
End: 2021-04-21
Payer: MEDICARE

## 2021-04-21 VITALS
TEMPERATURE: 98.7 F | DIASTOLIC BLOOD PRESSURE: 70 MMHG | BODY MASS INDEX: 25.91 KG/M2 | HEART RATE: 75 BPM | HEIGHT: 70 IN | RESPIRATION RATE: 14 BRPM | OXYGEN SATURATION: 98 % | SYSTOLIC BLOOD PRESSURE: 120 MMHG | WEIGHT: 181 LBS

## 2021-04-21 PROCEDURE — 99024 POSTOP FOLLOW-UP VISIT: CPT

## 2021-04-21 NOTE — PHYSICAL EXAM
[JVD] : no jugular venous distention  [Normal Thyroid] : the thyroid was normal [Normal Breath Sounds] : Normal breath sounds [Abdominal Masses] : No abdominal masses [de-identified] : NAD [de-identified] : NC/AT PER [de-identified] : Wound continues to close

## 2021-04-23 ENCOUNTER — APPOINTMENT (OUTPATIENT)
Age: 86
End: 2021-04-23
Payer: MEDICARE

## 2021-04-23 VITALS
HEART RATE: 73 BPM | WEIGHT: 181 LBS | BODY MASS INDEX: 25.91 KG/M2 | RESPIRATION RATE: 16 BRPM | HEIGHT: 70 IN | OXYGEN SATURATION: 97 % | SYSTOLIC BLOOD PRESSURE: 112 MMHG | DIASTOLIC BLOOD PRESSURE: 82 MMHG | TEMPERATURE: 97.6 F

## 2021-04-23 PROCEDURE — 99024 POSTOP FOLLOW-UP VISIT: CPT

## 2021-04-26 ENCOUNTER — APPOINTMENT (OUTPATIENT)
Age: 86
End: 2021-04-26
Payer: MEDICARE

## 2021-04-26 VITALS
HEART RATE: 82 BPM | TEMPERATURE: 98.2 F | HEIGHT: 70 IN | RESPIRATION RATE: 16 BRPM | SYSTOLIC BLOOD PRESSURE: 112 MMHG | WEIGHT: 181 LBS | BODY MASS INDEX: 25.91 KG/M2 | OXYGEN SATURATION: 98 % | DIASTOLIC BLOOD PRESSURE: 78 MMHG

## 2021-04-26 PROCEDURE — 99024 POSTOP FOLLOW-UP VISIT: CPT

## 2021-04-27 ENCOUNTER — APPOINTMENT (OUTPATIENT)
Dept: FAMILY MEDICINE | Facility: CLINIC | Age: 86
End: 2021-04-27
Payer: MEDICARE

## 2021-04-27 VITALS
WEIGHT: 182 LBS | OXYGEN SATURATION: 97 % | DIASTOLIC BLOOD PRESSURE: 74 MMHG | SYSTOLIC BLOOD PRESSURE: 122 MMHG | HEIGHT: 70 IN | TEMPERATURE: 98.3 F | HEART RATE: 77 BPM | BODY MASS INDEX: 26.05 KG/M2 | RESPIRATION RATE: 16 BRPM

## 2021-04-27 DIAGNOSIS — J30.2 OTHER SEASONAL ALLERGIC RHINITIS: ICD-10-CM

## 2021-04-27 DIAGNOSIS — R77.0 ABNORMALITY OF ALBUMIN: ICD-10-CM

## 2021-04-27 DIAGNOSIS — T81.19XD: ICD-10-CM

## 2021-04-27 PROCEDURE — 99072 ADDL SUPL MATRL&STAF TM PHE: CPT

## 2021-04-27 PROCEDURE — 99214 OFFICE O/P EST MOD 30 MIN: CPT | Mod: 25

## 2021-04-27 PROCEDURE — 36415 COLL VENOUS BLD VENIPUNCTURE: CPT

## 2021-04-27 NOTE — PHYSICAL EXAM
[Normal Sclera/Conjunctiva] : normal sclera/conjunctiva [Normal Rate] : normal rate  [Normal S1, S2] : normal S1 and S2 [No Rash] : no rash [Normal] : affect was normal and insight and judgment were intact [de-identified] : Dressing in place, healing surgical wound.

## 2021-04-27 NOTE — HISTORY OF PRESENT ILLNESS
[FreeTextEntry1] : follow up [de-identified] : Mr. AURORA SIBLEY is a 85 year old male presenting for a follow up\par s/p Colectomy, Rectal bleed and severe anemia.\par Hypotension resolved. On metorprolol.\par Seasonal allergies requesting Flonase\par Diet has improved but states he has to chop the meat.\par  Low protein/ albumin. taking protein shakes once a day that his wife makes.\par \par He had AAA repair by Dr Martinez \par Was on Plavix was discontinued by Vascular surgeon because of extensive ecchymosis. Now on Aspirin 81 mg.\par a fib off Coumadin for 3 months\par Seen by Cardiologist Dr Tamayo\par CHADS score 3.\par Mucocele appendix regular follow up for wound care. last visit yesterday.

## 2021-04-27 NOTE — REVIEW OF SYSTEMS
[Easy Bleeding] : easy bleeding [Easy Bruising] : easy bruising [Negative] : Psychiatric [Shortness Of Breath] : no shortness of breath [Wheezing] : no wheezing [Cough] : no cough [Dyspnea on Exertion] : not dyspnea on exertion

## 2021-04-27 NOTE — ASSESSMENT
[FreeTextEntry1] : Status post colectomy\par Rectal bleeding hemorrhagic shock\par Hemoglobin was 9.3 on discharge.\par Low-grade appendiceal mucinous carcinoma.  Schedule oncologist appointment in 3 to 4 weeks.\par \par Hypotension resolved, off Amlodipine \par Metoprolol 1/2 bid\par Low Albumin: Diet improved\par Low protein/ albumin. taking protein shakes once a day that his wife makes.\par Check labs in office today\par A Fib:  metoprolol 1/2 BID\par Follow-up in 4-6 weeks.\par \par Seasonal allergies requesting Flonase. rx renewed

## 2021-04-28 LAB
ALBUMIN SERPL ELPH-MCNC: 3.8 G/DL
ALP BLD-CCNC: 65 U/L
ALT SERPL-CCNC: 12 U/L
ANION GAP SERPL CALC-SCNC: 10 MMOL/L
AST SERPL-CCNC: 16 U/L
BASOPHILS # BLD AUTO: 0.04 K/UL
BASOPHILS NFR BLD AUTO: 0.8 %
BILIRUB SERPL-MCNC: 0.3 MG/DL
BUN SERPL-MCNC: 9 MG/DL
CALCIUM SERPL-MCNC: 9.2 MG/DL
CHLORIDE SERPL-SCNC: 106 MMOL/L
CO2 SERPL-SCNC: 26 MMOL/L
CREAT SERPL-MCNC: 1.17 MG/DL
EOSINOPHIL # BLD AUTO: 0.2 K/UL
EOSINOPHIL NFR BLD AUTO: 3.8 %
FERRITIN SERPL-MCNC: 140 NG/ML
FOLATE SERPL-MCNC: 4.7 NG/ML
GLUCOSE SERPL-MCNC: 100 MG/DL
HCT VFR BLD CALC: 35.8 %
HGB BLD-MCNC: 10.9 G/DL
IMM GRANULOCYTES NFR BLD AUTO: 0.2 %
IRON SATN MFR SERPL: 13 %
IRON SERPL-MCNC: 34 UG/DL
LYMPHOCYTES # BLD AUTO: 0.79 K/UL
LYMPHOCYTES NFR BLD AUTO: 15.1 %
MAN DIFF?: NORMAL
MCHC RBC-ENTMCNC: 28.2 PG
MCHC RBC-ENTMCNC: 30.4 GM/DL
MCV RBC AUTO: 92.5 FL
MONOCYTES # BLD AUTO: 0.43 K/UL
MONOCYTES NFR BLD AUTO: 8.2 %
NEUTROPHILS # BLD AUTO: 3.77 K/UL
NEUTROPHILS NFR BLD AUTO: 71.9 %
PLATELET # BLD AUTO: 332 K/UL
POTASSIUM SERPL-SCNC: 4 MMOL/L
PROT SERPL-MCNC: 6.7 G/DL
RBC # BLD: 3.87 M/UL
RBC # FLD: 15 %
SODIUM SERPL-SCNC: 142 MMOL/L
TIBC SERPL-MCNC: 272 UG/DL
UIBC SERPL-MCNC: 237 UG/DL
VIT B12 SERPL-MCNC: 333 PG/ML
WBC # FLD AUTO: 5.24 K/UL

## 2021-04-29 ENCOUNTER — APPOINTMENT (OUTPATIENT)
Dept: SURGERY | Facility: CLINIC | Age: 86
End: 2021-04-29
Payer: MEDICARE

## 2021-04-29 VITALS
RESPIRATION RATE: 16 BRPM | DIASTOLIC BLOOD PRESSURE: 80 MMHG | HEIGHT: 70 IN | HEART RATE: 87 BPM | OXYGEN SATURATION: 97 % | WEIGHT: 182 LBS | BODY MASS INDEX: 26.05 KG/M2 | TEMPERATURE: 98.4 F | SYSTOLIC BLOOD PRESSURE: 124 MMHG

## 2021-04-29 PROCEDURE — 99024 POSTOP FOLLOW-UP VISIT: CPT

## 2021-05-04 ENCOUNTER — APPOINTMENT (OUTPATIENT)
Dept: SURGERY | Facility: CLINIC | Age: 86
End: 2021-05-04
Payer: MEDICARE

## 2021-05-04 VITALS
WEIGHT: 182 LBS | HEIGHT: 70 IN | OXYGEN SATURATION: 96 % | TEMPERATURE: 97.6 F | RESPIRATION RATE: 16 BRPM | DIASTOLIC BLOOD PRESSURE: 80 MMHG | BODY MASS INDEX: 26.05 KG/M2 | HEART RATE: 73 BPM | SYSTOLIC BLOOD PRESSURE: 112 MMHG

## 2021-05-04 PROCEDURE — 99024 POSTOP FOLLOW-UP VISIT: CPT

## 2021-05-11 ENCOUNTER — APPOINTMENT (OUTPATIENT)
Dept: SURGERY | Facility: CLINIC | Age: 86
End: 2021-05-11

## 2021-05-11 VITALS
HEART RATE: 110 BPM | RESPIRATION RATE: 15 BRPM | OXYGEN SATURATION: 96 % | SYSTOLIC BLOOD PRESSURE: 116 MMHG | TEMPERATURE: 97.8 F | BODY MASS INDEX: 26.05 KG/M2 | HEIGHT: 70 IN | WEIGHT: 182 LBS | DIASTOLIC BLOOD PRESSURE: 78 MMHG

## 2021-05-11 DIAGNOSIS — K38.8 OTHER SPECIFIED DISEASES OF APPENDIX: ICD-10-CM

## 2021-05-11 NOTE — PHYSICAL EXAM
[JVD] : no jugular venous distention  [Normal Thyroid] : the thyroid was normal [Normal Breath Sounds] : Normal breath sounds [Abdomen Tenderness] : ~T ~M No abdominal tenderness [No Rash or Lesion] : No rash or lesion [Alert] : alert [Oriented to Person] : oriented to person [Calm] : calm [de-identified] : NAD [de-identified] : NC/AT PER [de-identified] : Wound is closed

## 2021-05-24 ENCOUNTER — APPOINTMENT (OUTPATIENT)
Dept: FAMILY MEDICINE | Facility: CLINIC | Age: 86
End: 2021-05-24
Payer: MEDICARE

## 2021-05-24 VITALS
OXYGEN SATURATION: 95 % | BODY MASS INDEX: 26.05 KG/M2 | HEIGHT: 70 IN | SYSTOLIC BLOOD PRESSURE: 134 MMHG | DIASTOLIC BLOOD PRESSURE: 84 MMHG | WEIGHT: 182 LBS | HEART RATE: 94 BPM | RESPIRATION RATE: 15 BRPM | TEMPERATURE: 98 F

## 2021-05-24 DIAGNOSIS — K38.8 OTHER SPECIFIED DISEASES OF APPENDIX: ICD-10-CM

## 2021-05-24 DIAGNOSIS — B36.9 SUPERFICIAL MYCOSIS, UNSPECIFIED: ICD-10-CM

## 2021-05-24 LAB — INR PPP: 1.1 RATIO

## 2021-05-24 PROCEDURE — 85610 PROTHROMBIN TIME: CPT | Mod: QW

## 2021-05-24 PROCEDURE — 99072 ADDL SUPL MATRL&STAF TM PHE: CPT

## 2021-05-24 PROCEDURE — 99214 OFFICE O/P EST MOD 30 MIN: CPT

## 2021-05-24 NOTE — PHYSICAL EXAM
[Normal Sclera/Conjunctiva] : normal sclera/conjunctiva [Normal Rate] : normal rate  [Normal S1, S2] : normal S1 and S2 [Normal] : affect was normal and insight and judgment were intact [de-identified] : Dressing in place, healing surgical wound. [de-identified] : erythematous rash gluteal area

## 2021-05-24 NOTE — ASSESSMENT
[FreeTextEntry1] : Low-grade appendiceal mucinous carcinoma. \par Status post colectomy\par Hemoglobin was 9.3 on discharge. Now 10.9 4/21\par  Schedule oncologist appointment. referral given\par Written instructions with name and phone number.\par \par Hypotension/ A Fib\par Metoprolol increase to 1 tab am 1/2 pm\par Follow up in 6 weeks.\par Low Albumin resolved : Diet improved\par \par Rash ?fungal in Gluteal area\par Rx given\par \par Seasonal allergies requesting Flonase. rx renewed

## 2021-05-24 NOTE — REVIEW OF SYSTEMS
[Shortness Of Breath] : no shortness of breath [Wheezing] : no wheezing [Cough] : no cough [Dyspnea on Exertion] : not dyspnea on exertion [Skin Rash] : skin rash [Easy Bleeding] : easy bleeding [Easy Bruising] : easy bruising [Negative] : Psychiatric [de-identified] : gluteal

## 2021-06-24 ENCOUNTER — APPOINTMENT (OUTPATIENT)
Dept: FAMILY MEDICINE | Facility: CLINIC | Age: 86
End: 2021-06-24
Payer: MEDICARE

## 2021-06-24 VITALS
BODY MASS INDEX: 27.35 KG/M2 | HEIGHT: 70 IN | HEART RATE: 84 BPM | WEIGHT: 191 LBS | RESPIRATION RATE: 16 BRPM | OXYGEN SATURATION: 97 % | TEMPERATURE: 97.4 F | SYSTOLIC BLOOD PRESSURE: 130 MMHG | DIASTOLIC BLOOD PRESSURE: 72 MMHG

## 2021-06-24 PROCEDURE — 99214 OFFICE O/P EST MOD 30 MIN: CPT

## 2021-06-24 PROCEDURE — 99072 ADDL SUPL MATRL&STAF TM PHE: CPT

## 2021-06-24 NOTE — ASSESSMENT
[FreeTextEntry1] : Low-grade appendiceal mucinous carcinoma. \par Status post colectomy\par \par  Schedule oncologist appointment. refuses\par States risk is low and cannot afford the specialist copay.\par \par \par Hypertension/ A Fib\par On Metoprolol\par \par Low Albumin resolved : Diet improved\par \par Rash ?fungal in Gluteal area\par Rx renewed\par states it helps.\par \par

## 2021-06-24 NOTE — PHYSICAL EXAM
[Normal Sclera/Conjunctiva] : normal sclera/conjunctiva [Normal Rate] : normal rate  [Normal S1, S2] : normal S1 and S2 [Normal] : affect was normal and insight and judgment were intact [de-identified] : erythematous rash gluteal area resolving [de-identified] : Dressing in place, healing surgical wound.

## 2021-06-24 NOTE — REVIEW OF SYSTEMS
[Shortness Of Breath] : no shortness of breath [Wheezing] : no wheezing [Cough] : no cough [Dyspnea on Exertion] : not dyspnea on exertion [Skin Rash] : skin rash [Easy Bleeding] : easy bleeding [Easy Bruising] : easy bruising [Negative] : Psychiatric [de-identified] : gluteal

## 2021-06-24 NOTE — HISTORY OF PRESENT ILLNESS
[FreeTextEntry1] : Rx renewal for rash\par  [de-identified] : Mr. AURORA SIBLEY is a 85 year old male presenting for a follow up\par Diarrhea resolved\par s/p Colectomy, Rectal bleed and severe anemia.\par Hypotension resolved. On metorprolol.\par Rash using Clotrimazole cream with Betamethasone\par Wants it refilled\par He had AAA repair by Dr Martinez \par Was on Plavix was discontinued by Vascular surgeon because of extensive ecchymosis. Now on Aspirin 81 mg.\par a fib off Coumadin because of rectal bleeding\par Seen by Cardiologist Dr Tamayo\par CHADS score 3.\par Mucocele appendix low grade carcinoma has not scheduled visit with Oncologist as advised.\par States he cannot afford the copay.\par \par Itchy rash in the gluteal fold. the cream helps

## 2021-07-14 ENCOUNTER — APPOINTMENT (OUTPATIENT)
Dept: FAMILY MEDICINE | Facility: CLINIC | Age: 86
End: 2021-07-14

## 2021-07-19 ENCOUNTER — APPOINTMENT (OUTPATIENT)
Dept: FAMILY MEDICINE | Facility: CLINIC | Age: 86
End: 2021-07-19
Payer: MEDICARE

## 2021-07-19 VITALS
TEMPERATURE: 97.9 F | SYSTOLIC BLOOD PRESSURE: 130 MMHG | BODY MASS INDEX: 27.2 KG/M2 | HEIGHT: 70 IN | DIASTOLIC BLOOD PRESSURE: 80 MMHG | WEIGHT: 190 LBS | RESPIRATION RATE: 16 BRPM | HEART RATE: 74 BPM | OXYGEN SATURATION: 97 %

## 2021-07-19 PROCEDURE — 99072 ADDL SUPL MATRL&STAF TM PHE: CPT

## 2021-07-19 PROCEDURE — 99214 OFFICE O/P EST MOD 30 MIN: CPT

## 2021-07-19 RX ORDER — MECLIZINE HYDROCHLORIDE 25 MG/1
25 TABLET ORAL
Qty: 30 | Refills: 0 | Status: COMPLETED | COMMUNITY
Start: 2019-12-19 | End: 2021-07-19

## 2021-07-19 RX ORDER — ACETAMINOPHEN 325 MG/1
325 TABLET ORAL
Qty: 28 | Refills: 0 | Status: COMPLETED | COMMUNITY
Start: 2021-02-02 | End: 2021-02-24

## 2021-07-19 NOTE — REVIEW OF SYSTEMS
[Shortness Of Breath] : no shortness of breath [Wheezing] : no wheezing [Cough] : no cough [Dyspnea on Exertion] : not dyspnea on exertion [Skin Rash] : skin rash [Easy Bleeding] : easy bleeding [Easy Bruising] : easy bruising [Negative] : Psychiatric [de-identified] : gluteal

## 2021-07-19 NOTE — HISTORY OF PRESENT ILLNESS
[FreeTextEntry8] : painful area in the Left gluteal area for 2-3 days\par Sitting a lot, inactive\par No Fever\par

## 2021-07-19 NOTE — ASSESSMENT
[FreeTextEntry1] : Skin Ulcer with Cellulitis\par Mupirocin topical\par Rx for Levaquin\par see me for recheck in 2-3 days.\par

## 2021-07-19 NOTE — PHYSICAL EXAM
[Normal Sclera/Conjunctiva] : normal sclera/conjunctiva [Normal Rate] : normal rate  [Normal S1, S2] : normal S1 and S2 [No Edema] : there was no peripheral edema [No Joint Swelling] : no joint swelling [Grossly Normal Strength/Tone] : grossly normal strength/tone [Normal] : affect was normal and insight and judgment were intact [de-identified] : ulcer about 1 cm in the Left gluteal area with surrounding erythema

## 2021-07-22 ENCOUNTER — APPOINTMENT (OUTPATIENT)
Dept: FAMILY MEDICINE | Facility: CLINIC | Age: 86
End: 2021-07-22
Payer: MEDICARE

## 2021-07-22 VITALS
SYSTOLIC BLOOD PRESSURE: 124 MMHG | HEIGHT: 70 IN | RESPIRATION RATE: 14 BRPM | DIASTOLIC BLOOD PRESSURE: 78 MMHG | WEIGHT: 190 LBS | HEART RATE: 101 BPM | BODY MASS INDEX: 27.2 KG/M2 | OXYGEN SATURATION: 96 % | TEMPERATURE: 97.6 F

## 2021-07-22 DIAGNOSIS — N52.9 MALE ERECTILE DYSFUNCTION, UNSPECIFIED: ICD-10-CM

## 2021-07-22 DIAGNOSIS — L03.90 CELLULITIS, UNSPECIFIED: ICD-10-CM

## 2021-07-22 PROCEDURE — 99214 OFFICE O/P EST MOD 30 MIN: CPT

## 2021-07-22 PROCEDURE — 99072 ADDL SUPL MATRL&STAF TM PHE: CPT

## 2021-07-22 RX ORDER — TADALAFIL 5 MG/1
5 TABLET ORAL
Qty: 15 | Refills: 5 | Status: ACTIVE | COMMUNITY
Start: 2020-01-16 | End: 1900-01-01

## 2021-07-22 NOTE — ASSESSMENT
[FreeTextEntry1] : Skin Ulcer with Cellulitis\par resolving\par Complete Antibiotic course Levaquin\par Avoid sitting for long periods\par Saline wet to dry dressing\par \par ED: Ishaans Cialis renewed

## 2021-07-22 NOTE — HISTORY OF PRESENT ILLNESS
[FreeTextEntry1] : Follow up [de-identified] : Mr. AURORA SIBLEY is a 85 year old male presenting for a follow up\par Pain and redness resolving \par Ulcer healing well\par Rash using Clotrimazole cream \par \par ED: Wants Cialis renewed

## 2021-07-22 NOTE — PHYSICAL EXAM
[Normal Sclera/Conjunctiva] : normal sclera/conjunctiva [Normal Rate] : normal rate  [Normal S1, S2] : normal S1 and S2 [No Edema] : there was no peripheral edema [No Joint Swelling] : no joint swelling [Grossly Normal Strength/Tone] : grossly normal strength/tone [Normal] : affect was normal and insight and judgment were intact [de-identified] : ulcer about 1 cm in the Left gluteal area erythema resolved

## 2021-07-22 NOTE — REVIEW OF SYSTEMS
[Shortness Of Breath] : no shortness of breath [Wheezing] : no wheezing [Cough] : no cough [Dyspnea on Exertion] : not dyspnea on exertion [Skin Rash] : skin rash [Easy Bleeding] : easy bleeding [Easy Bruising] : easy bruising [Negative] : Psychiatric [de-identified] : gluteal

## 2021-08-11 ENCOUNTER — NON-APPOINTMENT (OUTPATIENT)
Age: 86
End: 2021-08-11

## 2021-08-31 NOTE — H&P ADULT - CLICK TO LAUNCH ORM
[facility]    Date: 2021  Patient Name: Duanne Moritz        CSN: [id number]   : 1946  (70 y.o.)  Gender: female   Areli Gonsales MD,    No admission diagnoses are documented for this encounter. ,      Patient is discharged from Occupational Therapy services at this time. See last note for details related to results of therapy and goal achievement. Reason for discharge: Patient informed therapist that she is having to have an additional surgery on 21 due to a \"tendon coming loose. \"  Tulio Humphries informed patient she could contact therapy department after surgery when therapy orders are received. Valentin Evangelista, OTR/L #[id number] .

## 2021-09-22 ENCOUNTER — APPOINTMENT (OUTPATIENT)
Dept: FAMILY MEDICINE | Facility: CLINIC | Age: 86
End: 2021-09-22
Payer: MEDICARE

## 2021-09-28 ENCOUNTER — APPOINTMENT (OUTPATIENT)
Dept: FAMILY MEDICINE | Facility: CLINIC | Age: 86
End: 2021-09-28
Payer: MEDICARE

## 2021-09-28 VITALS
DIASTOLIC BLOOD PRESSURE: 86 MMHG | HEART RATE: 76 BPM | TEMPERATURE: 97.4 F | OXYGEN SATURATION: 97 % | RESPIRATION RATE: 16 BRPM | WEIGHT: 194 LBS | BODY MASS INDEX: 27.77 KG/M2 | SYSTOLIC BLOOD PRESSURE: 128 MMHG | HEIGHT: 70 IN

## 2021-09-28 PROCEDURE — G0008: CPT

## 2021-09-28 PROCEDURE — 99214 OFFICE O/P EST MOD 30 MIN: CPT | Mod: 25

## 2021-09-28 PROCEDURE — 90662 IIV NO PRSV INCREASED AG IM: CPT

## 2021-09-28 RX ORDER — LEVOFLOXACIN 500 MG/1
500 TABLET, FILM COATED ORAL DAILY
Qty: 7 | Refills: 0 | Status: DISCONTINUED | COMMUNITY
Start: 2021-07-19 | End: 2021-07-28

## 2021-09-28 RX ORDER — MUPIROCIN 20 MG/G
2 OINTMENT TOPICAL TWICE DAILY
Qty: 1 | Refills: 0 | Status: DISCONTINUED | COMMUNITY
Start: 2021-07-19 | End: 2021-08-09

## 2021-09-28 NOTE — REVIEW OF SYSTEMS
[Skin Rash] : skin rash [Easy Bleeding] : easy bleeding [Easy Bruising] : easy bruising [Negative] : Psychiatric [Shortness Of Breath] : no shortness of breath [Wheezing] : no wheezing [Cough] : no cough [Dyspnea on Exertion] : not dyspnea on exertion [de-identified] : gluteal

## 2021-09-28 NOTE — ASSESSMENT
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 States that he is using a topical from [place]. His wife recently went there.\par topical steroid prn\par \par A Fib on metoprolol.\par Off anticoagulants because he had rectal bleed.\par \par Parkinsons on Ropinirole. Did not see Neurologist as advised.\par Flu vaccine administered

## 2021-09-28 NOTE — HISTORY OF PRESENT ILLNESS
[FreeTextEntry1] : follow up [de-identified] : Mr. 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 Rash with itching\par Also on arms and legs.\par \par Not seen by dermatology recently. States that he is using a topical from [place]. His wife recently went there.\par A Fib on metoprolol.\par Off anticoagulants because he had rectal bleed.\par \par Parkinsons on Ropinirole. Did not see Neurologist as advised.

## 2021-09-28 NOTE — PHYSICAL EXAM
[Normal Sclera/Conjunctiva] : normal sclera/conjunctiva [Normal Rate] : normal rate  [Normal S1, S2] : normal S1 and S2 [No Edema] : there was no peripheral edema [No Joint Swelling] : no joint swelling [Grossly Normal Strength/Tone] : grossly normal strength/tone [Normal] : affect was normal and insight and judgment were intact [de-identified] : Erythematous patch on abdomen, arms and legs

## 2021-12-10 ENCOUNTER — APPOINTMENT (OUTPATIENT)
Dept: CARDIOLOGY | Facility: CLINIC | Age: 86
End: 2021-12-10
Payer: MEDICARE

## 2021-12-10 ENCOUNTER — NON-APPOINTMENT (OUTPATIENT)
Age: 86
End: 2021-12-10

## 2021-12-10 VITALS
OXYGEN SATURATION: 99 % | BODY MASS INDEX: 27.77 KG/M2 | WEIGHT: 194 LBS | DIASTOLIC BLOOD PRESSURE: 80 MMHG | HEIGHT: 70 IN | HEART RATE: 75 BPM | SYSTOLIC BLOOD PRESSURE: 122 MMHG

## 2021-12-10 PROCEDURE — 99214 OFFICE O/P EST MOD 30 MIN: CPT | Mod: 25

## 2021-12-10 PROCEDURE — 93000 ELECTROCARDIOGRAM COMPLETE: CPT

## 2021-12-10 NOTE — DISCUSSION/SUMMARY
[FreeTextEntry1] : Pt is an 84 y/o M PMH AAA s/p EVAR with Dr Martinez 02/2021, AF NOT currently on coumadin (due to hematuria and recent surgeries) dx about 2008, Parkinsons, HTN.  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  He is now s/p appendectomy complicated by anastomotic bleed.\par He also notes that he has not been taking ASA - not sure why\par \par AF:\par CHADSVasc = 3 (age, HTN)\par Continue to hold coumadin.  Discussed risk of CVA.  \par I will reach out to Dr Ochoa to see if there any surgical contraindications for restarting coumadin\par \par AAA s/p EVAR:\par I have advised that he restart ASA 81mg\par monitor for bleeding\par f/u with Dr Martinez\par \par HTN: \par well controlled, \par c/w current meds\par Advised low salt diet\par \par Pt will return in 1 mnth or sooner as needed but I encouraged communication via phone or portal if necessary.\par The described plan was discussed with the pt.  All questions and concerns were addressed to the best of my knowledge.

## 2021-12-10 NOTE — REASON FOR VISIT
[CV Risk Factors and Non-Cardiac Disease] : CV risk factors and non-cardiac disease [Arrhythmia/ECG Abnorrmalities] : arrhythmia/ECG abnormalities

## 2021-12-10 NOTE — HISTORY OF PRESENT ILLNESS
[FreeTextEntry1] : Pt is an 84 y/o M PMH AAA s/p EVAR with Dr Martinez 02/2021, AF NOT currently on coumadin (due to hematuria and recent surgeries) dx about 2008, Parkinsons, HTN.  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  He is now s/p appendectomy complicated by anastomotic bleed.\par He also notes that he has not been taking ASA - not sure why\par Overall he is feeling well and denies CP, SOB, diaphoresis, palpitations, dizziness, syncope, LE edema, PND, orthopnea, fevers, dysuria.   He denies history of stroke, HF, DM\par  \par TTE 01/2021 EF 50%\par \par PMH: AF on coumadin, HTN, Parkinsons, vertigo\par Smoking status: quit in his 20's\par social ETOH\par no drug use\par Current exercise: walking\par Daily water intake: none\par Daily caffeine intake: 1 cup coffee\par OTC medications: none\par Family hx: mother MI 60's\par Previous cardiac testing: TTE 2018 "normal"\par Previous hospitalizations: AF\par

## 2021-12-22 ENCOUNTER — EMERGENCY (EMERGENCY)
Facility: HOSPITAL | Age: 86
LOS: 0 days | Discharge: HOME | End: 2021-12-22
Attending: EMERGENCY MEDICINE
Payer: MEDICARE

## 2021-12-22 VITALS
OXYGEN SATURATION: 96 % | DIASTOLIC BLOOD PRESSURE: 96 MMHG | WEIGHT: 199.96 LBS | HEIGHT: 69 IN | SYSTOLIC BLOOD PRESSURE: 162 MMHG | RESPIRATION RATE: 17 BRPM | TEMPERATURE: 98 F | HEART RATE: 81 BPM

## 2021-12-22 DIAGNOSIS — S00.81XA ABRASION OF OTHER PART OF HEAD, INITIAL ENCOUNTER: ICD-10-CM

## 2021-12-22 DIAGNOSIS — S09.90XA UNSPECIFIED INJURY OF HEAD, INITIAL ENCOUNTER: ICD-10-CM

## 2021-12-22 DIAGNOSIS — Z88.0 ALLERGY STATUS TO PENICILLIN: ICD-10-CM

## 2021-12-22 DIAGNOSIS — Z23 ENCOUNTER FOR IMMUNIZATION: ICD-10-CM
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
## 2021-12-22 DIAGNOSIS — I10 ESSENTIAL (PRIMARY) HYPERTENSION: ICD-10-CM

## 2021-12-22 DIAGNOSIS — Z90.49 ACQUIRED ABSENCE OF OTHER SPECIFIED PARTS OF DIGESTIVE TRACT: Chronic | ICD-10-CM

## 2021-12-22 DIAGNOSIS — Z98.890 OTHER SPECIFIED POSTPROCEDURAL STATES: Chronic | ICD-10-CM

## 2021-12-22 DIAGNOSIS — S61.412A LACERATION WITHOUT FOREIGN BODY OF LEFT HAND, INITIAL ENCOUNTER: ICD-10-CM
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
## 2021-12-22 DIAGNOSIS — M54.2 CERVICALGIA: ICD-10-CM

## 2021-12-22 DIAGNOSIS — G20 PARKINSON'S DISEASE: ICD-10-CM

## 2021-12-22 PROCEDURE — 12001 RPR S/N/AX/GEN/TRNK 2.5CM/<: CPT

## 2021-12-22 PROCEDURE — 76376 3D RENDER W/INTRP POSTPROCES: CPT | Mod: 26

## 2021-12-22 PROCEDURE — 72170 X-RAY EXAM OF PELVIS: CPT

## 2021-12-22 PROCEDURE — 70486 CT MAXILLOFACIAL W/O DYE: CPT | Mod: 26,MA

## 2021-12-22 PROCEDURE — 99285 EMERGENCY DEPT VISIT HI MDM: CPT | Mod: 25

## 2021-12-22 PROCEDURE — 90471 IMMUNIZATION ADMIN: CPT

## 2021-12-22 PROCEDURE — 70486 CT MAXILLOFACIAL W/O DYE: CPT | Mod: MA

## 2021-12-22 PROCEDURE — 72125 CT NECK SPINE W/O DYE: CPT | Mod: MA

## 2021-12-22 PROCEDURE — 70450 CT HEAD/BRAIN W/O DYE: CPT | Mod: 26,MA

## 2021-12-22 PROCEDURE — 76376 3D RENDER W/INTRP POSTPROCES: CPT

## 2021-12-22 PROCEDURE — 72125 CT NECK SPINE W/O DYE: CPT | Mod: 26,MA

## 2021-12-22 PROCEDURE — 72170 X-RAY EXAM OF PELVIS: CPT | Mod: 26

## 2021-12-22 PROCEDURE — 70450 CT HEAD/BRAIN W/O DYE: CPT | Mod: MA

## 2021-12-22 PROCEDURE — 90715 TDAP VACCINE 7 YRS/> IM: CPT

## 2021-12-22 PROCEDURE — 99284 EMERGENCY DEPT VISIT MOD MDM: CPT | Mod: 25

## 2021-12-22 RX ORDER — CEPHALEXIN 500 MG
500 CAPSULE ORAL ONCE
Refills: 0 | Status: COMPLETED | OUTPATIENT
Start: 2021-12-22 | End: 2021-12-22

## 2021-12-22 RX ORDER — TETANUS TOXOID, REDUCED DIPHTHERIA TOXOID AND ACELLULAR PERTUSSIS VACCINE, ADSORBED 5; 2.5; 8; 8; 2.5 [IU]/.5ML; [IU]/.5ML; UG/.5ML; UG/.5ML; UG/.5ML
0.5 SUSPENSION INTRAMUSCULAR ONCE
Refills: 0 | Status: COMPLETED | OUTPATIENT
Start: 2021-12-22 | End: 2021-12-22

## 2021-12-22 RX ORDER — CEPHALEXIN 500 MG
1 CAPSULE ORAL
Qty: 12 | Refills: 0
Start: 2021-12-22 | End: 2021-12-25

## 2021-12-22 RX ADMIN — Medication 500 MILLIGRAM(S): at 16:55

## 2021-12-22 RX ADMIN — TETANUS TOXOID, REDUCED DIPHTHERIA TOXOID AND ACELLULAR PERTUSSIS VACCINE, ADSORBED 0.5 MILLILITER(S): 5; 2.5; 8; 8; 2.5 SUSPENSION INTRAMUSCULAR at 16:46

## 2021-12-22 NOTE — ED PROVIDER NOTE - SKIN, MLM
Skin normal color for race, warm, dry. No evidence of rash. Scalp Intact. (+) forehead abrasions. Laceration to palmar surface of left hand overlying head of 5th metacarpal. No obvious flexor tendon visible.

## 2021-12-22 NOTE — ED ADULT TRIAGE NOTE - CHIEF COMPLAINT QUOTE
pt. had mechanical fall while walking down stairs, +head injury unknown blood thinners, bleeding controlled. lacerations to face, Neuro alert called. no LOC

## 2021-12-22 NOTE — ED PROVIDER NOTE - PATIENT PORTAL LINK FT
You can access the FollowMyHealth Patient Portal offered by [facility] by registering at the following website: [email or web address]. By joining [employer]’s FollowMyHealth portal, you will also be able to view your health information using other applications (apps) compatible with our system.

## 2021-12-22 NOTE — ED ADULT NURSE NOTE - NSICDXPASTMEDICALHX_GEN_ALL_CORE_FT
PAST MEDICAL HISTORY:  AAA (abdominal aortic aneurysm)     Anxiety     Atrial fibrillation     Bruise to bilateral upper extremity    Eczema     H/O candidiasis of mouth     H/O scarlet fever     History of shingles     [place] (hard of hearing)     HTN (hypertension)     Mucocele, appendix     Parkinson disease     Patent foramen ovale dilated left artrium severe echo 1/2021    Renal calculi     Seasonal allergies     Vertigo

## 2021-12-22 NOTE — ED PROVIDER NOTE - CONSTITUTIONAL, MLM
normal... Elderly overweight white male, well appearing, awake, alert, oriented to person, place, time/situation and in no apparent distress.

## 2021-12-22 NOTE — ED PROVIDER NOTE - NSICDXPASTMEDICALHX_GEN_ALL_CORE_FT
PAST MEDICAL HISTORY:  AAA (abdominal aortic aneurysm)     Anxiety     Atrial fibrillation     Bruise to bilateral upper extremity    Eczema     H/O candidiasis of mouth     H/O scarlet fever     History of shingles     [place] (hard of hearing)     HTN (hypertension)     Mucocele, appendix     Parkinson disease     Patent foramen ovale dilated left artrium severe echo 1/2021    Renal calculi     Seasonal allergies     Vertigo

## 2021-12-22 NOTE — ED PROVIDER NOTE - CARE PLAN
1 Principal Discharge DX:	Head injury, closed, without LOC  Secondary Diagnosis:	Facial abrasion, initial encounter  Secondary Diagnosis:	Laceration of left palm, initial encounter

## 2021-12-22 NOTE — ED PROVIDER NOTE - NSFOLLOWUPINSTRUCTIONS_ED_ALL_ED_FT
KEEP DRESSING ON X 24 HOURS. AFTERWARDS RINSE CLEAN IN RUNNING SOAPY WATER. DO NOT IMMERSE HAND IN WATER. COVER WITH BACITRACIN AND BAND-AID OR GAUZE TWICE DAILY. HAVE 4 SUTURES REMOVED IN 10 DAYS.     Laceration    WHAT YOU NEED TO KNOW:    A laceration is an injury to the skin and the soft tissue underneath it. Lacerations happen when you are cut or hit by something. They can happen anywhere on the body.     DISCHARGE INSTRUCTIONS:    Return to the emergency department if:     You have heavy bleeding or bleeding that does not stop after 10 minutes of holding firm, direct pressure over the wound.       Your wound opens up.     Contact your healthcare provider if:     You have a fever or chills.       Your laceration is red, warm, or swollen.      You have red streaks on your skin coming from your wound.      You have white or yellow drainage from the wound that smells bad.      You have pain that gets worse, even after treatment.       You have questions or concerns about your condition or care.     Medicines:     Prescription pain medicine may be given. Ask how to take this medicine safely.       Antibiotics help treat or prevent a bacterial infection.       Take your medicine as directed. Contact your healthcare provider if you think your medicine is not helping or if you have side effects. Tell him or her if you are allergic to any medicine. Keep a list of the medicines, vitamins, and herbs you take. Include the amounts, and when and why you take them. Bring the list or the pill bottles to follow-up visits. Carry your medicine list with you in case of an emergency.    Care for your wound as directed:     Do not get your wound wet until your healthcare provider says it is okay. Do not soak your wound in water. Do not go swimming until your healthcare provider says it is okay. Carefully wash the wound with soap and water. Gently pat the area dry or allow it to air dry.       Change your bandages when they get wet, dirty, or after washing. Apply new, clean bandages as directed. Do not apply elastic bandages or tape too tight. Do not put powders or lotions over your incision.       Apply antibiotic ointment as directed. Your healthcare provider may give you antibiotic ointment to put over your wound if you have stitches. If you have strips of tape over your incision, let them dry up and fall off on their own. If they do not fall off within 14 days, gently remove them. If you have glue over your wound, do not remove or pick at it. If your glue comes off, do not replace it with glue that you have at home.       Check your wound every day for signs of infection such as swelling, redness, or pus.     Self-care:     Apply ice on your wound for 15 to 20 minutes every hour or as directed. Use an ice pack, or put crushed ice in a plastic bag. Cover it with a towel. Ice helps prevent tissue damage and decreases swelling and pain.      Use a splint as directed. A splint will decrease movement and stress on your wound. It may help it heal faster. A splint may be used for lacerations over joints or areas of your body that bend. Ask your healthcare provider how to apply and remove a splint.       Decrease scarring of your wound by applying ointments as directed. Do not apply ointments until your healthcare provider says it is okay. You may need to wait until your wound is healed. Ask which ointment to buy and how often to use it. After your wound is healed, use sunscreen over the area when you are out in the sun. You should do this for at least 6 months to 1 year after your injury.     Follow up with your healthcare provider as directed: You may need to follow up in 24 to 48 hours to have your wound checked for infection. You will need to return in 3 to 14 days if you have stitches or staples so they can be removed. Care for your wound as directed to prevent infection and help it heal. Write down your questions so you remember to ask them during your visits. KEEP DRESSING ON X 24 HOURS. AFTERWARDS RINSE CLEAN IN RUNNING SOAPY WATER. DO NOT IMMERSE HAND IN WATER. COVER WITH BACITRACIN AND BAND-AID OR GAUZE TWICE DAILY. HAVE 4 SUTURES REMOVED IN 10 DAYS.   Take antibiotic as prescribed to help prevent infection.  Tylenol 325 mg 2 tabs every 6 hours as needed for headache, aches & pains.  Follow up next 2 - 3 days with your own doctor.    Laceration    WHAT YOU NEED TO KNOW:    A laceration is an injury to the skin and the soft tissue underneath it. Lacerations happen when you are cut or hit by something. They can happen anywhere on the body.     DISCHARGE INSTRUCTIONS:    Return to the emergency department if:     You have heavy bleeding or bleeding that does not stop after 10 minutes of holding firm, direct pressure over the wound.       Your wound opens up.     Contact your healthcare provider if:     You have a fever or chills.       Your laceration is red, warm, or swollen.      You have red streaks on your skin coming from your wound.      You have white or yellow drainage from the wound that smells bad.      You have pain that gets worse, even after treatment.       You have questions or concerns about your condition or care.     Medicines:     Prescription pain medicine may be given. Ask how to take this medicine safely.       Antibiotics help treat or prevent a bacterial infection.       Take your medicine as directed. Contact your healthcare provider if you think your medicine is not helping or if you have side effects. Tell him or her if you are allergic to any medicine. Keep a list of the medicines, vitamins, and herbs you take. Include the amounts, and when and why you take them. Bring the list or the pill bottles to follow-up visits. Carry your medicine list with you in case of an emergency.    Care for your wound as directed:     Do not get your wound wet until your healthcare provider says it is okay. Do not soak your wound in water. Do not go swimming until your healthcare provider says it is okay. Carefully wash the wound with soap and water. Gently pat the area dry or allow it to air dry.       Change your bandages when they get wet, dirty, or after washing. Apply new, clean bandages as directed. Do not apply elastic bandages or tape too tight. Do not put powders or lotions over your incision.       Apply antibiotic ointment as directed. Your healthcare provider may give you antibiotic ointment to put over your wound if you have stitches. If you have strips of tape over your incision, let them dry up and fall off on their own. If they do not fall off within 14 days, gently remove them. If you have glue over your wound, do not remove or pick at it. If your glue comes off, do not replace it with glue that you have at home.       Check your wound every day for signs of infection such as swelling, redness, or pus.     Self-care:     Apply ice on your wound for 15 to 20 minutes every hour or as directed. Use an ice pack, or put crushed ice in a plastic bag. Cover it with a towel. Ice helps prevent tissue damage and decreases swelling and pain.      Use a splint as directed. A splint will decrease movement and stress on your wound. It may help it heal faster. A splint may be used for lacerations over joints or areas of your body that bend. Ask your healthcare provider how to apply and remove a splint.       Decrease scarring of your wound by applying ointments as directed. Do not apply ointments until your healthcare provider says it is okay. You may need to wait until your wound is healed. Ask which ointment to buy and how often to use it. After your wound is healed, use sunscreen over the area when you are out in the sun. You should do this for at least 6 months to 1 year after your injury.     Follow up with your healthcare provider as directed: You may need to follow up in 24 to 48 hours to have your wound checked for infection. You will need to return in 3 to 14 days if you have stitches or staples so they can be removed. Care for your wound as directed to prevent infection and help it heal. Write down your questions so you remember to ask them during your visits.        Closed Head Injury    A closed head injury is an injury to your head that may or may not involve a traumatic brain injury (TBI). Symptoms of TBI can be short or long lasting and include headache, dizziness, interference with memory or speech, fatigue, confusion, changes in sleep, mood changes, nausea, depression/anxiety, and dulling of senses. Make sure to obtain proper rest which includes getting plenty of sleep, avoiding excessive visual stimulation, and avoiding activities that may cause physical or mental stress. Avoid any situation where there is potential for another head injury, including sports.    SEEK IMMEDIATE MEDICAL CARE IF YOU HAVE ANY OF THE FOLLOWING SYMPTOMS: unusual drowsiness, vomiting, severe dizziness, seizures, lightheadedness, muscular weakness, different pupil sizes, visual changes, or clear or bloody discharge from your ears or nose.          Abrasion    WHAT YOU NEED TO KNOW:    An abrasion is a scrape on your skin. It happens when your skin rubs against a rough surface. Some examples of an abrasion include rug burn, a skinned elbow, or road rash. Abrasions can be many shapes and sizes. The wound may hurt, bleed, bruise, or swell.     DISCHARGE INSTRUCTIONS:    Return to the emergency department if:     The bleeding does not stop after 10 minutes of firm pressure.      You cannot rinse one or more foreign objects out of your wound.      You have red streaks on your skin coming from your wound.    Contact your healthcare provider if:     You have a fever or chills.       Your abrasion is red, warm, swollen, or draining pus.      You have questions or concerns about your condition or care.    Care for your abrasion:     Wash your hands and dry them with a clean towel.      Press a clean cloth against your wound to stop any bleeding.      Rinse your wound with a lot of clean water. Do not use harsh soap, alcohol, or iodine solutions.      Use a clean, wet cloth to remove any objects, such as small pieces of rocks or dirt.      Rub antibiotic ointment on your wound. This may help prevent infection and help your wound heal.      Cover the wound with a non-stick bandage. Change the bandage daily, and if gets wet or dirty.     Follow up with your healthcare provider as directed: Write down your questions so you remember to ask them during your visits.            Contusion    A contusion is a deep bruise. Contusions are the result of a blunt injury to tissues and muscle fibers under the skin. The skin overlying the contusion may turn blue, purple, or yellow. Symptoms also include pain and swelling in the injured area.    SEEK IMMEDIATE MEDICAL CARE IF YOU HAVE ANY OF THE FOLLOWING SYMPTOMS: severe pain, numbness, tingling, pain, weakness, or skin color/temperature change in any part of your body distal to the injury.

## 2021-12-22 NOTE — ED PROVIDER NOTE - ENMT, MLM
Airway patent, Nasal mucosa clear. Mouth with mildly dry mucosa. Throat has no vesicles, no oropharyngeal exudates and uvula is midline. Battles negative. (+) abrasion anterior nose bridge. No gross deformity. Non-tender. Dried blood right nare. Dried blood on lips.

## 2021-12-22 NOTE — ED PROVIDER NOTE - PROGRESS NOTE DETAILS
JAMES Epps MD:  Lac repair by JERICA Ulloa appreciated, no f.b. noted w/in wound. Pt stable for D/C.

## 2021-12-22 NOTE — ED PROVIDER NOTE - MUSCULOSKELETAL, MLM
Spine appears normal, range of motion is not limited, no muscle or joint tenderness. MAEx4. No focal tenderness. Left 5th digit moves well. Spine appears normal, range of motion is not limited, no muscle or joint tenderness. MAEx4. No focal tenderness. Left 5th digit moves well. Left hand nontender.

## 2021-12-22 NOTE — ED PROVIDER NOTE - OBJECTIVE STATEMENT
85 y/o male with a PMHx of vertigo, PFO, shingles, mucocele appendix, eczema, anxiety, seasonal allergies, renal calculi, Parkinson's disease, hard of hearing, H/O scarlet fever, AAA, H/O candidiasis of mouth, HTN, Afib presents to the ED BIBEMS s/p mechanical fall. Pt reports he slipped on leaves on a sloped [place] pavement. Pt fell forward and hit his face on the pavement. No LOC. On 81mg ASA. Pt reports mild soreness on back of neck. +abrasions, +laceration. Denies nausea, blurry vision. Allergies: Penicillin. PMD- Dr. Paz 87 y/o male with a PMHx of vertigo, PFO, shingles, mucocele appendix, eczema, anxiety, seasonal allergies, renal calculi, Parkinson's disease, hard of hearing, H/O scarlet fever, AAA, H/O candidiasis of mouth, HTN, Afib presents to the ED BIBEMS s/p mechanical fall. Pt reports he slipped on leaves on a  paved incline at a [place]op. Pt fell forward and hit his face on the pavement. No LOC. On 81mg ASA. Pt reports mild soreness on back of neck. +abrasions, +laceration, incl. to L palm of hand. Denies nausea, blurry vision. Allergies: Penicillin. PMD- Dr. Paz

## 2021-12-22 NOTE — ED PROVIDER NOTE - CLINICAL SUMMARY MEDICAL DECISION MAKING FREE TEXT BOX
85 y/o male on 81mg ASA BIBA s/p mechanical slip and fall forward with head/facial injury, abrasions and left palm laceration. Left hand motor sensory intact. Neuro exam nonfocal. Plan: neuro alert: CT head, face, Cspine, pelvis XR, Tdap, left hand laceration repair.

## 2021-12-22 NOTE — ED ADULT NURSE NOTE - NSIMPLEMENTINTERV_GEN_ALL_ED
Implemented All Fall with Harm Risk Interventions:  [place] to call system. Call bell, personal items and telephone within reach. Instruct patient to call for assistance. Room bathroom lighting operational. Non-slip footwear when patient is off stretcher. Physically safe environment: no spills, clutter or unnecessary equipment. Stretcher in lowest position, wheels locked, appropriate side rails in place. Provide visual cue, wrist band, yellow gown, etc. Monitor gait and stability. Monitor for mental status changes and reorient to person, place, and time. Review medications for side effects contributing to fall risk. Reinforce activity limits and safety measures with patient and family. Provide visual clues: red socks.

## 2021-12-22 NOTE — ED PROVIDER NOTE - ATTENDING CONTRIBUTION TO CARE
I, David Epps MD, personally saw the patient with the PA, and completed the key components of the history and physical exam. I then discussed the management plan with the PA.

## 2022-01-06 ENCOUNTER — APPOINTMENT (OUTPATIENT)
Dept: UROLOGY | Facility: CLINIC | Age: 87
End: 2022-01-06

## 2022-01-10 ENCOUNTER — RX RENEWAL (OUTPATIENT)
Age: 87
End: 2022-01-10

## 2022-01-10 DIAGNOSIS — J06.9 ACUTE UPPER RESPIRATORY INFECTION, UNSPECIFIED: ICD-10-CM

## 2022-01-11 ENCOUNTER — APPOINTMENT (OUTPATIENT)
Dept: CARDIOLOGY | Facility: CLINIC | Age: 87
End: 2022-01-11

## 2022-01-13 LAB — SARS-COV-2 N GENE NPH QL NAA+PROBE: NOT DETECTED

## 2022-01-24 ENCOUNTER — APPOINTMENT (OUTPATIENT)
Dept: FAMILY MEDICINE | Facility: CLINIC | Age: 87
End: 2022-01-24
Payer: MEDICARE

## 2022-01-24 VITALS
OXYGEN SATURATION: 97 % | SYSTOLIC BLOOD PRESSURE: 102 MMHG | HEIGHT: 70 IN | TEMPERATURE: 97.9 F | RESPIRATION RATE: 18 BRPM | BODY MASS INDEX: 27.49 KG/M2 | WEIGHT: 192 LBS | HEART RATE: 95 BPM | DIASTOLIC BLOOD PRESSURE: 58 MMHG

## 2022-01-24 DIAGNOSIS — Z85.038 PERSONAL HISTORY OF OTHER MALIGNANT NEOPLASM OF LARGE INTESTINE: ICD-10-CM

## 2022-01-24 DIAGNOSIS — J20.9 ACUTE BRONCHITIS, UNSPECIFIED: ICD-10-CM

## 2022-01-24 DIAGNOSIS — R19.4 CHANGE IN BOWEL HABIT: ICD-10-CM

## 2022-01-24 DIAGNOSIS — L98.491 NON-PRESSURE CHRONIC ULCER OF SKIN OF OTHER SITES LIMITED TO BREAKDOWN OF SKIN: ICD-10-CM

## 2022-01-24 DIAGNOSIS — R14.0 ABDOMINAL DISTENSION (GASEOUS): ICD-10-CM

## 2022-01-24 DIAGNOSIS — Z87.19 PERSONAL HISTORY OF OTHER DISEASES OF THE DIGESTIVE SYSTEM: ICD-10-CM

## 2022-01-24 DIAGNOSIS — I71.4 ABDOMINAL AORTIC ANEURYSM, W/OUT RUPTURE: ICD-10-CM

## 2022-01-24 PROCEDURE — 99214 OFFICE O/P EST MOD 30 MIN: CPT

## 2022-01-24 RX ORDER — AZITHROMYCIN 500 MG/1
0 TABLET, FILM COATED ORAL
Qty: 0 | Refills: 0 | DISCHARGE
Start: 2022-01-24 | End: 2022-01-28

## 2022-01-24 RX ORDER — CEPHALEXIN 500 MG/1
500 CAPSULE ORAL
Qty: 12 | Refills: 0 | Status: COMPLETED | COMMUNITY
Start: 2021-12-22

## 2022-01-24 NOTE — ASSESSMENT
[FreeTextEntry1] : Bronchitis\par Z pack/ Medrol\par CXR if not improved in 1 week\par Allegra or Zyrtec/ continue Robitussin for cough\par \par Parkinsons on Ropinirole. Did not see Neurologist as advised.\par ? Gastroparesis.\par States he had Constipation previously which resolved after abdominal surgery. But now he has diarrhea\par See GI\par \par A Fib\par Seen by Cardiologist plans to restart Coumadin. Other drugs are too expensive for him.

## 2022-01-24 NOTE — PHYSICAL EXAM
[Normal Sclera/Conjunctiva] : normal sclera/conjunctiva [Normal Rate] : normal rate  [Normal S1, S2] : normal S1 and S2 [Normal Gait] : normal gait [Normal] : affect was normal and insight and judgment were intact

## 2022-01-24 NOTE — REVIEW OF SYSTEMS
[Negative] : Psychiatric [Shortness Of Breath] : no shortness of breath [Wheezing] : no wheezing [Cough] : no cough [Dyspnea on Exertion] : not dyspnea on exertion [Skin Rash] : no skin rash [Easy Bleeding] : no easy bleeding [Easy Bruising] : no easy bruising [FreeTextEntry4] : see HPI

## 2022-01-24 NOTE — HISTORY OF PRESENT ILLNESS
[Cold Symptoms] : cold symptoms [Moderate] : moderate [___ Weeks ago] :  [unfilled] weeks ago [Constant] : constant [Congestion] : congestion [Sore Throat] : sore throat [Stable] : stable [Cough] : no cough [Wheezing] : no wheezing [Chills] : no chills [Anorexia] : no anorexia [Shortness Of Breath] : no shortness of breath [Earache] : no earache [Fatigue] : not fatigue [Headache] : no headache [Fever] : no fever [FreeTextEntry8] : tested negative for COVID\par States when he eats he feels bloated.\par States he had Constipation previously which resolved after abdominal surgery. But now he has diarrhea\par Seen by Cardiologist plans to restart Coumadin. Other drugs are too expensive for him.

## 2022-02-01 ENCOUNTER — TRANSCRIPTION ENCOUNTER (OUTPATIENT)
Age: 87
End: 2022-02-01

## 2022-02-01 LAB — SARS-COV-2 N GENE NPH QL NAA+PROBE: DETECTED

## 2022-02-02 ENCOUNTER — NON-APPOINTMENT (OUTPATIENT)
Age: 87
End: 2022-02-02

## 2022-02-02 ENCOUNTER — APPOINTMENT (OUTPATIENT)
Dept: FAMILY MEDICINE | Facility: CLINIC | Age: 87
End: 2022-02-02
Payer: COMMERCIAL

## 2022-02-02 ENCOUNTER — APPOINTMENT (OUTPATIENT)
Dept: FAMILY MEDICINE | Facility: CLINIC | Age: 87
End: 2022-02-02

## 2022-02-02 ENCOUNTER — TRANSCRIPTION ENCOUNTER (OUTPATIENT)
Age: 87
End: 2022-02-02

## 2022-02-02 PROCEDURE — 99449 NTRPROF PH1/NTRNET/EHR 31/>: CPT

## 2022-02-03 ENCOUNTER — APPOINTMENT (OUTPATIENT)
Dept: DISASTER EMERGENCY | Facility: HOSPITAL | Age: 87
End: 2022-02-03

## 2022-02-03 ENCOUNTER — OUTPATIENT (OUTPATIENT)
Dept: OUTPATIENT SERVICES | Facility: HOSPITAL | Age: 87
LOS: 1 days | End: 2022-02-03

## 2022-02-03 VITALS
HEART RATE: 76 BPM | TEMPERATURE: 98 F | DIASTOLIC BLOOD PRESSURE: 94 MMHG | RESPIRATION RATE: 18 BRPM | HEIGHT: 70 IN | OXYGEN SATURATION: 97 % | SYSTOLIC BLOOD PRESSURE: 148 MMHG | WEIGHT: 197.09 LBS

## 2022-02-03 VITALS
OXYGEN SATURATION: 95 % | DIASTOLIC BLOOD PRESSURE: 78 MMHG | SYSTOLIC BLOOD PRESSURE: 134 MMHG | HEART RATE: 79 BPM | RESPIRATION RATE: 18 BRPM | TEMPERATURE: 98 F

## 2022-02-03 DIAGNOSIS — Z90.49 ACQUIRED ABSENCE OF OTHER SPECIFIED PARTS OF DIGESTIVE TRACT: Chronic | ICD-10-CM

## 2022-02-03 DIAGNOSIS — U07.1 COVID-19: ICD-10-CM

## 2022-02-03 DIAGNOSIS — Z98.890 OTHER SPECIFIED POSTPROCEDURAL STATES: Chronic | ICD-10-CM

## 2022-02-03 RX ORDER — SODIUM CHLORIDE 9 MG/ML
250 INJECTION INTRAMUSCULAR; INTRAVENOUS; SUBCUTANEOUS
Refills: 0 | Status: DISCONTINUED | OUTPATIENT
Start: 2022-02-03 | End: 2022-02-17

## 2022-02-03 RX ORDER — SOTROVIMAB 62.5 MG/ML
500 INJECTION, SOLUTION, CONCENTRATE INTRAVENOUS ONCE
Refills: 0 | Status: COMPLETED | OUTPATIENT
Start: 2022-02-03 | End: 2022-02-03

## 2022-02-03 RX ORDER — DEXAMETHASONE 0.5 MG/5ML
1 ELIXIR ORAL
Qty: 0 | Refills: 0 | DISCHARGE
Start: 2022-02-03 | End: 2022-02-12

## 2022-02-03 RX ADMIN — SOTROVIMAB 116 MILLIGRAM(S): 62.5 INJECTION, SOLUTION, CONCENTRATE INTRAVENOUS at 13:37

## 2022-02-03 NOTE — MONOCLONAL ANTIBODY INFUSION - HOME MEDICATIONS
cephalexin 500 mg oral capsule , 1 cap(s) orally 3 times a day   rOPINIRole 0.5 mg oral tablet , 1 tab(s) orally 2 times a day  rOPINIRole 0.5 mg oral tablet , 2 tab(s) orally once a day (in the morning)  meclizine 25 mg oral tablet , 1 tab(s) orally once a day  Metoprolol Tartrate 25 mg oral tablet , 1/2  tab(s) orally 2 times a day  amLODIPine 5 mg oral tablet , 1 tab(s) orally once a day

## 2022-02-03 NOTE — MONOCLONAL ANTIBODY INFUSION - EXAM
CC: Monoclonal Antibody Infusion/COVID 19 Positive  86yMale    exam/findings:  T(C): 37.1 (02-03-22 @ 13:21), Max: 37.1 (02-03-22 @ 13:21)  HR: 63 (02-03-22 @ 13:21) (63 - 76)  BP: 133/85 (02-03-22 @ 13:21) (128/91 - 148/94)  RR: 16 (02-03-22 @ 13:21) (16 - 18)  SpO2: 97% (02-03-22 @ 13:21) (97% - 97%)      PE:   Appearance: NAD	  HEENT:   Normal oral mucosa,   Lymphatic: No lymphadenopathy  Cardiovascular: Normal S1 S2, No JVD, No murmurs, No edema  Respiratory: Lungs clear to auscultation	  Gastrointestinal:  Soft, Non-tender, + BS	  Skin: warm and dry  Neurologic: Non-focal  Extremities: Normal range of motion,

## 2022-02-03 NOTE — MONOCLONAL ANTIBODY INFUSION - ASSESSMENT AND PLAN
ASSESSMENT:  Pt is a 85 y/o Male Covid +  referred by Dr. Summers who presents to infusion center for Monoclonal antibody infusion (Sotrovimab)  Symptoms/ Criteria: Cough, SOB and Malaise  Risk Profile includes: Age >75, Parkinsons, HTN, Atrial Fibrillation, Cancer of Appendix, Bronchitis    PLAN:  - infusion procedure explained to patient   -Consent for monoclonal antibody infusion obtained   - Risk & benefits discussed/all questions answered  -infuse  Sotrovimab IV over 30 minutes  -observe patient for one hour post infusion and discharge home

## 2022-02-11 ENCOUNTER — INPATIENT (INPATIENT)
Facility: HOSPITAL | Age: 87
LOS: 6 days | Discharge: HOME HEALTH | DRG: 64 | End: 2022-02-18
Attending: FAMILY MEDICINE | Admitting: FAMILY MEDICINE
Payer: MEDICARE

## 2022-02-11 ENCOUNTER — TRANSCRIPTION ENCOUNTER (OUTPATIENT)
Age: 87
End: 2022-02-11

## 2022-02-11 VITALS — WEIGHT: 193.57 LBS | HEIGHT: 70 IN

## 2022-02-11 DIAGNOSIS — Z90.49 ACQUIRED ABSENCE OF OTHER SPECIFIED PARTS OF DIGESTIVE TRACT: Chronic | ICD-10-CM

## 2022-02-11 DIAGNOSIS — I63.9 CEREBRAL INFARCTION, UNSPECIFIED: ICD-10-CM

## 2022-02-11 DIAGNOSIS — Z98.890 OTHER SPECIFIED POSTPROCEDURAL STATES: Chronic | ICD-10-CM

## 2022-02-11 LAB
ALBUMIN SERPL ELPH-MCNC: 3.2 G/DL — LOW (ref 3.3–5)
ALP SERPL-CCNC: 64 U/L — SIGNIFICANT CHANGE UP (ref 40–120)
ALT FLD-CCNC: 43 U/L — SIGNIFICANT CHANGE UP (ref 12–78)
ANION GAP SERPL CALC-SCNC: 3 MMOL/L — LOW (ref 5–17)
APTT BLD: 28 SEC — SIGNIFICANT CHANGE UP (ref 27.5–35.5)
AST SERPL-CCNC: 23 U/L — SIGNIFICANT CHANGE UP (ref 15–37)
BASOPHILS # BLD AUTO: 0.02 K/UL — SIGNIFICANT CHANGE UP (ref 0–0.2)
BASOPHILS NFR BLD AUTO: 0.2 % — SIGNIFICANT CHANGE UP (ref 0–2)
BILIRUB SERPL-MCNC: 0.7 MG/DL — SIGNIFICANT CHANGE UP (ref 0.2–1.2)
BUN SERPL-MCNC: 22 MG/DL — SIGNIFICANT CHANGE UP (ref 7–23)
CALCIUM SERPL-MCNC: 9.3 MG/DL — SIGNIFICANT CHANGE UP (ref 8.5–10.1)
CHLORIDE SERPL-SCNC: 111 MMOL/L — HIGH (ref 96–108)
CO2 SERPL-SCNC: 29 MMOL/L — SIGNIFICANT CHANGE UP (ref 22–31)
CREAT SERPL-MCNC: 1.2 MG/DL — SIGNIFICANT CHANGE UP (ref 0.5–1.3)
EOSINOPHIL # BLD AUTO: 0.03 K/UL — SIGNIFICANT CHANGE UP (ref 0–0.5)
EOSINOPHIL NFR BLD AUTO: 0.3 % — SIGNIFICANT CHANGE UP (ref 0–6)
GLUCOSE SERPL-MCNC: 97 MG/DL — SIGNIFICANT CHANGE UP (ref 70–99)
HCT VFR BLD CALC: 46 % — SIGNIFICANT CHANGE UP (ref 39–50)
HGB BLD-MCNC: 15 G/DL — SIGNIFICANT CHANGE UP (ref 13–17)
IMM GRANULOCYTES NFR BLD AUTO: 0.6 % — SIGNIFICANT CHANGE UP (ref 0–1.5)
INR BLD: 1.07 RATIO — SIGNIFICANT CHANGE UP (ref 0.88–1.16)
LYMPHOCYTES # BLD AUTO: 1.17 K/UL — SIGNIFICANT CHANGE UP (ref 1–3.3)
LYMPHOCYTES # BLD AUTO: 10.9 % — LOW (ref 13–44)
MCHC RBC-ENTMCNC: 29.8 PG — SIGNIFICANT CHANGE UP (ref 27–34)
MCHC RBC-ENTMCNC: 32.6 GM/DL — SIGNIFICANT CHANGE UP (ref 32–36)
MCV RBC AUTO: 91.5 FL — SIGNIFICANT CHANGE UP (ref 80–100)
MONOCYTES # BLD AUTO: 0.89 K/UL — SIGNIFICANT CHANGE UP (ref 0–0.9)
MONOCYTES NFR BLD AUTO: 8.3 % — SIGNIFICANT CHANGE UP (ref 2–14)
NEUTROPHILS # BLD AUTO: 8.6 K/UL — HIGH (ref 1.8–7.4)
NEUTROPHILS NFR BLD AUTO: 79.7 % — HIGH (ref 43–77)
PLATELET # BLD AUTO: 273 K/UL — SIGNIFICANT CHANGE UP (ref 150–400)
POTASSIUM SERPL-MCNC: 4.3 MMOL/L — SIGNIFICANT CHANGE UP (ref 3.5–5.3)
POTASSIUM SERPL-SCNC: 4.3 MMOL/L — SIGNIFICANT CHANGE UP (ref 3.5–5.3)
PROT SERPL-MCNC: 7.5 GM/DL — SIGNIFICANT CHANGE UP (ref 6–8.3)
PROTHROM AB SERPL-ACNC: 12.4 SEC — SIGNIFICANT CHANGE UP (ref 10.6–13.6)
RBC # BLD: 5.03 M/UL — SIGNIFICANT CHANGE UP (ref 4.2–5.8)
RBC # FLD: 16.2 % — HIGH (ref 10.3–14.5)
SARS-COV-2 RNA SPEC QL NAA+PROBE: DETECTED
SODIUM SERPL-SCNC: 143 MMOL/L — SIGNIFICANT CHANGE UP (ref 135–145)
TROPONIN I, HIGH SENSITIVITY RESULT: 14.13 NG/L — SIGNIFICANT CHANGE UP
WBC # BLD: 10.77 K/UL — HIGH (ref 3.8–10.5)
WBC # FLD AUTO: 10.77 K/UL — HIGH (ref 3.8–10.5)

## 2022-02-11 PROCEDURE — 97530 THERAPEUTIC ACTIVITIES: CPT | Mod: GP

## 2022-02-11 PROCEDURE — 81001 URINALYSIS AUTO W/SCOPE: CPT

## 2022-02-11 PROCEDURE — 92610 EVALUATE SWALLOWING FUNCTION: CPT | Mod: GN

## 2022-02-11 PROCEDURE — 80053 COMPREHEN METABOLIC PANEL: CPT

## 2022-02-11 PROCEDURE — 70496 CT ANGIOGRAPHY HEAD: CPT | Mod: 26,MA

## 2022-02-11 PROCEDURE — 70551 MRI BRAIN STEM W/O DYE: CPT | Mod: MG

## 2022-02-11 PROCEDURE — 70498 CT ANGIOGRAPHY NECK: CPT | Mod: 26,MA

## 2022-02-11 PROCEDURE — 93306 TTE W/DOPPLER COMPLETE: CPT

## 2022-02-11 PROCEDURE — G1004: CPT

## 2022-02-11 PROCEDURE — 80061 LIPID PANEL: CPT

## 2022-02-11 PROCEDURE — 99223 1ST HOSP IP/OBS HIGH 75: CPT

## 2022-02-11 PROCEDURE — 99285 EMERGENCY DEPT VISIT HI MDM: CPT

## 2022-02-11 PROCEDURE — 71045 X-RAY EXAM CHEST 1 VIEW: CPT | Mod: 26

## 2022-02-11 PROCEDURE — 80048 BASIC METABOLIC PNL TOTAL CA: CPT

## 2022-02-11 PROCEDURE — 94640 AIRWAY INHALATION TREATMENT: CPT

## 2022-02-11 PROCEDURE — 97161 PT EVAL LOW COMPLEX 20 MIN: CPT | Mod: GP

## 2022-02-11 PROCEDURE — 85027 COMPLETE CBC AUTOMATED: CPT

## 2022-02-11 PROCEDURE — 93010 ELECTROCARDIOGRAM REPORT: CPT

## 2022-02-11 PROCEDURE — 70450 CT HEAD/BRAIN W/O DYE: CPT

## 2022-02-11 PROCEDURE — 70551 MRI BRAIN STEM W/O DYE: CPT | Mod: 26

## 2022-02-11 PROCEDURE — 0042T: CPT

## 2022-02-11 PROCEDURE — 97116 GAIT TRAINING THERAPY: CPT | Mod: GP

## 2022-02-11 PROCEDURE — 36415 COLL VENOUS BLD VENIPUNCTURE: CPT

## 2022-02-11 PROCEDURE — 95816 EEG AWAKE AND DROWSY: CPT

## 2022-02-11 PROCEDURE — 92523 SPEECH SOUND LANG COMPREHEN: CPT | Mod: GN

## 2022-02-11 PROCEDURE — 85730 THROMBOPLASTIN TIME PARTIAL: CPT

## 2022-02-11 PROCEDURE — 83036 HEMOGLOBIN GLYCOSYLATED A1C: CPT

## 2022-02-11 RX ORDER — ROPINIROLE 8 MG/1
2 TABLET, FILM COATED, EXTENDED RELEASE ORAL
Qty: 0 | Refills: 0 | DISCHARGE

## 2022-02-11 RX ORDER — ACETAMINOPHEN 500 MG
650 TABLET ORAL EVERY 6 HOURS
Refills: 0 | Status: DISCONTINUED | OUTPATIENT
Start: 2022-02-11 | End: 2022-02-18

## 2022-02-11 RX ORDER — ATORVASTATIN CALCIUM 80 MG/1
80 TABLET, FILM COATED ORAL AT BEDTIME
Refills: 0 | Status: DISCONTINUED | OUTPATIENT
Start: 2022-02-11 | End: 2022-02-18

## 2022-02-11 RX ORDER — MECLIZINE HCL 12.5 MG
25 TABLET ORAL THREE TIMES A DAY
Refills: 0 | Status: DISCONTINUED | OUTPATIENT
Start: 2022-02-11 | End: 2022-02-18

## 2022-02-11 RX ORDER — ROPINIROLE 8 MG/1
1 TABLET, FILM COATED, EXTENDED RELEASE ORAL
Qty: 0 | Refills: 0 | DISCHARGE

## 2022-02-11 RX ORDER — ROPINIROLE 8 MG/1
1 TABLET, FILM COATED, EXTENDED RELEASE ORAL DAILY
Refills: 0 | Status: DISCONTINUED | OUTPATIENT
Start: 2022-02-11 | End: 2022-02-12

## 2022-02-11 RX ORDER — MECLIZINE HCL 12.5 MG
1 TABLET ORAL
Qty: 0 | Refills: 0 | DISCHARGE

## 2022-02-11 RX ORDER — FLUTICASONE PROPIONATE 50 MCG
1 SPRAY, SUSPENSION NASAL
Qty: 0 | Refills: 0 | DISCHARGE

## 2022-02-11 RX ORDER — HYDROCORTISONE 1 %
1 OINTMENT (GRAM) TOPICAL
Qty: 0 | Refills: 0 | DISCHARGE

## 2022-02-11 RX ORDER — METOPROLOL TARTRATE 50 MG
1 TABLET ORAL
Qty: 0 | Refills: 0 | DISCHARGE

## 2022-02-11 RX ORDER — METOPROLOL TARTRATE 50 MG
25 TABLET ORAL DAILY
Refills: 0 | Status: DISCONTINUED | OUTPATIENT
Start: 2022-02-11 | End: 2022-02-14

## 2022-02-11 RX ORDER — ASPIRIN/CALCIUM CARB/MAGNESIUM 324 MG
325 TABLET ORAL ONCE
Refills: 0 | Status: COMPLETED | OUTPATIENT
Start: 2022-02-11 | End: 2022-02-11

## 2022-02-11 RX ORDER — HEPARIN SODIUM 5000 [USP'U]/ML
7000 INJECTION INTRAVENOUS; SUBCUTANEOUS EVERY 6 HOURS
Refills: 0 | Status: DISCONTINUED | OUTPATIENT
Start: 2022-02-11 | End: 2022-02-13

## 2022-02-11 RX ORDER — ROPINIROLE 8 MG/1
0.25 TABLET, FILM COATED, EXTENDED RELEASE ORAL
Refills: 0 | Status: DISCONTINUED | OUTPATIENT
Start: 2022-02-11 | End: 2022-02-17

## 2022-02-11 RX ORDER — MECLIZINE HCL 12.5 MG
25 TABLET ORAL DAILY
Refills: 0 | Status: DISCONTINUED | OUTPATIENT
Start: 2022-02-11 | End: 2022-02-18

## 2022-02-11 RX ORDER — METOPROLOL TARTRATE 50 MG
12.5 TABLET ORAL AT BEDTIME
Refills: 0 | Status: DISCONTINUED | OUTPATIENT
Start: 2022-02-11 | End: 2022-02-14

## 2022-02-11 RX ORDER — ASPIRIN/CALCIUM CARB/MAGNESIUM 324 MG
81 TABLET ORAL DAILY
Refills: 0 | Status: DISCONTINUED | OUTPATIENT
Start: 2022-02-11 | End: 2022-02-13

## 2022-02-11 RX ORDER — HEPARIN SODIUM 5000 [USP'U]/ML
INJECTION INTRAVENOUS; SUBCUTANEOUS
Qty: 25000 | Refills: 0 | Status: DISCONTINUED | OUTPATIENT
Start: 2022-02-11 | End: 2022-02-13

## 2022-02-11 RX ORDER — ASPIRIN/CALCIUM CARB/MAGNESIUM 324 MG
1 TABLET ORAL
Qty: 0 | Refills: 0 | DISCHARGE

## 2022-02-11 RX ORDER — HEPARIN SODIUM 5000 [USP'U]/ML
7000 INJECTION INTRAVENOUS; SUBCUTANEOUS ONCE
Refills: 0 | Status: COMPLETED | OUTPATIENT
Start: 2022-02-11 | End: 2022-02-11

## 2022-02-11 RX ORDER — AMLODIPINE BESYLATE 2.5 MG/1
1 TABLET ORAL
Qty: 0 | Refills: 0 | DISCHARGE

## 2022-02-11 RX ORDER — FLUTICASONE PROPIONATE 50 MCG
1 SPRAY, SUSPENSION NASAL
Refills: 0 | Status: DISCONTINUED | OUTPATIENT
Start: 2022-02-11 | End: 2022-02-18

## 2022-02-11 RX ORDER — LANOLIN ALCOHOL/MO/W.PET/CERES
3 CREAM (GRAM) TOPICAL AT BEDTIME
Refills: 0 | Status: DISCONTINUED | OUTPATIENT
Start: 2022-02-11 | End: 2022-02-18

## 2022-02-11 RX ORDER — HEPARIN SODIUM 5000 [USP'U]/ML
3500 INJECTION INTRAVENOUS; SUBCUTANEOUS EVERY 6 HOURS
Refills: 0 | Status: DISCONTINUED | OUTPATIENT
Start: 2022-02-11 | End: 2022-02-13

## 2022-02-11 RX ADMIN — HEPARIN SODIUM 7000 UNIT(S): 5000 INJECTION INTRAVENOUS; SUBCUTANEOUS at 17:54

## 2022-02-11 RX ADMIN — ROPINIROLE 0.25 MILLIGRAM(S): 8 TABLET, FILM COATED, EXTENDED RELEASE ORAL at 22:45

## 2022-02-11 RX ADMIN — Medication 325 MILLIGRAM(S): at 17:50

## 2022-02-11 RX ADMIN — HEPARIN SODIUM 1600 UNIT(S)/HR: 5000 INJECTION INTRAVENOUS; SUBCUTANEOUS at 17:55

## 2022-02-11 RX ADMIN — Medication 12.5 MILLIGRAM(S): at 22:42

## 2022-02-11 RX ADMIN — ATORVASTATIN CALCIUM 80 MILLIGRAM(S): 80 TABLET, FILM COATED ORAL at 22:42

## 2022-02-11 NOTE — ED ADULT NURSE NOTE - TEMPLATE LIST FOR HEAD TO TOE ASSESSMENT
Takes atorvastatin 10mg every 3 days. ASCVD risk of 7.8%, mod-intensity recommended  · Continue current therapy   · Advised to take every day  · Continue ASA   Neuro

## 2022-02-11 NOTE — ED PROVIDER NOTE - OBJECTIVE STATEMENT
85 y/o male with PMHx of HTN, Parkinson's Disease, vertigo, patent foramen ovale, shingles, anxiety, AAA s/p repair, A-Fib (on Lovenox), mucocele appendix s/p right colectomy presents to the ED, sent in by PMD for evaluation of slurred speech x4 days. Noted to have left facial droop in Intake. Code Stroke called and pt sent to the main ED for further evaluation. No headache, weakness, dizziness, chest pain, SOB.

## 2022-02-11 NOTE — ED PROVIDER NOTE - NSICDXPASTMEDICALHX_GEN_ALL_CORE_FT
PAST MEDICAL HISTORY:  AAA (abdominal aortic aneurysm)     Anxiety     Atrial fibrillation     Bruise to bilateral upper extremity    Eczema     H/O candidiasis of mouth     H/O scarlet fever     History of shingles     [place] (hard of hearing)     HTN (hypertension)     Mucocele, appendix     Parkinson disease     Patent foramen ovale dilated left artrium severe echo 1/2021    Renal calculi     Seasonal allergies     Vertigo

## 2022-02-11 NOTE — PATIENT PROFILE ADULT - FALL HARM RISK - HARM RISK INTERVENTIONS

## 2022-02-11 NOTE — ED ADULT NURSE NOTE - NSICDXPASTMEDICALHX_GEN_ALL_CORE_FT
PAST MEDICAL HISTORY:  AAA (abdominal aortic aneurysm)     Anxiety     Atrial fibrillation     Bruise to bilateral upper extremity    Eczema     H/O candidiasis of mouth     H/O scarlet fever     History of shingles     [place] (hard of hearing)     HTN (hypertension)     Mucocele, appendix     Parkinson disease     Patent foramen ovale dilated left artrium severe echo 1/2021    Renal calculi     Seasonal allergies     Vertigo

## 2022-02-11 NOTE — CONSULT NOTE ADULT - SUBJECTIVE AND OBJECTIVE BOX
Neurology Consult requested by:   Patient is a 86y old  Male who presents with a chief complaint of slurred speech   HPI:    86 year old man sent by his doctor to ED because of 5 days of slurred speech, difficulty swallowing. No weakness, dizziness or numbness of face or extremities.  Woke up this past monday with difficulty speaking, improving. No recent fever or chills, no injuries.    PAST MEDICAL & SURGICAL HISTORY:  Atrial fibrillation    HTN (hypertension)    H/O candidiasis of mouth    AAA (abdominal aortic aneurysm)    H/O scarlet fever    [place] (hard of hearing)    Parkinson disease    Renal calculi    Seasonal allergies    Anxiety    Eczema    Mucocele, appendix    History of shingles    Patent foramen ovale  dilated left artrium severe echo 1/2021    Bruise  to bilateral upper extremity    Vertigo    H/O colonoscopy    S/P AAA repair  1/2021    History of appendectomy    H/O right hemicolectomy      FAMILY HISTORY:  No pertinent family history in first degree relatives      Social Hx:  Nonsmoker, no drug or alcohol use  Medications and Allergies ReviewedMEDICATIONS  (STANDING):     ROS: Pertinent positives in HPI, all other ROS were reviewed and are negative.      Examination:   Vital Signs Last 24 Hrs  T(C): --  T(F): --  HR: --  BP: --  BP(mean): --  RR: --  SpO2: --  General: Cooperative, NAD   NECK: supple, no masses  ENT: Normal hearing   Vascular : no carotid bruits,   Lungs: CTAB  Chest: RRR, no murmurs  Extremities: nontender, no edema  Musculoskeletal: no adventitious movements, no joint stiffness  Skin: no rash    Neurological Examination:  NIHSS:3  MS: AOx3. Appropriately interactive, normal affect. Speech dysarthric, can name and repeat, follows 2 step commands   CN: VFFTC, PERLL, EOMI, V1-3 sensation intact, face mild R NLF flattening, hearing intact, palate elevates symmetrically, tongue midline, SCM equal bilaterally  Motor: normal bulk and tone, no tremor, + bilat UE rigidity no bradykinesia.  5/5 all over   Sens: Intact to light touch.    Reflexes: 0-1/4 all over, downgoing toes b/l  Coord:  mild RUE dysmetria   Gait: Cannot test    Labs: pending        Imaging:   < from: CT Head No Cont (12.22.21 @ 15:19) >    BRAIN  IMPRESSION:    1)  involutional changes with volume loss. There are no acute abnormality   suggested.  2)  no intracerebral hemorrhage, contusion, or collections identified.    CERVICAL IMPRESSION:    Multilevel degenerative changes with no acute fracture suggested that.    FACIAL BONES IMPRESSION :    Moderate mucosal disease in the maxillary sinuses. No acute facial bone   fracture suggested.    < end of copied text >

## 2022-02-11 NOTE — H&P ADULT - HISTORY OF PRESENT ILLNESS
PAST MEDICAL HX  AAA (abdominal aortic aneurysm)  Anemia   Anxiety   Atrial fibrillation   Bruise to bilateral upper extremity  COVID-19 virus infection  Eczema   H/O candidiasis of mouth  History of other malignant neoplasm of large intestine    H/O scarlet fever   History of shingles   [place] (hard of hearing)   HTN (hypertension)   Mucocele, appendix   Parkinson disease   Patent foramen ovale dilated left artrium severe echo 1/2021  History of Postoperative hemorrhagic shock, subsequent encounter   Renal calculi   Seasonal allergies   Vertigo.     PAST SURGICAL HX  Cataract surgery  H/O colonoscopy   H/O right hemicolectomy  2021  History of appendectomy 2021  S/P AAA repair 1/2021. Dr Martinez     FAMILY HX  Family history of hepatic cirrhosis: Father   Family history of myocardial infarction: Mother   Family history of cardiomegaly: Mother  86 year old male w hx AAA s/p EVAR 2021, AFIB on ASA, [place], HTN, PD, patent PFO came to ED for evaluation for difficulty speaking    Started 3 days ago when he woke up he had difficulty getting the words out  Took tylenol and felt better  Same yesterday  Today he called his doctor who advised him to go to the ED  + some difficulty swallowing;  usually has to take small portions  denied headache, dizziness, focal weakness or paresthesia  he walks unassisted    COVID+ was given monoclonal AB 02-  also placed on z pack and steroids      +BEFAST.Code stroke called at 1519 BGM 97 in triage.  In ED /89  HR 78   RR 18   T 98   99% sat RA  NIHSS 3  Neuro  CT few small acute left frontal parietal infarcts  MRI same  IV heparin  ekg AFIB      PAST MEDICAL HX  AAA (abdominal aortic aneurysm)  Anemia   Anxiety   Atrial fibrillation   Bruise to bilateral upper extremity  COVID-19 virus infection  Eczema   H/O candidiasis of mouth  History of other malignant neoplasm of large intestine    H/O scarlet fever   History of shingles   [place] (hard of hearing)   HTN (hypertension)   Mucocele, appendix   PD Parkinson Disease   Patent foramen ovale dilated left artrium severe echo 1/2021  History of Postoperative hemorrhagic shock, subsequent encounter   Renal calculi   Seasonal allergies   Vertigo.     PAST SURGICAL HX  Cataract surgery  H/O colonoscopy   H/O right hemicolectomy  2021  History of appendectomy 2021  S/P AAA repair 1/2021. Dr Martinez     FAMILY HX  Family history of hepatic cirrhosis: Father   Family history of myocardial infarction: Mother   Family history of cardiomegaly: Mother  86 year old male w hx AAA s/p EVAR 2021, AFIB on ASA, [place], HTN, PD, patent PFO came to ED for evaluation for difficulty speaking    Started 3 days ago when he woke up he had difficulty getting the words out  Took tylenol and felt better  Same yesterday  Today he called his doctor who advised him to go to the ED  + some difficulty swallowing;  usually has to take small portions  denied headache, dizziness, focal weakness or paresthesia  he walks unassisted    COVID+ was given monoclonal AB 02-  also placed on z pack and steroids      +BEFAST.Code stroke called at 1519 BGM 97 in triage.  In ED /89  HR 78   RR 18   T 98   99% sat RA  NIHSS 3  Neuro  CT few small acute left frontal parietal infarcts  MRI same  IV heparin  ekg AFIB      PAST MEDICAL HX  AAA (abdominal aortic aneurysm)  Anemia   Anxiety   Atrial fibrillation   Bruise to bilateral upper extremity  COVID-19 virus infection  Eczema   H/O candidiasis of mouth  History of other malignant neoplasm of large intestine    H/O scarlet fever   History of shingles   [place] (hard of hearing)   HTN (hypertension)   Mucocele, appendix   PD Parkinson Disease   Patent foramen ovale dilated left artrium severe echo 1/2021  History of Postoperative hemorrhagic shock, subsequent encounter   Renal calculi   Seasonal allergies   Vertigo.     PAST SURGICAL HX  Cataract surgery  H/O colonoscopy   H/O right hemicolectomy  2021  History of appendectomy 2021  S/P AAA repair 1/2021. Dr Martinez     FAMILY HX  Family history of hepatic cirrhosis: Father   Family history of myocardial infarction: Mother   Family history of cardiomegaly: Mother       SOCIAL HX    nonsmoker  walks unassisted

## 2022-02-11 NOTE — ED ADULT TRIAGE NOTE - CHIEF COMPLAINT QUOTE
Pt c/o difficulty speaking. Hx parkinson's. pt reports he noticed that he could not get the words out 3 days ago. and has some moments that he finds difficulty speaking. +BEFAST.Code stroke called at 1519 by Dr. moore. BGM 97 in triage. 87.8kg

## 2022-02-11 NOTE — ED CLERICAL - NS ED CLERK NOTE PRE-ARRIVAL INFORMATION; ADDITIONAL PRE-ARRIVAL INFORMATION
This patient is eligible for (or currently enrolled in) an outpatient care management program available through [employer]. This program can coordinate outpatient follow up and assist the patient in accessing a variety of outpatient resources.  If discharged from the ED, the patient will be contacted to see if any additional resources are needed.                                                                                    Please call the Nurse Clinical Call Center at [phone] with any questions or for assistance in discharge planning.

## 2022-02-11 NOTE — CONSULT NOTE ADULT - ASSESSMENT
86 year old man c/o 5 dyas of dysarthria, difficulty swallowing no weakness. NIHSS: 3. Not tpa candidate because OOW.Min deficits.  Suggest:  Admit to monitored bed  f/u labs  asa,statin  f/u B/p, aim systolic <140  swallow and speech evaluation  PT eval  MR head wo  2 Decho

## 2022-02-11 NOTE — ED STATDOCS - NSICDXPASTMEDICALHX_GEN_ALL_CORE_FT
PAST MEDICAL HISTORY:  AAA (abdominal aortic aneurysm)     Anxiety     Atrial fibrillation     Bruise to bilateral upper extremity    Eczema     H/O candidiasis of mouth     H/O scarlet fever     History of shingles     [place] (hard of hearing)     HTN (hypertension)     Mucocele, appendix     Parkinson disease     Patent foramen ovale dilated left artrium severe echo 1/2021    Renal calculi     Seasonal allergies     Vertigo

## 2022-02-11 NOTE — ED ADULT NURSE REASSESSMENT NOTE - NS ED NURSE REASSESS COMMENT FT1
Assumed care of patient from MICHELLE Ross, 22:00. Patient AxOx4, in no acute distress. VS stable. Speech slurred. no unilateral weakness observed. Comfort and safety measures maintained, side rails up. All needs addressed. Updated on plan of care, all questions and concerns addressed. Will continue to monitor.

## 2022-02-11 NOTE — H&P ADULT - ASSESSMENT
86 year old male w hx AAA s/p EVAR 2021, AFIB on ASA, [place], HTN, PD, patent PFO came to ED for evaluation for difficulty speaking      #Acute CVA L frontoparietal   was not a tpa candidate since he was outside window w symptoms over past 3 days  no significant stenosis H+N  since 12- interim small post, L frontal CVA  #Patent PFO hx  1. admit to telemetry  2. neuro checks q 4 hrs  3. neuro consult read and appreciated  4. ECHO  5. atorvastatin 80mg  6. IV heparin as per protocol  7. able to eat  8. speech and swallow  9. OT/PT      #AFIB  from chart review was not on AC since he had hematuria and also had major surgeries at the time I saw note  other issues not available as his cardiologist is in [place]  1. asa 81 mg  2. metorpolol 25 mg q AM w parameters  3. metoprolol 12.5 q PM w parameters      #COVID+  clear CXR  s/p monoclonal AB  scattered wheezes ANNEMARIE  1. finish       #PD  1. continue home meds    #VTE on IV heparin 86 year old male w hx AAA s/p EVAR 2021, AFIB on ASA, [place], HTN, PD, patent PFO came to ED for evaluation for difficulty speaking      #Acute CVA L frontoparietal   was not a tpa candidate since he was outside window w symptoms over past 3 days  no significant stenosis H+N  since 12- interim small post, L frontal CVA  #Patent PFO hx  1. admit to telemetry  2. neuro checks q 4 hrs  3. neuro consult read and appreciated  4. ECHO  5. atorvastatin 80mg  6. IV heparin as per protocol  7. able to eat  8. speech and swallow  9. OT/PT      #AFIB  from chart review was not on AC since he had hematuria and also had major surgeries at the time I saw note  other issues not available as his cardiologist is in [place]  1. asa 81 mg  2. metorpolol 25 mg q AM w parameters  3. metoprolol 12.5 q PM w parameters      #COVID+  clear CXR  s/p monoclonal AB  scattered wheezes ANNEMARIE  1. finished 9 days of steroids    #PD  1. continue home meds        #VTE on IV heparin

## 2022-02-11 NOTE — ED ADULT NURSE NOTE - NSIMPLEMENTINTERV_GEN_ALL_ED
Implemented All Fall Risk Interventions:  [place] to call system. Call bell, personal items and telephone within reach. Instruct patient to call for assistance. Room bathroom lighting operational. Non-slip footwear when patient is off stretcher. Physically safe environment: no spills, clutter or unnecessary equipment. Stretcher in lowest position, wheels locked, appropriate side rails in place. Provide visual cue, wrist band, yellow gown, etc. Monitor gait and stability. Monitor for mental status changes and reorient to person, place, and time. Review medications for side effects contributing to fall risk. Reinforce activity limits and safety measures with patient and family.

## 2022-02-11 NOTE — ED STATDOCS - PROGRESS NOTE DETAILS
Ramsey Carreno for attending Dr. De La Rosa: 87 y/o male with a PMHx of AAA, anxiety, Afib, eczema, [place], HTN, shingles, Parkinsons presents to the ED for intermittent speech changes. Pt with left facial droop. Code stroke called. Will send pt to main ED for further evaluation.

## 2022-02-11 NOTE — ED ADULT NURSE REASSESSMENT NOTE - NS ED NURSE REASSESS COMMENT FT1
MD Renteria approved patient's heparin gtt to be on hold at this time for MRI. Will restart after MRI. Cardiac monitor on hold for MRI.

## 2022-02-11 NOTE — ED ADULT NURSE NOTE - OBJECTIVE STATEMENT
pt presents to ED s/p facial droop. pt presents to ED s/p facial droop and garbled speech that began on monday. Pt denies headache/blurred vision/difficulty ambulating. hx parkinsons, AAA repair. Pt on the monitor, in no acute distress, AOx4, slight right sided facial droop detected, speech garbled but comprehensible. Strength +4 and equal in all 4 extremities. will ctm

## 2022-02-11 NOTE — H&P ADULT - NSHPPHYSICALEXAM_GEN_ALL_CORE
Vital Signs Last 24 Hrs  T(C): 36.7 (11 Feb 2022 15:48), Max: 36.7 (11 Feb 2022 15:48)  T(F): 98 (11 Feb 2022 15:48), Max: 98 (11 Feb 2022 15:48)  HR: 65 (11 Feb 2022 16:30) (65 - 78)  BP: 141/84 (11 Feb 2022 16:30) (130/84 - 151/89)  BP(mean): 97 (11 Feb 2022 16:30) (95 - 97)  RR: 18 (11 Feb 2022 15:48) (18 - 18)  SpO2: 99% (11 Feb 2022 15:48) (99% - 99%)

## 2022-02-11 NOTE — STROKE CODE NOTE - NSMDCONSULT QTN_Y FT
Neurology Consult requested by:   Patient is a 86y old  Male who presents with a chief complaint of slurred speech   HPI:    86 year old man sent by his doctor to ED because of 5 days of slurred speech, difficulty swallowing. No weakness, dizziness or numbness of face or extremities.  Woke up this past monday with difficulty speaking, improving. No recent fever or chills, no injuries.    PAST MEDICAL & SURGICAL HISTORY:  Atrial fibrillation    HTN (hypertension)    H/O candidiasis of mouth    AAA (abdominal aortic aneurysm)    H/O scarlet fever    [place] (hard of hearing)    Parkinson disease    Renal calculi    Seasonal allergies    Anxiety    Eczema    Mucocele, appendix    History of shingles    Patent foramen ovale  dilated left artrium severe echo 1/2021    Bruise  to bilateral upper extremity    Vertigo    H/O colonoscopy    S/P AAA repair  1/2021    History of appendectomy    H/O right hemicolectomy      FAMILY HISTORY:  No pertinent family history in first degree relatives      Social Hx:  Nonsmoker, no drug or alcohol use  Medications and Allergies ReviewedMEDICATIONS  (STANDING):     ROS: Pertinent positives in HPI, all other ROS were reviewed and are negative.      Examination:   Vital Signs Last 24 Hrs  T(C): --  T(F): --  HR: --  BP: --  BP(mean): --  RR: --  SpO2: --  General: Cooperative, NAD   NECK: supple, no masses  ENT: Normal hearing   Vascular : no carotid bruits,   Lungs: CTAB  Chest: RRR, no murmurs  Extremities: nontender, no edema  Musculoskeletal: no adventitious movements, no joint stiffness  Skin: no rash    Neurological Examination:  NIHSS:3  MS: AOx3. Appropriately interactive, normal affect. Speech dysarthric, can name and repeat, follows 2 step commands   CN: VFFTC, PERLL, EOMI, V1-3 sensation intact, face mild R NLF flattening, hearing intact, palate elevates symmetrically, tongue midline, SCM equal bilaterally  Motor: normal bulk and tone, no tremor, + bilat UE rigidity no bradykinesia.  5/5 all over   Sens: Intact to light touch.    Reflexes: 0-1/4 all over, downgoing toes b/l  Coord:  mild RUE dysmetria   Gait: Cannot test    Labs: pending        Imaging:   < from: CT Head No Cont (12.22.21 @ 15:19) >    BRAIN  IMPRESSION:    1)  involutional changes with volume loss. There are no acute abnormality   suggested.  2)  no intracerebral hemorrhage, contusion, or collections identified.    CERVICAL IMPRESSION:    Multilevel degenerative changes with no acute fracture suggested that.    FACIAL BONES IMPRESSION :    Moderate mucosal disease in the maxillary sinuses. No acute facial bone   fracture suggested.    < end of copied text >      Assessment and Recommendation:   · Assessment	  86 year old man c/o 5 dyas of dysarthria, difficulty swallowing no weakness. NIHSS: 3. Not tpa candidate because OOW.Min deficits.  Suggest:  Admit to monitored bed  f/u labs  asa,statin  f/u B/p, aim systolic <140  swallow and speech evaluation  PT eval  MR head wo  2 Decho Neurology Consult requested by:   Patient is a 86y old  Male who presents with a chief complaint of slurred speech   HPI:    86 year old man sent by his doctor to ED because of 5 days of slurred speech, difficulty swallowing. No weakness, dizziness or numbness of face or extremities. Woke up this past monday with difficulty speaking, improving. No recent fever or chills, no injuries.    PAST MEDICAL & SURGICAL HISTORY:  Atrial fibrillation    HTN (hypertension)    H/O candidiasis of mouth    AAA (abdominal aortic aneurysm)    H/O scarlet fever    [place] (hard of hearing)    Parkinson disease    Renal calculi    Seasonal allergies    Anxiety    Eczema    Mucocele, appendix    History of shingles    Patent foramen ovale  dilated left artrium severe echo 1/2021    Bruise  to bilateral upper extremity    Vertigo    H/O colonoscopy    S/P AAA repair  1/2021    History of appendectomy    H/O right hemicolectomy      FAMILY HISTORY:  No pertinent family history in first degree relatives      Social Hx:  Nonsmoker, no drug or alcohol use  Medications and Allergies ReviewedMEDICATIONS  (STANDING):     ROS: Pertinent positives in HPI, all other ROS were reviewed and are negative.      Examination:   Vital Signs Last 24 Hrs  T(C): --  T(F): --  HR: --  BP: --  BP(mean): --  RR: --  SpO2: --  General: Cooperative, NAD   NECK: supple, no masses  ENT: Normal hearing   Vascular : no carotid bruits,   Lungs: CTAB  Chest: RRR, no murmurs  Extremities: nontender, no edema  Musculoskeletal: no adventitious movements, no joint stiffness  Skin: no rash    Neurological Examination:  NIHSS:3  MS: AOx3. Appropriately interactive, normal affect. Speech dysarthric, can name and repeat, follows 2 step commands   CN: VFFTC, PERLL, EOMI, V1-3 sensation intact, face mild R NLF flattening, hearing intact, palate elevates symmetrically, tongue midline, SCM equal bilaterally  Motor: normal bulk and tone, no tremor, + bilat UE rigidity no bradykinesia.  5/5 all over   Sens: Intact to light touch.    Reflexes: 0-1/4 all over, downgoing toes b/l  Coord:  mild RUE dysmetria   Gait: Cannot test    Labs: pending        Imaging:   < from: CT Head No Cont (12.22.21 @ 15:19) >    BRAIN  IMPRESSION:    1)  involutional changes with volume loss. There are no acute abnormality   suggested.  2)  no intracerebral hemorrhage, contusion, or collections identified.    CERVICAL IMPRESSION:    Multilevel degenerative changes with no acute fracture suggested that.    FACIAL BONES IMPRESSION :    Moderate mucosal disease in the maxillary sinuses. No acute facial bone   fracture suggested.    < end of copied text >      Assessment and Recommendation:   · Assessment	  86 year old man c/o 5 days of dysarthria, difficulty swallowing no weakness. NIHSS: 3. Not tpa candidate because [place]OW. Min deficits.  Suggest:  Admit to monitored bed  f/u labs  asa, statin  f/u B/p, aim systolic <140  swallow and speech evaluation  PT eval  MR head wo  2 Decho

## 2022-02-12 ENCOUNTER — TRANSCRIPTION ENCOUNTER (OUTPATIENT)
Age: 87
End: 2022-02-12

## 2022-02-12 LAB
A1C WITH ESTIMATED AVERAGE GLUCOSE RESULT: 5.8 % — HIGH (ref 4–5.6)
ANION GAP SERPL CALC-SCNC: 7 MMOL/L — SIGNIFICANT CHANGE UP (ref 5–17)
APTT BLD: 184.4 SEC — CRITICAL HIGH (ref 27.5–35.5)
APTT BLD: 73.4 SEC — HIGH (ref 27.5–35.5)
APTT BLD: >200 SEC — CRITICAL HIGH (ref 27.5–35.5)
BUN SERPL-MCNC: 22 MG/DL — SIGNIFICANT CHANGE UP (ref 7–23)
CALCIUM SERPL-MCNC: 8.8 MG/DL — SIGNIFICANT CHANGE UP (ref 8.5–10.1)
CHLORIDE SERPL-SCNC: 105 MMOL/L — SIGNIFICANT CHANGE UP (ref 96–108)
CHOLEST SERPL-MCNC: 144 MG/DL — SIGNIFICANT CHANGE UP
CO2 SERPL-SCNC: 27 MMOL/L — SIGNIFICANT CHANGE UP (ref 22–31)
CREAT SERPL-MCNC: 1.18 MG/DL — SIGNIFICANT CHANGE UP (ref 0.5–1.3)
ESTIMATED AVERAGE GLUCOSE: 120 MG/DL — HIGH (ref 68–114)
GLUCOSE SERPL-MCNC: 217 MG/DL — HIGH (ref 70–99)
HCT VFR BLD CALC: 44.3 % — SIGNIFICANT CHANGE UP (ref 39–50)
HCT VFR BLD CALC: 44.8 % — SIGNIFICANT CHANGE UP (ref 39–50)
HDLC SERPL-MCNC: 50 MG/DL — SIGNIFICANT CHANGE UP
HGB BLD-MCNC: 14.4 G/DL — SIGNIFICANT CHANGE UP (ref 13–17)
HGB BLD-MCNC: 14.8 G/DL — SIGNIFICANT CHANGE UP (ref 13–17)
LIPID PNL WITH DIRECT LDL SERPL: 81 MG/DL — SIGNIFICANT CHANGE UP
MCHC RBC-ENTMCNC: 29.6 PG — SIGNIFICANT CHANGE UP (ref 27–34)
MCHC RBC-ENTMCNC: 30.1 PG — SIGNIFICANT CHANGE UP (ref 27–34)
MCHC RBC-ENTMCNC: 32.5 GM/DL — SIGNIFICANT CHANGE UP (ref 32–36)
MCHC RBC-ENTMCNC: 33 GM/DL — SIGNIFICANT CHANGE UP (ref 32–36)
MCV RBC AUTO: 91 FL — SIGNIFICANT CHANGE UP (ref 80–100)
MCV RBC AUTO: 91.1 FL — SIGNIFICANT CHANGE UP (ref 80–100)
NON HDL CHOLESTEROL: 95 MG/DL — SIGNIFICANT CHANGE UP
PLATELET # BLD AUTO: 243 K/UL — SIGNIFICANT CHANGE UP (ref 150–400)
PLATELET # BLD AUTO: 244 K/UL — SIGNIFICANT CHANGE UP (ref 150–400)
POTASSIUM SERPL-MCNC: 4.1 MMOL/L — SIGNIFICANT CHANGE UP (ref 3.5–5.3)
POTASSIUM SERPL-SCNC: 4.1 MMOL/L — SIGNIFICANT CHANGE UP (ref 3.5–5.3)
RBC # BLD: 4.87 M/UL — SIGNIFICANT CHANGE UP (ref 4.2–5.8)
RBC # BLD: 4.92 M/UL — SIGNIFICANT CHANGE UP (ref 4.2–5.8)
RBC # FLD: 16.2 % — HIGH (ref 10.3–14.5)
RBC # FLD: 16.2 % — HIGH (ref 10.3–14.5)
SODIUM SERPL-SCNC: 139 MMOL/L — SIGNIFICANT CHANGE UP (ref 135–145)
TRIGL SERPL-MCNC: 69 MG/DL — SIGNIFICANT CHANGE UP
WBC # BLD: 10.78 K/UL — HIGH (ref 3.8–10.5)
WBC # BLD: 8.35 K/UL — SIGNIFICANT CHANGE UP (ref 3.8–10.5)
WBC # FLD AUTO: 10.78 K/UL — HIGH (ref 3.8–10.5)
WBC # FLD AUTO: 8.35 K/UL — SIGNIFICANT CHANGE UP (ref 3.8–10.5)

## 2022-02-12 PROCEDURE — 99232 SBSQ HOSP IP/OBS MODERATE 35: CPT

## 2022-02-12 PROCEDURE — 93306 TTE W/DOPPLER COMPLETE: CPT | Mod: 26

## 2022-02-12 RX ORDER — ROPINIROLE 8 MG/1
1 TABLET, FILM COATED, EXTENDED RELEASE ORAL
Refills: 0 | Status: DISCONTINUED | OUTPATIENT
Start: 2022-02-12 | End: 2022-02-18

## 2022-02-12 RX ORDER — ROPINIROLE 8 MG/1
1 TABLET, FILM COATED, EXTENDED RELEASE ORAL
Refills: 0 | Status: DISCONTINUED | OUTPATIENT
Start: 2022-02-12 | End: 2022-02-12

## 2022-02-12 RX ADMIN — Medication 12.5 MILLIGRAM(S): at 21:20

## 2022-02-12 RX ADMIN — HEPARIN SODIUM 0 UNIT(S)/HR: 5000 INJECTION INTRAVENOUS; SUBCUTANEOUS at 00:46

## 2022-02-12 RX ADMIN — HEPARIN SODIUM 1000 UNIT(S)/HR: 5000 INJECTION INTRAVENOUS; SUBCUTANEOUS at 19:30

## 2022-02-12 RX ADMIN — Medication 1 SPRAY(S): at 18:55

## 2022-02-12 RX ADMIN — ROPINIROLE 1 MILLIGRAM(S): 8 TABLET, FILM COATED, EXTENDED RELEASE ORAL at 09:25

## 2022-02-12 RX ADMIN — HEPARIN SODIUM 0 UNIT(S)/HR: 5000 INJECTION INTRAVENOUS; SUBCUTANEOUS at 09:11

## 2022-02-12 RX ADMIN — Medication 1 SPRAY(S): at 12:54

## 2022-02-12 RX ADMIN — Medication 25 MILLIGRAM(S): at 09:23

## 2022-02-12 RX ADMIN — Medication 3 MILLIGRAM(S): at 21:20

## 2022-02-12 RX ADMIN — ROPINIROLE 0.25 MILLIGRAM(S): 8 TABLET, FILM COATED, EXTENDED RELEASE ORAL at 17:08

## 2022-02-12 RX ADMIN — ROPINIROLE 0.25 MILLIGRAM(S): 8 TABLET, FILM COATED, EXTENDED RELEASE ORAL at 13:31

## 2022-02-12 RX ADMIN — HEPARIN SODIUM 1300 UNIT(S)/HR: 5000 INJECTION INTRAVENOUS; SUBCUTANEOUS at 01:53

## 2022-02-12 RX ADMIN — Medication 25 MILLIGRAM(S): at 09:26

## 2022-02-12 RX ADMIN — ATORVASTATIN CALCIUM 80 MILLIGRAM(S): 80 TABLET, FILM COATED ORAL at 21:20

## 2022-02-12 RX ADMIN — Medication 81 MILLIGRAM(S): at 09:24

## 2022-02-12 RX ADMIN — HEPARIN SODIUM 1000 UNIT(S)/HR: 5000 INJECTION INTRAVENOUS; SUBCUTANEOUS at 10:30

## 2022-02-12 NOTE — PROGRESS NOTE ADULT - SUBJECTIVE AND OBJECTIVE BOX
Interval History:  2/12/22: He has no new complaints at this time.    MEDICATIONS  (STANDING):  aspirin enteric coated 81 milliGRAM(s) Oral daily  atorvastatin 80 milliGRAM(s) Oral at bedtime  fluticasone propionate 50 MICROgram(s)/spray Nasal Spray 1 Spray(s) Both Nostrils two times a day  heparin  Infusion.  Unit(s)/Hr (16 mL/Hr) IV Continuous <Continuous>  meclizine 25 milliGRAM(s) Oral daily  melatonin 3 milliGRAM(s) Oral at bedtime  metoprolol tartrate 12.5 milliGRAM(s) Oral at bedtime  metoprolol tartrate 25 milliGRAM(s) Oral daily  rOPINIRole 1 milliGRAM(s) Oral daily  rOPINIRole 0.25 milliGRAM(s) Oral <User Schedule>    MEDICATIONS  (PRN):  acetaminophen     Tablet .. 650 milliGRAM(s) Oral every 6 hours PRN Temp greater or equal to 38C (100.4F), Mild Pain (1 - 3)  aluminum hydroxide/magnesium hydroxide/simethicone Suspension 30 milliLiter(s) Oral every 4 hours PRN Dyspepsia  artificial  tears Solution 1 Drop(s) Both EYES four times a day PRN Dry Eyes  heparin   Injectable 7000 Unit(s) IV Push every 6 hours PRN For aPTT less than 40  heparin   Injectable 3500 Unit(s) IV Push every 6 hours PRN For aPTT between 40 - 57  meclizine 25 milliGRAM(s) Oral three times a day PRN Dizziness      Allergies    penicillin (Rash)    Intolerances        PHYSICAL EXAM:  Vital Signs Last 24 Hrs  T(F): 97.7 (02-12-22 @ 09:05)  HR: 64 (02-12-22 @ 09:05)  BP: 136/94 (02-12-22 @ 09:05)  RR: 18 (02-12-22 @ 09:05)    GENERAL: NAD, well-groomed, well-developed  HEAD:  Atraumatic, Normocephalic  Neuro:  Awake, alert, comprehension intact, speaking fluently  CN: PERRL, EOMI, no nystagmus, no facial weakness, mild hypophonia, mild dysarthria   motor: normal tone, no pronator drift, no focal weakness  sensory: intact to light touch  coordination: no tremors noted  DTRs: symmetric, hypoactive    LABS:                        14.8   8.35  )-----------( 244      ( 12 Feb 2022 07:50 )             44.8     02-12    139  |  105  |  22  ----------------------------<  217<H>  4.1   |  27  |  1.18    Ca    8.8      12 Feb 2022 07:50    TPro  7.5  /  Alb  3.2<L>  /  TBili  0.7  /  DBili  x   /  AST  23  /  ALT  43  /  AlkPhos  64  02-11    PT/INR - ( 11 Feb 2022 15:32 )   PT: 12.4 sec;   INR: 1.07 ratio         PTT - ( 12 Feb 2022 07:50 )  PTT:>200.0 sec      RADIOLOGY & ADDITIONAL STUDIES:    CT head, CTA head and neck 2/11/22:  *  VERY SMALL POSTERIOR LEFT FRONTAL INFARCT WHICH IS OCCURRED IN THE   INTERIM FROM 12/22/2021  *  NO HEMODYNAMIC SIGNIFICANT NARROWING WITHIN THE NECK.  *  NO PROXIMAL LARGE VESSEL OCCLUSION INTRACRANIALLY.  *  PERFUSION IMAGING DEMONSTRATES REGIONS OF HYPOPERFUSION/ISCHEMIA   INVOLVING THE BILATERAL TEMPORAL OCCIPITAL REGIONS AND RIGHT PARIETAL   LOBE WITH A VOLUME OF 32 ML. NO CORE INFARCT DEMONSTRATED.    MRI head 2/11/22:  FEW SMALL ACUTE LEFT FRONTAL PARIETAL INFARCTS. NO ASSOCIATED BLOOD   PRODUCTS. NO SIGNIFICANT MASS EFFECT

## 2022-02-12 NOTE — SWALLOW BEDSIDE ASSESSMENT ADULT - SLP GENERAL OBSERVATIONS
Pt is alert & interactive but [place]. His receptive language abilities are relatively preserved. Additionally, pt was able to verbalize during communicative probes via fluent, linguistically intact, contextually appropriate utterances. 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 Additionally, it is noted that he has a lateral lisp since childhood. Pt is able to verbalize needs and feels that he is at usual communicative state. Pt reported that he received ST years ago and is generally content with current level of communicative integrity.

## 2022-02-12 NOTE — SWALLOW BEDSIDE ASSESSMENT ADULT - SWALLOW EVAL: DIAGNOSIS
1) Pt is alert & interactive but [place]. His receptive language abilities are relatively preserved. Additionally, pt was able to verbalize during communicative probes via fluent, linguistically intact, contextually appropriate utterances. 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 Additionally, it is noted that he has a lateral lisp since childhood. Pt is able to verbalize needs and feels that he is at usual communicative state. Pt reported that he received ST years ago and is generally content with current level of communicative integrity.   2) Pt exhibits chronic mild functional Oral Dysphagia associated with Parkinson's without overt s/s of so pharyngeal dysfunction or aspiration. Odynophagia denied. I do not feel that pt's new stroke has impeded his deglutition competence.

## 2022-02-12 NOTE — PHYSICAL THERAPY INITIAL EVALUATION ADULT - PERTINENT HX OF CURRENT PROBLEM, REHAB EVAL
came to ED for evaluation for difficulty speaking, pt also COVID+ was given monoclonal AB. patent PFO hx, h/o AF not on AC. MRI: FEW SMALL ACUTE LEFT FRONTAL PARIETAL INFARCTS

## 2022-02-12 NOTE — SWALLOW BEDSIDE ASSESSMENT ADULT - NS SPL SWALLOW CLINIC TRIAL FT
Odynophagia denied. Coarse solids not offered given oral dysphagic profile and expressed food preferences.

## 2022-02-12 NOTE — PHYSICAL THERAPY INITIAL EVALUATION ADULT - GENERAL OBSERVATIONS, REHAB EVAL
pt rec'd supine in bed on 3N, pleasant/cooperative. (+) dysarthria -pt endorses "gravelly" speech when fatigued d/t PD, reports speech worse acutely

## 2022-02-12 NOTE — SWALLOW BEDSIDE ASSESSMENT ADULT - ASR SWALLOW LABIAL MOBILITY
A mild generalized decrease in labial strength/agility noted on exam with rigidity which is felt to be chronic due to Parkinson's.

## 2022-02-12 NOTE — SWALLOW BEDSIDE ASSESSMENT ADULT - SWALLOW EVAL: CRITERIA FOR SKILLED INTERVENTION MET
DO NOT FEEL THAT ACUTE SPEECH PATHOLOGY FOLLOW UP WOULD CHANGE CLINICAL MANAGEMENT/OUTCOME WHILE IN ACUTE HOSPITAL SETTING. PT'S FUNCTIONAL ORAL DYSPHAGIA/FUNCTIONAL DYSARTHRIA ARE CHRONIC IRREVERSIBLE PRE-EXISTING CONDITIONS. PT HAD RECEIVED ST PTH. DO NOT FEEL THAT OFFERING SPEECH PATH INTERVENTION IN HOSPITAL WOULD BE OF SIGNIFICANT CLINICAL BENEFIT IN HOSPITAL. GIVEN ABOVE, WILL NOT ACTIVELY FOLLOW. RECONSULT PRN SHOULD STATUS CHANGE AND CONDITION WARRANT.

## 2022-02-12 NOTE — PHYSICAL THERAPY INITIAL EVALUATION ADULT - PRECAUTIONS/LIMITATIONS, REHAB EVAL
Has some chronic dysarthria and possible dysphagia/fall precautions/isolation precautions/swallowing precautions

## 2022-02-12 NOTE — PROGRESS NOTE ADULT - ASSESSMENT
86 year old man who is known to me from my outpatient practice (but last seen in January 2021) who presented with complaints of 5 days of dysarthria, difficulty swallowing but weakness.   In the ED, NIHSS was 3. He was not a tpa candidate since symptoms had been present for several days.  He reports that he had been started on aspirin about two weeks earlier.   He has a history of atrial fibrillation. Outpatient cardiology note from December 2021 states that he was off Coumadin due to hematuria and recent surgeries.    Stroke  -History of a-fib  -May not be a candidate for AC due to medical contraindications (history of hemorrhage)  -telemetry  -Aspirin 81 mg/day for now. If he is not a candidate for AC, may need to change to Plavix as he reports that he has been taking aspirin for about two weeks.  -Statin  -Echo  -PT  -Speech pathology eval pending    Parkinson's  -Has some chronic dysarthria and possible dysphagia  -Eats soft foods at home  -Continue ropinirole 1 mg in the AM, 0.5 mg in the afternoon and 0.5 mg in the PM    Discussed with Dr. Quiroz.  Will follow

## 2022-02-12 NOTE — PROGRESS NOTE ADULT - SUBJECTIVE AND OBJECTIVE BOX
Reason for Admission: acute CVA  History of Present Illness:   86 year old male w hx AAA s/p EVAR 2021, AFIB on ASA, [place], HTN, PD, patent PFO came to ED for evaluation for difficulty speaking    Started 3 days ago when he woke up he had difficulty getting the words out  Took tylenol and felt better  Same yesterday  Today he called his doctor who advised him to go to the ED  + some difficulty swallowing;  usually has to take small portions  denied headache, dizziness, focal weakness or paresthesia  he walks unassisted    COVID+ was given monoclonal AB 02-  also placed on z pack and steroids      REVIEW OF SYSTEMS:  General: NAD, hemodynamically stable, (-)  fever, (-) chills, (-) weakness  HEENT:  Eyes:  No visual loss, blurred vision, double vision or yellow sclerae. Ears, Nose, Throat:  No hearing loss, sneezing, congestion, runny nose or sore throat.  SKIN:  No rash or itching.  CARDIOVASCULAR:  No chest pain, chest pressure or chest discomfort. No palpitations or edema.  RESPIRATORY:  No shortness of breath, cough or sputum.  GASTROINTESTINAL:  No anorexia, nausea, vomiting or diarrhea. No abdominal pain or blood.  NEUROLOGICAL:  No headache, dizziness, syncope, paralysis, ataxia, numbness or tingling in the extremities. No change in bowel or bladder control.  MUSCULOSKELETAL:  No muscle, back pain, joint pain or stiffness.  HEMATOLOGIC:  No anemia, bleeding or bruising.  LYMPHATICS:  No enlarged nodes. No history of splenectomy.  ENDOCRINOLOGIC:  No reports of sweating, cold or heat intolerance. No polyuria or polydipsia.  ALLERGIES:  No history of asthma, hives, eczema or rhinitis.    Physical Exam:   GENERAL APPEARANCE:  NAD, hemodynamically stable  T(C): 36.5 (02-12-22 @ 09:05), Max: 37.1 (02-11-22 @ 21:27)  HR: 64 (02-12-22 @ 09:05) (62 - 80)  BP: 136/94 (02-12-22 @ 09:05) (130/84 - 157/89)  RR: 18 (02-12-22 @ 09:05) (16 - 18)  SpO2: 95% (02-12-22 @ 09:05) (95% - 99%)  Wt(kg): --  HEENT:  Head is normocephalic    Skin:  Warm and dry without any rash   NECK:  Supple without lymphadenopathy.   HEART:  Regular rate and rhythm. normal S1 and S2, No M/R/G  LUNGS:  Good ins/exp effort, no W/R/R/C  ABDOMEN:  Soft, nontender, nondistended with good bowel sounds heard  EXTREMITIES:  Without cyanosis, clubbing or edema.   NEUROLOGICAL:  Gross nonfocal       CBC Full  -  ( 12 Feb 2022 07:50 )  WBC Count : 8.35 K/uL  RBC Count : 4.92 M/uL  Hemoglobin : 14.8 g/dL  Hematocrit : 44.8 %  Platelet Count - Automated : 244 K/uL  Mean Cell Volume : 91.1 fl  Mean Cell Hemoglobin : 30.1 pg  Mean Cell Hemoglobin Concentration : 33.0 gm/dL  Auto Neutrophil # : x  Auto Lymphocyte # : x  Auto Monocyte # : x  Auto Eosinophil # : x  Auto Basophil # : x  Auto Neutrophil % : x  Auto Lymphocyte % : x  Auto Monocyte % : x  Auto Eosinophil % : x  Auto Basophil % : x    PT/INR - ( 11 Feb 2022 15:32 )   PT: 12.4 sec;   INR: 1.07 ratio         PTT - ( 12 Feb 2022 07:50 )  PTT:>200.0 sec    02-12    139  |  105  |  22  ----------------------------<  217<H>  4.1   |  27  |  1.18    Ca    8.8      12 Feb 2022 07:50    TPro  7.5  /  Alb  3.2<L>  /  TBili  0.7  /  DBili  x   /  AST  23  /  ALT  43  /  AlkPhos  64  02-11     Reason for Admission: acute CVA  History of Present Illness:   86 year old male w hx AAA s/p EVAR 2021, AFIB on ASA, [place], HTN, PD, patent PFO came to ED for evaluation for difficulty speaking    Today he called his doctor who advised him to go to the ED  + some difficulty swallowing;  usually has to take small portions     COVID+ was given monoclonal AB 02-    care discussed with neurology as patient has a multiple strokes on an MRI. Patient has an underlying atrial fibrillation -  not AC. Previous hospitalization significant for        REVIEW OF SYSTEMS:  General: NAD, hemodynamically stable, (-)  fever, (-) chills, (-) weakness  HEENT:  Eyes:  No visual loss, blurred vision, double vision or yellow sclerae. Ears, Nose, Throat:  No hearing loss, sneezing, congestion, runny nose or sore throat.  SKIN:  No rash or itching.  CARDIOVASCULAR:  No chest pain, chest pressure or chest discomfort. No palpitations or edema.  RESPIRATORY:  No shortness of breath, cough or sputum.  GASTROINTESTINAL:  No anorexia, nausea, vomiting or diarrhea. No abdominal pain or blood.  NEUROLOGICAL:  No headache, dizziness, syncope, paralysis, ataxia, numbness or tingling in the extremities. No change in bowel or bladder control.  MUSCULOSKELETAL:  No muscle, back pain, joint pain or stiffness.  HEMATOLOGIC:  No anemia, bleeding or bruising.  LYMPHATICS:  No enlarged nodes. No history of splenectomy.  ENDOCRINOLOGIC:  No reports of sweating, cold or heat intolerance. No polyuria or polydipsia.  ALLERGIES:  No history of asthma, hives, eczema or rhinitis.    Physical Exam:   GENERAL APPEARANCE:  NAD, hemodynamically stable  T(C): 36.5 (02-12-22 @ 09:05), Max: 37.1 (02-11-22 @ 21:27)  HR: 64 (02-12-22 @ 09:05) (62 - 80)  BP: 136/94 (02-12-22 @ 09:05) (130/84 - 157/89)  RR: 18 (02-12-22 @ 09:05) (16 - 18)  SpO2: 95% (02-12-22 @ 09:05) (95% - 99%)  HEENT:  Head is normocephalic    Skin:  Warm and dry without any rash   NECK:  Supple without lymphadenopathy.   HEART:  Regular rate and rhythm. normal S1 and S2, No M/R/G  LUNGS:  Good ins/exp effort, no W/R/R/C  ABDOMEN:  Soft, nontender, nondistended with good bowel sounds heard  EXTREMITIES:  Without cyanosis, clubbing or edema.   NEUROLOGICAL:  Gross nonfocal       CBC Full  -  ( 12 Feb 2022 07:50 )  WBC Count : 8.35 K/uL  RBC Count : 4.92 M/uL  Hemoglobin : 14.8 g/dL  Hematocrit : 44.8 %  Platelet Count - Automated : 244 K/uL    PT/INR - ( 11 Feb 2022 15:32 )   PT: 12.4 sec;   INR: 1.07 ratio         PTT - ( 12 Feb 2022 07:50 )  PTT:>200.0 sec    02-12    139  |  105  |  22  ----------------------------<  217<H>  4.1   |  27  |  1.18    Ca    8.8      12 Feb 2022 07:50    TPro  7.5  /  Alb  3.2<L>  /  TBili  0.7  /  DBili  x   /  AST  23  /  ALT  43  /  AlkPhos  64  02-11      86 year old male w hx AAA s/p EVAR 2021, AFIB on ASA, [place], HTN, PD, patent PFO came to ED for evaluation for difficulty speaking      # Acute CVA L frontoparietal   - was not a tpa candidate since he was outside window w symptoms over past 3 days  - since 12- interim small post, L frontal CVA  - om a setting of CVA / Atrial fibrillation patient needs to be on a AC  - care discussed with neurology    # AFIB - not on AC  - from chart review was not on AC since he had hematuria and also had major surgeries at the time I saw note  other issues not available as his cardiologist is in [place]  - asa 81 mg / heparin drip  - metorpolol 25 mg q AM w parameters  - metoprolol 12.5 q PM w parameters  - surgery eval    # Patent PFO hx  - tele monitoring  - neuro checks q 4 hrs  - neuro consult read and appreciated  - IV heparin as per protocol  - speech and swallow  - OT/PT    # COVID+  - clear CXR  - s/p monoclonal AB  - scattered wheezes ANNEMARIE  - finished 9 days of steroids    # PD  1. continue home meds    #VTE on IV heparin

## 2022-02-12 NOTE — DISCHARGE NOTE NURSING/CASE MANAGEMENT/SOCIAL WORK - NSDCVIVACCINE_GEN_ALL_CORE_FT
COVID-19 vaccine, vector-nr, [id number], PF, 0.5 mL (Rebeca); 15-Mar-2021 12:03; Annita Mello (RN); [employer]; [id number] (Exp. Date: 25-May-2021); IntraMuscular; Deltoid Left.; 0.5 milliLiter(s);   Tdap; 22-Dec-2021 16:46; Jose Antonio Oconnor (RN); [employer]; [id number] (Exp. Date: 09-Sep-2023); IntraMuscular; Deltoid Left.; 0.5 milliLiter(s); VIS (VIS Published: 09-May-2013, VIS Presented: 22-Dec-2021);

## 2022-02-12 NOTE — DISCHARGE NOTE NURSING/CASE MANAGEMENT/SOCIAL WORK - NSDCPEFALRISK_GEN_ALL_CORE
For information on Fall & Injury Prevention, visit: [email or web address] OR  [email or web address] OR  https://www.cdc.gov/steadi/patient.html

## 2022-02-12 NOTE — SWALLOW BEDSIDE ASSESSMENT ADULT - ORAL PHASE
Bolus formation/transfer were mildly prolonged/disorganized but mechanically functional. Pt cleared oral debris on own after piecemeal oral processing.

## 2022-02-12 NOTE — DISCHARGE NOTE NURSING/CASE MANAGEMENT/SOCIAL WORK - PATIENT PORTAL LINK FT
You can access the FollowMyHealth Patient Portal offered by [facility] by registering at the following website: [email or web address]. By joining [employer]’s FollowMyHealth portal, you will also be able to view your health information using other applications (apps) compatible with our system.

## 2022-02-12 NOTE — SWALLOW BEDSIDE ASSESSMENT ADULT - SWALLOW EVAL: RECOMMENDED DIET
SUGGEST AN EASY TO CHEW ORAL DIET WITH THIN LIQUIDS AS PT TOLERATES THESE FOOD CONSISTENCIES FROM AN OROPHARYNGEAL SWALLOWING STANCE ON EXAM AND FOOD TEXTURES ON THIS DIET ACCOMMODATES PT'S EXPRESSED FOOD PREFERENCES.

## 2022-02-12 NOTE — SWALLOW BEDSIDE ASSESSMENT ADULT - COMMENTS
Pt admitted to  with difficulties speaking. Hospital course is notable for COVID as well as new left Fronto-Parietal CVA. This profile is superimposed upon a history of A-Fib, PFO, AAA s/p repair, HTN, vertigo, hearing loss, anxiety, eczema, nephrolithiasis, prior right colectomy NOS, and past appy. Additionally, the pt has a longstanding history of Parkinson's with chronic functional Dysarthria/Dysphonia as well as mild functional Oral Dysphagia. Pt favors softer food types PTH.

## 2022-02-12 NOTE — SWALLOW BEDSIDE ASSESSMENT ADULT - ASR SWALLOW LINGUAL MOBILITY
A mild generalized decrease in lingual strength/agility noted on exam with rigidity which is felt to be chronic due to Parkinson's

## 2022-02-12 NOTE — SWALLOW BEDSIDE ASSESSMENT ADULT - ASPIRATION PRECAUTIONS
MAINTAIN ASPIRATION PRECAUTIONS AS A CONSERVATIVE MEASURE. NOTE THAT WHILE DYSPHAGIA MAY PLACE PT AT RELATIVELY HEIGHTENED RISK FOR EPISODIC ASPIRATION, HE DID NOT APPEAR TO BE ASPIRATING ON CLINICAL EXAM./yes

## 2022-02-13 LAB
ALBUMIN SERPL ELPH-MCNC: 2.5 G/DL — LOW (ref 3.3–5)
ALP SERPL-CCNC: 54 U/L — SIGNIFICANT CHANGE UP (ref 40–120)
ALT FLD-CCNC: 27 U/L — SIGNIFICANT CHANGE UP (ref 12–78)
ANION GAP SERPL CALC-SCNC: 6 MMOL/L — SIGNIFICANT CHANGE UP (ref 5–17)
APPEARANCE UR: ABNORMAL
APTT BLD: 84.8 SEC — HIGH (ref 27.5–35.5)
AST SERPL-CCNC: 14 U/L — LOW (ref 15–37)
BILIRUB SERPL-MCNC: 1.2 MG/DL — SIGNIFICANT CHANGE UP (ref 0.2–1.2)
BILIRUB UR-MCNC: NEGATIVE — SIGNIFICANT CHANGE UP
BUN SERPL-MCNC: 23 MG/DL — SIGNIFICANT CHANGE UP (ref 7–23)
CALCIUM SERPL-MCNC: 8.8 MG/DL — SIGNIFICANT CHANGE UP (ref 8.5–10.1)
CHLORIDE SERPL-SCNC: 109 MMOL/L — HIGH (ref 96–108)
CO2 SERPL-SCNC: 27 MMOL/L — SIGNIFICANT CHANGE UP (ref 22–31)
COLOR SPEC: ABNORMAL
CREAT SERPL-MCNC: 1.08 MG/DL — SIGNIFICANT CHANGE UP (ref 0.5–1.3)
DIFF PNL FLD: ABNORMAL
GLUCOSE SERPL-MCNC: 93 MG/DL — SIGNIFICANT CHANGE UP (ref 70–99)
GLUCOSE UR QL: NEGATIVE — SIGNIFICANT CHANGE UP
HCT VFR BLD CALC: 45.8 % — SIGNIFICANT CHANGE UP (ref 39–50)
HGB BLD-MCNC: 15 G/DL — SIGNIFICANT CHANGE UP (ref 13–17)
KETONES UR-MCNC: NEGATIVE — SIGNIFICANT CHANGE UP
LEUKOCYTE ESTERASE UR-ACNC: ABNORMAL
MCHC RBC-ENTMCNC: 29.7 PG — SIGNIFICANT CHANGE UP (ref 27–34)
MCHC RBC-ENTMCNC: 32.8 GM/DL — SIGNIFICANT CHANGE UP (ref 32–36)
MCV RBC AUTO: 90.7 FL — SIGNIFICANT CHANGE UP (ref 80–100)
NITRITE UR-MCNC: NEGATIVE — SIGNIFICANT CHANGE UP
PH UR: 5 — SIGNIFICANT CHANGE UP (ref 5–8)
PLATELET # BLD AUTO: 224 K/UL — SIGNIFICANT CHANGE UP (ref 150–400)
POTASSIUM SERPL-MCNC: 4.1 MMOL/L — SIGNIFICANT CHANGE UP (ref 3.5–5.3)
POTASSIUM SERPL-SCNC: 4.1 MMOL/L — SIGNIFICANT CHANGE UP (ref 3.5–5.3)
PROT SERPL-MCNC: 6.3 GM/DL — SIGNIFICANT CHANGE UP (ref 6–8.3)
PROT UR-MCNC: SIGNIFICANT CHANGE UP
RBC # BLD: 5.05 M/UL — SIGNIFICANT CHANGE UP (ref 4.2–5.8)
RBC # FLD: 16.1 % — HIGH (ref 10.3–14.5)
SODIUM SERPL-SCNC: 142 MMOL/L — SIGNIFICANT CHANGE UP (ref 135–145)
SP GR SPEC: 1.01 — SIGNIFICANT CHANGE UP (ref 1.01–1.02)
UROBILINOGEN FLD QL: 1
WBC # BLD: 7.46 K/UL — SIGNIFICANT CHANGE UP (ref 3.8–10.5)
WBC # FLD AUTO: 7.46 K/UL — SIGNIFICANT CHANGE UP (ref 3.8–10.5)

## 2022-02-13 PROCEDURE — 99232 SBSQ HOSP IP/OBS MODERATE 35: CPT

## 2022-02-13 RX ORDER — APIXABAN 2.5 MG/1
5 TABLET, FILM COATED ORAL
Refills: 0 | Status: DISCONTINUED | OUTPATIENT
Start: 2022-02-13 | End: 2022-02-14

## 2022-02-13 RX ADMIN — HEPARIN SODIUM 1000 UNIT(S)/HR: 5000 INJECTION INTRAVENOUS; SUBCUTANEOUS at 07:01

## 2022-02-13 RX ADMIN — ROPINIROLE 1 MILLIGRAM(S): 8 TABLET, FILM COATED, EXTENDED RELEASE ORAL at 09:39

## 2022-02-13 RX ADMIN — HEPARIN SODIUM 1000 UNIT(S)/HR: 5000 INJECTION INTRAVENOUS; SUBCUTANEOUS at 09:42

## 2022-02-13 RX ADMIN — Medication 25 MILLIGRAM(S): at 09:38

## 2022-02-13 RX ADMIN — Medication 81 MILLIGRAM(S): at 09:39

## 2022-02-13 RX ADMIN — ROPINIROLE 0.25 MILLIGRAM(S): 8 TABLET, FILM COATED, EXTENDED RELEASE ORAL at 12:47

## 2022-02-13 RX ADMIN — ROPINIROLE 0.25 MILLIGRAM(S): 8 TABLET, FILM COATED, EXTENDED RELEASE ORAL at 18:28

## 2022-02-13 RX ADMIN — Medication 12.5 MILLIGRAM(S): at 21:57

## 2022-02-13 RX ADMIN — Medication 1 SPRAY(S): at 05:10

## 2022-02-13 RX ADMIN — Medication 3 MILLIGRAM(S): at 21:57

## 2022-02-13 RX ADMIN — HEPARIN SODIUM 1000 UNIT(S)/HR: 5000 INJECTION INTRAVENOUS; SUBCUTANEOUS at 01:56

## 2022-02-13 RX ADMIN — Medication 1 SPRAY(S): at 18:27

## 2022-02-13 RX ADMIN — APIXABAN 5 MILLIGRAM(S): 2.5 TABLET, FILM COATED ORAL at 12:49

## 2022-02-13 RX ADMIN — ATORVASTATIN CALCIUM 80 MILLIGRAM(S): 80 TABLET, FILM COATED ORAL at 21:57

## 2022-02-13 NOTE — PROGRESS NOTE ADULT - SUBJECTIVE AND OBJECTIVE BOX
Interval History:  2/13/22: Denies difficulty with word finding difficulty but does report some difficulty with pronunciation    MEDICATIONS  (STANDING):  aspirin enteric coated 81 milliGRAM(s) Oral daily  atorvastatin 80 milliGRAM(s) Oral at bedtime  fluticasone propionate 50 MICROgram(s)/spray Nasal Spray 1 Spray(s) Both Nostrils two times a day  heparin  Infusion.  Unit(s)/Hr (16 mL/Hr) IV Continuous <Continuous>  meclizine 25 milliGRAM(s) Oral daily  melatonin 3 milliGRAM(s) Oral at bedtime  metoprolol tartrate 12.5 milliGRAM(s) Oral at bedtime  metoprolol tartrate 25 milliGRAM(s) Oral daily  rOPINIRole 1 milliGRAM(s) Oral <User Schedule>  rOPINIRole 0.25 milliGRAM(s) Oral <User Schedule>    MEDICATIONS  (PRN):  acetaminophen     Tablet .. 650 milliGRAM(s) Oral every 6 hours PRN Temp greater or equal to 38C (100.4F), Mild Pain (1 - 3)  aluminum hydroxide/magnesium hydroxide/simethicone Suspension 30 milliLiter(s) Oral every 4 hours PRN Dyspepsia  artificial  tears Solution 1 Drop(s) Both EYES four times a day PRN Dry Eyes  heparin   Injectable 7000 Unit(s) IV Push every 6 hours PRN For aPTT less than 40  heparin   Injectable 3500 Unit(s) IV Push every 6 hours PRN For aPTT between 40 - 57  meclizine 25 milliGRAM(s) Oral three times a day PRN Dizziness      Allergies    penicillin (Rash)    Intolerances        PHYSICAL EXAM:  Vital Signs Last 24 Hrs  T(F): 98.1 (02-13-22 @ 09:08)  HR: 88 (02-13-22 @ 09:08)  BP: 107/71 (02-13-22 @ 09:08)  RR: 16 (02-13-22 @ 09:08)    GENERAL: NAD, well-groomed, well-developed  HEAD:  Atraumatic, Normocephalic  Neuro:  Awake, alert, comprehension intact, speaking fluently  CN: PERRL, EOMI, no nystagmus, no facial weakness, mild hypophonia, mild dysarthria   motor: normal tone, no pronator drift, no focal weakness  sensory: intact to light touch  coordination: mild rest tremor in left arm        LABS:                        15.0   7.46  )-----------( 224      ( 13 Feb 2022 06:54 )             45.8     02-13    142  |  109<H>  |  23  ----------------------------<  93  4.1   |  27  |  1.08    Ca    8.8      13 Feb 2022 06:54    TPro  6.3  /  Alb  2.5<L>  /  TBili  1.2  /  DBili  x   /  AST  14<L>  /  ALT  27  /  AlkPhos  54  02-13    PT/INR - ( 11 Feb 2022 15:32 )   PT: 12.4 sec;   INR: 1.07 ratio         PTT - ( 13 Feb 2022 01:15 )  PTT:84.8 sec      RADIOLOGY & ADDITIONAL STUDIES:  CT head, CTA head and neck 2/11/22:  *  VERY SMALL POSTERIOR LEFT FRONTAL INFARCT WHICH IS OCCURRED IN THE   INTERIM FROM 12/22/2021  *  NO HEMODYNAMIC SIGNIFICANT NARROWING WITHIN THE NECK.  *  NO PROXIMAL LARGE VESSEL OCCLUSION INTRACRANIALLY.  *  PERFUSION IMAGING DEMONSTRATES REGIONS OF HYPOPERFUSION/ISCHEMIA   INVOLVING THE BILATERAL TEMPORAL OCCIPITAL REGIONS AND RIGHT PARIETAL   LOBE WITH A VOLUME OF 32 ML. NO CORE INFARCT DEMONSTRATED.    MRI head 2/11/22:  FEW SMALL ACUTE LEFT FRONTAL PARIETAL INFARCTS. NO ASSOCIATED BLOOD   PRODUCTS. NO SIGNIFICANT MASS EFFECT

## 2022-02-13 NOTE — PROGRESS NOTE ADULT - ASSESSMENT
86 year old man who is known to me from my outpatient practice (but last seen in January 2021) who presented with complaints of 5 days of dysarthria, difficulty swallowing but weakness.   In the ED, NIHSS was 3. He was not a tpa candidate since symptoms had been present for several days.  He reports that he had been started on aspirin about two weeks earlier.   He has a history of atrial fibrillation. Outpatient cardiology note from December 2021 states that he was off Coumadin due to hematuria and recent surgeries.    Stroke  -History of a-fib  -Was off AC due to history of hemorrhagic complications post-op.  -This was discussed with surgery and since underlying issues have been fixed, okay for AC.  -Now on heparin gtt to make sure he tolerates AC.   -telemetry    -Echo  -PT  -Speech pathology leann appreciated    Parkinson's  -Has some chronic dysarthria and possible dysphagia  -Eats soft foods at home  -Continue ropinirole 1 mg in the AM, 0.5 mg in the afternoon and 0.5 mg in the PM    Discussed with Dr. Quiroz.  Will follow up as needed.

## 2022-02-14 LAB
APTT BLD: 37.4 SEC — HIGH (ref 27.5–35.5)
HCT VFR BLD CALC: 47.7 % — SIGNIFICANT CHANGE UP (ref 39–50)
HGB BLD-MCNC: 15.9 G/DL — SIGNIFICANT CHANGE UP (ref 13–17)
MCHC RBC-ENTMCNC: 30.4 PG — SIGNIFICANT CHANGE UP (ref 27–34)
MCHC RBC-ENTMCNC: 33.3 GM/DL — SIGNIFICANT CHANGE UP (ref 32–36)
MCV RBC AUTO: 91.2 FL — SIGNIFICANT CHANGE UP (ref 80–100)
PLATELET # BLD AUTO: 202 K/UL — SIGNIFICANT CHANGE UP (ref 150–400)
RBC # BLD: 5.23 M/UL — SIGNIFICANT CHANGE UP (ref 4.2–5.8)
RBC # FLD: 16 % — HIGH (ref 10.3–14.5)
WBC # BLD: 7.52 K/UL — SIGNIFICANT CHANGE UP (ref 3.8–10.5)
WBC # FLD AUTO: 7.52 K/UL — SIGNIFICANT CHANGE UP (ref 3.8–10.5)

## 2022-02-14 PROCEDURE — 99233 SBSQ HOSP IP/OBS HIGH 50: CPT

## 2022-02-14 PROCEDURE — 99231 SBSQ HOSP IP/OBS SF/LOW 25: CPT

## 2022-02-14 PROCEDURE — 99221 1ST HOSP IP/OBS SF/LOW 40: CPT

## 2022-02-14 PROCEDURE — 70450 CT HEAD/BRAIN W/O DYE: CPT | Mod: 26

## 2022-02-14 PROCEDURE — 99223 1ST HOSP IP/OBS HIGH 75: CPT

## 2022-02-14 PROCEDURE — 99232 SBSQ HOSP IP/OBS MODERATE 35: CPT

## 2022-02-14 PROCEDURE — 99222 1ST HOSP IP/OBS MODERATE 55: CPT

## 2022-02-14 RX ORDER — DIGOXIN 250 MCG
125 TABLET ORAL DAILY
Refills: 0 | Status: DISCONTINUED | OUTPATIENT
Start: 2022-02-14 | End: 2022-02-18

## 2022-02-14 RX ORDER — LEVETIRACETAM 250 MG/1
500 TABLET, FILM COATED ORAL
Refills: 0 | Status: DISCONTINUED | OUTPATIENT
Start: 2022-02-15 | End: 2022-02-17

## 2022-02-14 RX ORDER — METOPROLOL TARTRATE 50 MG
25 TABLET ORAL EVERY 12 HOURS
Refills: 0 | Status: DISCONTINUED | OUTPATIENT
Start: 2022-02-14 | End: 2022-02-18

## 2022-02-14 RX ORDER — LEVETIRACETAM 250 MG/1
1000 TABLET, FILM COATED ORAL ONCE
Refills: 0 | Status: COMPLETED | OUTPATIENT
Start: 2022-02-14 | End: 2022-02-14

## 2022-02-14 RX ORDER — APIXABAN 2.5 MG/1
1 TABLET, FILM COATED ORAL
Qty: 60 | Refills: 0
Start: 2022-02-14 | End: 2022-03-15

## 2022-02-14 RX ADMIN — Medication 25 MILLIGRAM(S): at 16:18

## 2022-02-14 RX ADMIN — LEVETIRACETAM 400 MILLIGRAM(S): 250 TABLET, FILM COATED ORAL at 20:44

## 2022-02-14 RX ADMIN — Medication 1 SPRAY(S): at 05:13

## 2022-02-14 RX ADMIN — APIXABAN 5 MILLIGRAM(S): 2.5 TABLET, FILM COATED ORAL at 17:58

## 2022-02-14 RX ADMIN — ROPINIROLE 0.25 MILLIGRAM(S): 8 TABLET, FILM COATED, EXTENDED RELEASE ORAL at 12:47

## 2022-02-14 RX ADMIN — Medication 1 SPRAY(S): at 17:58

## 2022-02-14 RX ADMIN — APIXABAN 5 MILLIGRAM(S): 2.5 TABLET, FILM COATED ORAL at 05:15

## 2022-02-14 RX ADMIN — ROPINIROLE 1 MILLIGRAM(S): 8 TABLET, FILM COATED, EXTENDED RELEASE ORAL at 09:05

## 2022-02-14 RX ADMIN — ATORVASTATIN CALCIUM 80 MILLIGRAM(S): 80 TABLET, FILM COATED ORAL at 22:02

## 2022-02-14 RX ADMIN — ROPINIROLE 0.25 MILLIGRAM(S): 8 TABLET, FILM COATED, EXTENDED RELEASE ORAL at 17:58

## 2022-02-14 RX ADMIN — Medication 650 MILLIGRAM(S): at 22:02

## 2022-02-14 RX ADMIN — Medication 25 MILLIGRAM(S): at 09:07

## 2022-02-14 NOTE — CONSULT NOTE ADULT - ASSESSMENT
87 yo male PMH HTN, Parkinson's, chronic afib on aspirin at home, AAA s/p EVAR 2021,  patent PFO, admitted with CVA, outside of window for TPA.   on this admission was cleared by neuro to start oac for afib and started on eliquis. Telemetry shows afib rates 90-110bpm, with episodes of RVR during ambulation.  Pt is asymptomatic during rapid afib.  he is toprol 25 mg bid. echo shows normal LVEF and severely dilated LA consistent with long standing h/o afib.     Chronic atrial fibrillation with paroxysms of RVR , mostly on ambulation  recommend rate control with bb , continue toprol 25mg bid (with hold parameter for SBP<100)   add digoxin 125mcg daily  continue to monitor telemetry  keep K >4 Mg >2  discussed with Dr Bee who agrees. 87 yo male PMH HTN, Parkinson's, chronic afib on aspirin at home, AAA s/p EVAR 2021,  patent PFO, admitted with CVA, outside of window for TPA.   on this admission was cleared by neuro to start oac for afib and started on eliquis. Telemetry shows afib rates 90-110bpm, with episodes of RVR during ambulation.  Pt is asymptomatic during rapid afib.  he is toprol 25 mg bid. echo shows normal LVEF and severely dilated LA consistent with long standing h/o afib. New hemorrhagic cva after initiating doac   he would benefit from ENRIQUE occlusive device watchman implant in the future if he clinically improves and cleared by Neuro service to resume doac for short time  Chronic atrial fibrillation with paroxysms of RVR , mostly on ambulation  recommend rate control with bb , continue toprol 25mg bid (with hold parameter for SBP<100)   add digoxin 125mcg daily  continue to monitor telemetry  keep K >4 Mg >2  discussed with Dr Bee who agrees.  will follow as outpatient

## 2022-02-14 NOTE — PROGRESS NOTE ADULT - SUBJECTIVE AND OBJECTIVE BOX
Reason for Admission: acute CVA  History of Present Illness:   86 year old male w hx AAA s/p EVAR 2021, AFIB on ASA, [place], HTN, PD, patent PFO came to ED for evaluation for difficulty speaking    Today he called his doctor who advised him to go to the ED  + some difficulty swallowing;  usually has to take small portions     COVID+ was given monoclonal AB 02-    care discussed with neurology as patient has a multiple strokes on an MRI. Patient has an underlying atrial fibrillation -  not AC. Previous hospitalization significant for     Care discussed with Dr. Ochoa, as patient had a large hemorrhagic shock last year -  from his perspective no contraindication for starting AC 2/12.     Patient doing well this morning, patient on ambulation with atrial fibrillation RVR. Denies any HA, CP, SOB.        REVIEW OF SYSTEMS:  General: NAD, hemodynamically stable   HEENT:  Eyes:  No visual loss, blurred vision, double vision or yellow sclerae. Ears, Nose, Throat:  No hearing loss, sneezing, congestion, runny nose or sore throat.  SKIN:  No rash or itching.  CARDIOVASCULAR:  No chest pain, chest pressure or chest discomfort. No palpitations or edema.  RESPIRATORY:  No shortness of breath, cough or sputum.  GASTROINTESTINAL:  No anorexia, nausea, vomiting or diarrhea. No abdominal pain or blood.  NEUROLOGICAL:  No headache, dizziness, syncope, paralysis, ataxia, numbness or tingling in the extremities. No change in bowel or bladder control.  MUSCULOSKELETAL:  No muscle, back pain, joint pain or stiffness.  HEMATOLOGIC:  No anemia, bleeding or bruising.  LYMPHATICS:  No enlarged nodes. No history of splenectomy.  ENDOCRINOLOGIC:  No reports of sweating, cold or heat intolerance. No polyuria or polydipsia.  ALLERGIES:  No history of asthma, hives, eczema or rhinitis.    Physical Exam:   GENERAL APPEARANCE:  NAD, hemodynamically stable  T(C): 36.5 (02-12-22 @ 09:05), Max: 37.1 (02-11-22 @ 21:27)  HR: 64 (02-12-22 @ 09:05) (62 - 80)  BP: 136/94 (02-12-22 @ 09:05) (130/84 - 157/89)  RR: 18 (02-12-22 @ 09:05) (16 - 18)  SpO2: 95% (02-12-22 @ 09:05) (95% - 99%)  HEENT:  Head is normocephalic    Skin:  Warm and dry without any rash   NECK:  Supple without lymphadenopathy.   HEART:  Regular rate and rhythm. normal S1 and S2, No M/R/G  LUNGS:  Good ins/exp effort, no W/R/R/C  ABDOMEN:  Soft, nontender, nondistended with good bowel sounds heard  EXTREMITIES:  Without cyanosis, clubbing or edema.   NEUROLOGICAL:  Gross nonfocal       CBC Full  -  ( 12 Feb 2022 07:50 )  WBC Count : 8.35 K/uL  RBC Count : 4.92 M/uL  Hemoglobin : 14.8 g/dL  Hematocrit : 44.8 %  Platelet Count - Automated : 244 K/uL    PT/INR - ( 11 Feb 2022 15:32 )   PT: 12.4 sec;   INR: 1.07 ratio         PTT - ( 12 Feb 2022 07:50 )  PTT:>200.0 sec    02-12    139  |  105  |  22  ----------------------------<  217<H>  4.1   |  27  |  1.18    Ca    8.8      12 Feb 2022 07:50    TPro  7.5  /  Alb  3.2<L>  /  TBili  0.7  /  DBili  x   /  AST  23  /  ALT  43  /  AlkPhos  64  02-11      86 year old male w hx AAA s/p EVAR 2021, AFIB on ASA, [place], HTN, PD, patent PFO came to ED for evaluation for difficulty speaking      # Acute CVA L frontoparietal   - was not a tpa candidate since he was outside window w symptoms over past 3 days  - since 12- interim small post, L frontal CVA  - surgery notes of no contraindication for anticoagulation  - monitor HH  - start Eliquis today    # AFIB - started on AC  - exertional RVR  - started on eliquis  - metorpolol 25 mg q AM w parameters  - metoprolol 12.5 q PM w parameters  - surgery eval    # Patent PFO hx  - tele monitoring  - neuro checks q 4 hrs  - neuro consult read and appreciated  - speech and swallow  - OT/PT    # COVID+  - clear CXR  - s/p monoclonal AB  - scattered wheezes ANNEMARIE  - finished 9 days of steroids    # PD  1. continue home meds    #VTE on IV heparin       Reason for Admission: acute CVA  History of Present Illness:     Pt is a 87 y/o/m w/ hx AAA s/p EVAR 2021, AFIB on ASA, [place], HTN, PD, patent PFO came to ED for evaluation for difficulty speaking    Today he called his doctor who advised him to go to the ED  + some difficulty swallowing;  usually has to take small portions     COVID+ was given monoclonal AB 02-    care discussed with neurology as patient has a multiple strokes on an MRI. Patient has an underlying atrial fibrillation -  not AC. Previous hospitalization significant for     Care discussed with Dr. Ochoa, as patient had a large hemorrhagic shock last year -  from his perspective no contraindication for starting AC 2/12.     Patient doing well this morning, patient on ambulation with atrial fibrillation RVR. Denies any HA, CP, SOB. Care discussed with Dr. Paz.        REVIEW OF SYSTEMS:  General: NAD, hemodynamically stable   HEENT:  Eyes:  No visual loss, blurred vision, double vision or yellow sclerae. Ears, Nose, Throat:  No hearing loss, sneezing, congestion, runny nose or sore throat.  SKIN:  No rash or itching.  CARDIOVASCULAR:  No chest pain, chest pressure or chest discomfort. No palpitations or edema.  RESPIRATORY:  No shortness of breath, cough or sputum.  GASTROINTESTINAL:  No anorexia, nausea, vomiting or diarrhea. No abdominal pain or blood.  NEUROLOGICAL:  No headache, dizziness, syncope, paralysis, ataxia, numbness or tingling in the extremities. No change in bowel or bladder control.  MUSCULOSKELETAL:  No muscle, back pain, joint pain or stiffness.  HEMATOLOGIC:  No anemia, bleeding or bruising.  LYMPHATICS:  No enlarged nodes. No history of splenectomy.  ENDOCRINOLOGIC:  No reports of sweating, cold or heat intolerance. No polyuria or polydipsia.  ALLERGIES:  No history of asthma, hives, eczema or rhinitis.    Physical Exam:   GENERAL APPEARANCE:  NAD, hemodynamically stable  T(C): 36.5 (02-12-22 @ 09:05), Max: 37.1 (02-11-22 @ 21:27)  HR: 64 (02-12-22 @ 09:05) (62 - 80)  BP: 136/94 (02-12-22 @ 09:05) (130/84 - 157/89)  RR: 18 (02-12-22 @ 09:05) (16 - 18)  SpO2: 95% (02-12-22 @ 09:05) (95% - 99%)  HEENT:  Head is normocephalic    Skin:  Warm and dry without any rash   NECK:  Supple without lymphadenopathy.   HEART:  Regular rate and rhythm. normal S1 and S2, No M/R/G  LUNGS:  Good ins/exp effort, no W/R/R/C  ABDOMEN:  Soft, nontender, nondistended with good bowel sounds heard  EXTREMITIES:  Without cyanosis, clubbing or edema.   NEUROLOGICAL:  Gross nonfocal       CBC Full  -  ( 12 Feb 2022 07:50 )  WBC Count : 8.35 K/uL  RBC Count : 4.92 M/uL  Hemoglobin : 14.8 g/dL  Hematocrit : 44.8 %  Platelet Count - Automated : 244 K/uL    PT/INR - ( 11 Feb 2022 15:32 )   PT: 12.4 sec;   INR: 1.07 ratio         PTT - ( 12 Feb 2022 07:50 )  PTT:>200.0 sec    02-12    139  |  105  |  22  ----------------------------<  217<H>  4.1   |  27  |  1.18    Ca    8.8      12 Feb 2022 07:50    TPro  7.5  /  Alb  3.2<L>  /  TBili  0.7  /  DBili  x   /  AST  23  /  ALT  43  /  AlkPhos  64  02-11      86 year old male w hx AAA s/p EVAR 2021, AFIB on ASA, [place], HTN, PD, patent PFO came to ED for evaluation for difficulty speaking      # Acute CVA L frontoparietal   - was not a tpa candidate since he was outside window w symptoms over past 3 days  - since 12- interim small post, L frontal CVA  - surgery notes of no contraindication for anticoagulation  - monitor HH  - start Eliquis today    # AFIB - started on AC  - exertional RVR  - started on Eliquis  - started on toprol xl 25 mg q12h  - care discussed with Dr. ochoa -  ok to start AC  - care disucssed with Dr. Paz -  agrees with anticoagulation    # Patent PFO hx  - tele monitoring  - neuro checks q 4 hrs  - neuro consult read and appreciated  - speech and swallow  - OT/PT    # COVID+  - clear CXR  - s/p monoclonal AB  - scattered wheezes ANNEMARIE  - finished 9 days of steroids    # PD  1. continue home meds    #VTE on IV heparin       Reason for Admission: acute CVA  History of Present Illness:     Pt is a 85 y/o/m w/ hx AAA s/p EVAR 2021, AFIB on ASA, [place], HTN, PD, patent PFO came to ED for evaluation for difficulty speaking    Today he called his doctor who advised him to go to the ED  + some difficulty swallowing;  usually has to take small portions     COVID+ was given monoclonal AB 02-    care discussed with neurology as patient has a multiple strokes on an MRI. Patient has an underlying atrial fibrillation -  not AC. Previous hospitalization significant for     Care discussed with Dr. Ochoa, as patient had a large hemorrhagic shock last year -  from his perspective no contraindication for starting AC 2/12.     Patient doing well this morning, patient on ambulation with atrial fibrillation RVR. Denies any HA, CP, SOB. Care discussed with Dr. Paz - agrees with an AC.        REVIEW OF SYSTEMS:  General: NAD, hemodynamically stable   HEENT:  Eyes:  No visual loss, blurred vision, double vision or yellow sclerae. Ears, Nose, Throat:  No hearing loss, sneezing, congestion, runny nose or sore throat.  SKIN:  No rash or itching.  CARDIOVASCULAR:  No chest pain, chest pressure or chest discomfort. No palpitations or edema.  RESPIRATORY:  No shortness of breath, cough or sputum.  GASTROINTESTINAL:  No anorexia, nausea, vomiting or diarrhea. No abdominal pain or blood.  NEUROLOGICAL:  No headache, dizziness, syncope, paralysis, ataxia, numbness or tingling in the extremities. No change in bowel or bladder control.  MUSCULOSKELETAL:  No muscle, back pain, joint pain or stiffness.  HEMATOLOGIC:  No anemia, bleeding or bruising.  LYMPHATICS:  No enlarged nodes. No history of splenectomy.  ENDOCRINOLOGIC:  No reports of sweating, cold or heat intolerance. No polyuria or polydipsia.  ALLERGIES:  No history of asthma, hives, eczema or rhinitis.    Physical Exam:   GENERAL APPEARANCE:  NAD, hemodynamically stable  T(C): 36.5 (02-12-22 @ 09:05), Max: 37.1 (02-11-22 @ 21:27)  HR: 64 (02-12-22 @ 09:05) (62 - 80)  BP: 136/94 (02-12-22 @ 09:05) (130/84 - 157/89)  RR: 18 (02-12-22 @ 09:05) (16 - 18)  SpO2: 95% (02-12-22 @ 09:05) (95% - 99%)  HEENT:  Head is normocephalic    Skin:  Warm and dry without any rash   NECK:  Supple without lymphadenopathy.   HEART:  Regular rate and rhythm. normal S1 and S2, No M/R/G  LUNGS:  Good ins/exp effort, no W/R/R/C  ABDOMEN:  Soft, nontender, nondistended with good bowel sounds heard  EXTREMITIES:  Without cyanosis, clubbing or edema.   NEUROLOGICAL:  Gross nonfocal       CBC Full  -  ( 12 Feb 2022 07:50 )  WBC Count : 8.35 K/uL  RBC Count : 4.92 M/uL  Hemoglobin : 14.8 g/dL  Hematocrit : 44.8 %  Platelet Count - Automated : 244 K/uL    PT/INR - ( 11 Feb 2022 15:32 )   PT: 12.4 sec;   INR: 1.07 ratio         PTT - ( 12 Feb 2022 07:50 )  PTT:>200.0 sec    02-12    139  |  105  |  22  ----------------------------<  217<H>  4.1   |  27  |  1.18    Ca    8.8      12 Feb 2022 07:50    TPro  7.5  /  Alb  3.2<L>  /  TBili  0.7  /  DBili  x   /  AST  23  /  ALT  43  /  AlkPhos  64  02-11      86 year old male w hx AAA s/p EVAR 2021, AFIB on ASA, [place], HTN, PD, patent PFO came to ED for evaluation for difficulty speaking      # Acute CVA L frontoparietal   - was not a tpa candidate since he was outside window w symptoms over past 3 days  - since 12- interim small post, L frontal CVA  - surgery notes of no contraindication for anticoagulation  - monitor HH  - start Eliquis today    # AFIB - started on AC  - exertional RVR  - started on Eliquis  - started on toprol xl 25 mg q12h  - care discussed with Dr. ochoa -  ok to start AC  - care disucssed with Dr. Paz -  agrees with anticoagulation    # Patent PFO hx  - tele monitoring  - neuro checks q 4 hrs  - neuro consult read and appreciated  - speech and swallow  - OT/PT    # COVID+  - clear CXR  - s/p monoclonal AB  - scattered wheezes ANNEMARIE  - finished 9 days of steroids    # PD  1. continue home meds    #VTE on IV heparin

## 2022-02-14 NOTE — CHART NOTE - NSCHARTNOTEFT_GEN_A_CORE
Patient reports experiencing jerking movements of his right upper extremity.  He states that this symptom started shortly after taking metoprolol ER.  On exam he has some dysmetria on finger to nose on right. Otherwise exam is unchanged.  Will order urgent CT head.  Patient is on anticoagulation and had recent stroke which would exclude him from TPA.  Will f/u results of CT scan.

## 2022-02-14 NOTE — CONSULT NOTE ADULT - SUBJECTIVE AND OBJECTIVE BOX
HPI:  86 year old male w hx AAA s/p EVAR , AFIB on ASA, [place], HTN, PD, patent PFO came to ED for evaluation for difficulty speaking    Started 3 days ago when he woke up he had difficulty getting the words out  Took tylenol and felt better  Same yesterday  Today he called his doctor who advised him to go to the ED  + some difficulty swallowing;  usually has to take small portions  denied headache, dizziness, focal weakness or paresthesia  he walks unassisted    COVID+ was given monoclonal AB 2022  also placed on z pack and steroids    EP consulted for management of afib.  Telemetry shows afib rates 90-110bpm, with episodes of RVR during ambulation to 140-160bpm.  Pt is asymptomatic during rapid afib, denies any chest pain, palpiations sob or dizziness.       PAST MEDICAL & SURGICAL HISTORY:  Atrial fibrillation    HTN (hypertension)    H/O candidiasis of mouth    AAA (abdominal aortic aneurysm)    H/O scarlet fever    [place] (hard of hearing)    Parkinson disease    Renal calculi    Seasonal allergies    Anxiety    Eczema    Mucocele, appendix    History of shingles    Patent foramen ovale  dilated left artrium severe echo 2021    Bruise  to bilateral upper extremity    Vertigo    H/O colonoscopy    S/P AAA repair  2021    History of appendectomy    H/O right hemicolectomy        MEDICATIONS  (STANDING):  apixaban 5 milliGRAM(s) Oral two times a day  atorvastatin 80 milliGRAM(s) Oral at bedtime  fluticasone propionate 50 MICROgram(s)/spray Nasal Spray 1 Spray(s) Both Nostrils two times a day  meclizine 25 milliGRAM(s) Oral daily  melatonin 3 milliGRAM(s) Oral at bedtime  metoprolol succinate ER 25 milliGRAM(s) Oral every 12 hours  rOPINIRole 1 milliGRAM(s) Oral <User Schedule>  rOPINIRole 0.25 milliGRAM(s) Oral <User Schedule>    MEDICATIONS  (PRN):  acetaminophen     Tablet .. 650 milliGRAM(s) Oral every 6 hours PRN Temp greater or equal to 38C (100.4F), Mild Pain (1 - 3)  aluminum hydroxide/magnesium hydroxide/simethicone Suspension 30 milliLiter(s) Oral every 4 hours PRN Dyspepsia  artificial  tears Solution 1 Drop(s) Both EYES four times a day PRN Dry Eyes  meclizine 25 milliGRAM(s) Oral three times a day PRN Dizziness      Allergies    penicillin (Rash)      SOCIAL HISTORY: , denies smoking drinking or illicit drug use    FAMILY HISTORY:  No pertinent family history in first degree relatives        Vital Signs Last 24 Hrs  T(C): 35.9 (2022 08:54), Max: 36.5 (2022 21:49)  T(F): 96.7 (2022 08:54), Max: 97.7 (2022 21:49)  HR: 88 (2022 08:54) (88 - 93)  BP: 105/68 (2022 08:54) (105/68 - 146/82)  BP(mean): --  RR: 18 (2022 08:54) (17 - 18)  SpO2: 97% (2022 08:54) (96% - 97%)      CONSTITUTIONAL: No fever, weight loss, chills, shakes, or fatigue  EYES: No eye pain, visual disturbances, or discharge  ENMT:  No difficulty hearing, tinnitus, vertigo; No sinus or throat pain  RESPIRATORY: No cough, wheezing, hemoptysis, or shortness of breath  CARDIOVASCULAR: No chest pain, dyspnea, palpitations, dizziness, syncope  GASTROINTESTINAL: No abdominal  pain, nausea, vomiting, diarrhea, constipation, melena or bright red blood.  GENITOURINARY: No dysuria, nocturia, hematuria, or urinary incontinence  NEUROLOGICAL: No headaches, memory loss, slurred speech, limb weakness, loss of strength, numbness, or tremors  SKIN: No itching, burning, rashes, or lesions   ENDOCRINE: No heat or cold intolerance, or hair loss  MUSCULOSKELETAL: No joint pain or swelling, muscle, back, or extremity pain  PSYCHIATRIC: No depression, anxiety, or difficulty sleeping  HEME/LYMPH: No easy bruising or bleeding gums  ALLERY AND IMMUNOLOGIC: No hives or rash.    PHYSICAL EXAMINATION:    Constitutional: NAD, awake and alert, well-developed  HEENT: PERR, EOMI,  No oral cyananosis.  Neck:  supple,  No JVD  Respiratory: Breath sounds are clear bilaterally, No wheezing, rales or rhonchi  Cardiovascular: S1 and S2, irregular rhythm, no Murmurs, gallops or rubs  Gastrointestinal: Bowel Sounds present, soft, nontender.   Extremities: No peripheral edema. No clubbing or cyanosis.  Vascular: 2+ peripheral pulses  Neurological: A/O x 3, no focal deficits  Musculoskeletal: no calf tenderness.  Skin: No rashes.      LABS:                        15.9   7.52  )-----------( 202      ( 2022 08:22 )             47.7   02-13    142  |  109<H>  |  23  ----------------------------<  93  4.1   |  27  |  1.08    Ca    8.8      2022 06:54    TPro  6.3  /  Alb  2.5<L>  /  TBili  1.2  /  DBili  x   /  AST  14<L>  /  ALT  27  /  AlkPhos  54  02-13  LIVER FUNCTIONS - ( 2022 06:54 )  Alb: 2.5 g/dL / Pro: 6.3 gm/dL / ALK PHOS: 54 U/L / ALT: 27 U/L / AST: 14 U/L / GGT: x           PTT - ( 2022 08:22 )  PTT:37.4 sec    Urinalysis Basic - ( 2022 17:05 )    Color: Brown / Appearance: very cloudy / S.015 / pH: x  Gluc: x / Ketone: Negative  / Bili: Negative / Urobili: 1   Blood: x / Protein: See Note / Nitrite: Negative   Leuk Esterase: Trace / RBC: 25-50 /HPF / WBC 0-2   Sq Epi: x / Non Sq Epi: Occasional / Bacteria: Few          EKG:    TELEMETRY: afib rates 90-100bpm with episodes of -160bpm (during ambulation)    CARDIAC TESTS:    ECHO 22     Estimated left ventricular ejection fraction is 50-55 %.   The left ventricle is normal in size and wall thickness.   Wall motion abnormalities are noted with preserved LV systolic function.   The left atrium is severely dilated.   The right atrium appears moderately dilated.   A PFO is noted with color doppler.   Fibrocalcific changes noted to the Aortic valve leaflets with preserved   leafletexcursion.   Mild (1+) aortic regurgitation is present.   Fibrocalcific changes noted to the mitral valve leaflets with preserved   leaflet excursion.   Mild mitral annular calcification is present.   Mild (1+) mitral regurgitation is present.   Normal appearing tricuspid valve structure.   Moderate (2+) tricuspid valve regurgitation is present.     Signature

## 2022-02-14 NOTE — CHART NOTE - NSCHARTNOTEFT_GEN_A_CORE
CT head performed.  I spoke with neuroradiologist.  CT head shows new hemorrhage left frontal precentral gyrus. This appears to be a SAH and not a hemorrhagic conversion of his stroke.  -D/C Eliquis  -Repeat CT head in AM  -Neurosurgery consult - spoke with PA  -If necessary will upgrade patient for more frequent neuro checks.    I spoke with the patient over the telephone.  He still reports "jumping" of the his right hand.  Unclear if this is seizure activity but given area of hemorrhage, he may be having focal seizures.  Will start levetiracetam. Will try to avoid lorazepam which may affect neuro checks but can give if necessary.  EEG in AM.    I advised the patient to report any headache or other neurological symptoms to his nurse.    Will follow. CT head performed.  I spoke with neuroradiologist.  CT head shows new hemorrhage left frontal precentral gyrus. This appears to be a SAH and not a hemorrhagic conversion of his stroke.  -D/C Eliquis  -Repeat CT head in AM (sooner if worsening of neuro status).  -Neurosurgery consult - spoke with PA  -If necessary will upgrade patient for more frequent neuro checks.    I spoke with the patient over the telephone.  He still reports "jumping" of the his right hand.  Unclear if this is seizure activity but given area of hemorrhage, he may be having focal seizures.  Will start levetiracetam. Will try to avoid lorazepam which may affect neuro checks but can give if necessary.  EEG in AM.    I advised the patient to report any headache or other neurological symptoms to his nurse.    Will follow.

## 2022-02-14 NOTE — CONSULT NOTE ADULT - ATTENDING COMMENTS
chronic AF ischemic CVA and after doac has hemorrhagic new cva, doac stopped  he would benefit from ENRIQUE occlusive device watchman implant in the future if he clinically improves and cleared by Neuro service to resume doac for short time (45 days post watchman followed by asa/plavix after for 6 months then asa lifelong).  continue rate control with metoprolol digoxin   will follow as outpatient

## 2022-02-14 NOTE — PROGRESS NOTE ADULT - ASSESSMENT
86 year old man presented with complaints of 5 days of dysarthria, difficulty swallowing but weakness.   In the ED, NIHSS was 3. He was not a tpa candidate since symptoms had been present for several days.  He reports that he had been started on aspirin about two weeks earlier.   He has a history of atrial fibrillation. Outpatient cardiology note from December 2021 states that he was off Coumadin due to hematuria and recent surgeries.    Stroke  -History of a-fib  -Was off AC due to history of hemorrhagic complications post-op, now back on eliquis  -telemetry  -PT  -Speech pathology eval appreciated    Parkinson's  -Has some chronic dysarthria and possible dysphagia  -Eats soft foods at home  -Continue ropinirole 1 mg in the AM, 0.5 mg in the afternoon and 0.5 mg in the PM    Will follow up as needed.    Patient was discussed with Dr. Bee 86 year old man presented with complaints of 5 days of dysarthria, difficulty swallowing but weakness.   In the ED, NIHSS was 3. He was not a tpa candidate since symptoms had been present for several days.  He reports that he had been started on aspirin about two weeks earlier.   He has a history of atrial fibrillation. Outpatient cardiology note from December 2021 states that he was off Coumadin due to hematuria and recent surgeries.    Stroke  -History of a-fib  -Was off AC due to history of hemorrhagic complications post-op. Eliquis started during this admission.  -telemetry  -PT  -Speech pathology eval appreciated    Parkinson's  -Has some chronic dysarthria and possible dysphagia  -Eats soft foods at home  -Continue ropinirole 1 mg in the AM, 0.5 mg in the afternoon and 0.5 mg in the PM    Will follow up as needed.

## 2022-02-14 NOTE — PHARMACOTHERAPY INTERVENTION NOTE - COMMENTS
Medication reconciliation completed.  Reviewed Medication list and confirmed med allergies with patient; confirmed with Dr. First Medeufemia.
Confirmed outpatient coverage of Eliquis - $9.85 copay
Counseled patient on new start Eliquis

## 2022-02-14 NOTE — PROGRESS NOTE ADULT - ATTENDING COMMENTS
Agree with above.  Acute stroke in patient with history of afib off AC.  Was on heparin gtt without major complications and now on Eliquis.  Continue ropinirole for PD.  Patient can follow up with me in the office.  Discussed with Dr. Quiroz.

## 2022-02-14 NOTE — PROGRESS NOTE ADULT - SUBJECTIVE AND OBJECTIVE BOX
Interval History:  2/14/22: Denies HA, dizziness today, has some difficulty with pronunciation    MEDICATIONS  (STANDING):  aspirin enteric coated 81 milliGRAM(s) Oral daily  atorvastatin 80 milliGRAM(s) Oral at bedtime  fluticasone propionate 50 MICROgram(s)/spray Nasal Spray 1 Spray(s) Both Nostrils two times a day  heparin  Infusion.  Unit(s)/Hr (16 mL/Hr) IV Continuous <Continuous>  meclizine 25 milliGRAM(s) Oral daily  melatonin 3 milliGRAM(s) Oral at bedtime  metoprolol tartrate 12.5 milliGRAM(s) Oral at bedtime  metoprolol tartrate 25 milliGRAM(s) Oral daily  rOPINIRole 1 milliGRAM(s) Oral <User Schedule>  rOPINIRole 0.25 milliGRAM(s) Oral <User Schedule>    MEDICATIONS  (PRN):  acetaminophen     Tablet .. 650 milliGRAM(s) Oral every 6 hours PRN Temp greater or equal to 38C (100.4F), Mild Pain (1 - 3)  aluminum hydroxide/magnesium hydroxide/simethicone Suspension 30 milliLiter(s) Oral every 4 hours PRN Dyspepsia  artificial  tears Solution 1 Drop(s) Both EYES four times a day PRN Dry Eyes  heparin   Injectable 7000 Unit(s) IV Push every 6 hours PRN For aPTT less than 40  heparin   Injectable 3500 Unit(s) IV Push every 6 hours PRN For aPTT between 40 - 57  meclizine 25 milliGRAM(s) Oral three times a day PRN Dizziness      Allergies    penicillin (Rash)    Intolerances        PHYSICAL EXAM:  Vital Signs Last 24 Hrs  T(F): 98.1 (02-13-22 @ 09:08)  HR: 88 (02-13-22 @ 09:08)  BP: 107/71 (02-13-22 @ 09:08)  RR: 16 (02-13-22 @ 09:08)    GENERAL: NAD, well-groomed, well-developed  HEAD:  Atraumatic, Normocephalic  Neuro:  Awake, alert, comprehension intact, speaking fluently  CN: PERRL, EOMI, no nystagmus, no facial weakness, mild hypophonia, mild dysarthria   motor: normal tone, no pronator drift, no focal weakness  sensory: intact to light touch  coordination: mild rest tremor in left arm    Neurological Examination:    MS: AOx3. Appropriately interactive, normal affect. Speech mildly dysarthric, can name and repeat, follows 2 step commands   CN: VFFTC, PERLL, EOMI, V1-3 sensation intact, no facial weaknness, hearing intact, palate elevates symmetrically, tongue midline, SCM equal bilaterally  Motor: normal bulk and tone, no tremor, +bilat UE rigidity no bradykinesia.  5/5 all over   Sens: Intact to light touch.    Reflexes: 0-1/4 all over, downgoing toes b/l  Coord:  Mild HTSA, mild rest tremor in left arm  Gait: Cannot test    NIHSS: 2      LABS:                        15.0   7.46  )-----------( 224      ( 13 Feb 2022 06:54 )             45.8     02-13    142  |  109<H>  |  23  ----------------------------<  93  4.1   |  27  |  1.08    Ca    8.8      13 Feb 2022 06:54    TPro  6.3  /  Alb  2.5<L>  /  TBili  1.2  /  DBili  x   /  AST  14<L>  /  ALT  27  /  AlkPhos  54  02-13    PT/INR - ( 11 Feb 2022 15:32 )   PT: 12.4 sec;   INR: 1.07 ratio         PTT - ( 13 Feb 2022 01:15 )  PTT:84.8 sec      RADIOLOGY & ADDITIONAL STUDIES:  CT head, CTA head and neck 2/11/22:  *  VERY SMALL POSTERIOR LEFT FRONTAL INFARCT WHICH IS OCCURRED IN THE   INTERIM FROM 12/22/2021  *  NO HEMODYNAMIC SIGNIFICANT NARROWING WITHIN THE NECK.  *  NO PROXIMAL LARGE VESSEL OCCLUSION INTRACRANIALLY.  *  PERFUSION IMAGING DEMONSTRATES REGIONS OF HYPOPERFUSION/ISCHEMIA   INVOLVING THE BILATERAL TEMPORAL OCCIPITAL REGIONS AND RIGHT PARIETAL   LOBE WITH A VOLUME OF 32 ML. NO CORE INFARCT DEMONSTRATED.    MRI head 2/11/22:  FEW SMALL ACUTE LEFT FRONTAL PARIETAL INFARCTS. NO ASSOCIATED BLOOD   PRODUCTS. NO SIGNIFICANT MASS EFFECT    TTE Echo Complete w/o Contrast w/ Doppler (02.12.22 @ 11:55)      Summary     Estimated left ventricular ejection fraction is 50-55 %.   The left ventricle is normal in size and wall thickness.

## 2022-02-14 NOTE — CONSULT NOTE ADULT - SUBJECTIVE AND OBJECTIVE BOX
Neurosurgery:    86 year old male w hx AAA s/p EVAR 2021, AFIB on ASA (coumadin d/cd due to heatmuria in 12/2021) [place], HTN, PD, patent PFO came to ED for evaluation for difficulty speaking (dysarthria) , pt claims symptoms happened 3 days prior to admission but with some improvement.   MD outpt told pt to report to ED as pt also started c/o diff swallowing.  Pt has denies HA/Dizzines/vision changes/ focal weakness / dysesthesias.      COVID+ was given monoclonal AB 02-  ||  Also placed on z pack and steroids    Code Stroke / Neurology following:  +BEFAST.Code stroke called at 1519 BGM 97 in triage.  In ED /89  HR 78   RR 18   T 98   99% sat RA  NIHSS 3  CTH few small acute left frontal parietal infarcts  MRI same / similar findings.  Initated IV heparin, but stopped along with ASA when left frontal heme component discovered.  Neurology consulted neurosurgery.    PAST MEDICAL HX  AAA (abdominal aortic aneurysm)  Anemia   Anxiety   Atrial fibrillation   Bruise to bilateral upper extremity  COVID-19 virus infection  Eczema   H/O candidiasis of mouth  History of other malignant neoplasm of large intestine    H/O scarlet fever   History of shingles   [place] (hard of hearing)   HTN (hypertension)   Mucocele, appendix   PD Parkinson Disease   Patent foramen ovale dilated left artrium severe echo 1/2021  History of Postoperative hemorrhagic shock, subsequent encounter   Renal calculi   Seasonal allergies   Vertigo.     PAST SURGICAL HX  Cataract surgery  H/O colonoscopy   H/O right hemicolectomy  2021  History of appendectomy 2021  S/P AAA repair 1/2021. Dr Martinez     FAMILY HX  Family history of hepatic cirrhosis: Father   Family history of myocardial infarction: Mother   Family history of cardiomegaly: Mother       SOCIAL HX    nonsmoker  walks unassisted    Allergies  penicillin (Rash)    MEDICATIONS  (STANDING):  atorvastatin 80 milliGRAM(s) Oral at bedtime  digoxin     Tablet 125 MICROGram(s) Oral daily  fluticasone propionate 50 MICROgram(s)/spray Nasal Spray 1 Spray(s) Both Nostrils two times a day  levETIRAcetam  IVPB 1000 milliGRAM(s) IV Intermittent once  levETIRAcetam  Solution 500 milliGRAM(s) Oral two times a day  meclizine 25 milliGRAM(s) Oral daily  melatonin 3 milliGRAM(s) Oral at bedtime  metoprolol succinate ER 25 milliGRAM(s) Oral every 12 hours  rOPINIRole 1 milliGRAM(s) Oral <User Schedule>  rOPINIRole 0.25 milliGRAM(s) Oral <User Schedule>     ROS: Pertinent positives in HPI, all other ROS were reviewed and are negative.     Vital Signs Last 24 Hrs  T(C): 36.6 (14 Feb 2022 14:30), Max: 36.6 (14 Feb 2022 14:30)  T(F): 97.9 (14 Feb 2022 14:30), Max: 97.9 (14 Feb 2022 14:30)  HR: 105 (14 Feb 2022 14:30) (88 - 105)  BP: 127/91 (14 Feb 2022 14:30) (105/68 - 146/82)  BP(mean): --  RR: 18 (14 Feb 2022 14:30) (17 - 18)  SpO2: 98% (14 Feb 2022 14:30) (96% - 98%)    Labs:                        15.9   7.52  )-----------( 202      ( 14 Feb 2022 08:22 )             47.7     02-13    142  |  109<H>  |  23  ----------------------------<  93  4.1   |  27  |  1.08    Ca    8.8      13 Feb 2022 06:54    TPro  6.3  /  Alb  2.5<L>  /  TBili  1.2  /  DBili  x   /  AST  14<L>  /  ALT  27  /  AlkPhos  54  02-13      02-12 Chol 144 LDL -- HDL 50 Trig 69  PTT - ( 14 Feb 2022 08:22 )  PTT:37.4 sec    Radiology report:  - CT head 2/14/22:  Since the prior examination there is new subarachnoid hemorrhage in a   left precentral sulcus. Age-appropriate involutional and ischemic gliotic   changes are otherwise unchanged. There has been previous bilateral lens   replacement surgery.    - MRI Brain: 2/11/22  IMPRESSION:  FEW SMALL ACUTE LEFT FRONTAL PARIETAL INFARCTS. NO ASSOCIATED BLOOD   PRODUCTS. NO SIGNIFICANT MASS EFFECT    Physical Exam:  Constitutional: Awake / alert  HEENT: PERRLA, EOMI  Neck: Supple  Respiratory: Breath sounds are clear bilaterally  Cardiovascular: S1 and S2, regular rhythm  Gastrointestinal: Soft, NT/ND  Extremities:  no edema  Musculoskeletal: no joint swelling/tenderness, no abnormal movements    Neurological Exam:  HF: A x O x 3, appropriately interactive, normal affect, + dysarthria mild. Naming /repetition intact   CN: PERRL, EOMI, VFF, facial sensation normal, no NLFD, tongue midline  Motor: No pronator drift, Strength 5/5 in all 4 ext, normal bulk and tone, no tremor, rigidity or bradykinesia  Sens: Intact to light touch  Reflexes: Symmetric and normal, downgoing toes b/l  Coord:  No FNFA, dysmetria, MARTHA intact . + resting tremor noted.  Gait/Balance: Cannot test    A/P:  86 year old male w hx AAA s/p EVAR 2021, AFIB on ASA (coumadin d/cd due to heatmuria in 12/2021) [place], HTN, PD, patent PFO came to ED for evaluation for difficulty speaking (dysarthria) , pt claims symptoms happened 3 days prior to admission but with some improvement.   MD outpt told pt to report to ED as pt also started c/o diff swallowing.  Pt has denies HA/Dizzines/vision changes/ focal weakness / dysesthesias.      - No neurosurg intervention  - Holding AC/ Antiplatelet for now - no reversal required.  - CTH in 12 hours - am study ok  - obtain CTH earlier if woresening exam  - Antiszr meds per neurology if deemed necessary  - sbp < 160  - will follow   - d/w attending Dr. andres    Care time:   35 minutes spent on total encounter; more than 50% of the visit was spent counseling and / or coordinating care by the Neurosurgical team.  While the remainder including evaluating the patient, medical record, imaging studies, and discussions with fellow staff members / patient / and or family.   Neurosurgery:    86 year old male w hx AAA s/p EVAR 2021, AFIB on ASA (coumadin d/cd due to heatmuria in 12/2021) [place], HTN, PD, patent PFO came to ED for evaluation for difficulty speaking (dysarthria) , pt claims symptoms happened 3 days prior to admission but with some improvement.   MD outpt told pt to report to ED as pt also started c/o diff swallowing.  Pt has denies HA/Dizzines/vision changes/ focal weakness / dysesthesias.      COVID+ was given monoclonal AB 02-  ||  Also placed on z pack and steroids    Code Stroke / Neurology following:  +BEFAST.Code stroke called at 1519 BGM 97 in triage.  In ED /89  HR 78   RR 18   T 98   99% sat RA  NIHSS 3  CTH few small acute left frontal parietal infarcts  MRI same / similar findings.  Initated IV heparin, but stopped along with ASA when left frontal heme component discovered.  Neurology consulted neurosurgery.    PAST MEDICAL HX  AAA (abdominal aortic aneurysm)  Anemia   Anxiety   Atrial fibrillation   Bruise to bilateral upper extremity  COVID-19 virus infection  Eczema   H/O candidiasis of mouth  History of other malignant neoplasm of large intestine    H/O scarlet fever   History of shingles   [place] (hard of hearing)   HTN (hypertension)   Mucocele, appendix   PD Parkinson Disease   Patent foramen ovale dilated left artrium severe echo 1/2021  History of Postoperative hemorrhagic shock, subsequent encounter   Renal calculi   Seasonal allergies   Vertigo.     PAST SURGICAL HX  Cataract surgery  H/O colonoscopy   H/O right hemicolectomy  2021  History of appendectomy 2021  S/P AAA repair 1/2021. Dr Martinez     FAMILY HX  Family history of hepatic cirrhosis: Father   Family history of myocardial infarction: Mother   Family history of cardiomegaly: Mother       SOCIAL HX    nonsmoker  walks unassisted    Allergies  penicillin (Rash)    MEDICATIONS  (STANDING):  atorvastatin 80 milliGRAM(s) Oral at bedtime  digoxin     Tablet 125 MICROGram(s) Oral daily  fluticasone propionate 50 MICROgram(s)/spray Nasal Spray 1 Spray(s) Both Nostrils two times a day  levETIRAcetam  IVPB 1000 milliGRAM(s) IV Intermittent once  levETIRAcetam  Solution 500 milliGRAM(s) Oral two times a day  meclizine 25 milliGRAM(s) Oral daily  melatonin 3 milliGRAM(s) Oral at bedtime  metoprolol succinate ER 25 milliGRAM(s) Oral every 12 hours  rOPINIRole 1 milliGRAM(s) Oral <User Schedule>  rOPINIRole 0.25 milliGRAM(s) Oral <User Schedule>     ROS: Pertinent positives in HPI, all other ROS were reviewed and are negative.     Vital Signs Last 24 Hrs  T(C): 36.6 (14 Feb 2022 14:30), Max: 36.6 (14 Feb 2022 14:30)  T(F): 97.9 (14 Feb 2022 14:30), Max: 97.9 (14 Feb 2022 14:30)  HR: 105 (14 Feb 2022 14:30) (88 - 105)  BP: 127/91 (14 Feb 2022 14:30) (105/68 - 146/82)  BP(mean): --  RR: 18 (14 Feb 2022 14:30) (17 - 18)  SpO2: 98% (14 Feb 2022 14:30) (96% - 98%)    Labs:                        15.9   7.52  )-----------( 202      ( 14 Feb 2022 08:22 )             47.7     02-13    142  |  109<H>  |  23  ----------------------------<  93  4.1   |  27  |  1.08    Ca    8.8      13 Feb 2022 06:54    TPro  6.3  /  Alb  2.5<L>  /  TBili  1.2  /  DBili  x   /  AST  14<L>  /  ALT  27  /  AlkPhos  54  02-13      02-12 Chol 144 LDL -- HDL 50 Trig 69  PTT - ( 14 Feb 2022 08:22 )  PTT:37.4 sec    Radiology report:  - CT head 2/14/22:  Since the prior examination there is new subarachnoid hemorrhage in a   left precentral sulcus. Age-appropriate involutional and ischemic gliotic   changes are otherwise unchanged. There has been previous bilateral lens   replacement surgery.    - MRI Brain: 2/11/22  IMPRESSION:  FEW SMALL ACUTE LEFT FRONTAL PARIETAL INFARCTS. NO ASSOCIATED BLOOD   PRODUCTS. NO SIGNIFICANT MASS EFFECT    Physical Exam:  Constitutional: Awake / alert  HEENT: PERRLA, EOMI  Neck: Supple  Respiratory: Breath sounds are clear bilaterally  Cardiovascular: S1 and S2, regular rhythm  Gastrointestinal: Soft, NT/ND  Extremities:  no edema  Musculoskeletal: no joint swelling/tenderness, no abnormal movements    Neurological Exam:  HF: A x O x 3, appropriately interactive, normal affect, + dysarthria mild. Naming /repetition intact   CN: PERRL, EOMI, VFF, facial sensation normal, no NLFD, tongue midline  Motor: No pronator drift, Strength 5/5 in all 4 ext, x RUE (hand opening / closing challenges) - Lifts off bed moves appropriately.  Sens: Intact to light touch  Reflexes: Symmetric and normal, downgoing toes b/l  Coord:  No FNFA, dysmetria, MARTHA intact . + resting tremor noted RUE  Gait/Balance: Cannot test    A/P:  86 year old male w hx AAA s/p EVAR 2021, AFIB on ASA (coumadin d/cd due to heatmuria in 12/2021) [place], HTN, PD, patent PFO came to ED for evaluation for difficulty speaking (dysarthria) , pt claims symptoms happened 3 days prior to admission but with some improvement.   MD outpt told pt to report to ED as pt also started c/o diff swallowing.  Pt has denies HA/Dizzines/vision changes/ focal weakness / dysesthesias.      - No neurosurg intervention  - Holding AC/ Antiplatelet for now - no reversal required.  - CTH in 12 hours - am study ok  - obtain CTH earlier if woresening exam  - Antiszr meds per neurology if deemed necessary  - sbp < 160  - will follow   - d/w attending Dr. andres    Care time:   35 minutes spent on total encounter; more than 50% of the visit was spent counseling and / or coordinating care by the Neurosurgical team.  While the remainder including evaluating the patient, medical record, imaging studies, and discussions with fellow staff members / patient / and or family.

## 2022-02-15 DIAGNOSIS — I63.9 CEREBRAL INFARCTION, UNSPECIFIED: ICD-10-CM

## 2022-02-15 DIAGNOSIS — I10 ESSENTIAL (PRIMARY) HYPERTENSION: ICD-10-CM

## 2022-02-15 DIAGNOSIS — I48.91 UNSPECIFIED ATRIAL FIBRILLATION: ICD-10-CM

## 2022-02-15 DIAGNOSIS — R93.1 ABNORMAL FINDINGS ON DIAGNOSTIC IMAGING OF HEART AND CORONARY CIRCULATION: ICD-10-CM

## 2022-02-15 LAB
ALBUMIN SERPL ELPH-MCNC: 2.7 G/DL — LOW (ref 3.3–5)
ALP SERPL-CCNC: 63 U/L — SIGNIFICANT CHANGE UP (ref 40–120)
ALT FLD-CCNC: 33 U/L — SIGNIFICANT CHANGE UP (ref 12–78)
ANION GAP SERPL CALC-SCNC: 3 MMOL/L — LOW (ref 5–17)
AST SERPL-CCNC: 19 U/L — SIGNIFICANT CHANGE UP (ref 15–37)
BILIRUB SERPL-MCNC: 1.7 MG/DL — HIGH (ref 0.2–1.2)
BUN SERPL-MCNC: 18 MG/DL — SIGNIFICANT CHANGE UP (ref 7–23)
CALCIUM SERPL-MCNC: 8.8 MG/DL — SIGNIFICANT CHANGE UP (ref 8.5–10.1)
CHLORIDE SERPL-SCNC: 109 MMOL/L — HIGH (ref 96–108)
CO2 SERPL-SCNC: 29 MMOL/L — SIGNIFICANT CHANGE UP (ref 22–31)
CREAT SERPL-MCNC: 1.09 MG/DL — SIGNIFICANT CHANGE UP (ref 0.5–1.3)
GLUCOSE SERPL-MCNC: 84 MG/DL — SIGNIFICANT CHANGE UP (ref 70–99)
HCT VFR BLD CALC: 47 % — SIGNIFICANT CHANGE UP (ref 39–50)
HGB BLD-MCNC: 15.4 G/DL — SIGNIFICANT CHANGE UP (ref 13–17)
MCHC RBC-ENTMCNC: 30 PG — SIGNIFICANT CHANGE UP (ref 27–34)
MCHC RBC-ENTMCNC: 32.8 GM/DL — SIGNIFICANT CHANGE UP (ref 32–36)
MCV RBC AUTO: 91.4 FL — SIGNIFICANT CHANGE UP (ref 80–100)
PLATELET # BLD AUTO: 211 K/UL — SIGNIFICANT CHANGE UP (ref 150–400)
POTASSIUM SERPL-MCNC: 4.1 MMOL/L — SIGNIFICANT CHANGE UP (ref 3.5–5.3)
POTASSIUM SERPL-SCNC: 4.1 MMOL/L — SIGNIFICANT CHANGE UP (ref 3.5–5.3)
PROT SERPL-MCNC: 6.7 GM/DL — SIGNIFICANT CHANGE UP (ref 6–8.3)
RBC # BLD: 5.14 M/UL — SIGNIFICANT CHANGE UP (ref 4.2–5.8)
RBC # FLD: 16 % — HIGH (ref 10.3–14.5)
SODIUM SERPL-SCNC: 141 MMOL/L — SIGNIFICANT CHANGE UP (ref 135–145)
WBC # BLD: 7.72 K/UL — SIGNIFICANT CHANGE UP (ref 3.8–10.5)
WBC # FLD AUTO: 7.72 K/UL — SIGNIFICANT CHANGE UP (ref 3.8–10.5)

## 2022-02-15 PROCEDURE — 99232 SBSQ HOSP IP/OBS MODERATE 35: CPT

## 2022-02-15 PROCEDURE — 99223 1ST HOSP IP/OBS HIGH 75: CPT

## 2022-02-15 PROCEDURE — 70450 CT HEAD/BRAIN W/O DYE: CPT | Mod: 26

## 2022-02-15 PROCEDURE — 95816 EEG AWAKE AND DROWSY: CPT | Mod: 26

## 2022-02-15 RX ORDER — HYDROCORTISONE 1 %
1 OINTMENT (GRAM) TOPICAL THREE TIMES A DAY
Refills: 0 | Status: DISCONTINUED | OUTPATIENT
Start: 2022-02-15 | End: 2022-02-18

## 2022-02-15 RX ADMIN — ATORVASTATIN CALCIUM 80 MILLIGRAM(S): 80 TABLET, FILM COATED ORAL at 21:38

## 2022-02-15 RX ADMIN — Medication 25 MILLIGRAM(S): at 10:20

## 2022-02-15 RX ADMIN — ROPINIROLE 0.25 MILLIGRAM(S): 8 TABLET, FILM COATED, EXTENDED RELEASE ORAL at 17:52

## 2022-02-15 RX ADMIN — ROPINIROLE 0.25 MILLIGRAM(S): 8 TABLET, FILM COATED, EXTENDED RELEASE ORAL at 13:46

## 2022-02-15 RX ADMIN — ROPINIROLE 1 MILLIGRAM(S): 8 TABLET, FILM COATED, EXTENDED RELEASE ORAL at 10:20

## 2022-02-15 RX ADMIN — LEVETIRACETAM 500 MILLIGRAM(S): 250 TABLET, FILM COATED ORAL at 21:38

## 2022-02-15 RX ADMIN — Medication 3 MILLIGRAM(S): at 21:37

## 2022-02-15 RX ADMIN — LEVETIRACETAM 500 MILLIGRAM(S): 250 TABLET, FILM COATED ORAL at 10:19

## 2022-02-15 RX ADMIN — Medication 25 MILLIGRAM(S): at 21:38

## 2022-02-15 RX ADMIN — Medication 125 MICROGRAM(S): at 10:20

## 2022-02-15 NOTE — CONSULT NOTE ADULT - PROBLEM SELECTOR RECOMMENDATION 2
chronic persistent , improved ventricular rate , continue BB , consider discontinue digoxin , increase BB dose if tolerate. anticoagulation was held due to hemorrhagic stroke ,   patient is at high risk as patient afib and PFO ,   patient is aware of it

## 2022-02-15 NOTE — PROGRESS NOTE ADULT - SUBJECTIVE AND OBJECTIVE BOX
Interval History:  2/15/22: Patient is feeling better today overall.  Denies headache.    MEDICATIONS  (STANDING):  atorvastatin 80 milliGRAM(s) Oral at bedtime  digoxin     Tablet 125 MICROGram(s) Oral daily  fluticasone propionate 50 MICROgram(s)/spray Nasal Spray 1 Spray(s) Both Nostrils two times a day  levETIRAcetam  Solution 500 milliGRAM(s) Oral two times a day  meclizine 25 milliGRAM(s) Oral daily  melatonin 3 milliGRAM(s) Oral at bedtime  metoprolol succinate ER 25 milliGRAM(s) Oral every 12 hours  rOPINIRole 1 milliGRAM(s) Oral <User Schedule>  rOPINIRole 0.25 milliGRAM(s) Oral <User Schedule>    MEDICATIONS  (PRN):  acetaminophen     Tablet .. 650 milliGRAM(s) Oral every 6 hours PRN Temp greater or equal to 38C (100.4F), Mild Pain (1 - 3)  aluminum hydroxide/magnesium hydroxide/simethicone Suspension 30 milliLiter(s) Oral every 4 hours PRN Dyspepsia  artificial  tears Solution 1 Drop(s) Both EYES four times a day PRN Dry Eyes  hydrocortisone 1% Topical Cream - Peds 1 Application(s) Topical three times a day PRN Itching  meclizine 25 milliGRAM(s) Oral three times a day PRN Dizziness      Allergies    penicillin (Rash)    Intolerances        PHYSICAL EXAM:  Vital Signs Last 24 Hrs  T(F): 97 (02-15-22 @ 05:53)  HR: 90 (02-15-22 @ 08:00)  BP: 130/97 (02-15-22 @ 08:00)  RR: 20 (02-15-22 @ 08:00)    GENERAL: NAD, well-groomed, well-developed  HEAD:  Atraumatic, Normocephalic  Neuro:  Awake, alert, no aphasia  CN: PERRL, EOMI, no nystagmus, no facial weakness, tongue protrudes in the midline, mild dysarthria  motor: Mild right drift. 5-/5 in RUE, 5/5 in LUE, 5/5 in b/l lower extremities  sensory: intact to light touch  coordination: Mild dysmetria on finger to nose on right, intact on left  DTRs: symmetric, plantar responses flexor bilaterally    LABS:                        15.4   7.72  )-----------( 211      ( 15 Feb 2022 05:44 )             47.0     02-15    141  |  109<H>  |  18  ----------------------------<  84  4.1   |  29  |  1.09    Ca    8.8      15 Feb 2022 05:44    TPro  6.7  /  Alb  2.7<L>  /  TBili  1.7<H>  /  DBili  x   /  AST  19  /  ALT  33  /  AlkPhos  63  02-15    PTT - ( 2022 08:22 )  PTT:37.4 sec  Urinalysis Basic - ( 2022 17:05 )    Color: Brown / Appearance: very cloudy / S.015 / pH: x  Gluc: x / Ketone: Negative  / Bili: Negative / Urobili: 1   Blood: x / Protein: See Note / Nitrite: Negative   Leuk Esterase: Trace / RBC: 25-50 /HPF / WBC 0-2   Sq Epi: x / Non Sq Epi: Occasional / Bacteria: Few        RADIOLOGY & ADDITIONAL STUDIES:  CT head, CTA head and neck 22:  *  VERY SMALL POSTERIOR LEFT FRONTAL INFARCT WHICH IS OCCURRED IN THE   INTERIM FROM 2021  *  NO HEMODYNAMIC SIGNIFICANT NARROWING WITHIN THE NECK.  *  NO PROXIMAL LARGE VESSEL OCCLUSION INTRACRANIALLY.  *  PERFUSION IMAGING DEMONSTRATES REGIONS OF HYPOPERFUSION/ISCHEMIA   INVOLVING THE BILATERAL TEMPORAL OCCIPITAL REGIONS AND RIGHT PARIETAL   LOBE WITH A VOLUME OF 32 ML. NO CORE INFARCT DEMONSTRATED.    MRI head 22:  FEW SMALL ACUTE LEFT FRONTAL PARIETAL INFARCTS. NO ASSOCIATED BLOOD   PRODUCTS. NO SIGNIFICANT MASS EFFECT    TTE Echo Complete w/o Contrast w/ Doppler (22 @ 11:55)      Summary     Estimated left ventricular ejection fraction is 50-55 %.   The left ventricle is normal in size and wall thickness.    CT head 22:  Age-appropriate involutional and ischemic gliotic changes.   New hemorrhage left frontal precentral gyrus.           Interval History:  2/15/22: Patient is feeling better today overall.  Denies headache.    MEDICATIONS  (STANDING):  atorvastatin 80 milliGRAM(s) Oral at bedtime  digoxin     Tablet 125 MICROGram(s) Oral daily  fluticasone propionate 50 MICROgram(s)/spray Nasal Spray 1 Spray(s) Both Nostrils two times a day  levETIRAcetam  Solution 500 milliGRAM(s) Oral two times a day  meclizine 25 milliGRAM(s) Oral daily  melatonin 3 milliGRAM(s) Oral at bedtime  metoprolol succinate ER 25 milliGRAM(s) Oral every 12 hours  rOPINIRole 1 milliGRAM(s) Oral <User Schedule>  rOPINIRole 0.25 milliGRAM(s) Oral <User Schedule>    MEDICATIONS  (PRN):  acetaminophen     Tablet .. 650 milliGRAM(s) Oral every 6 hours PRN Temp greater or equal to 38C (100.4F), Mild Pain (1 - 3)  aluminum hydroxide/magnesium hydroxide/simethicone Suspension 30 milliLiter(s) Oral every 4 hours PRN Dyspepsia  artificial  tears Solution 1 Drop(s) Both EYES four times a day PRN Dry Eyes  hydrocortisone 1% Topical Cream - Peds 1 Application(s) Topical three times a day PRN Itching  meclizine 25 milliGRAM(s) Oral three times a day PRN Dizziness      Allergies    penicillin (Rash)    Intolerances        PHYSICAL EXAM:  Vital Signs Last 24 Hrs  T(F): 97 (02-15-22 @ 05:53)  HR: 90 (02-15-22 @ 08:00)  BP: 130/97 (02-15-22 @ 08:00)  RR: 20 (02-15-22 @ 08:00)    GENERAL: NAD, well-groomed, well-developed  HEAD:  Atraumatic, Normocephalic  Neuro:  Awake, alert, no aphasia  CN: PERRL, EOMI, no nystagmus, no facial weakness, tongue protrudes in the midline, mild dysarthria  motor: Mild right drift. 5-/5 in RUE, 5/5 in LUE, 5/5 in b/l lower extremities  sensory: intact to light touch  coordination: Mild dysmetria on finger to nose on right, intact on left  DTRs: symmetric, plantar responses flexor bilaterally      NIH Stroke Scale Score 3    LABS:                        15.4   7.72  )-----------( 211      ( 15 Feb 2022 05:44 )             47.0     02-15    141  |  109<H>  |  18  ----------------------------<  84  4.1   |  29  |  1.09    Ca    8.8      15 Feb 2022 05:44    TPro  6.7  /  Alb  2.7<L>  /  TBili  1.7<H>  /  DBili  x   /  AST  19  /  ALT  33  /  AlkPhos  63  02-15    PTT - ( 2022 08:22 )  PTT:37.4 sec  Urinalysis Basic - ( 2022 17:05 )    Color: Brown / Appearance: very cloudy / S.015 / pH: x  Gluc: x / Ketone: Negative  / Bili: Negative / Urobili: 1   Blood: x / Protein: See Note / Nitrite: Negative   Leuk Esterase: Trace / RBC: 25-50 /HPF / WBC 0-2   Sq Epi: x / Non Sq Epi: Occasional / Bacteria: Few        RADIOLOGY & ADDITIONAL STUDIES:  CT head, CTA head and neck 22:  *  VERY SMALL POSTERIOR LEFT FRONTAL INFARCT WHICH IS OCCURRED IN THE   INTERIM FROM 2021  *  NO HEMODYNAMIC SIGNIFICANT NARROWING WITHIN THE NECK.  *  NO PROXIMAL LARGE VESSEL OCCLUSION INTRACRANIALLY.  *  PERFUSION IMAGING DEMONSTRATES REGIONS OF HYPOPERFUSION/ISCHEMIA   INVOLVING THE BILATERAL TEMPORAL OCCIPITAL REGIONS AND RIGHT PARIETAL   LOBE WITH A VOLUME OF 32 ML. NO CORE INFARCT DEMONSTRATED.    MRI head 22:  FEW SMALL ACUTE LEFT FRONTAL PARIETAL INFARCTS. NO ASSOCIATED BLOOD   PRODUCTS. NO SIGNIFICANT MASS EFFECT    TTE Echo Complete w/o Contrast w/ Doppler (22 @ 11:55)      Summary     Estimated left ventricular ejection fraction is 50-55 %.   The left ventricle is normal in size and wall thickness.    CT head 22:  Age-appropriate involutional and ischemic gliotic changes.   New hemorrhage left frontal precentral gyrus.

## 2022-02-15 NOTE — CONSULT NOTE ADULT - SUBJECTIVE AND OBJECTIVE BOX
HPI: Pt is a 86y old Male with hx of       PAIN: ( )Yes   ( )No  Level:  Location:  Intensity:    /10  Quality:  Aggravating Factors:  Alleviating Factors:  Radiation:  Duration/Timing:  Impact on ADLs:    DYSPNEA: ( ) Yes  ( ) No  Level:    PAST MEDICAL & SURGICAL HISTORY:  Atrial fibrillation  HTN (hypertension)  H/O candidiasis of mouth  AAA (abdominal aortic aneurysm)  H/O scarlet fever  [place] (hard of hearing)  Parkinson disease  Renal calculi  Seasonal allergies  Anxiety  Eczema  Mucocele, appendix  History of shingles  Patent foramen ovale dilated left atrium severe echo 2021  Bruise to bilateral upper extremity  Vertigo  H/O colonoscopy  S/P AAA repair 2021  History of appendectomy  H/O right hemicolectomy    SOCIAL HX:    Hx opiate tolerance ( )YES  (x)NO    Baseline ADLs  (Prior to Admission)  (x ) Independent   ( )Dependent    FAMILY HISTORY:  No pertinent family history in first degree relatives    Review of Systems:    Dyspnea-  Constipation-  Diarrhea-  Nausea-  Vomiting-  Anorexia-  Weight Loss-   Cough-  Secretions-  Fatigue-  Weakness-  Delirium-    All other systems reviewed and negative  Unable to obtain/Limited due to:      PHYSICAL EXAM:    Vital Signs Last 24 Hrs  T(C): 36.1 (15 Feb 2022 05:53), Max: 36.7 (15 Feb 2022 00:25)  T(F): 97 (15 Feb 2022 05:53), Max: 98 (15 Feb 2022 00:25)  HR: 83 (15 Feb 2022 10:00) (69 - 105)  BP: 120/78 (15 Feb 2022 10:00) (100/79 - 151/92)  BP(mean): 89 (15 Feb 2022 10:00) (80 - 108)  RR: 16 (15 Feb 2022 10:00) (15 - 24)  SpO2: 96% (15 Feb 2022 09:05) (94% - 98%)  Daily     Daily Weight in k.8 (15 Feb 2022 05:53)    PPSV2:   %  FAST:    General:  Mental Status:  HEENT:  Lungs:  Cardiac:  GI:  :  Ext:  Neuro:      LABS:                        15.4   7.72  )-----------( 211      ( 15 Feb 2022 05:44 )             47.0     02-15    141  |  109<H>  |  18  ----------------------------<  84  4.1   |  29  |  1.09    Ca    8.8      15 Feb 2022 05:44    TPro  6.7  /  Alb  2.7<L>  /  TBili  1.7<H>  /  DBili  x   /  AST  19  /  ALT  33  /  AlkPhos  63  -15    PTT - ( 2022 08:22 )  PTT:37.4 sec  Albumin: Albumin, Serum: 2.7 g/dL (02-15 @ 05:44)      Allergies    penicillin (Rash)    Intolerances      MEDICATIONS  (STANDING):  atorvastatin 80 milliGRAM(s) Oral at bedtime  digoxin     Tablet 125 MICROGram(s) Oral daily  fluticasone propionate 50 MICROgram(s)/spray Nasal Spray 1 Spray(s) Both Nostrils two times a day  levETIRAcetam  Solution 500 milliGRAM(s) Oral two times a day  meclizine 25 milliGRAM(s) Oral daily  melatonin 3 milliGRAM(s) Oral at bedtime  metoprolol succinate ER 25 milliGRAM(s) Oral every 12 hours  rOPINIRole 1 milliGRAM(s) Oral <User Schedule>  rOPINIRole 0.25 milliGRAM(s) Oral <User Schedule>    MEDICATIONS  (PRN):  acetaminophen     Tablet .. 650 milliGRAM(s) Oral every 6 hours PRN Temp greater or equal to 38C (100.4F), Mild Pain (1 - 3)  aluminum hydroxide/magnesium hydroxide/simethicone Suspension 30 milliLiter(s) Oral every 4 hours PRN Dyspepsia  artificial  tears Solution 1 Drop(s) Both EYES four times a day PRN Dry Eyes  hydrocortisone 1% Topical Cream - Peds 1 Application(s) Topical three times a day PRN Itching  meclizine 25 milliGRAM(s) Oral three times a day PRN Dizziness      RADIOLOGY/ADDITIONAL STUDIES: HPI: Pt is a 86y old Male with hx of       PAIN: ( )Yes   (x )No  patient denies   DYSPNEA: ( ) Yes  (x ) No  Level:    PAST MEDICAL & SURGICAL HISTORY:  Atrial fibrillation  HTN (hypertension)  H/O candidiasis of mouth  AAA (abdominal aortic aneurysm)  H/O scarlet fever  [place] (hard of hearing)  Parkinson disease  Renal calculi  Seasonal allergies  Anxiety  Eczema  Mucocele, appendix  History of shingles  Patent foramen ovale dilated left atrium severe echo 2021  Bruise to bilateral upper extremity  Vertigo  H/O colonoscopy  S/P AAA repair 2021  History of appendectomy  H/O right hemicolectomy    SOCIAL HX: lives at home with wife     Hx opiate tolerance ( )YES  (x)NO    Baseline ADLs  (Prior to Admission)  (x ) Independent   ( )Dependent    FAMILY HISTORY:  No pertinent family history in first degree relatives    Review of Systems:  Fatigue-yes  Weakness-yes    All other systems reviewed and negative    PHYSICAL EXAM:    Vital Signs Last 24 Hrs  T(C): 36.1 (15 Feb 2022 05:53), Max: 36.7 (15 Feb 2022 00:25)  T(F): 97 (15 Feb 2022 05:53), Max: 98 (15 Feb 2022 00:25)  HR: 83 (15 Feb 2022 10:00) (69 - 105)  BP: 120/78 (15 Feb 2022 10:00) (100/79 - 151/92)  BP(mean): 89 (15 Feb 2022 10:00) (80 - 108)  RR: 16 (15 Feb 2022 10:00) (15 - 24)  SpO2: 96% (15 Feb 2022 09:05) (94% - 98%)  Daily Weight in k.8 (15 Feb 2022 05:53)    PPSV2: 40  %    General: elderly gentleman in bed, NAD   Mental Status: alert and oriented, + dysarthria, some periods of confusion or forgetfulness   HEENT: mmm   Lungs: diminished breath sounds b/l   Cardiac: irregular S1S2   GI: nontender, non distended + BS   : no suprapubic tenderness   Ext: mild edema   Neuro:  weakness on right side       LABS:                        15.4   7.72  )-----------( 211      ( 15 Feb 2022 05:44 )             47.0     02-15    141  |  109<H>  |  18  ----------------------------<  84  4.1   |  29  |  1.09    Ca    8.8      15 Feb 2022 05:44    TPro  6.7  /  Alb  2.7<L>  /  TBili  1.7<H>  /  DBili  x   /  AST  19  /  ALT  33  /  AlkPhos  63  02-15    PTT - ( 2022 08:22 )  PTT:37.4 sec  Albumin: Albumin, Serum: 2.7 g/dL (02-15 @ 05:44)    Allergies- penicillin (Rash)    MEDICATIONS  (STANDING):  atorvastatin 80 milliGRAM(s) Oral at bedtime  digoxin     Tablet 125 MICROGram(s) Oral daily  fluticasone propionate 50 MICROgram(s)/spray Nasal Spray 1 Spray(s) Both Nostrils two times a day  levETIRAcetam  Solution 500 milliGRAM(s) Oral two times a day  meclizine 25 milliGRAM(s) Oral daily  melatonin 3 milliGRAM(s) Oral at bedtime  metoprolol succinate ER 25 milliGRAM(s) Oral every 12 hours  rOPINIRole 1 milliGRAM(s) Oral <User Schedule>  rOPINIRole 0.25 milliGRAM(s) Oral <User Schedule>    MEDICATIONS  (PRN):  acetaminophen     Tablet .. 650 milliGRAM(s) Oral every 6 hours PRN Temp greater or equal to 38C (100.4F), Mild Pain (1 - 3)  aluminum hydroxide/magnesium hydroxide/simethicone Suspension 30 milliLiter(s) Oral every 4 hours PRN Dyspepsia  artificial  tears Solution 1 Drop(s) Both EYES four times a day PRN Dry Eyes  hydrocortisone 1% Topical Cream - Peds 1 Application(s) Topical three times a day PRN Itching  meclizine 25 milliGRAM(s) Oral three times a day PRN Dizziness      RADIOLOGY/ADDITIONAL STUDIES:    ACC: [id number] EXAM:  CT PERFUSION W MAPS IC                        ACC: [id number] EXAM:  CT ANGIO BRAIN (W)AW IC                        ACC: [id number] EXAM:  CT BRAIN STROKE PROTOCOL                        ACC: [id number] EXAM:  CT ANGIO NECK (W)AW IC                        PROCEDURE DATE:  2022    INTERPRETATION:  CT HEAD STROKE PROTOCOL, CT ANGIO NECK WITHOUT AND OR   WITH IV CONTRAST, CT ANGIO BRAIN WITHOUT AND OR WITH IV CONTRAST, CT   PERFUSION WITH MAPS WITH IV CONTRAST  CLINICAL HISTORY: Stroke Code  CT HEAD TECHNIQUE:  Noncontrast CT.  Axial Acqisition.  Sagittal and coronal reformations.  COMPARISON:  Head CT is compared to prior study of 2021  FINDINGS:  *  HEMORRHAGE:  No evidence of intracranial hemorrhage.  *  BRAIN PARENCHYMA:  Mild to moderate atrophy. Mild periventricular   white matter ischemic changes. Very small posterior left frontal infarct   which has occurred in the interim from prior study (series 2 image 21,   series 601 image 53, series 602 image 44) No mass or mass effect.  *  VENTRICLES / SHIFT:  No hydrocephalus. No midline shift.  *  EXTRA-AXIAL / BASAL CISTERNS:  No extra-axial mass. Basal cisterns   preserved.  *  CALVARIUM AND EXTRACRANIAL SOFT TISSUES:  No depressed calvarial  fracture.  *  SINUSES, ORBITS, MASTOIDS:  Mucosal thickening and retention cysts   within the maxillary sinuses  CTA TECHNIQUE:  CT angiography of the neck and brain was performed during the dynamic   administration of intravenous contrast.  MIP reconstructions were   performed and reviewed. Multiplanar reformations were obtained.  Contrast administered 90 cc Omnipaque 350, contrast discarded 10 cc  CTA FINDINGS:  NECK  RIGHT CAROTID CIRCULATION:  Evaluation of the right carotid circulationdemonstrates no hemodynamic   significant narrowing utilizing a distal reference.  LEFT CAROTID CIRCULATION:  Evaluation of the left carotid circulation demonstrates no hemodynamic   significant narrowing utilizing a distal reference.  VERTEBRAL ARTERIES:  Evaluation of the vertebral arteries reveals no evidence of a vertebral   artery occlusion or dissection.  BRAIN  ANTERIOR CIRCULATION:  Distal internal carotid arteries are patent. Atherosclerotic   calcification of the cavernous and supraclinoid segments are seen without   significant narrowing  Anterior cerebral arteries are patent. Right anterior cerebral artery is   smaller than the left a normal variant  Middle cerebral arteries are patent.  POSTERIOR CIRCULATION:  Distal vertebral arteries are patent. Distal vertebral arteries are   tortuous  Basilar artery is patent. Proximal superior cerebellar arteries are patent  Posterior cerebral arteries are patent.  CT PERFUSION TECHNIQUE:  CT perfusion was performed during the administration of intravenous   contrast.  There was a total of 8 cm brain coverage.  The images were processed utilizing RAPID software.  Contrast administered 50 cc Omnipaque 350, contrast discarded 0 cc  < from: MR Head No Cont (22 @ 19:50) >  Ischemic tissue is defined as TMAX > 6 seconds.  Core infarct is defined as CBF < 30%.  CT PERFUSION FINDINGS:  There is a region of hypoperfusion involving the bilateral temporal   occipital regions and right parietal lobe.  The volume of ischemic tissue (core infarct and penumbra) measures 32 mL.  The core infarct measures 0 mL.  The mismatch volume (penumbra) measure 32mL.  The mismatch ratio (ischemic tissue / core) is: Infinite  The hypoperfusion index (Tmax>10s/Tmax>6s) is: 0.0    IMPRESSION:  *  VERY SMALL POSTERIOR LEFT FRONTAL INFARCT WHICH IS OCCURRED IN THE   INTERIM FROM 2021  *  NO HEMODYNAMIC SIGNIFICANT NARROWING WITHIN THE NECK.  *  NO PROXIMAL LARGE VESSEL OCCLUSION INTRACRANIALLY.  *  PERFUSION IMAGING DEMONSTRATES REGIONS OF HYPOPERFUSION/ISCHEMIA   INVOLVING THE BILATERAL TEMPORAL OCCIPITAL REGIONS AND RIGHT PARIETAL   LOBE WITH A VOLUME OF 32 ML. NO CORE INFARCT DEMONSTRATED.    *  FINDINGS DISCUSSED WITH DR. VALDEZ AT 3:41 PM ON 2022      ACC: [id number] EXAM:  MR BRAIN                        PROCEDURE DATE:  2022    INTERPRETATION:  MR BRAIN  Clinical information: Stroke, follow up  A noncontrast MRI of the brain was performed.    TECHNIQUE:  In the sagittal plane aT1 weighted sequence was performed.  In the axial   plane T1, T2, FLAIR, GRE, and diffusion weighted imaging was obtained.    COMPARISON:  Exam is compared to head CT of earlier in the day.  FINDINGS:  BRAIN PARENCHYMA:  There are a few small acuteleft frontal parietal infarcts. No associated   blood products. No significant associated mass effect. Patient has   moderate underlying brain atrophy. Mild chronic periventricular white   matter ischemic changes are seen. Chronic very small right cerebellar   infarct is noted. Corpus callosum well formed. No cerebellar tonsillar   ectopia.  VENTRICLES:  There is no hydrocephalus. There is no midline shift.  EXTRA-AXIAL:  No extra-axial mass.  No evidence of a subdural or epidural collection.   Patent basal cisterns.  OTHER:  Mucosal thickening is seen within the maxillary sinuses with associated   polyps and/or retention cysts    IMPRESSION:  FEW SMALL ACUTE LEFT FRONTAL PARIETAL INFARCTS. NO ASSOCIATED BLOOD   PRODUCTS. NO SIGNIFICANT MASS EFFECT      ACC: [id number] EXAM:  CT BRAIN                        PROCEDURE DATE:  2022    INTERPRETATION:  Clinical Indication:  New onset right arm incoordination  5mm axial sections of the brain were obtained from base to vertex,   without the intravenous administration of contrast material. Coronal and   sagittal computer generated reconstructed views are available.  Comparison is made with the prior CT of 2022.  Since the prior examination there is new subarachnoid hemorrhage in a   left precentral sulcus. Age-appropriate involutional and ischemic gliotic   changes are otherwise unchanged. There has been previous bilateral lens   replacement surgery.  IMPRESSION: Age-appropriate involutional and ischemic gliotic changes.   New hemorrhage left frontal precentral gyrus.  Dr. Carbajal discussed these findings with Dr. Rasheed on 2022 7:34 PM   with read back.      ACC: [id number] EXAM:  CT BRAIN                        PROCEDURE DATE:  02/15/2022    INTERPRETATION:  Noncontrast CT of the brain.  CLINICAL INDICATION:  Subarachnoid hemorrhage, follow-up  TECHNIQUE : Axial CT scanning of the brain was obtained from the skull   base to the vertex without the administration of intravenous contrast.   Sagittal and coronal reformats were provided.  COMPARISON: CT brain 2022  FINDINGS:  Similar acute subarachnoid hemorrhage in the left central sulcus.  Similar partially calcified 6 mm hyperdense extra-axial nodule along the   right aspect of the anterior falx, likely representing a small meningioma.  No hydrocephalus, midline shift, or effacement of basal cisterns.  No parenchymal hemorrhage or brain edema.    IMPRESSION:  No significant interval change.           HPI: Pt is a 86y old Male with hx of  AAA s/p EVAR , AFIB on ASA, [place], HTN, PD, patent PFO came to ED for evaluation for difficulty speaking and swallowing; he called his doctor, who advised him to go to the ED. Since then, the patient on admission was noted to have minor acute * L frontal-parietal infarcts (not a TPA candidate); the risk factor was identified atrial fibrillation (not on AC due to hemorrhagic shock last year, only year on ASA for three weeks). At that time, we discussed care with Dr. Ochoa - no contraindication for AC from a surgical perspective. Care was discussed with Dr. Rasheed. Patient on 2/15 developed upper extremity shakes -  workup demonstrated New hemorrhage left frontal precentral gyrus. Palliative medicine consulted to help establish GOC and advance care planning.     2/15/2022 patient seen and examined with no family at bedside he was having EEG preformed. patient denies any pain at this time.  States wife helps him make medical decisions and agreeable to me reaching out to schedule GOC meeting        PAIN: ( )Yes   (x )No  patient denies   DYSPNEA: ( ) Yes  (x ) No  Level:    PAST MEDICAL & SURGICAL HISTORY:  Atrial fibrillation  HTN (hypertension)  H/O candidiasis of mouth  AAA (abdominal aortic aneurysm)  H/O scarlet fever  [place] (hard of hearing)  Parkinson disease  Renal calculi  Seasonal allergies  Anxiety  Eczema  Mucocele, appendix  History of shingles  Patent foramen ovale dilated left atrium severe echo 2021  Bruise to bilateral upper extremity  Vertigo  H/O colonoscopy  S/P AAA repair 2021  History of appendectomy  H/O right hemicolectomy    SOCIAL HX: lives at home with wife     Hx opiate tolerance ( )YES  (x)NO    Baseline ADLs  (Prior to Admission)  (x ) Independent   ( )Dependent    FAMILY HISTORY:  No pertinent family history in first degree relatives    Review of Systems:  Fatigue-yes  Weakness-yes    All other systems reviewed and negative    PHYSICAL EXAM:    Vital Signs Last 24 Hrs  T(C): 36.1 (15 Feb 2022 05:53), Max: 36.7 (15 Feb 2022 00:25)  T(F): 97 (15 Feb 2022 05:53), Max: 98 (15 Feb 2022 00:25)  HR: 83 (15 Feb 2022 10:00) (69 - 105)  BP: 120/78 (15 Feb 2022 10:00) (100/79 - 151/92)  BP(mean): 89 (15 Feb 2022 10:00) (80 - 108)  RR: 16 (15 Feb 2022 10:00) (15 - 24)  SpO2: 96% (15 Feb 2022 09:05) (94% - 98%)  Daily Weight in k.8 (15 Feb 2022 05:53)    PPSV2: 40  %    General: elderly gentleman in bed, NAD   Mental Status: alert and oriented, + dysarthria, some periods of confusion or forgetfulness   HEENT: mmm   Lungs: diminished breath sounds b/l   Cardiac: irregular S1S2   GI: nontender, non distended + BS   : no suprapubic tenderness   Ext: mild edema   Neuro:  weakness on right side       LABS:                        15.4   7.72  )-----------( 211      ( 15 Feb 2022 05:44 )             47.0     02-15    141  |  109<H>  |  18  ----------------------------<  84  4.1   |  29  |  1.09    Ca    8.8      15 Feb 2022 05:44    TPro  6.7  /  Alb  2.7<L>  /  TBili  1.7<H>  /  DBili  x   /  AST  19  /  ALT  33  /  AlkPhos  63  02-15    PTT - ( 2022 08:22 )  PTT:37.4 sec  Albumin: Albumin, Serum: 2.7 g/dL (02-15 @ 05:44)    Allergies- penicillin (Rash)    MEDICATIONS  (STANDING):  atorvastatin 80 milliGRAM(s) Oral at bedtime  digoxin     Tablet 125 MICROGram(s) Oral daily  fluticasone propionate 50 MICROgram(s)/spray Nasal Spray 1 Spray(s) Both Nostrils two times a day  levETIRAcetam  Solution 500 milliGRAM(s) Oral two times a day  meclizine 25 milliGRAM(s) Oral daily  melatonin 3 milliGRAM(s) Oral at bedtime  metoprolol succinate ER 25 milliGRAM(s) Oral every 12 hours  rOPINIRole 1 milliGRAM(s) Oral <User Schedule>  rOPINIRole 0.25 milliGRAM(s) Oral <User Schedule>    MEDICATIONS  (PRN):  acetaminophen     Tablet .. 650 milliGRAM(s) Oral every 6 hours PRN Temp greater or equal to 38C (100.4F), Mild Pain (1 - 3)  aluminum hydroxide/magnesium hydroxide/simethicone Suspension 30 milliLiter(s) Oral every 4 hours PRN Dyspepsia  artificial  tears Solution 1 Drop(s) Both EYES four times a day PRN Dry Eyes  hydrocortisone 1% Topical Cream - Peds 1 Application(s) Topical three times a day PRN Itching  meclizine 25 milliGRAM(s) Oral three times a day PRN Dizziness      RADIOLOGY/ADDITIONAL STUDIES:    ACC: [id number] EXAM:  CT PERFUSION W MAPS IC                        ACC: [id number] EXAM:  CT ANGIO BRAIN (W)AW IC                        ACC: [id number] EXAM:  CT BRAIN STROKE PROTOCOL                        ACC: [id number] EXAM:  CT ANGIO NECK (W) IC                        PROCEDURE DATE:  2022    INTERPRETATION:  CT HEAD STROKE PROTOCOL, CT ANGIO NECK WITHOUT AND OR   WITH IV CONTRAST, CT ANGIO BRAIN WITHOUT AND OR WITH IV CONTRAST, CT   PERFUSION WITH MAPS WITH IV CONTRAST  CLINICAL HISTORY: Stroke Code  CT HEAD TECHNIQUE:  Noncontrast CT.  Axial Acqisition.  Sagittal and coronal reformations.  COMPARISON:  Head CT is compared to prior study of 2021  FINDINGS:  *  HEMORRHAGE:  No evidence of intracranial hemorrhage.  *  BRAIN PARENCHYMA:  Mild to moderate atrophy. Mild periventricular   white matter ischemic changes. Very small posterior left frontal infarct   which has occurred in the interim from prior study (series 2 image 21,   series 601 image 53, series 602 image 44) No mass or mass effect.  *  VENTRICLES / SHIFT:  No hydrocephalus. No midline shift.  *  EXTRA-AXIAL / BASAL CISTERNS:  No extra-axial mass. Basal cisterns   preserved.  *  CALVARIUM AND EXTRACRANIAL SOFT TISSUES:  No depressed calvarial  fracture.  *  SINUSES, ORBITS, MASTOIDS:  Mucosal thickening and retention cysts   within the maxillary sinuses  CTA TECHNIQUE:  CT angiography of the neck and brain was performed during the dynamic   administration of intravenous contrast.  MIP reconstructions were   performed and reviewed. Multiplanar reformations were obtained.  Contrast administered 90 cc Omnipaque 350, contrast discarded 10 cc  CTA FINDINGS:  NECK  RIGHT CAROTID CIRCULATION:  Evaluation of the right carotid circulationdemonstrates no hemodynamic   significant narrowing utilizing a distal reference.  LEFT CAROTID CIRCULATION:  Evaluation of the left carotid circulation demonstrates no hemodynamic   significant narrowing utilizing a distal reference.  VERTEBRAL ARTERIES:  Evaluation of the vertebral arteries reveals no evidence of a vertebral   artery occlusion or dissection.  BRAIN  ANTERIOR CIRCULATION:  Distal internal carotid arteries are patent. Atherosclerotic   calcification of the cavernous and supraclinoid segments are seen without   significant narrowing  Anterior cerebral arteries are patent. Right anterior cerebral artery is   smaller than the left a normal variant  Middle cerebral arteries are patent.  POSTERIOR CIRCULATION:  Distal vertebral arteries are patent. Distal vertebral arteries are   tortuous  Basilar artery is patent. Proximal superior cerebellar arteries are patent  Posterior cerebral arteries are patent.  CT PERFUSION TECHNIQUE:  CT perfusion was performed during the administration of intravenous   contrast.  There was a total of 8 cm brain coverage.  The images were processed utilizing RAPID software.  Contrast administered 50 cc Omnipaque 350, contrast discarded 0 cc  < from: MR Head No Cont (22 @ 19:50) >  Ischemic tissue is defined as TMAX > 6 seconds.  Core infarct is defined as CBF < 30%.  CT PERFUSION FINDINGS:  There is a region of hypoperfusion involving the bilateral temporal   occipital regions and right parietal lobe.  The volume of ischemic tissue (core infarct and penumbra) measures 32 mL.  The core infarct measures 0 mL.  The mismatch volume (penumbra) measure 32mL.  The mismatch ratio (ischemic tissue / core) is: Infinite  The hypoperfusion index (Tmax>10s/Tmax>6s) is: 0.0    IMPRESSION:  *  VERY SMALL POSTERIOR LEFT FRONTAL INFARCT WHICH IS OCCURRED IN THE   INTERIM FROM 2021  *  NO HEMODYNAMIC SIGNIFICANT NARROWING WITHIN THE NECK.  *  NO PROXIMAL LARGE VESSEL OCCLUSION INTRACRANIALLY.  *  PERFUSION IMAGING DEMONSTRATES REGIONS OF HYPOPERFUSION/ISCHEMIA   INVOLVING THE BILATERAL TEMPORAL OCCIPITAL REGIONS AND RIGHT PARIETAL   LOBE WITH A VOLUME OF 32 ML. NO CORE INFARCT DEMONSTRATED.    *  FINDINGS DISCUSSED WITH DR. VALDEZ AT 3:41 PM ON 2022      ACC: [id number] EXAM:  MR BRAIN                        PROCEDURE DATE:  2022    INTERPRETATION:  MR BRAIN  Clinical information: Stroke, follow up  A noncontrast MRI of the brain was performed.    TECHNIQUE:  In the sagittal plane aT1 weighted sequence was performed.  In the axial   plane T1, T2, FLAIR, GRE, and diffusion weighted imaging was obtained.    COMPARISON:  Exam is compared to head CT of earlier in the day.  FINDINGS:  BRAIN PARENCHYMA:  There are a few small acuteleft frontal parietal infarcts. No associated   blood products. No significant associated mass effect. Patient has   moderate underlying brain atrophy. Mild chronic periventricular white   matter ischemic changes are seen. Chronic very small right cerebellar   infarct is noted. Corpus callosum well formed. No cerebellar tonsillar   ectopia.  VENTRICLES:  There is no hydrocephalus. There is no midline shift.  EXTRA-AXIAL:  No extra-axial mass.  No evidence of a subdural or epidural collection.   Patent basal cisterns.  OTHER:  Mucosal thickening is seen within the maxillary sinuses with associated   polyps and/or retention cysts    IMPRESSION:  FEW SMALL ACUTE LEFT FRONTAL PARIETAL INFARCTS. NO ASSOCIATED BLOOD   PRODUCTS. NO SIGNIFICANT MASS EFFECT      ACC: [id number] EXAM:  CT BRAIN                        PROCEDURE DATE:  2022    INTERPRETATION:  Clinical Indication:  New onset right arm incoordination  5mm axial sections of the brain were obtained from base to vertex,   without the intravenous administration of contrast material. Coronal and   sagittal computer generated reconstructed views are available.  Comparison is made with the prior CT of 2022.  Since the prior examination there is new subarachnoid hemorrhage in a   left precentral sulcus. Age-appropriate involutional and ischemic gliotic   changes are otherwise unchanged. There has been previous bilateral lens   replacement surgery.  IMPRESSION: Age-appropriate involutional and ischemic gliotic changes.   New hemorrhage left frontal precentral gyrus.  Dr. Carbajal discussed these findings with Dr. Rasheed on 2022 7:34 PM   with read back.      ACC: [id number] EXAM:  CT BRAIN                        PROCEDURE DATE:  02/15/2022    INTERPRETATION:  Noncontrast CT of the brain.  CLINICAL INDICATION:  Subarachnoid hemorrhage, follow-up  TECHNIQUE : Axial CT scanning of the brain was obtained from the skull   base to the vertex without the administration of intravenous contrast.   Sagittal and coronal reformats were provided.  COMPARISON: CT brain 2022  FINDINGS:  Similar acute subarachnoid hemorrhage in the left central sulcus.  Similar partially calcified 6 mm hyperdense extra-axial nodule along the   right aspect of the anterior falx, likely representing a small meningioma.  No hydrocephalus, midline shift, or effacement of basal cisterns.  No parenchymal hemorrhage or brain edema.    IMPRESSION:  No significant interval change.

## 2022-02-15 NOTE — PROGRESS NOTE ADULT - ASSESSMENT
86 year old male w hx AAA s/p EVAR 2021, AFIB on ASA (coumadin d/cd due to heatmuria in 12/2021) [place], HTN, PD, patent PFO came to ED for evaluation for difficulty speaking (dysarthria) , pt claims symptoms happened 3 days prior to admission but with some improvement.   MD jose told pt to report to ED as pt also started c/o diff swallowing.  Pt has denies HA/Dizzines/vision changes/ focal weakness / dysesthesias.      #Acute stroke with acute spontaneous subarachnoid hemorrhage in the setting of anticoagulation for A-fib    -CT head stable, no surgical intervention     With regards to a anticoagulation, pt is at risk for repeat stroke due to A-fib, but is also at risk for cerebral hemorrhage.   Must weigh the risk vs benefits of anticoagulation with the patient  Aspirin ans SQ heparin can be started in 48 hours after stable scan   If re-starting anticoagulation would recommend restarting coumadin vs DOAC as it is more easily reversible     Discussed with Dr. Quiroz, Dr. Rasheed and Dr. Parra

## 2022-02-15 NOTE — PROGRESS NOTE ADULT - ASSESSMENT
86 year old man presented with complaints of 5 days of dysarthria, difficulty swallowing but weakness.   In the ED, NIHSS was 3. He was not a tpa candidate since symptoms had been present for several days.  He reports that he had been started on aspirin about two weeks earlier.   He has a history of atrial fibrillation. Outpatient cardiology note from December 2021 states that he was off Coumadin due to hematuria and recent surgeries.    Stroke  -History of a-fib  -Was off AC due to history of hemorrhagic complications post-op. Eliquis started during this admission.  -On 2/14/22 developed abnormal movements in right upper extremity and dysmetria. Repeat head CT showed SAH and Eliquis stopped  -telemetry  -PT  -History of afib and now with subarachnoid hemorrhage. Would discuss with cardiology alternatives such as Watchman     Subarachnoid hemorrhage  -Repeat head CT done this AM. Awaiting report. If stable can downgrade back to telemetry with q4 neuro checks  -Hold AC and antiplatelets for now  -Keep SBP < 140  -Keep head of bed elevated    Abnormal movements of right arm  -? focal seizures in the setting of SAH  -Continue Keppra for now  -EEG today    Parkinson's  -Has some chronic dysarthria and possible dysphagia  -Eats soft foods at home  -Continue ropinirole 1 mg in the AM, 0.5 mg in the afternoon and 0.5 mg in the PM    Will follow

## 2022-02-15 NOTE — PROGRESS NOTE ADULT - SUBJECTIVE AND OBJECTIVE BOX
HPI:  86 year old male w hx AAA s/p EVAR 2021, AFIB on ASA (coumadin d/cd due to heatmuria in 12/2021) [place], HTN, PD, patent PFO came to ED for evaluation for difficulty speaking (dysarthria) , pt claims symptoms happened 3 days prior to admission but with some improvement.   MD outpt told pt to report to ED as pt also started c/o diff swallowing.  Pt has denies HA/Dizzines/vision changes/ focal weakness / dysesthesias.  COVID+ was given monoclonal AB 02-  ||  Also placed on z pack and steroids    2/15- Pt seen and examined on CICU, repeat CT stable, continue right hand weakness and dysmetria     Vital Signs Last 24 Hrs  T(C): 36.1 (15 Feb 2022 05:53), Max: 36.7 (15 Feb 2022 00:25)  T(F): 97 (15 Feb 2022 05:53), Max: 98 (15 Feb 2022 00:25)  HR: 79 (15 Feb 2022 12:00) (69 - 105)  BP: 107/83 (15 Feb 2022 12:00) (100/79 - 151/92)  BP(mean): 89 (15 Feb 2022 12:00) (76 - 108)  RR: 20 (15 Feb 2022 12:00) (15 - 24)  SpO2: 96% (15 Feb 2022 09:05) (94% - 98%)    MEDICATIONS  (STANDING):  atorvastatin 80 milliGRAM(s) Oral at bedtime  digoxin Tablet 125 MICROGram(s) Oral daily  fluticasone propionate 50 MICROgram(s)/spray Nasal Spray 1 Spray(s) Both Nostrils two times a day  levETIRAcetam  Solution 500 milliGRAM(s) Oral two times a day  meclizine 25 milliGRAM(s) Oral daily  melatonin 3 milliGRAM(s) Oral at bedtime  metoprolol succinate ER 25 milliGRAM(s) Oral every 12 hours  rOPINIRole 1 milliGRAM(s) Oral <User Schedule>  rOPINIRole 0.25 milliGRAM(s) Oral <User Schedule>    MEDICATIONS  (PRN):  acetaminophen     Tablet .. 650 milliGRAM(s) Oral every 6 hours PRN Temp greater or equal to 38C (100.4F), Mild Pain (1 - 3)  aluminum hydroxide/magnesium hydroxide/simethicone Suspension 30 milliLiter(s) Oral every 4 hours PRN Dyspepsia  artificial  tears Solution 1 Drop(s) Both EYES four times a day PRN Dry Eyes  hydrocortisone 1% Topical Cream - Peds 1 Application(s) Topical three times a day PRN Itching  meclizine 25 milliGRAM(s) Oral three times a day PRN Dizziness    ROS: pertinent positives in HPI, all other ROS were reviewed and are negative     PHYSICAL EXAM:  Constitutional: Awake / alert, resting in bed, NAD   HEENT: PERRLA, EOMI  Neck: Supple  Respiratory: Breath sounds are clear bilaterally  Cardiovascular: S1 and S2, regular rhythm  Gastrointestinal: Soft, NT/ND  Extremities:  no edema  Musculoskeletal: no joint swelling/tenderness, no abnormal movements    Neurological Exam:  HF: AxO x 3, appropriately interactive, normal affect, + dysarthria mild. Naming /repetition intact   CN: PEARRL, EOMI, VFF, facial sensation normal, no NLFD, tongue midline  Motor: No pronator drift, weakness in right hand 4/5, otherwise 5/5 throughout   Sens: slight numbness to right hand   Reflexes: Symmetric and normal, downgoing toes b/l  Coord: RUE dysmetria   Gait/Balance: not tested     LABS:                         15.4   7.72  )-----------( 211      ( 15 Feb 2022 05:44 )             47.0     02-15    141  |  109<H>  |  18  ----------------------------<  84  4.1   |  29  |  1.09    Ca    8.8      15 Feb 2022 05:44    TPro  6.7  /  Alb  2.7<L>  /  TBili  1.7<H>  /  DBili  x   /  AST  19  /  ALT  33  /  AlkPhos  63  02-15    LIVER FUNCTIONS - ( 15 Feb 2022 05:44 )  Alb: 2.7 g/dL / Pro: 6.7 gm/dL / ALK PHOS: 63 U/L / ALT: 33 U/L / AST: 19 U/L / GGT: x           02-12 Chol 144 LDL -- HDL 50 Trig 69    RADIOLOGY:  < from: CT Head No Cont (02.15.22 @ 09:05) >  FINDINGS:  Similar acute subarachnoid hemorrhage in the left central sulcus.    Similar partially calcified 6 mm hyperdense extra-axial nodule along the   right aspect of the anterior falx, likely representing a small meningioma.    No hydrocephalus, midline shift, or effacement of basal cisterns.    No parenchymal hemorrhage or brain edema.    IMPRESSION:  No significant interval change.

## 2022-02-15 NOTE — CONSULT NOTE ADULT - PROBLEM SELECTOR RECOMMENDATION 9
hemorrhagic conversion of ischemic embolic stroke  with residual dysarthria  , parkinson's disease ,   anticoagulation was held , supportive care , will need evaluation for possible watchmans  once his neurologic status improves , there is high risk of recurrent stroke ,

## 2022-02-15 NOTE — CONSULT NOTE ADULT - ASSESSMENT
Pt is a 86y old Male with hx of  AAA s/p EVAR 2021, AFIB on ASA, [place], HTN, PD, patent PFO came to ED for evaluation for difficulty speaking and swallowing; he called his doctor, who advised him to go to the ED. Since then, the patient on admission was noted to have minor acute * L frontal-parietal infarcts (not a TPA candidate); the risk factor was identified atrial fibrillation (not on AC due to hemorrhagic shock last year, only year on ASA for three weeks). At that time, we discussed care with Dr. Ochoa - no contraindication for AC from a surgical perspective. Care was discussed with Dr. Rasheed. Patient on 2/15 developed upper extremity shakes -  workup demonstrated New hemorrhage left frontal precentral gyrus. Palliative medicine consulted to help establish GOC and advance care planning.      Pt is a 86y old Male with hx of  AAA s/p EVAR 2021, AFIB on ASA, [place], HTN, PD, patent PFO came to ED for evaluation for difficulty speaking and swallowing; he called his doctor, who advised him to go to the ED. Since then, the patient on admission was noted to have minor acute * L frontal-parietal infarcts (not a TPA candidate); the risk factor was identified atrial fibrillation (not on AC due to hemorrhagic shock last year, only year on ASA for three weeks). At that time, we discussed care with Dr. Ochoa - no contraindication for AC from a surgical perspective. Care was discussed with Dr. Rasheed. Patient on 2/15 developed upper extremity shakes -  workup demonstrated New hemorrhage left frontal precentral gyrus. Palliative medicine consulted to help establish GOC and advance care planning.     1) Acute CVA L frontoparietal on admission complicated with New hemorrhage left frontal precentral gyrus  - was not a tpa candidate   - neuro surgery consult appreciated - No neurosurgery intervention  - Holding AC for now   - neurology consult appreciated   - EEG ordered   - neuro checks as per neurology - q4hrs right now     2) debility   - PT consult when able to participate   - supportive care   - fall risk     3) Afib   - holding AC for now   - cardiology consult   - monitor     4) s/p covid   - treated with monoclonal antibodies   - no signs of infection now     5) advance care planning   - patient appears to have capacity to make decisions   - will reach out to patients wife to schedule GOC meeting   - full code

## 2022-02-15 NOTE — CONSULT NOTE ADULT - CONSULT REASON
atrial fibrillation acute CVA converting to hemorrhagic
Code stroke
GOC
Eval for left spontaneous post CVA / SAH along sulcal gyri
Afib with RVR

## 2022-02-15 NOTE — CONSULT NOTE ADULT - SUBJECTIVE AND OBJECTIVE BOX
CHIEF COMPLAINT:  speech difficultt     HPI:  86 year old male w hx parkinson's disease  AAA s/p EVAR 2021, AFIB on ASA, [place], HTN, PD, patent PFO came to ED for evaluation for difficulty speaking Started 3 days before day of admission  notice  when he woke up he had difficulty getting the words out  patient was diagnosed to have acute ischemic stroke , patient was started on anticoagulation , patient did develop right upper extremity shaking yesterday , repeat CT shown , hemorrhagic conversion . patient anticoagulation was held , patient was persistently in atrial fibrillation , was started on BB , with improvement in heart rate  . Patient currently feeling ok , does have mild speech difficulty ,   able to move all extremities       COVID+ was given monoclonal AB 02-  also placed on z pack and steroids    Patient was not on anticoagulation  due to hematuria , did have surgery ,  patient was evaluated  last years however patient did not return for follow up , patient was not anticoagulation         PAST MEDICAL & SURGICAL HISTORY:  Atrial fibrillation    HTN (hypertension)    H/O candidiasis of mouth    AAA (abdominal aortic aneurysm)    H/O scarlet fever    [place] (hard of hearing)    Parkinson disease    Renal calculi    Seasonal allergies    Anxiety    Eczema    Mucocele, appendix    History of shingles    Patent foramen ovale  dilated left artrium severe echo 1/2021    Bruise  to bilateral upper extremity    Vertigo    H/O colonoscopy    S/P AAA repair  1/2021    History of appendectomy    H/O right hemicolectomy        Allergies    penicillin (Rash)    Intolerances        SOCIAL HISTORY: former smoker     FAMILY HISTORY:  No pertinent family history in first degree relatives        MEDICATIONS:Home Medications:  Aspirin Enteric Coated 81 mg oral delayed release tablet: 1 tab(s) orally once a day (11 Feb 2022 20:17)  Azithromycin 5 Day Dose Pack 250 mg oral tablet: Take 2 tablets by mouth on day 1, then 1 tablet daily on days 2-5 for URI  ***course complete*** (11 Feb 2022 20:17)  dexamethasone 6 mg oral tablet: 1 tab(s) orally once a day for 10 days for breathing  ***course not complete*** (11 Feb 2022 20:17)  Dry Eye Relief ophthalmic solution: 1 drop(s) to each affected eye 4 times a day, As Needed (11 Feb 2022 20:17)  fluticasone 50 mcg/inh nasal spray: 1 spray(s) nasal once a day (11 Feb 2022 20:17)  hydrocortisone 1% topical cream: Apply topically to affected area 3 times a day, As Needed for eczema (11 Feb 2022 20:17)  meclizine 25 mg oral tablet: 1 tab(s) orally 3 times a day, As Needed (11 Feb 2022 20:17)  meclizine 25 mg oral tablet: 1 tab(s) orally once a day (11 Feb 2022 20:17)  Medrol Dosepak 4 mg oral tablet: Medrol Dosepak Instructions:  Take 6 tablets by mouth on day 1, then 5 tablets on day 2, then 4 tablets on day 3, then 3 tablets on day 4, then 2 tablets on day 5, then 1 tablet on day 6 for URI  ***course complete*** (11 Feb 2022 20:17)  Metoprolol Tartrate 25 mg oral tablet: 1 tab(s) orally once a day (in the morning) (11 Feb 2022 20:17)  Metoprolol Tartrate 25 mg oral tablet: 0.5 tab(s) orally once a day (at bedtime) (11 Feb 2022 20:17)  rOPINIRole 0.5 mg oral tablet: 2 tab(s) orally once a day (in the morning) (11 Feb 2022 20:17)  rOPINIRole 0.5 mg oral tablet: 1 tab(s) orally 2 times a day in the afternoon and evening (11 Feb 2022 20:17)    MEDICATIONS  (STANDING):  atorvastatin 80 milliGRAM(s) Oral at bedtime  digoxin     Tablet 125 MICROGram(s) Oral daily  fluticasone propionate 50 MICROgram(s)/spray Nasal Spray 1 Spray(s) Both Nostrils two times a day  levETIRAcetam  Solution 500 milliGRAM(s) Oral two times a day  meclizine 25 milliGRAM(s) Oral daily  melatonin 3 milliGRAM(s) Oral at bedtime  metoprolol succinate ER 25 milliGRAM(s) Oral every 12 hours  rOPINIRole 1 milliGRAM(s) Oral <User Schedule>  rOPINIRole 0.25 milliGRAM(s) Oral <User Schedule>    MEDICATIONS  (PRN):  acetaminophen     Tablet .. 650 milliGRAM(s) Oral every 6 hours PRN Temp greater or equal to 38C (100.4F), Mild Pain (1 - 3)  aluminum hydroxide/magnesium hydroxide/simethicone Suspension 30 milliLiter(s) Oral every 4 hours PRN Dyspepsia  artificial  tears Solution 1 Drop(s) Both EYES four times a day PRN Dry Eyes  hydrocortisone 1% Topical Cream - Peds 1 Application(s) Topical three times a day PRN Itching  meclizine 25 milliGRAM(s) Oral three times a day PRN Dizziness      REVIEW OF SYSTEMS:    CONSTITUTIONAL: No weakness, fevers or chills  EYES/ENT: No visual changes;  No vertigo or throat pain   NECK: No pain or stiffness  RESPIRATORY: No cough, wheezing, hemoptysis; No shortness of breath  CARDIOVASCULAR: No chest pain or palpitations  GASTROINTESTINAL: No abdominal or epigastric pain. No nausea, vomiting, or hematemesis; No diarrhea or constipation. No melena or hematochezia.  GENITOURINARY: No dysuria, frequency or hematuria  NEUROLOGICAL: dysarthria , mild unsteady gait ,   SKIN: No itching, burning, rashes, or lesions   All other review of systems is negative unless indicated above    Vital Signs Last 24 Hrs  T(C): 36.1 (15 Feb 2022 05:53), Max: 36.7 (15 Feb 2022 00:25)  T(F): 97 (15 Feb 2022 05:53), Max: 98 (15 Feb 2022 00:25)  HR: 90 (15 Feb 2022 08:00) (69 - 105)  BP: 130/97 (15 Feb 2022 08:00) (100/79 - 151/92)  BP(mean): 103 (15 Feb 2022 08:00) (80 - 108)  RR: 20 (15 Feb 2022 08:00) (15 - 24)  SpO2: 95% (15 Feb 2022 06:00) (94% - 98%)    I&O's Summary    14 Feb 2022 07:01  -  15 Feb 2022 07:00  --------------------------------------------------------  IN: 120 mL / OUT: 500 mL / NET: -380 mL    15 Feb 2022 07:01  -  15 Feb 2022 08:20  --------------------------------------------------------  IN: 0 mL / OUT: 200 mL / NET: -200 mL        PHYSICAL EXAM:    Constitutional: NAD, awake and alert, well-developed  HEENT: PERR, EOMI,  No oral cyananosis.  Neck:  supple,  No JVD  Respiratory: Breath sounds are clear bilaterally, No wheezing, rales or rhonchi  Cardiovascular: S1 and S2, IR IR    Gastrointestinal: Bowel Sounds present, soft, nontender.   Extremities: No peripheral edema. No clubbing or cyanosis.  Vascular: 2+ peripheral pulses  Neurological: A/O x 3, Musculoskeletal: no calf tenderness.  Skin: No rashes.      LABS: All Labs Reviewed:                        15.4   7.72  )-----------( 211      ( 15 Feb 2022 05:44 )             47.0                         15.9   7.52  )-----------( 202      ( 14 Feb 2022 08:22 )             47.7                         15.0   7.46  )-----------( 224      ( 13 Feb 2022 06:54 )             45.8     15 Feb 2022 05:44    141    |  109    |  18     ----------------------------<  84     4.1     |  29     |  1.09   13 Feb 2022 06:54    142    |  109    |  23     ----------------------------<  93     4.1     |  27     |  1.08     Ca    8.8        15 Feb 2022 05:44  Ca    8.8        13 Feb 2022 06:54    TPro  6.7    /  Alb  2.7    /  TBili  1.7    /  DBili  x      /  AST  19     /  ALT  33     /  AlkPhos  63     15 Feb 2022 05:44  TPro  6.3    /  Alb  2.5    /  TBili  1.2    /  DBili  x      /  AST  14     /  ALT  27     /  AlkPhos  54     13 Feb 2022 06:54    PTT - ( 14 Feb 2022 08:22 )  PTT:37.4 sec        Blood Culture:         RADIOLOGY/EKG:< from: 12 Lead ECG (02.11.22 @ 16:22) >   Atrial fibrillation with premature ventricular or aberrantly conducted complexes  Left axis deviation  Pulmonary disease pattern  Incomplete right bundle branch block  Abnormal ECG  When compared with ECG of 27-MAR-2021 15:17,  No significant change was found  Confirmed by ARRON BAINS, SWEETIE SHEFFIELDU ([id number]) on 2/14/2022 9:44:43 AM    < end of copied text >  < from: TTE Echo Complete w/o Contrast w/ Doppler (02.12.22 @ 11:55) >     Estimated left ventricular ejection fraction is 50-55 %.   The left ventricle is normal in size and wall thickness.   Wall motion abnormalities are noted with preserved LV systolic function.   The left atrium is severely dilated.   The right atrium appears moderately dilated.   A PFO is noted with color doppler.   Fibrocalcific changes noted to the Aortic valve leaflets with preserved   leafletexcursion.   Mild (1+) aortic regurgitation is present.   Fibrocalcific changes noted to the mitral valve leaflets with preserved   leaflet excursion.   Mild mitral annular calcification is present.   Mild (1+) mitral regurgitation is present.   Normal appearing tricuspid valve structure.   Moderate (2+) tricuspid valve regurgitation is present.     Signature      < end of copied text >  < from: CT Head No Cont (02.14.22 @ 19:22) >    IMPRESSION: Age-appropriate involutional and ischemic gliotic changes.   New hemorrhage left frontal precentral gyrus.    Dr. Carbajal discussed these findings with Dr. Rasheed on 2/14/2022 7:34 PM   with read back.    --- End of Report ---    < end of copied text >  < from: CT Angio Head w/ IV Cont (02.11.22 @ 15:40) >  *  VERY SMALL POSTERIOR LEFT FRONTAL INFARCT WHICH IS OCCURRED IN THE   INTERIM FROM 12/22/2021  *  NO HEMODYNAMIC SIGNIFICANT NARROWING WITHIN THE NECK.  *  NO PROXIMAL LARGE VESSEL OCCLUSION INTRACRANIALLY.  *  PERFUSION IMAGING DEMONSTRATES REGIONS OF HYPOPERFUSION/ISCHEMIA   INVOLVING THE BILATERAL TEMPORAL OCCIPITAL REGIONS AND RIGHT PARIETAL   LOBE WITH A VOLUME OF 32 ML. NO CORE INFARCT DEMONSTRATED.    *  FINDINGS DISCUSSED WITH DR. VALDEZ AT 3:41 PM ON 2/11/2022    < end of copied text >

## 2022-02-15 NOTE — PROGRESS NOTE ADULT - SUBJECTIVE AND OBJECTIVE BOX
HPI:  86 year old male w hx AAA s/p EVAR , AFIB on ASA, [place], HTN, PD, patent PFO came to ED for evaluation for difficulty speaking  Started 3 days ago when he woke up he had difficulty getting the words out  Took tylenol and felt better  Same yesterday  Today he called his doctor who advised him to go to the ED  + some difficulty swallowing;  usually has to take small portions  denied headache, dizziness, focal weakness or paresthesia  he walks unassisted  COVID+ was given monoclonal AB 2022  also placed on z pack and steroids    EP consulted for management of afib.  Telemetry shows afib rates 90-110bpm, with episodes of RVR during ambulation to 140-160bpm.  Pt is asymptomatic during rapid afib, denies any chest pain, palpiations sob or dizziness.   2/15/22:  pt seen resting in bed in NAD, alert and oriented, speech with mild dysarthria  TELE: atrial fib rate 50-80's, no significant pauses >2 seconds.    MEDICATIONS  (STANDING):  atorvastatin 80 milliGRAM(s) Oral at bedtime  digoxin     Tablet 125 MICROGram(s) Oral daily  fluticasone propionate 50 MICROgram(s)/spray Nasal Spray 1 Spray(s) Both Nostrils two times a day  levETIRAcetam  Solution 500 milliGRAM(s) Oral two times a day  meclizine 25 milliGRAM(s) Oral daily  melatonin 3 milliGRAM(s) Oral at bedtime  metoprolol succinate ER 25 milliGRAM(s) Oral every 12 hours  rOPINIRole 1 milliGRAM(s) Oral <User Schedule>  rOPINIRole 0.25 milliGRAM(s) Oral <User Schedule>    MEDICATIONS  (PRN):  acetaminophen     Tablet .. 650 milliGRAM(s) Oral every 6 hours PRN Temp greater or equal to 38C (100.4F), Mild Pain (1 - 3)  aluminum hydroxide/magnesium hydroxide/simethicone Suspension 30 milliLiter(s) Oral every 4 hours PRN Dyspepsia  artificial  tears Solution 1 Drop(s) Both EYES four times a day PRN Dry Eyes  hydrocortisone 1% Topical Cream - Peds 1 Application(s) Topical three times a day PRN Itching  meclizine 25 milliGRAM(s) Oral three times a day PRN Dizziness      Allergies    penicillin (Rash)    Intolerances        Vital Signs Last 24 Hrs  T(C): 36.3 (15 Feb 2022 13:10), Max: 36.7 (15 Feb 2022 00:25)  T(F): 97.3 (15 Feb 2022 13:10), Max: 98 (15 Feb 2022 00:25)  HR: 64 (15 Feb 2022 14:00) (64 - 97)  BP: 109/74 (15 Feb 2022 14:00) (100/79 - 151/92)  BP(mean): 83 (15 Feb 2022 14:00) (76 - 108)  RR: 16 (15 Feb 2022 14:00) (15 - 25)  SpO2: 96% (15 Feb 2022 13:00) (94% - 97%)    PHYSICAL EXAMINATION:    GENERAL APPEARANCE:  Pt. is not currently dyspneic, in no distress. Pt. is alert, oriented, and pleasant.  HEENT:  Pupils are normal and react normally. No icterus. Mucous membranes well colored.  NECK:  Supple. No lymphadenopathy. Jugular venous pressure not elevated. Carotids equal.   HEART:   Irregular S1,S2. There are no murmurs, rubs or gallops noted  CHEST:  Chest is clear to auscultation. Normal respiratory effort.  ABDOMEN:  Soft and nontender.   EXTREMITIES:  There is no edema. Right arm weakness is noted.  SKIN:  No rash or significant lesions are noted.    LABS:                        15.4   7.72  )-----------( 211      ( 15 Feb 2022 05:44 )             47.0     02-15    141  |  109<H>  |  18  ----------------------------<  84  4.1   |  29  |  1.09    Ca    8.8      15 Feb 2022 05:44    TPro  6.7  /  Alb  2.7<L>  /  TBili  1.7<H>  /  DBili  x   /  AST  19  /  ALT  33  /  AlkPhos  63  02-15    PTT - ( 2022 08:22 )  PTT:37.4 sec  Urinalysis Basic - ( 2022 17:05 )    Color: Brown / Appearance: very cloudy / S.015 / pH: x  Gluc: x / Ketone: Negative  / Bili: Negative / Urobili: 1   Blood: x / Protein: See Note / Nitrite: Negative   Leuk Esterase: Trace / RBC: 25-50 /HPF / WBC 0-2   Sq Epi: x / Non Sq Epi: Occasional / Bacteria: Few            RADIOLOGY & ADDITIONAL TESTS:    CARDIAC TESTS:    ECHO 22     Estimated left ventricular ejection fraction is 50-55 %.   The left ventricle is normal in size and wall thickness.   Wall motion abnormalities are noted with preserved LV systolic function.   The left atrium is severely dilated.   The right atrium appears moderately dilated.   A PFO is noted with color doppler.   Fibrocalcific changes noted to the Aortic valve leaflets with preserved   leafletexcursion.   Mild (1+) aortic regurgitation is present.   Fibrocalcific changes noted to the mitral valve leaflets with preserved   leaflet excursion.   Mild mitral annular calcification is present.   Mild (1+) mitral regurgitation is present.   Normal appearing tricuspid valve structure.   Moderate (2+) tricuspid valve regurgitation is present.

## 2022-02-15 NOTE — CONSULT NOTE ADULT - PROBLEM SELECTOR RECOMMENDATION 4
borderline EF with segmental wall motion abnormalities , likely underlying CAD  as patient has hx of PAD   would continue BB for now , will need ischemic evaluation once his neurologic status improves  continue BB statin

## 2022-02-15 NOTE — PROGRESS NOTE ADULT - SUBJECTIVE AND OBJECTIVE BOX
Reason for Admission: acute CVA  History of Present Illness:     Pt is a 85 y/o/m w/ hx AAA s/p EVAR 2021, AFIB on ASA, [place], HTN, PD, patent PFO came to ED for evaluation for difficulty speaking    Today he called his doctor who advised him to go to the ED  + some difficulty swallowing;  usually has to take small portions     Hospital course: Patient on admission was noted to have small acute * L frontal parietal infarcts (not a TPA candidate) - risk factor was identified atrial fibrillation (not on AC due to hemorrhagic shock last year, only on ASA for 3 weeks). At that time we discussed care with Dr. Ochoa - no contraindication for AC from surgical perspective. Care discussed with Dr. Rasheed.  Upon starting AC -  patient was doing well (PCP - Dr. Arredondo was updated). Patient on 2/15 developed upper extremity shakes -  workup demonstrated New hemorrhage left frontal precentral gyrus. Family updated.     Due to recent strokes /  poor candidate for AC -  team would want patient to be re-evaluated by EP -  for watchmen's procedure.       REVIEW OF SYSTEMS:  General: NAD, hemodynamically stable   HEENT:  Eyes:  No visual loss   SKIN:  No rash or itching.  CARDIOVASCULAR:  No chest pain   RESPIRATORY:  No shortness of breath, cough or sputum.  GASTROINTESTINAL:  No anorexia, nausea, vomiting or diarrhea. No abdominal pain or blood.  NEUROLOGICAL:  No headache, dizziness, syncope, paralysis, ataxia, numbness or tingling in the extremities. No change in bowel or bladder control.  MUSCULOSKELETAL:  patient notes of (R) sided weakness, which he states this morning has improved  HEMATOLOGIC:  No anemia, bleeding or bruising.  LYMPHATICS:  No enlarged nodes. No history of splenectomy.  ENDOCRINOLOGIC:  No reports of sweating, cold or heat intolerance. No polyuria or polydipsia.  ALLERGIES:  No history of asthma, hives, eczema or rhinitis.    Physical Exam:   GENERAL APPEARANCE:  deconditioned, frail, elderly patient  T(C): 36.5 (02-12-22 @ 09:05), Max: 37.1 (02-11-22 @ 21:27)  HR: 64 (02-12-22 @ 09:05) (62 - 80)  BP: 136/94 (02-12-22 @ 09:05) (130/84 - 157/89)  RR: 18 (02-12-22 @ 09:05) (16 - 18)  SpO2: 95% (02-12-22 @ 09:05) (95% - 99%)  HEENT:  Head is normocephalic    Skin:  Warm and dry without any rash   NECK:  Supple without lymphadenopathy.   HEART:  Regular rate and rhythm. normal S1 and S2, No M/R/G  LUNGS:  Good ins/exp effort, no W/R/R/C  ABDOMEN:  Soft, nontender, nondistended with good bowel sounds heard  EXTREMITIES:  right upper extremity (mild) weakness  NEUROLOGICAL:  Gross nonfocal       CBC Full  -  ( 12 Feb 2022 07:50 )  WBC Count : 8.35 K/uL  RBC Count : 4.92 M/uL  Hemoglobin : 14.8 g/dL  Hematocrit : 44.8 %  Platelet Count - Automated : 244 K/uL    PT/INR - ( 11 Feb 2022 15:32 )   PT: 12.4 sec;   INR: 1.07 ratio         PTT - ( 12 Feb 2022 07:50 )  PTT:>200.0 sec    02-12    139  |  105  |  22  ----------------------------<  217<H>  4.1   |  27  |  1.18    Ca    8.8      12 Feb 2022 07:50    TPro  7.5  /  Alb  3.2<L>  /  TBili  0.7  /  DBili  x   /  AST  23  /  ALT  43  /  AlkPhos  64  02-11      86 year old male w hx AAA s/p EVAR 2021, AFIB on ASA, [place], HTN, PD, patent PFO came to ED for evaluation for difficulty speaking      # Acute CVA L frontoparietal on admission, complicated with New hemorrhage left frontal precentral gyrus  - was not a tpa candidate since he was outside window w symptoms over past 3 days  - No neurosurgery intervention  - Holding AC/ Antiplatelet for now - no reversal required.  - CTH in 12 hours    # AFIB -  contraindicated as patient had new hemorrhagic stroke  - Eliquis discontinued  - started on toprol xl 25 mg q12h  - care discussed with Dr. ochoa   - tony disucssed with Dr. Paz -  agrees with anticoagulation    # Patent PFO hx  - tele monitoring  - neuro checks q 4 hrs  - neuro consult read and appreciated  - speech and swallow  - OT/PT    # COVID+  - clear CXR  - s/p monoclonal AB  - scattered wheezes ANNEMARIE  - finished 9 days of steroids    # PD  -  continue home meds    # NO VTE prophylaxis as patient had a hemorragic stroke       Reason for Admission: acute CVA  History of Present Illness:     Pt is a 87 y/o/m w/ hx AAA s/p EVAR 2021, AFIB on ASA, [place], HTN, PD, patent PFO came to ED for evaluation for difficulty speaking    Today he called his doctor who advised him to go to the ED (+) some difficulty swallowing;  usually has to take small portions     Hospital course: Patient on admission was noted to have small acute * L frontal parietal infarcts (not a TPA candidate) - risk factor was identified atrial fibrillation (not on AC due to hemorrhagic shock last year, only on ASA for 3 weeks). At that time we discussed care with Dr. Ochoa - no contraindication for AC from surgical perspective. Care discussed with Dr. Rasheed.  Upon starting AC -  patient was doing well (PCP - Dr. Arredondo was updated). Patient on 2/15 developed upper extremity shakes -  workup demonstrated New hemorrhage left frontal precentral gyrus. Family updated.     Due to recent strokes /  poor candidate for AC -  team would want patient to be re-evaluated by EP -  for watchmen's procedure.       REVIEW OF SYSTEMS:  General: NAD, hemodynamically stable   HEENT:  Eyes:  No visual loss   SKIN:  No rash or itching.  CARDIOVASCULAR:  No chest pain   RESPIRATORY:  No shortness of breath, cough or sputum.  GASTROINTESTINAL:  No anorexia, nausea, vomiting or diarrhea. No abdominal pain or blood.  NEUROLOGICAL:  No headache, dizziness, syncope, paralysis, ataxia, numbness or tingling in the extremities. No change in bowel or bladder control.  MUSCULOSKELETAL:  patient notes of (R) sided weakness, which he states this morning has improved  HEMATOLOGIC:  No anemia, bleeding or bruising.  LYMPHATICS:  No enlarged nodes. No history of splenectomy.  ENDOCRINOLOGIC:  No reports of sweating, cold or heat intolerance. No polyuria or polydipsia.  ALLERGIES:  No history of asthma, hives, eczema or rhinitis.    Physical Exam:   GENERAL APPEARANCE:  deconditioned, frail, elderly patient  T(C): 36.5 (02-12-22 @ 09:05), Max: 37.1 (02-11-22 @ 21:27)  HR: 64 (02-12-22 @ 09:05) (62 - 80)  BP: 136/94 (02-12-22 @ 09:05) (130/84 - 157/89)  RR: 18 (02-12-22 @ 09:05) (16 - 18)  SpO2: 95% (02-12-22 @ 09:05) (95% - 99%)  HEENT:  Head is normocephalic    Skin:  Warm and dry without any rash   NECK:  Supple without lymphadenopathy.   HEART:  Regular rate and rhythm. normal S1 and S2, No M/R/G  LUNGS:  Good ins/exp effort, no W/R/R/C  ABDOMEN:  Soft, nontender, nondistended with good bowel sounds heard  EXTREMITIES:  right upper extremity (mild) weakness  NEUROLOGICAL:  Gross nonfocal       CBC Full  -  ( 12 Feb 2022 07:50 )  WBC Count : 8.35 K/uL  RBC Count : 4.92 M/uL  Hemoglobin : 14.8 g/dL  Hematocrit : 44.8 %  Platelet Count - Automated : 244 K/uL    PT/INR - ( 11 Feb 2022 15:32 )   PT: 12.4 sec;   INR: 1.07 ratio         PTT - ( 12 Feb 2022 07:50 )  PTT:>200.0 sec    02-12    139  |  105  |  22  ----------------------------<  217<H>  4.1   |  27  |  1.18    Ca    8.8      12 Feb 2022 07:50    TPro  7.5  /  Alb  3.2<L>  /  TBili  0.7  /  DBili  x   /  AST  23  /  ALT  43  /  AlkPhos  64  02-11      86 year old male w hx AAA s/p EVAR 2021, AFIB on ASA, [place], HTN, PD, patent PFO came to ED for evaluation for difficulty speaking      # Acute CVA L frontoparietal on admission, complicated with New hemorrhage left frontal precentral gyrus  - was not a tpa candidate since he was outside window w symptoms over past 3 days  - No neurosurgery intervention  - Holding AC/ Antiplatelet for now - no reversal required.  - CTH in 12 hours    # AFIB -  contraindicated as patient had new hemorrhagic stroke  - Eliquis discontinued  - started on toprol xl 25 mg q12h  - care discussed with Dr. ochoa   - tony disucssed with Dr. Paz -  agrees with anticoagulation    # Patent PFO hx  - tele monitoring  - neuro checks q 4 hrs  - neuro consult read and appreciated  - speech and swallow  - OT/PT    # COVID+  - clear CXR  - s/p monoclonal AB  - scattered wheezes ANNEMARIE  - finished 9 days of steroids    # PD  -  continue home meds    # NO VTE prophylaxis as patient had a hemorragic stroke

## 2022-02-15 NOTE — PROGRESS NOTE ADULT - ASSESSMENT
85 yo male PMH HTN, Parkinson's, chronic afib on aspirin at home, AAA s/p EVAR 2021,  patent PFO, admitted with CVA, outside of window for TPA.   on this admission was cleared by neuro to start oac for afib and started on eliquis. Telemetry shows afib rates 90-110bpm, with episodes of RVR during ambulation.  Pt is asymptomatic during rapid afib.  he is toprol 25 mg bid. echo shows normal LVEF and severely dilated LA consistent with long standing h/o afib. New hemorrhagic cva after initiating doac   he would benefit from ENRIQUE occlusive device watchman implant in the future if he clinically improves and cleared by Neuro service to resume doac for short time    A/P: Chronic atrial fibrillation, rates now controlled  Continue tele monitoring  Continue toprol 25mg bid (with hold parameter for SBP<100)   Continue digoxin 125mcg daily  DOAC is discontinued.  Keep lytes repleted,  K >4, Mg >2  Will follow as outpatient

## 2022-02-16 LAB
ANION GAP SERPL CALC-SCNC: 6 MMOL/L — SIGNIFICANT CHANGE UP (ref 5–17)
BUN SERPL-MCNC: 21 MG/DL — SIGNIFICANT CHANGE UP (ref 7–23)
CALCIUM SERPL-MCNC: 9.1 MG/DL — SIGNIFICANT CHANGE UP (ref 8.5–10.1)
CHLORIDE SERPL-SCNC: 109 MMOL/L — HIGH (ref 96–108)
CO2 SERPL-SCNC: 27 MMOL/L — SIGNIFICANT CHANGE UP (ref 22–31)
CREAT SERPL-MCNC: 1.17 MG/DL — SIGNIFICANT CHANGE UP (ref 0.5–1.3)
GLUCOSE SERPL-MCNC: 81 MG/DL — SIGNIFICANT CHANGE UP (ref 70–99)
HCT VFR BLD CALC: 50.8 % — HIGH (ref 39–50)
HGB BLD-MCNC: 16.2 G/DL — SIGNIFICANT CHANGE UP (ref 13–17)
MCHC RBC-ENTMCNC: 29.5 PG — SIGNIFICANT CHANGE UP (ref 27–34)
MCHC RBC-ENTMCNC: 31.9 GM/DL — LOW (ref 32–36)
MCV RBC AUTO: 92.5 FL — SIGNIFICANT CHANGE UP (ref 80–100)
PLATELET # BLD AUTO: 193 K/UL — SIGNIFICANT CHANGE UP (ref 150–400)
POTASSIUM SERPL-MCNC: 4.2 MMOL/L — SIGNIFICANT CHANGE UP (ref 3.5–5.3)
POTASSIUM SERPL-SCNC: 4.2 MMOL/L — SIGNIFICANT CHANGE UP (ref 3.5–5.3)
RBC # BLD: 5.49 M/UL — SIGNIFICANT CHANGE UP (ref 4.2–5.8)
RBC # FLD: 16.2 % — HIGH (ref 10.3–14.5)
SODIUM SERPL-SCNC: 142 MMOL/L — SIGNIFICANT CHANGE UP (ref 135–145)
WBC # BLD: 9.46 K/UL — SIGNIFICANT CHANGE UP (ref 3.8–10.5)
WBC # FLD AUTO: 9.46 K/UL — SIGNIFICANT CHANGE UP (ref 3.8–10.5)

## 2022-02-16 PROCEDURE — 70450 CT HEAD/BRAIN W/O DYE: CPT | Mod: 26

## 2022-02-16 PROCEDURE — 99233 SBSQ HOSP IP/OBS HIGH 50: CPT

## 2022-02-16 PROCEDURE — 99232 SBSQ HOSP IP/OBS MODERATE 35: CPT

## 2022-02-16 RX ORDER — SODIUM CHLORIDE 9 MG/ML
500 INJECTION INTRAMUSCULAR; INTRAVENOUS; SUBCUTANEOUS ONCE
Refills: 0 | Status: DISCONTINUED | OUTPATIENT
Start: 2022-02-16 | End: 2022-02-18

## 2022-02-16 RX ADMIN — LEVETIRACETAM 500 MILLIGRAM(S): 250 TABLET, FILM COATED ORAL at 21:05

## 2022-02-16 RX ADMIN — Medication 3 MILLIGRAM(S): at 21:04

## 2022-02-16 RX ADMIN — LEVETIRACETAM 500 MILLIGRAM(S): 250 TABLET, FILM COATED ORAL at 09:22

## 2022-02-16 RX ADMIN — Medication 25 MILLIGRAM(S): at 09:22

## 2022-02-16 RX ADMIN — Medication 1 SPRAY(S): at 09:21

## 2022-02-16 RX ADMIN — ATORVASTATIN CALCIUM 80 MILLIGRAM(S): 80 TABLET, FILM COATED ORAL at 21:04

## 2022-02-16 RX ADMIN — ROPINIROLE 1 MILLIGRAM(S): 8 TABLET, FILM COATED, EXTENDED RELEASE ORAL at 09:22

## 2022-02-16 RX ADMIN — Medication 1 SPRAY(S): at 17:33

## 2022-02-16 RX ADMIN — ROPINIROLE 0.25 MILLIGRAM(S): 8 TABLET, FILM COATED, EXTENDED RELEASE ORAL at 12:56

## 2022-02-16 RX ADMIN — Medication 125 MICROGRAM(S): at 09:22

## 2022-02-16 RX ADMIN — Medication 25 MILLIGRAM(S): at 21:05

## 2022-02-16 RX ADMIN — ROPINIROLE 0.25 MILLIGRAM(S): 8 TABLET, FILM COATED, EXTENDED RELEASE ORAL at 17:32

## 2022-02-16 NOTE — PROGRESS NOTE ADULT - ASSESSMENT
85 yo male PMH HTN, Parkinson's, chronic afib on aspirin at home, AAA s/p EVAR 2021,  patent PFO, admitted with CVA, outside of window for TPA.   on this admission was cleared by neuro to start oac for afib and started on eliquis. Telemetry shows afib rates 90-110bpm, with episodes of RVR during ambulation.  Pt is asymptomatic during rapid afib.  he is on toprol 25 mg bid. echo shows normal LVEF and severely dilated LA consistent with long standing h/o afib. New hemorrhagic cva after initiating doac   he would benefit from ENRIQUE occlusive device watchman implant in the future if he clinically improves and cleared by Neuro service to resume doac for short time    A/P: Chronic atrial fibrillation, rates now controlled  Continue tele monitoring  Continue toprol 25mg bid (with hold parameter for SBP<100)   SBP on soft side, also monitor for bradycardia  Continue digoxin 125mcg daily  DOAC is discontinued.  Keep lytes repleted,  K >4, Mg >2  Palliative care seeing for GOC discussion  Dispo? [facility]?

## 2022-02-16 NOTE — PROGRESS NOTE ADULT - SUBJECTIVE AND OBJECTIVE BOX
Reason for Admission: acute CVA  History of Present Illness:     Pt is a 85 y/o/m w/ hx AAA s/p EVAR 2021, AFIB on ASA, [place], HTN, PD, patent PFO came to ED for evaluation for difficulty speaking    Today he called his doctor who advised him to go to the ED (+) some difficulty swallowing;  usually has to take small portions     Hospital course: Patient on admission was noted to have small acute * L frontal parietal infarcts (not a TPA candidate) - risk factor was identified atrial fibrillation (not on AC due to hemorrhagic shock last year, only on ASA for 3 weeks). At that time we discussed care with Dr. Ochoa - no contraindication for AC from surgical perspective. Care discussed with Dr. Rasheed.  Upon starting AC -  patient was doing well (PCP - Dr. Arredondo was updated). Patient on 2/15 developed upper extremity shakes -  workup demonstrated New hemorrhage left frontal precentral gyrus. Family updated.     Due to recent strokes /  poor candidate for AC -  team would want patient to be re-evaluated by EP -  for watchmen's procedure.       Medical progress: Denies any HA, CP, SOB. This am patient with low BPs (which could be attributed to keppra /  intevention of 500cc of fluids given). in addition, patient's care discussd with neurosurgery /  neurology - patient was recommended to be started low dose aspirin in the am.   Complaints: tired  State of mind: normal conversation   PLan: ASA in the am    REVIEW OF SYSTEMS:  General: NAD, hemodynamically stable   HEENT:  Eyes:  No visual loss   SKIN:  No rash or itching.  CARDIOVASCULAR:  No chest pain   RESPIRATORY:  No shortness of breath, cough or sputum.  GASTROINTESTINAL:  No anorexia, nausea, vomiting or diarrhea. No abdominal pain or blood.  NEUROLOGICAL:  No headache, dizziness, syncope, paralysis, ataxia, numbness or tingling in the extremities. No change in bowel or bladder control.  MUSCULOSKELETAL:  patient notes of (R) sided weakness, which he states this morning has improved  HEMATOLOGIC:  No anemia, bleeding or bruising.  LYMPHATICS:  No enlarged nodes. No history of splenectomy.  ENDOCRINOLOGIC:  No reports of sweating, cold or heat intolerance. No polyuria or polydipsia.  ALLERGIES:  No history of asthma, hives, eczema or rhinitis.    Physical Exam:   GENERAL APPEARANCE:  deconditioned, frail, elderly patient  ICU Vital Signs Last 24 Hrs  T(C): 35.9 (16 Feb 2022 06:19), Max: 36.3 (15 Feb 2022 13:10)  T(F): 96.7 (16 Feb 2022 06:19), Max: 97.4 (15 Feb 2022 16:26)  HR: 71 (16 Feb 2022 10:25) (60 - 88)  BP: 104/64 (16 Feb 2022 10:25) (87/64 - 128/91)  BP(mean): 74 (16 Feb 2022 10:25) (67 - 100)  RR: 18 (16 Feb 2022 09:10) (12 - 25)  SpO2: 99% (16 Feb 2022 10:25) (94% - 99%)    HEENT:  Head is normocephalic    Skin:  Warm and dry without any rash   NECK:  Supple without lymphadenopathy.   HEART:  Regular rate and rhythm. normal S1 and S2, No M/R/G  LUNGS:  Good ins/exp effort, no W/R/R/C  ABDOMEN:  Soft, nontender, nondistended with good bowel sounds heard  EXTREMITIES:  right upper extremity (mild) weakness  NEUROLOGICAL:  Gross nonfocal       CBC Full  -  ( 12 Feb 2022 07:50 )  WBC Count : 8.35 K/uL  RBC Count : 4.92 M/uL  Hemoglobin : 14.8 g/dL  Hematocrit : 44.8 %  Platelet Count - Automated : 244 K/uL    PT/INR - ( 11 Feb 2022 15:32 )   PT: 12.4 sec;   INR: 1.07 ratio         PTT - ( 12 Feb 2022 07:50 )  PTT:>200.0 sec    02-12    139  |  105  |  22  ----------------------------<  217<H>  4.1   |  27  |  1.18    Ca    8.8      12 Feb 2022 07:50    TPro  7.5  /  Alb  3.2<L>  /  TBili  0.7  /  DBili  x   /  AST  23  /  ALT  43  /  AlkPhos  64  02-11      86 year old male w hx AAA s/p EVAR 2021, AFIB on ASA, [place], HTN, PD, patent PFO came to ED for evaluation for difficulty speaking      # Acute CVA L frontoparietal on admission, complicated with New hemorrhage left frontal precentral gyrus  - was not a tpa candidate since he was outside window w symptoms over past 3 days  - No neurosurgery intervention -  recommending starting ASA 2/17 am dose  - Holding AC/ Antiplatelet for now - no reversal required  - repeat CT scans no new bleedings  - Care discussed with neurosurgery   - Care discussed with Neurology  - Pallaitive care consulted   - care discussed with EP about watchmen's procedure -  patient is not a good candidate due to recent spontaneous hemorrhagic stroke, as watchmen's procedure warrants couple of months of AC    # AFIB -  contraindicated as patient had new hemorrhagic stroke  - Eliquis discontinued  - started on toprol xl 25 mg q12h  - care discussed with Dr. ochoa   - care disucssed with Dr. Paz -  agrees with anticoagulation    # Patent PFO hx  - tele monitoring  - neuro checks q 4 hrs  - neuro consult read and appreciated  - speech and swallow  - OT/PT    # COVID+  - clear CXR  - s/p monoclonal AB  - scattered wheezes ANNEMARIE  - finished 9 days of steroids    # PD  -  continue home meds    # NO VTE prophylaxis as patient had a hemorragic stroke       Reason for Admission: acute CVA  History of Present Illness:     Pt is a 87 y/o/m w/ hx AAA s/p EVAR 2021, AFIB on ASA, [place], HTN, PD, patent PFO came to ED for evaluation for difficulty speaking    Today he called his doctor who advised him to go to the ED (+) some difficulty swallowing;  usually has to take small portions     Hospital course: Patient on admission was noted to have small acute * L frontal parietal infarcts (not a TPA candidate) - risk factor was identified atrial fibrillation (not on AC due to hemorrhagic shock last year, only on ASA for 3 weeks). At that time we discussed care with Dr. Ochoa - no contraindication for AC from surgical perspective. Care discussed with Dr. Rasheed.  Upon starting AC -  patient was doing well (PCP - Dr. Arredondo was updated). Patient on 2/15 developed upper extremity shakes -  workup demonstrated New hemorrhage left frontal precentral gyrus. Family updated.     Due to recent strokes /  poor candidate for AC -  team would want patient to be re-evaluated by EP -  for watchmen's procedure.       Medical progress: Denies any HA, CP, SOB. This am patient with low BPs (which could be attributed to keppra /  intevention of 500cc of fluids given). in addition, patient's care discussd with neurosurgery /  neurology - patient was recommended to be started low dose aspirin in the am.   Complaints: tired  State of mind: normal conversation   PLan: ASA in the am    REVIEW OF SYSTEMS:  General: NAD, hemodynamically stable   HEENT:  Eyes:  No visual loss   SKIN:  No rash or itching.  CARDIOVASCULAR:  No chest pain   RESPIRATORY:  No shortness of breath, cough or sputum.  GASTROINTESTINAL:  No anorexia, nausea, vomiting or diarrhea. No abdominal pain or blood.  NEUROLOGICAL:  No headache, dizziness, syncope, paralysis, ataxia, numbness or tingling in the extremities. No change in bowel or bladder control.  MUSCULOSKELETAL:  patient notes of (R) sided weakness, which he states this morning has improved  HEMATOLOGIC:  No anemia, bleeding or bruising.  LYMPHATICS:  No enlarged nodes. No history of splenectomy.  ENDOCRINOLOGIC:  No reports of sweating, cold or heat intolerance. No polyuria or polydipsia.  ALLERGIES:  No history of asthma, hives, eczema or rhinitis.    Physical Exam:   GENERAL APPEARANCE:  deconditioned, frail, elderly patient  ICU Vital Signs Last 24 Hrs  T(C): 35.9 (16 Feb 2022 06:19), Max: 36.3 (15 Feb 2022 13:10)  T(F): 96.7 (16 Feb 2022 06:19), Max: 97.4 (15 Feb 2022 16:26)  HR: 71 (16 Feb 2022 10:25) (60 - 88)  BP: 104/64 (16 Feb 2022 10:25) (87/64 - 128/91)  BP(mean): 74 (16 Feb 2022 10:25) (67 - 100)  RR: 18 (16 Feb 2022 09:10) (12 - 25)  SpO2: 99% (16 Feb 2022 10:25) (94% - 99%)    HEENT:  Head is normocephalic    Skin:  Warm and dry without any rash   NECK:  Supple without lymphadenopathy.   HEART:  Regular rate and rhythm. normal S1 and S2, No M/R/G  LUNGS:  Good ins/exp effort, no W/R/R/C  ABDOMEN:  Soft, nontender, nondistended with good bowel sounds heard  EXTREMITIES:  right upper extremity (mild) weakness  NEUROLOGICAL:  Gross nonfocal       CBC Full  -  ( 12 Feb 2022 07:50 )  WBC Count : 8.35 K/uL  RBC Count : 4.92 M/uL  Hemoglobin : 14.8 g/dL  Hematocrit : 44.8 %  Platelet Count - Automated : 244 K/uL    PT/INR - ( 11 Feb 2022 15:32 )   PT: 12.4 sec;   INR: 1.07 ratio         PTT - ( 12 Feb 2022 07:50 )  PTT:>200.0 sec    02-12    139  |  105  |  22  ----------------------------<  217<H>  4.1   |  27  |  1.18    Ca    8.8      12 Feb 2022 07:50    TPro  7.5  /  Alb  3.2<L>  /  TBili  0.7  /  DBili  x   /  AST  23  /  ALT  43  /  AlkPhos  64  02-11      86 year old male w hx AAA s/p EVAR 2021, AFIB on ASA, [place], HTN, PD, patent PFO came to ED for evaluation for difficulty speaking      # Acute CVA L frontoparietal on admission, complicated with New hemorrhage left frontal precentral gyrus  - was not a tpa candidate since he was outside window w symptoms over past 3 days  - No neurosurgery intervention -  recommending starting ASA 2/17 am dose  - Holding AC/ Antiplatelet for now - no reversal required  - repeat CT scans: Acute subarachnoid hemorrhage within a sulcus of the left frontal lobe appears unchanged since prior exam.   - Care discussed with neurosurgery   - Care discussed with Neurology  - Pallaitive care consulted   - care discussed with EP about watchmen's procedure -  patient is not a good candidate due to recent spontaneous hemorrhagic stroke, as watchmen's procedure warrants couple of months of AC    # AFIB -  contraindicated as patient had new hemorrhagic stroke  - Eliquis discontinued  - started on toprol xl 25 mg q12h  - care discussed with Dr. ochoa   - care disucssed with Dr. Paz -  agrees with anticoagulation    # Patent PFO hx  - tele monitoring  - neuro checks q 4 hrs  - neuro consult read and appreciated  - speech and swallow  - OT/PT    # COVID+  - clear CXR  - s/p monoclonal AB  - scattered wheezes ANNEMARIE  - finished 9 days of steroids    # PD  -  continue home meds    # NO VTE prophylaxis as patient had a hemorragic stroke       Reason for Admission: acute CVA  History of Present Illness:     Pt is a 85 y/o/m w/ hx AAA s/p EVAR 2021, AFIB on ASA, [place], HTN, PD, patent PFO came to ED for evaluation for difficulty speaking    Today he called his doctor who advised him to go to the ED (+) some difficulty swallowing;  usually has to take small portions     Hospital course: Patient on admission was noted to have small acute * L frontal parietal infarcts (not a TPA candidate) - risk factor was identified atrial fibrillation (not on AC due to hemorrhagic shock last year, only on ASA for 3 weeks). At that time we discussed care with Dr. Ochoa - no contraindication for AC from surgical perspective. Care discussed with Dr. Rasheed.  Upon starting AC -  patient was doing well (PCP - Dr. Arredondo was updated). Patient on 2/15 developed upper extremity shakes -  workup demonstrated New hemorrhage left frontal precentral gyrus. Family updated.     Due to recent strokes /  poor candidate for AC -  team would want patient to be re-evaluated by EP -  for watchmen's procedure.       Medical progress: Denies any HA, CP, SOB. This am patient with low BPs (which could be attributed to keppra /  intevention of 500cc of fluids given). in addition, patient's care discussd with neurosurgery /  neurology - patient was recommended to be started low dose aspirin in the am.   Complaints: tired  State of mind: normal conversation   PLan: ASA in the am  Patient's PCP Dr. Paz fully updated    REVIEW OF SYSTEMS:  General: NAD, hemodynamically stable   HEENT:  Eyes:  No visual loss   SKIN:  No rash or itching.  CARDIOVASCULAR:  No chest pain   RESPIRATORY:  No shortness of breath, cough or sputum.  GASTROINTESTINAL:  No anorexia, nausea, vomiting or diarrhea. No abdominal pain or blood.  NEUROLOGICAL:  No headache, dizziness, syncope, paralysis, ataxia, numbness or tingling in the extremities. No change in bowel or bladder control.  MUSCULOSKELETAL:  patient notes of (R) sided weakness, which he states this morning has improved  HEMATOLOGIC:  No anemia, bleeding or bruising.  LYMPHATICS:  No enlarged nodes. No history of splenectomy.  ENDOCRINOLOGIC:  No reports of sweating, cold or heat intolerance. No polyuria or polydipsia.  ALLERGIES:  No history of asthma, hives, eczema or rhinitis.    Physical Exam:   GENERAL APPEARANCE:  deconditioned, frail, elderly patient  ICU Vital Signs Last 24 Hrs  T(C): 35.9 (16 Feb 2022 06:19), Max: 36.3 (15 Feb 2022 13:10)  T(F): 96.7 (16 Feb 2022 06:19), Max: 97.4 (15 Feb 2022 16:26)  HR: 71 (16 Feb 2022 10:25) (60 - 88)  BP: 104/64 (16 Feb 2022 10:25) (87/64 - 128/91)  BP(mean): 74 (16 Feb 2022 10:25) (67 - 100)  RR: 18 (16 Feb 2022 09:10) (12 - 25)  SpO2: 99% (16 Feb 2022 10:25) (94% - 99%)    HEENT:  Head is normocephalic    Skin:  Warm and dry without any rash   NECK:  Supple without lymphadenopathy.   HEART:  Regular rate and rhythm. normal S1 and S2, No M/R/G  LUNGS:  Good ins/exp effort, no W/R/R/C  ABDOMEN:  Soft, nontender, nondistended with good bowel sounds heard  EXTREMITIES:  right upper extremity (mild) weakness  NEUROLOGICAL:  Gross nonfocal       CBC Full  -  ( 12 Feb 2022 07:50 )  WBC Count : 8.35 K/uL  RBC Count : 4.92 M/uL  Hemoglobin : 14.8 g/dL  Hematocrit : 44.8 %  Platelet Count - Automated : 244 K/uL    PT/INR - ( 11 Feb 2022 15:32 )   PT: 12.4 sec;   INR: 1.07 ratio         PTT - ( 12 Feb 2022 07:50 )  PTT:>200.0 sec    02-12    139  |  105  |  22  ----------------------------<  217<H>  4.1   |  27  |  1.18    Ca    8.8      12 Feb 2022 07:50    TPro  7.5  /  Alb  3.2<L>  /  TBili  0.7  /  DBili  x   /  AST  23  /  ALT  43  /  AlkPhos  64  02-11      86 year old male w hx AAA s/p EVAR 2021, AFIB on ASA, [place], HTN, PD, patent PFO came to ED for evaluation for difficulty speaking      # Acute CVA L frontoparietal on admission, complicated with New hemorrhage left frontal precentral gyrus  - was not a tpa candidate since he was outside window w symptoms over past 3 days  - No neurosurgery intervention -  recommending starting ASA 2/17 am dose  - Holding AC/ Antiplatelet for now - no reversal required  - repeat CT scans: Acute subarachnoid hemorrhage within a sulcus of the left frontal lobe appears unchanged since prior exam.   - Care discussed with neurosurgery   - Care discussed with Neurology  - Pallaitive care consulted   - care discussed with EP about watchmen's procedure -  patient is not a good candidate due to recent spontaneous hemorrhagic stroke, as watchmen's procedure warrants couple of months of AC    # AFIB -  contraindicated as patient had new hemorrhagic stroke  - Eliquis discontinued  - started on toprol xl 25 mg q12h  - care discussed with Dr. ochoa   - care disucssed with Dr. Paz -  agrees with anticoagulation    # Patent PFO hx  - tele monitoring  - neuro checks q 4 hrs  - neuro consult read and appreciated  - speech and swallow  - OT/PT    # COVID+  - clear CXR  - s/p monoclonal AB  - scattered wheezes ANNEMARIE  - finished 9 days of steroids    # PD  -  continue home meds    # NO VTE prophylaxis as patient had a hemorragic stroke

## 2022-02-16 NOTE — PROGRESS NOTE ADULT - SUBJECTIVE AND OBJECTIVE BOX
HPI: patient seen and examined with no family at bedside, patient endorsed feeling overwhelmed today because he feels like  he isn't getting better and concerned about going to [facility].  Patient denies any pain or dyspnea at this time.  Awaiting call back from patients wife to schedule [facility] meeting     PAIN: ( )Yes   (x )No  patient denies   DYSPNEA: ( ) Yes  (x ) No  Level:    Review of Systems:  Fatigue-yes  Weakness-yes    All other systems reviewed and negative    PHYSICAL EXAM:    Vital Signs Last 24 Hrs  T(C): 36.2 (2022 15:42), Max: 36.6 (2022 10:45)  T(F): 97.1 (2022 15:42), Max: 97.8 (2022 10:45)  HR: 62 (2022 15:00) (60 - 88)  BP: 104/55 (2022 15:00) (87/64 - 128/91)  BP(mean): 67 (2022 15:00) (67 - 100)  RR: 18 (2022 09:10) (12 - 22)  SpO2: 95% (2022 15:00) (94% - 99%)    Daily Weight in k (2022 04:28)    PPSV2: 40  %    General: elderly gentleman in bed, NAD   Mental Status: alert and oriented, + dysarthria, some periods of confusion or forgetfulness   HEENT: mmm   Lungs: diminished breath sounds b/l   Cardiac: irregular S1S2   GI: nontender, non distended + BS   : no suprapubic tenderness   Ext: mild edema   Neuro:  weakness on right side       LABS:                        16.2   9.46  )-----------( 193      ( 2022 06:58 )             50.8     02-16    142  |  109<H>  |  21  ----------------------------<  81  4.2   |  27  |  1.17    Ca    9.1      2022 06:58    TPro  6.7  /  Alb  2.7<L>  /  TBili  1.7<H>  /  DBili  x   /  AST  19  /  ALT  33  /  AlkPhos  63  02-15      Albumin: Albumin, Serum: 2.7 g/dL (02-15 @ 05:44)      Allergies    penicillin (Rash)    Intolerances      MEDICATIONS  (STANDING):  atorvastatin 80 milliGRAM(s) Oral at bedtime  digoxin     Tablet 125 MICROGram(s) Oral daily  fluticasone propionate 50 MICROgram(s)/spray Nasal Spray 1 Spray(s) Both Nostrils two times a day  levETIRAcetam  Solution 500 milliGRAM(s) Oral two times a day  meclizine 25 milliGRAM(s) Oral daily  melatonin 3 milliGRAM(s) Oral at bedtime  metoprolol succinate ER 25 milliGRAM(s) Oral every 12 hours  rOPINIRole 1 milliGRAM(s) Oral <User Schedule>  rOPINIRole 0.25 milliGRAM(s) Oral <User Schedule>  sodium chloride 0.9% Bolus 500 milliLiter(s) IV Bolus once    MEDICATIONS  (PRN):  acetaminophen     Tablet .. 650 milliGRAM(s) Oral every 6 hours PRN Temp greater or equal to 38C (100.4F), Mild Pain (1 - 3)  aluminum hydroxide/magnesium hydroxide/simethicone Suspension 30 milliLiter(s) Oral every 4 hours PRN Dyspepsia  artificial  tears Solution 1 Drop(s) Both EYES four times a day PRN Dry Eyes  hydrocortisone 1% Topical Cream - Peds 1 Application(s) Topical three times a day PRN Itching  meclizine 25 milliGRAM(s) Oral three times a day PRN Dizziness      RADIOLOGY:    ACC: [id number] EXAM:  CT BRAIN                        PROCEDURE DATE:  2022    INTERPRETATION:  Exam Date: 2022 9:02 AM  CT head without IV contrast  CLINICAL INFORMATION:  f/u [facility]  TECHNIQUE: Contiguous axial sections were obtained through the head.     Coronal and sagittal reformats were obtained.  COMPARISON: CT head 2/15/2022  FINDINGS:  Acute subarachnoid hemorrhage within a sulcus of the left frontal lobe   appears unchanged since prior exam. No new hemorrhage present.   There is   no CT evidence of large vessel acute infarct. No mass effect is found in   the brain.  No evidence of midline shift or herniation pattern.  The ventricles, sulci and basal cisterns appear unremarkable.  Visualized paranasalsinuses are clear.    IMPRESSION:  Acute subarachnoid hemorrhage within a sulcus of the left frontal lobe   appears unchanged since prior exam. No new hemorrhage present.

## 2022-02-16 NOTE — PROGRESS NOTE ADULT - SUBJECTIVE AND OBJECTIVE BOX
PCP:    REQUESTING PHYSICIAN:    REASON FOR CONSULT:    CHIEF COMPLAINT:    HPI:  86 year old male w hx parkinson's disease  AAA s/p EVAR , AFIB on ASA, [place], HTN, PD, patent PFO came to ED for evaluation for difficulty speaking Started 3 days before day of admission  notice  when he woke up he had difficulty getting the words out  patient was diagnosed to have acute ischemic stroke , patient was started on anticoagulation , patient did develop right upper extremity shaking yesterday , repeat CT shown , hemorrhagic conversion . patient anticoagulation was held , patient was persistently in atrial fibrillation , was started on BB , with improvement in heart rate  . Patient currently feeling ok , does have mild speech difficulty ,   able to move all extremities   22: Pt feels ok. No chest pain. Atrial fib in 70's     +BEFAST.Code stroke called at 1519 BGM 97 in triage.  In ED /89  HR 78   RR 18   T 98   99% sat RA  NIHSS 3  Neuro  CT few small acute left frontal parietal infarcts  MRI same  IV heparin  ekg AFIB      PAST MEDICAL HX  AAA (abdominal aortic aneurysm)  Anemia   Anxiety   Atrial fibrillation   Bruise to bilateral upper extremity  COVID-19 virus infection  Eczema   H/O candidiasis of mouth  History of other malignant neoplasm of large intestine    H/O scarlet fever   History of shingles   [place] (hard of hearing)   HTN (hypertension)   Mucocele, appendix   PD Parkinson Disease   Patent foramen ovale dilated left artrium severe echo 2021  History of Postoperative hemorrhagic shock, subsequent encounter   Renal calculi   Seasonal allergies   Vertigo.     PAST SURGICAL HX  Cataract surgery  H/O colonoscopy   H/O right hemicolectomy    History of appendectomy   S/P AAA repair 2021. Dr Martinez     FAMILY HX  Family history of hepatic cirrhosis: Father   Family history of myocardial infarction: Mother   Family history of cardiomegaly: Mother       SOCIAL HX    nonsmoker  walks unassisted (2022 21:07)      PAST MEDICAL & SURGICAL HISTORY:  Atrial fibrillation    HTN (hypertension)    H/O candidiasis of mouth    AAA (abdominal aortic aneurysm)    H/O scarlet fever    [place] (hard of hearing)    Parkinson disease    Renal calculi    Seasonal allergies    Anxiety    Eczema    Mucocele, appendix    History of shingles    Patent foramen ovale  dilated left artrium severe echo 2021    Bruise  to bilateral upper extremity    Vertigo    H/O colonoscopy    S/P AAA repair  2021    History of appendectomy    H/O right hemicolectomy        SOCIAL HISTORY:    FAMILY HISTORY:  No pertinent family history in first degree relatives        ALLERGIES:  Allergies    penicillin (Rash)    Intolerances        MEDICATIONS:    MEDICATIONS  (STANDING):  atorvastatin 80 milliGRAM(s) Oral at bedtime  digoxin     Tablet 125 MICROGram(s) Oral daily  fluticasone propionate 50 MICROgram(s)/spray Nasal Spray 1 Spray(s) Both Nostrils two times a day  levETIRAcetam  Solution 500 milliGRAM(s) Oral two times a day  meclizine 25 milliGRAM(s) Oral daily  melatonin 3 milliGRAM(s) Oral at bedtime  metoprolol succinate ER 25 milliGRAM(s) Oral every 12 hours  rOPINIRole 1 milliGRAM(s) Oral <User Schedule>  rOPINIRole 0.25 milliGRAM(s) Oral <User Schedule>  sodium chloride 0.9% Bolus 500 milliLiter(s) IV Bolus once    MEDICATIONS  (PRN):  acetaminophen     Tablet .. 650 milliGRAM(s) Oral every 6 hours PRN Temp greater or equal to 38C (100.4F), Mild Pain (1 - 3)  aluminum hydroxide/magnesium hydroxide/simethicone Suspension 30 milliLiter(s) Oral every 4 hours PRN Dyspepsia  artificial  tears Solution 1 Drop(s) Both EYES four times a day PRN Dry Eyes  hydrocortisone 1% Topical Cream - Peds 1 Application(s) Topical three times a day PRN Itching  meclizine 25 milliGRAM(s) Oral three times a day PRN Dizziness        Vital Signs Last 24 Hrs  T(C): 36.6 (2022 10:45), Max: 36.6 (2022 10:45)  T(F): 97.8 (2022 10:45), Max: 97.8 (2022 10:45)  HR: 67 (2022 13:00) (60 - 88)  BP: 102/81 (2022 13:00) (87/64 - 128/91)  BP(mean): 86 (2022 13:00) (67 - 100)  RR: 18 (2022 09:10) (12 - 22)  SpO2: 98% (2022 11:00) (94% - 99%)Daily     Daily Weight in k (2022 04:28)I&O's Summary    15 Feb 2022 07:01  -  2022 07:00  --------------------------------------------------------  IN: 0 mL / OUT: 200 mL / NET: -200 mL        PHYSICAL EXAM:    Constitutional: NAD, awake and alert, well-developed  HEENT: PERR, EOMI,  No oral cyananosis.  Neck:  supple,  No JVD  Respiratory: Breath sounds are clear bilaterally, No wheezing, rales or rhonchi  Cardiovascular: S1 and S2, irr  Gastrointestinal: Bowel Sounds present, soft, nontender.   Extremities: No peripheral edema. No clubbing or cyanosis.  Vascular: 2+ peripheral pulses  Neurological: A/O x 3, no focal deficits  Musculoskeletal: no calf tenderness.  Skin: No rashes.      LABS: All Labs Reviewed:                        16.2   9.46  )-----------( 193      ( 2022 06:58 )             50.8                         15.4   7.72  )-----------( 211      ( 15 Feb 2022 05:44 )             47.0                         15.9   7.52  )-----------( 202      ( 2022 08:22 )             47.7     2022 06:58    142    |  109    |  21     ----------------------------<  81     4.2     |  27     |  1.17   15 Feb 2022 05:44    141    |  109    |  18     ----------------------------<  84     4.1     |  29     |  1.09     Ca    9.1        2022 06:58  Ca    8.8        15 Feb 2022 05:44    TPro  6.7    /  Alb  2.7    /  TBili  1.7    /  DBili  x      /  AST  19     /  ALT  33     /  AlkPhos  63     15 Feb 2022 05:44          Blood Culture:         RADIOLOGY/EKG:< from: 12 Lead ECG (22 @ 16:22) >  Diagnosis Line Atrial fibrillation with premature ventricular or aberrantly conducted complexes  Left axis deviation  Pulmonary disease pattern  Incomplete right bundle branch block  Abnormal ECG  When compared with ECG of 27-MAR-2021 15:17,  No significant change was found  Confirmed by ARRON BAINS, SWEETIEESTEBAN WILSON ([id number]) on 2022 9:44:43 AM    < end of copied text >        ECHO/CARDIAC CATHTERIZATION/STRESS TEST:  < from: TTE Echo Complete w/o Contrast w/ Doppler (22 @ 11:55) >   Impression     Summary     Estimated left ventricular ejection fraction is 50-55 %.   The left ventricle is normal in size and wall thickness.   Wall motion abnormalities are noted with preserved LV systolic function.   The left atrium is severely dilated.   The right atrium appears moderately dilated.   A PFO is noted with color doppler.   Fibrocalcific changes noted to the Aortic valve leaflets with preserved   leafletexcursion.   Mild (1+) aortic regurgitation is present.   Fibrocalcific changes noted to the mitral valve leaflets with preserved   leaflet excursion.   Mild mitral annular calcification is present.   Mild (1+) mitral regurgitation is present.   Normal appearing tricuspid valve structure.   Moderate (2+) tricuspid valve regurgitation is present.     Signature     ----------------------------------------------------------------   Electronically signed by Tonya EllingtonInterpreting physician)   on 2022 05:59 PM   ----------------------------------------------------------------    < end of copied text >

## 2022-02-16 NOTE — PROGRESS NOTE ADULT - ASSESSMENT
86 year old man presented with complaints of 5 days of dysarthria, difficulty swallowing but weakness.   In the ED, NIHSS was 3. He was not a tpa candidate since symptoms had been present for several days.  He reports that he had been started on aspirin about two weeks earlier.   He has a history of atrial fibrillation. Outpatient cardiology note from December 2021 states that he was off Coumadin due to hematuria and recent surgeries.    Stroke  -History of a-fib  -Was off AC due to history of hemorrhagic complications post-op. Eliquis started during this admission.  -On 2/14/22 developed abnormal movements in right upper extremity and dysmetria. Repeat head CT showed SAH and Eliquis stopped  -telemetry  -PT  -History of afib and now with subarachnoid hemorrhage, requiring discontinuation of AC.  -Will need cardiology f/u to discuss alternatives  -If CT head is stable today can start aspirin 81 mg/day.    Subarachnoid hemorrhage  -Repeat head CT done this AM. Awaiting report. If stable can downgrade back to telemetry with q4 neuro checks  -Hold AC and antiplatelets for now  -Keep SBP < 140  -Keep head of bed elevated    Abnormal movements of right arm  -? focal seizures in the setting of SAH  -EEG did not show epileptiform activity  -Continue Keppra 500 mg q12  -Will repeat EEG in office after hemorrhage is resolved    Parkinson's  -Has some chronic dysarthria and possible dysphagia  -Eats soft foods at home  -Continue ropinirole 1 mg in the AM, 0.5 mg in the afternoon and 0.5 mg in the PM    Will follow as needed

## 2022-02-16 NOTE — PROGRESS NOTE ADULT - ASSESSMENT
Pt is a 86y old Male with hx of  AAA s/p EVAR 2021, AFIB on ASA, [place], HTN, PD, patent PFO came to ED for evaluation for difficulty speaking and swallowing; he called his doctor, who advised him to go to the ED. Since then, the patient on admission was noted to have minor acute * L frontal-parietal infarcts (not a TPA candidate); the risk factor was identified atrial fibrillation (not on AC due to hemorrhagic shock last year, only year on ASA for three weeks). At that time, we discussed care with Dr. Ochoa - no contraindication for AC from a surgical perspective. Care was discussed with Dr. Rasheed. Patient on 2/15 developed upper extremity shakes -  workup demonstrated New hemorrhage left frontal precentral gyrus. Palliative medicine consulted to help establish GOC and advance care planning.     1) Acute CVA L frontoparietal on admission complicated with New hemorrhage left frontal precentral gyrus  - was not a tpa candidate   - neuro surgery consult appreciated - No neurosurgery intervention  - Holding AC for now   - neurology consult appreciated   - EEG ordered   - neuro checks as per neurology - q4hrs right now     2) debility   - PT consult when able to participate   - supportive care   - fall risk     3) Afib   - holding AC for now   - cardiology consult   - monitor     4) s/p covid   - treated with monoclonal antibodies   - no signs of infection now     5) advance care planning   - patient appears to have capacity to make decisions   - will reach out to patients wife to schedule GOC meeting   - full code    Pt is a 86y old Male with hx of  AAA s/p EVAR 2021, AFIB on ASA, [place], HTN, PD, patent PFO came to ED for evaluation for difficulty speaking and swallowing; he called his doctor, who advised him to go to the ED. Since then, the patient on admission was noted to have minor acute * L frontal-parietal infarcts (not a TPA candidate); the risk factor was identified atrial fibrillation (not on AC due to hemorrhagic shock last year, only year on ASA for three weeks). At that time, we discussed care with Dr. Ochoa - no contraindication for AC from a surgical perspective. Care was discussed with Dr. Rasheed. Patient on 2/15 developed upper extremity shakes -  workup demonstrated New hemorrhage left frontal precentral gyrus. Palliative medicine consulted to help establish GOC and advance care planning.     1) Acute CVA L frontoparietal on admission complicated with New hemorrhage left frontal precentral gyrus  - was not a tpa candidate   - neuro surgery consult appreciated - No neurosurgery intervention  - Holding AC for now   - neurology consult appreciated   - EEG ordered   - neuro checks as per neurology - q4hrs right now     2) debility   - PT consult when able to participate   - supportive care   - fall risk     3) Afib   - holding AC for now   - cardiology consult   - monitor     4) s/p covid   - treated with monoclonal antibodies   - no signs of infection now     5) advance care planning   - patient appears to have capacity to make decisions   - will reach out to patients wife to schedule GOC meeting   - full code   - GOC meeting scheduled with patients wife for Friday at 11AM

## 2022-02-16 NOTE — PROGRESS NOTE ADULT - SUBJECTIVE AND OBJECTIVE BOX
Interval History:  2/16/22: No new complaints. Feels that right hand feels better.    MEDICATIONS  (STANDING):  atorvastatin 80 milliGRAM(s) Oral at bedtime  digoxin     Tablet 125 MICROGram(s) Oral daily  fluticasone propionate 50 MICROgram(s)/spray Nasal Spray 1 Spray(s) Both Nostrils two times a day  levETIRAcetam  Solution 500 milliGRAM(s) Oral two times a day  meclizine 25 milliGRAM(s) Oral daily  melatonin 3 milliGRAM(s) Oral at bedtime  metoprolol succinate ER 25 milliGRAM(s) Oral every 12 hours  rOPINIRole 1 milliGRAM(s) Oral <User Schedule>  rOPINIRole 0.25 milliGRAM(s) Oral <User Schedule>    MEDICATIONS  (PRN):  acetaminophen     Tablet .. 650 milliGRAM(s) Oral every 6 hours PRN Temp greater or equal to 38C (100.4F), Mild Pain (1 - 3)  aluminum hydroxide/magnesium hydroxide/simethicone Suspension 30 milliLiter(s) Oral every 4 hours PRN Dyspepsia  artificial  tears Solution 1 Drop(s) Both EYES four times a day PRN Dry Eyes  hydrocortisone 1% Topical Cream - Peds 1 Application(s) Topical three times a day PRN Itching  meclizine 25 milliGRAM(s) Oral three times a day PRN Dizziness      Allergies    penicillin (Rash)    Intolerances        PHYSICAL EXAM:  Vital Signs Last 24 Hrs  T(F): 96.7 (02-16-22 @ 06:19)  HR: 70 (02-16-22 @ 07:00)  BP: 102/66 (02-16-22 @ 07:00)  RR: 22 (02-16-22 @ 07:00)    GENERAL: NAD, well-groomed  HEAD:  Atraumatic, Normocephalic  Neuro:  Awake, alert, no aphasia  CN: PERRL, EOMI, no nystagmus, no facial weakness, tongue protrudes in the midline, mild dysarthria  motor: Mild right drift. 5-/5 in RUE, 5/5 in LUE, 5/5 in b/l lower extremities  sensory: intact to light touch  coordination: Mild dysmetria on finger to nose on right, intact on left  DTRs: symmetric, plantar responses flexor bilaterally    NIH Score 2        LABS:                        16.2   9.46  )-----------( 193      ( 16 Feb 2022 06:58 )             50.8     02-15    141  |  109<H>  |  18  ----------------------------<  84  4.1   |  29  |  1.09    Ca    8.8      15 Feb 2022 05:44    TPro  6.7  /  Alb  2.7<L>  /  TBili  1.7<H>  /  DBili  x   /  AST  19  /  ALT  33  /  AlkPhos  63  02-15          RADIOLOGY & ADDITIONAL STUDIES:  CT head, CTA head and neck 2/11/22:  *  VERY SMALL POSTERIOR LEFT FRONTAL INFARCT WHICH IS OCCURRED IN THE   INTERIM FROM 12/22/2021  *  NO HEMODYNAMIC SIGNIFICANT NARROWING WITHIN THE NECK.  *  NO PROXIMAL LARGE VESSEL OCCLUSION INTRACRANIALLY.  *  PERFUSION IMAGING DEMONSTRATES REGIONS OF HYPOPERFUSION/ISCHEMIA   INVOLVING THE BILATERAL TEMPORAL OCCIPITAL REGIONS AND RIGHT PARIETAL   LOBE WITH A VOLUME OF 32 ML. NO CORE INFARCT DEMONSTRATED.    MRI head 2/11/22:  FEW SMALL ACUTE LEFT FRONTAL PARIETAL INFARCTS. NO ASSOCIATED BLOOD   PRODUCTS. NO SIGNIFICANT MASS EFFECT    TTE Echo Complete w/o Contrast w/ Doppler (02.12.22 @ 11:55)      Summary     Estimated left ventricular ejection fraction is 50-55 %.   The left ventricle is normal in size and wall thickness.    CT head 2/14/22:  Age-appropriate involutional and ischemic gliotic changes.   New hemorrhage left frontal precentral gyrus.    CT head 2/15/22:  No significant interval change.    EEG 2/15/22:  Normal EEG, afib

## 2022-02-16 NOTE — PROGRESS NOTE ADULT - SUBJECTIVE AND OBJECTIVE BOX
HPI:  86 year old male w hx AAA s/p EVAR 2021, AFIB on ASA, [place], HTN, PD, patent PFO came to ED for evaluation for difficulty speaking  Started 3 days ago when he woke up he had difficulty getting the words out  Took tylenol and felt better  Same yesterday  Today he called his doctor who advised him to go to the ED  + some difficulty swallowing;  usually has to take small portions  denied headache, dizziness, focal weakness or paresthesia  he walks unassisted  COVID+ was given monoclonal AB 02-  also placed on z pack and steroids    EP consulted for management of afib.  Telemetry shows afib rates 90-110bpm, with episodes of RVR during ambulation to 140-160bpm.  Pt is asymptomatic during rapid afib, denies any chest pain, palpiations sob or dizziness.   2/15/22:  pt seen resting in bed in NAD, alert and oriented, speech with mild dysarthria  TELE: atrial fib rate 50-80's, no significant pauses >2 seconds.    2/16/22: TELE: atrial fib 60-80 bpm, PVC's, v-couplets, occasional pause 2-2.5 sec    MEDICATIONS  (STANDING):  atorvastatin 80 milliGRAM(s) Oral at bedtime  digoxin     Tablet 125 MICROGram(s) Oral daily  fluticasone propionate 50 MICROgram(s)/spray Nasal Spray 1 Spray(s) Both Nostrils two times a day  levETIRAcetam  Solution 500 milliGRAM(s) Oral two times a day  meclizine 25 milliGRAM(s) Oral daily  melatonin 3 milliGRAM(s) Oral at bedtime  metoprolol succinate ER 25 milliGRAM(s) Oral every 12 hours  rOPINIRole 1 milliGRAM(s) Oral <User Schedule>  rOPINIRole 0.25 milliGRAM(s) Oral <User Schedule>  sodium chloride 0.9% Bolus 500 milliLiter(s) IV Bolus once    MEDICATIONS  (PRN):  acetaminophen     Tablet .. 650 milliGRAM(s) Oral every 6 hours PRN Temp greater or equal to 38C (100.4F), Mild Pain (1 - 3)  aluminum hydroxide/magnesium hydroxide/simethicone Suspension 30 milliLiter(s) Oral every 4 hours PRN Dyspepsia  artificial  tears Solution 1 Drop(s) Both EYES four times a day PRN Dry Eyes  hydrocortisone 1% Topical Cream - Peds 1 Application(s) Topical three times a day PRN Itching  meclizine 25 milliGRAM(s) Oral three times a day PRN Dizziness    Allergies    penicillin (Rash)    Intolerances    Vital Signs Last 24 Hrs  T(C): 36.6 (16 Feb 2022 10:45), Max: 36.6 (16 Feb 2022 10:45)  T(F): 97.8 (16 Feb 2022 10:45), Max: 97.8 (16 Feb 2022 10:45)  HR: 67 (16 Feb 2022 13:00) (60 - 88)  BP: 102/81 (16 Feb 2022 13:00) (87/64 - 128/91)  BP(mean): 86 (16 Feb 2022 13:00) (67 - 100)  RR: 18 (16 Feb 2022 09:10) (12 - 22)  SpO2: 98% (16 Feb 2022 11:00) (94% - 99%)    PHYSICAL EXAMINATION:    GENERAL APPEARANCE:  Pt. is not currently dyspneic, in no distress. Mild dysarthria noted. Alert and oriented x 3  HEENT:  Pupils are normal and react normally. No icterus. Mucous membranes well colored.  NECK:  Supple. No lymphadenopathy. Jugular venous pressure not elevated. Carotids equal.   HEART:   Irregular S1,S2. There are no murmurs, rubs or gallops noted  CHEST:  Chest is clear to auscultation. Normal respiratory effort.  ABDOMEN:  Soft and nontender.   EXTREMITIES:  There is no edema. Right arm weakness is noted.  SKIN:  No rash or significant lesions are noted.                            16.2   9.46  )-----------( 193      ( 16 Feb 2022 06:58 )             50.8     02-16    142  |  109<H>  |  21  ----------------------------<  81  4.2   |  27  |  1.17    Ca    9.1      16 Feb 2022 06:58    TPro  6.7  /  Alb  2.7<L>  /  TBili  1.7<H>  /  DBili  x   /  AST  19  /  ALT  33  /  AlkPhos  63  02-15      CARDIAC TESTS:    ECHO 2/12/22     Estimated left ventricular ejection fraction is 50-55 %.   The left ventricle is normal in size and wall thickness.   Wall motion abnormalities are noted with preserved LV systolic function.   The left atrium is severely dilated.   The right atrium appears moderately dilated.   A PFO is noted with color doppler.   Fibrocalcific changes noted to the Aortic valve leaflets with preserved   leafletexcursion.   Mild (1+) aortic regurgitation is present.   Fibrocalcific changes noted to the mitral valve leaflets with preserved   leaflet excursion.   Mild mitral annular calcification is present.   Mild (1+) mitral regurgitation is present.   Normal appearing tricuspid valve structure.   Moderate (2+) tricuspid valve regurgitation is present.

## 2022-02-16 NOTE — CHART NOTE - NSCHARTNOTEFT_GEN_A_CORE
HPI:  86 year old male w hx AAA s/p EVAR 2021, AFIB on ASA, [place], HTN, PD, patent PFO came to ED for evaluation for difficulty speaking    PAST MEDICAL HX  AAA (abdominal aortic aneurysm)  Anemia   Anxiety   Atrial fibrillation   Bruise to bilateral upper extremity  COVID-19 virus infection  Eczema   H/O candidiasis of mouth  History of other malignant neoplasm of large intestine    H/O scarlet fever   History of shingles   [place] (hard of hearing)   HTN (hypertension)   Mucocele, appendix   PD Parkinson Disease   Patent foramen ovale dilated left artrium severe echo 1/2021  History of Postoperative hemorrhagic shock, subsequent encounter   Renal calculi   Seasonal allergies   Vertigo.    (11 Feb 2022 21:07)      PERTINENT PMH REVIEWED:  [ X ] YES [ ] NO           Primary Contact:    Wife Pita HCP in [facility]                   HCP [ X ] Surrogate [   ] Guardian [   ]    Mental Status: [ X ] Alert  [ X ] Oriented [  ] Confused [  ] Lethargic [  ]  Concerns of Depression [  ]- denies  Anxiety [   ]- denies  Baseline ADLs (prior to admission):  Independent [ X ] moderately [ ] fully   Dependent   [ ] moderately [ ]fully    Family Meeting: GOC pending with pt. and his wife     Anticipated Grief: Patient[  ] Family [  ]    Caregiver [place] Assessed: Yes [ X ] No [  ]    Adventism: Taoism    Spiritual Concerns: Not identified     Goals of Care: to be further determined, however, pt expressed that being home and having a quality of life is what is most important to him     ADVANCE DIRECTIVES:  Pt. currently Full Code, to be addressed.     Anticipated D/C Plan: to be determined [facility] VS home with home care                     Summary:    This SW met with pt. at bedside to follow up and provide support. Pt. had been living home with his wife prior to admission. He discussed his stroke and how he knows that he has a "ticking time bomb" and something could happen at any moment however, he wants to live his life to the fullest. He expressed his frustrations that he cannot walk around and be as independent as he wants to be due to it being a safety risk for him to be up on his own. He shared how he knows is he safe and in his mind can function well. Pts feelings explored and support provided. He shared that he does not want to go to [facility] and would rather return home with home PT. He discussed how he wife may prefer him to go to [facility] however, that is not what he wants. Pt. denied feelings anxious or depressed and reports that he just feelings frustrated. GOC meeting with pts wife and Palliative NP Gayle Beal pending. Palliative SW to follow up with pts wife for support after GOC meeting as pt. gave permission. Plan to be further determined. Emotional support provided. Our team will continue to follow.

## 2022-02-17 LAB
ANION GAP SERPL CALC-SCNC: 5 MMOL/L — SIGNIFICANT CHANGE UP (ref 5–17)
BUN SERPL-MCNC: 13 MG/DL — SIGNIFICANT CHANGE UP (ref 7–23)
CALCIUM SERPL-MCNC: 8.2 MG/DL — LOW (ref 8.5–10.1)
CHLORIDE SERPL-SCNC: 110 MMOL/L — HIGH (ref 96–108)
CO2 SERPL-SCNC: 26 MMOL/L — SIGNIFICANT CHANGE UP (ref 22–31)
CREAT SERPL-MCNC: 0.9 MG/DL — SIGNIFICANT CHANGE UP (ref 0.5–1.3)
GLUCOSE SERPL-MCNC: 157 MG/DL — HIGH (ref 70–99)
POTASSIUM SERPL-MCNC: 4.6 MMOL/L — SIGNIFICANT CHANGE UP (ref 3.5–5.3)
POTASSIUM SERPL-SCNC: 4.6 MMOL/L — SIGNIFICANT CHANGE UP (ref 3.5–5.3)
SODIUM SERPL-SCNC: 141 MMOL/L — SIGNIFICANT CHANGE UP (ref 135–145)

## 2022-02-17 PROCEDURE — 99233 SBSQ HOSP IP/OBS HIGH 50: CPT

## 2022-02-17 RX ORDER — ROPINIROLE 8 MG/1
0.5 TABLET, FILM COATED, EXTENDED RELEASE ORAL
Refills: 0 | Status: DISCONTINUED | OUTPATIENT
Start: 2022-02-17 | End: 2022-02-18

## 2022-02-17 RX ORDER — LEVETIRACETAM 250 MG/1
250 TABLET, FILM COATED ORAL DAILY
Refills: 0 | Status: DISCONTINUED | OUTPATIENT
Start: 2022-02-17 | End: 2022-02-18

## 2022-02-17 RX ORDER — LEVETIRACETAM 250 MG/1
750 TABLET, FILM COATED ORAL AT BEDTIME
Refills: 0 | Status: DISCONTINUED | OUTPATIENT
Start: 2022-02-17 | End: 2022-02-18

## 2022-02-17 RX ORDER — ASPIRIN/CALCIUM CARB/MAGNESIUM 324 MG
81 TABLET ORAL DAILY
Refills: 0 | Status: DISCONTINUED | OUTPATIENT
Start: 2022-02-17 | End: 2022-02-18

## 2022-02-17 RX ADMIN — ATORVASTATIN CALCIUM 80 MILLIGRAM(S): 80 TABLET, FILM COATED ORAL at 21:43

## 2022-02-17 RX ADMIN — Medication 25 MILLIGRAM(S): at 21:43

## 2022-02-17 RX ADMIN — LEVETIRACETAM 750 MILLIGRAM(S): 250 TABLET, FILM COATED ORAL at 22:10

## 2022-02-17 RX ADMIN — ROPINIROLE 0.5 MILLIGRAM(S): 8 TABLET, FILM COATED, EXTENDED RELEASE ORAL at 13:37

## 2022-02-17 RX ADMIN — ROPINIROLE 1 MILLIGRAM(S): 8 TABLET, FILM COATED, EXTENDED RELEASE ORAL at 09:38

## 2022-02-17 RX ADMIN — LEVETIRACETAM 250 MILLIGRAM(S): 250 TABLET, FILM COATED ORAL at 10:29

## 2022-02-17 RX ADMIN — Medication 125 MICROGRAM(S): at 09:39

## 2022-02-17 RX ADMIN — Medication 25 MILLIGRAM(S): at 09:40

## 2022-02-17 RX ADMIN — Medication 81 MILLIGRAM(S): at 13:36

## 2022-02-17 NOTE — PROGRESS NOTE ADULT - ASSESSMENT
86 year old man presented with complaints of 5 days of dysarthria, difficulty swallowing but weakness.   In the ED, NIHSS was 3. He was not a tpa candidate since symptoms had been present for several days.  He reports that he had been started on aspirin about two weeks earlier.   He has a history of atrial fibrillation. Outpatient cardiology note from December 2021 states that he was off Coumadin due to hematuria and recent surgeries.    Stroke  -History of a-fib  -Was off AC due to history of hemorrhagic complications post-op. Eliquis started during this admission.  -On 2/14/22 developed abnormal movements in right upper extremity and dysmetria. Repeat head CT showed SAH and Eliquis stopped  -High risk for repeat stroke. Cardiology eval appreciated. Can discuss Watchman after he recovers.  -telemetry  -PT  -Can restart aspirin since hemorrhage has been stable for 48 hours    Subarachnoid hemorrhage  -Repeat head CTs have shown that hemorrhage is stable.    -Keep SBP < 140      Abnormal movements of right arm  -? focal seizures in the setting of SAH  -EEG did not show epileptiform activity  -Continue Keppra. Since he is having drowsiness in AM, will change dosing to 250 mg in the AM and 750 mg at night.   -Will repeat EEG in office after hemorrhage is resolved    Parkinson's  -Has some chronic dysarthria and possible dysphagia  -Eats soft foods at home  -Continue ropinirole 1 mg in the AM, 0.5 mg in the afternoon and 0.5 mg in the PM    Will follow as needed

## 2022-02-17 NOTE — PROGRESS NOTE ADULT - ASSESSMENT
86 year old male w hx AAA s/p EVAR 2021, AFIB on ASA, [place], HTN, PD, patent PFO came to ED for evaluation for difficulty speaking      # Acute CVA L frontoparietal on admission, complicated with New hemorrhage left frontal precentral gyrus on full dose eliquis  - stable Acute subarachnoid hemorrhage within a sulcus of the left frontal lobe  - start po ASA today  - will consider low dose eliquis as outpt as possible transition for Watchman if tolerated  - seizure prophylaxis - meds adjusted to minimize daytime sedation  - DC in am    # AFIB   - continueon toprol xl 25 mg q12h    # Patent PFO hx  - previously on coumadin    # COVID+  - s/p monoclonal AB, finished 9 days of steroids    # PD  -  continue home meds - dosage adjusted    Discussed with pt, wife, Dr. Rasheed and Jonatan

## 2022-02-17 NOTE — PROGRESS NOTE ADULT - SUBJECTIVE AND OBJECTIVE BOX
REASON FOR VISIT: CVA, AF    HPI:  86 year old man with a history of atrial fibrillation (taking aspirin at time of admission), repaired AAA, recent COVID-19 infection, Parkinson's Disease, HTN, patent foramen ovale, admitted with small, acute L frontal / parietal infarcts with course complicated by acute SAH    2/17/22: Eager to return home; no new complaints.    MEDICATIONS  (STANDING):  atorvastatin 80 milliGRAM(s) Oral at bedtime  digoxin     Tablet 125 MICROGram(s) Oral daily  fluticasone propionate 50 MICROgram(s)/spray Nasal Spray 1 Spray(s) Both Nostrils two times a day  levETIRAcetam 250 milliGRAM(s) Oral daily  levETIRAcetam 750 milliGRAM(s) Oral at bedtime  meclizine 25 milliGRAM(s) Oral daily  melatonin 3 milliGRAM(s) Oral at bedtime  metoprolol succinate ER 25 milliGRAM(s) Oral every 12 hours  rOPINIRole 1 milliGRAM(s) Oral <User Schedule>  rOPINIRole 0.25 milliGRAM(s) Oral <User Schedule>  sodium chloride 0.9% Bolus 500 milliLiter(s) IV Bolus once    Vital Signs Last 24 Hrs  T(C): 36.7 (17 Feb 2022 08:45), Max: 36.7 (17 Feb 2022 08:45)  T(F): 98.1 (17 Feb 2022 08:45), Max: 98.1 (17 Feb 2022 08:45)  HR: 75 (17 Feb 2022 06:12) (62 - 94)  BP: 99/62 (17 Feb 2022 06:12) (87/64 - 117/64)  BP(mean): 71 (17 Feb 2022 06:12) (67 - 97)  SpO2: 96% (16 Feb 2022 21:00) (94% - 100%)    PHYSICAL EXAM:  Constitutional: NAD, awake and alert, seated in bedside chair  Respiratory: Breath sounds are clear bilaterally, No wheezing, rales or rhonchi  Cardiovascular: Irregular, normal rate  Gastrointestinal: Bowel Sounds present, soft, nontender.   Extremities: No edema.     LABS:              15.6   9.24  )-----------( 184      ( 17 Feb 2022 08:45 )             48.3     144  |  112<H>  |  17  ----------------------------<  134<H>  4.1   |  25  |  1.25    Ca    8.6      17 Feb 2022 08:45    TroponinI hsT: <-14.13       12 Lead ECG (02.11.22 @ 16:22):  Atrial fibrillation with premature ventricular or aberrantly conducted complexes  Left axis deviation  Pulmonary disease pattern  Incomplete right bundle branch block    TTE Echo Complete w/o Contrast w/ Doppler (02.12.22 @ 11:55) >   The left ventricle is normal in size and wall thickness.   Wall motion abnormalities are noted with preserved LV systolic function.   Estimated left ventricular ejection fraction is 50-55 %.   The left atrium is severely dilated.   The right atrium appears moderately dilated.   A PFO is noted with color doppler.   Mild (1+) aortic regurgitation.   Mild (1+) mitral regurgitation.   Moderate (2+) tricuspid valve regurgitation is present.

## 2022-02-17 NOTE — PROGRESS NOTE ADULT - SUBJECTIVE AND OBJECTIVE BOX
Reason for Admission: acute CVA  History of Present Illness:     Pt is a 87 y/o/m w/ hx AAA s/p EVAR 2021, AFIB on ASA, [place], HTN, PD, patent PFO came to ED for evaluation for difficulty speaking    Today he called his doctor who advised him to go to the ED (+) some difficulty swallowing;  usually has to take small portions     Hospital course: Patient on admission was noted to have small acute * L frontal parietal infarcts (not a TPA candidate) - risk factor was identified atrial fibrillation (not on AC due to hemorrhagic shock last year, only on ASA for 3 weeks). At that time we discussed care with Dr. Ochoa - no contraindication for AC from surgical perspective. Care discussed with Dr. Rasheed.  Upon starting AC -  patient was doing well (PCP - Dr. Arredondo was updated). Patient on 2/15 developed upper extremity shakes -  workup demonstrated New hemorrhage left frontal precentral gyrus. Family updated.     Due to recent strokes /  poor candidate for AC -  team would want patient to be re-evaluated by EP -  for watchman's procedure.     INTERVAL HPI: ambulated, able to express himself, concerned about sedation with keppra  Other ROS reviewed and neg     Vital Signs Last 24 Hrs  T(C): 36.9 (17 Feb 2022 12:11), Max: 36.9 (17 Feb 2022 12:11)  T(F): 98.5 (17 Feb 2022 12:11), Max: 98.5 (17 Feb 2022 12:11)  HR: 75 (17 Feb 2022 06:12) (62 - 94)  BP: 99/62 (17 Feb 2022 06:12) (99/62 - 117/64)  BP(mean): 71 (17 Feb 2022 06:12) (67 - 97)  RR: --  SpO2: 96% (16 Feb 2022 21:00) (95% - 100%)                       15.6   9.24  )-----------( 184      ( 17 Feb 2022 08:45 )             48.3     17 Feb 2022 08:45    144    |  112    |  17     ----------------------------<  134    4.1     |  25     |  1.25     Ca    8.6        17 Feb 2022 08:45    MEDICATIONS  (STANDING):  aspirin  chewable 81 milliGRAM(s) Oral daily  atorvastatin 80 milliGRAM(s) Oral at bedtime  digoxin     Tablet 125 MICROGram(s) Oral daily  fluticasone propionate 50 MICROgram(s)/spray Nasal Spray 1 Spray(s) Both Nostrils two times a day  levETIRAcetam 250 milliGRAM(s) Oral daily  levETIRAcetam 750 milliGRAM(s) Oral at bedtime  meclizine 25 milliGRAM(s) Oral daily  melatonin 3 milliGRAM(s) Oral at bedtime  metoprolol succinate ER 25 milliGRAM(s) Oral every 12 hours  rOPINIRole 1 milliGRAM(s) Oral <User Schedule>  rOPINIRole 0.5 milliGRAM(s) Oral <User Schedule>  sodium chloride 0.9% Bolus 500 milliLiter(s) IV Bolus once    MEDICATIONS  (PRN):  acetaminophen     Tablet .. 650 milliGRAM(s) Oral every 6 hours PRN Temp greater or equal to 38C (100.4F), Mild Pain (1 - 3)  aluminum hydroxide/magnesium hydroxide/simethicone Suspension 30 milliLiter(s) Oral every 4 hours PRN Dyspepsia  artificial  tears Solution 1 Drop(s) Both EYES four times a day PRN Dry Eyes  hydrocortisone 1% Topical Cream - Peds 1 Application(s) Topical three times a day PRN Itching  meclizine 25 milliGRAM(s) Oral three times a day PRN Dizziness    RADIOLOGY: personally visualized    All available medical records personally reviewed    PHYSICAL EXAM:    General: elderly male in no acute distress  Eyes: PERRLA, EOMI; conjunctiva and sclera clear  Head: Normocephalic; atraumatic  ENMT: No nasal discharge; airway clear  Neck: Supple; non tender; no masses  Respiratory: No wheezes, rales or rhonchi  Cardiovascular: S1, S2 irreg  Gastrointestinal: Soft non-tender non-distended; Normal bowel sounds  Genitourinary: No costovertebral angle tenderness  Extremities: No clubbing, cyanosis or edema  Vascular: Peripheral pulses palpable 2+ bilaterally  Neurological: Alert and oriented x4, mild dysarthria, other neurologic exam is neg  Skin: Warm and dry.  Musculoskeletal: Normal tone  Psychiatric: Cooperative and appropriate

## 2022-02-17 NOTE — PROGRESS NOTE ADULT - SUBJECTIVE AND OBJECTIVE BOX
Interval History:  2/17/22: States that he feels sleepy in the AM after taking Keppra    MEDICATIONS  (STANDING):  atorvastatin 80 milliGRAM(s) Oral at bedtime  digoxin     Tablet 125 MICROGram(s) Oral daily  fluticasone propionate 50 MICROgram(s)/spray Nasal Spray 1 Spray(s) Both Nostrils two times a day  levETIRAcetam  Solution 500 milliGRAM(s) Oral two times a day  meclizine 25 milliGRAM(s) Oral daily  melatonin 3 milliGRAM(s) Oral at bedtime  metoprolol succinate ER 25 milliGRAM(s) Oral every 12 hours  rOPINIRole 1 milliGRAM(s) Oral <User Schedule>  rOPINIRole 0.25 milliGRAM(s) Oral <User Schedule>  sodium chloride 0.9% Bolus 500 milliLiter(s) IV Bolus once    MEDICATIONS  (PRN):  acetaminophen     Tablet .. 650 milliGRAM(s) Oral every 6 hours PRN Temp greater or equal to 38C (100.4F), Mild Pain (1 - 3)  aluminum hydroxide/magnesium hydroxide/simethicone Suspension 30 milliLiter(s) Oral every 4 hours PRN Dyspepsia  artificial  tears Solution 1 Drop(s) Both EYES four times a day PRN Dry Eyes  hydrocortisone 1% Topical Cream - Peds 1 Application(s) Topical three times a day PRN Itching  meclizine 25 milliGRAM(s) Oral three times a day PRN Dizziness      Allergies    penicillin (Rash)    Intolerances        PHYSICAL EXAM:  Vital Signs Last 24 Hrs  T(F): 98.1 (02-17-22 @ 08:45)  HR: 75 (02-17-22 @ 06:12)  BP: 99/62 (02-17-22 @ 06:12)  RR: --    GENERAL: NAD, well-groomed, well-developed  HEAD:  Atraumatic, Normocephalic  Neuro:  Awake, alert, no aphasia  CN: PERRL, EOMI, no nystagmus, no facial weakness, tongue protrudes in the midline, mild dysarthria  motor: No drift 5-/5 in RUE, 5/5 in LUE, 5/5 in b/l lower extremities  sensory: intact to light touch  coordination: Mild dysmetria on finger to nose on right, intact on left  DTRs: symmetric, plantar responses flexor bilaterally    NIH Score 1        LABS:                        15.6   9.24  )-----------( 184      ( 17 Feb 2022 08:45 )             48.3     02-17    144  |  112<H>  |  17  ----------------------------<  134<H>  4.1   |  25  |  1.25    Ca    8.6      17 Feb 2022 08:45            RADIOLOGY & ADDITIONAL STUDIES:  CT head, CTA head and neck 2/11/22:  *  VERY SMALL POSTERIOR LEFT FRONTAL INFARCT WHICH IS OCCURRED IN THE   INTERIM FROM 12/22/2021  *  NO HEMODYNAMIC SIGNIFICANT NARROWING WITHIN THE NECK.  *  NO PROXIMAL LARGE VESSEL OCCLUSION INTRACRANIALLY.  *  PERFUSION IMAGING DEMONSTRATES REGIONS OF HYPOPERFUSION/ISCHEMIA   INVOLVING THE BILATERAL TEMPORAL OCCIPITAL REGIONS AND RIGHT PARIETAL   LOBE WITH A VOLUME OF 32 ML. NO CORE INFARCT DEMONSTRATED.    MRI head 2/11/22:  FEW SMALL ACUTE LEFT FRONTAL PARIETAL INFARCTS. NO ASSOCIATED BLOOD   PRODUCTS. NO SIGNIFICANT MASS EFFECT    TTE Echo Complete w/o Contrast w/ Doppler (02.12.22 @ 11:55)      Summary     Estimated left ventricular ejection fraction is 50-55 %.   The left ventricle is normal in size and wall thickness.    CT head 2/14/22:  Age-appropriate involutional and ischemic gliotic changes.   New hemorrhage left frontal precentral gyrus.    CT head 2/15/22:  No significant interval change.    EEG 2/15/22:  Normal EEG, afib    CT head 2/16/22:    Acute subarachnoid hemorrhage within a sulcus of the left frontal lobe   appears unchanged since prior exam. No new hemorrhage present.

## 2022-02-18 ENCOUNTER — TRANSCRIPTION ENCOUNTER (OUTPATIENT)
Age: 87
End: 2022-02-18

## 2022-02-18 VITALS — SYSTOLIC BLOOD PRESSURE: 110 MMHG | DIASTOLIC BLOOD PRESSURE: 71 MMHG | HEART RATE: 79 BPM | OXYGEN SATURATION: 94 %

## 2022-02-18 LAB
ANION GAP SERPL CALC-SCNC: 7 MMOL/L — SIGNIFICANT CHANGE UP (ref 5–17)
BUN SERPL-MCNC: 15 MG/DL — SIGNIFICANT CHANGE UP (ref 7–23)
CALCIUM SERPL-MCNC: 8.4 MG/DL — LOW (ref 8.5–10.1)
CHLORIDE SERPL-SCNC: 114 MMOL/L — HIGH (ref 96–108)
CO2 SERPL-SCNC: 23 MMOL/L — SIGNIFICANT CHANGE UP (ref 22–31)
CREAT SERPL-MCNC: 1.19 MG/DL — SIGNIFICANT CHANGE UP (ref 0.5–1.3)
GLUCOSE SERPL-MCNC: 120 MG/DL — HIGH (ref 70–99)
HCT VFR BLD CALC: 46.6 % — SIGNIFICANT CHANGE UP (ref 39–50)
HGB BLD-MCNC: 15.3 G/DL — SIGNIFICANT CHANGE UP (ref 13–17)
MCHC RBC-ENTMCNC: 30.1 PG — SIGNIFICANT CHANGE UP (ref 27–34)
MCHC RBC-ENTMCNC: 32.8 GM/DL — SIGNIFICANT CHANGE UP (ref 32–36)
MCV RBC AUTO: 91.7 FL — SIGNIFICANT CHANGE UP (ref 80–100)
PLATELET # BLD AUTO: 184 K/UL — SIGNIFICANT CHANGE UP (ref 150–400)
POTASSIUM SERPL-MCNC: 4.1 MMOL/L — SIGNIFICANT CHANGE UP (ref 3.5–5.3)
POTASSIUM SERPL-SCNC: 4.1 MMOL/L — SIGNIFICANT CHANGE UP (ref 3.5–5.3)
RBC # BLD: 5.08 M/UL — SIGNIFICANT CHANGE UP (ref 4.2–5.8)
RBC # FLD: 16.1 % — HIGH (ref 10.3–14.5)
SODIUM SERPL-SCNC: 144 MMOL/L — SIGNIFICANT CHANGE UP (ref 135–145)
WBC # BLD: 9.04 K/UL — SIGNIFICANT CHANGE UP (ref 3.8–10.5)
WBC # FLD AUTO: 9.04 K/UL — SIGNIFICANT CHANGE UP (ref 3.8–10.5)

## 2022-02-18 PROCEDURE — 99233 SBSQ HOSP IP/OBS HIGH 50: CPT

## 2022-02-18 PROCEDURE — 99232 SBSQ HOSP IP/OBS MODERATE 35: CPT

## 2022-02-18 PROCEDURE — 99239 HOSP IP/OBS DSCHRG MGMT >30: CPT

## 2022-02-18 RX ORDER — METOPROLOL TARTRATE 50 MG
1 TABLET ORAL
Qty: 60 | Refills: 0
Start: 2022-02-18 | End: 2022-03-19

## 2022-02-18 RX ORDER — LEVETIRACETAM 250 MG/1
1 TABLET, FILM COATED ORAL
Qty: 30 | Refills: 0
Start: 2022-02-18 | End: 2022-03-19

## 2022-02-18 RX ORDER — DIGOXIN 250 MCG
1 TABLET ORAL
Qty: 30 | Refills: 0
Start: 2022-02-18 | End: 2022-03-19

## 2022-02-18 RX ORDER — METOPROLOL TARTRATE 50 MG
0.5 TABLET ORAL
Qty: 0 | Refills: 0 | DISCHARGE

## 2022-02-18 RX ORDER — ATORVASTATIN CALCIUM 80 MG/1
1 TABLET, FILM COATED ORAL
Qty: 30 | Refills: 0
Start: 2022-02-18 | End: 2022-03-19

## 2022-02-18 RX ORDER — METOPROLOL TARTRATE 50 MG
1 TABLET ORAL
Qty: 0 | Refills: 0 | DISCHARGE

## 2022-02-18 RX ORDER — METOPROLOL TARTRATE 50 MG
1 TABLET ORAL
Qty: 0 | Refills: 0 | DISCHARGE
Start: 2022-02-18

## 2022-02-18 RX ADMIN — Medication 81 MILLIGRAM(S): at 09:31

## 2022-02-18 RX ADMIN — ROPINIROLE 1 MILLIGRAM(S): 8 TABLET, FILM COATED, EXTENDED RELEASE ORAL at 08:44

## 2022-02-18 RX ADMIN — Medication 125 MICROGRAM(S): at 09:31

## 2022-02-18 RX ADMIN — Medication 25 MILLIGRAM(S): at 09:31

## 2022-02-18 RX ADMIN — Medication 1 APPLICATION(S): at 05:25

## 2022-02-18 RX ADMIN — LEVETIRACETAM 250 MILLIGRAM(S): 250 TABLET, FILM COATED ORAL at 09:31

## 2022-02-18 RX ADMIN — ROPINIROLE 0.5 MILLIGRAM(S): 8 TABLET, FILM COATED, EXTENDED RELEASE ORAL at 14:17

## 2022-02-18 NOTE — PROGRESS NOTE ADULT - PROBLEM SELECTOR PLAN 4
Segmental wall motion abnormality but no angina; consider outpatient nuclear stress test.
Segmental wall motion abnormality but no angina; consider outpatient nuclear stress test.
Continue BB. Will consider ischemia workup as opt.

## 2022-02-18 NOTE — DISCHARGE NOTE PROVIDER - HOSPITAL COURSE
85 y/o male with PMHx of AAA s/p EVAR 2021, AFIB on ASA as previously bled on [facility], [place], HTN, PD, patent PFO p/w difficulty speaking - pt was diagnosed with small acute * L frontal parietal infarcts (not a TPA candidate) not on AC due to hemorrhagic shock last year. Pt was started on full dose eliquis after discussion with Dr. Ochoa and Dr. Rasheed. Patient on 2/15 developed upper extremity shakes -  workup demonstrated New hemorrhage in left frontal precentral gyrus. SAH remained stable and ASA restarted.  Pt to follow up wit cardiology and neurology and decide about Watchman's possibility with low dose AC eliquis.  All chart reviewed.  Medically stable for DC.  Time spent 68 min.  Discussed with outpatient cardiologist.

## 2022-02-18 NOTE — DISCHARGE NOTE PROVIDER - ATTENDING DISCHARGE PHYSICAL EXAMINATION:
General: elderly male in no acute distress  Eyes: PERRLA, EOMI; conjunctiva and sclera clear  Head: Normocephalic; atraumatic  ENMT: No nasal discharge; airway clear  Neck: Supple; non tender; no masses  Respiratory: No wheezes, rales or rhonchi  Cardiovascular: S1, S2 irreg  Gastrointestinal: Soft non-tender non-distended; Normal bowel sounds  Genitourinary: No costovertebral angle tenderness  Extremities: No clubbing, cyanosis or edema  Vascular: Peripheral pulses palpable 2+ bilaterally  Neurological: Alert and oriented x4, mild dysarthria, other neurologic exam is neg  Skin: Warm and dry.  Musculoskeletal: Normal tone  Psychiatric: Cooperative and appropriate

## 2022-02-18 NOTE — DISCHARGE NOTE PROVIDER - NSDCCPCAREPLAN_GEN_ALL_CORE_FT
PRINCIPAL DISCHARGE DIAGNOSIS  Diagnosis: Stroke  Assessment and Plan of Treatment: continue ASA and statin - f/u with neurologist      SECONDARY DISCHARGE DIAGNOSES  Diagnosis: Atrial fibrillation  Assessment and Plan of Treatment: no AC    Diagnosis: SAH (subarachnoid hemorrhage)  Assessment and Plan of Treatment: no AC - decide about future Watchman's procedure

## 2022-02-18 NOTE — DISCHARGE NOTE PROVIDER - NSDCMRMEDTOKEN_GEN_ALL_CORE_FT
Aspirin Enteric Coated 81 mg oral delayed release tablet: 1 tab(s) orally once a day  atorvastatin 80 mg oral tablet: 1 tab(s) orally once a day (at bedtime)  digoxin 125 mcg (0.125 mg) oral tablet: 1 tab(s) orally once a day  Dry Eye Relief ophthalmic solution: 1 drop(s) to each affected eye 4 times a day, As Needed  fluticasone 50 mcg/inh nasal spray: 1 spray(s) nasal once a day  hydrocortisone 1% topical cream: Apply topically to affected area 3 times a day, As Needed for eczema  levETIRAcetam 250 mg oral tablet: 1 tab(s) orally once a day  levETIRAcetam 750 mg oral tablet: 1 tab(s) orally once a day (at bedtime)  meclizine 25 mg oral tablet: 1 tab(s) orally 3 times a day, As Needed  meclizine 25 mg oral tablet: 1 tab(s) orally once a day  metoprolol succinate 25 mg oral tablet, extended release: 1 tab(s) orally every 12 hours  rOPINIRole 0.5 mg oral tablet: 2 tab(s) orally once a day (in the morning)  rOPINIRole 0.5 mg oral tablet: 1 tab(s) orally 2 times a day in the afternoon and evening

## 2022-02-18 NOTE — PROGRESS NOTE ADULT - SUBJECTIVE AND OBJECTIVE BOX
REASON FOR VISIT: CVA, AF    HPI:  86 year old man with a history of atrial fibrillation (taking aspirin at time of admission), repaired AAA, recent COVID-19 infection, Parkinson's Disease, HTN, patent foramen ovale, admitted with small, acute L frontal / parietal infarcts with course complicated by acute SAH    2/17/22: Eager to return home; no new complaints.  2/18/22  Patient is feeling well , his heart rate resting controlled rate , was tachycardic with activity ( while walking ) in 140s denies any sob     MEDICATIONS  (STANDING):  aspirin  chewable 81 milliGRAM(s) Oral daily  atorvastatin 80 milliGRAM(s) Oral at bedtime  digoxin     Tablet 125 MICROGram(s) Oral daily  fluticasone propionate 50 MICROgram(s)/spray Nasal Spray 1 Spray(s) Both Nostrils two times a day  levETIRAcetam 250 milliGRAM(s) Oral daily  levETIRAcetam 750 milliGRAM(s) Oral at bedtime  meclizine 25 milliGRAM(s) Oral daily  melatonin 3 milliGRAM(s) Oral at bedtime  metoprolol succinate ER 25 milliGRAM(s) Oral every 12 hours  rOPINIRole 1 milliGRAM(s) Oral <User Schedule>  rOPINIRole 0.5 milliGRAM(s) Oral <User Schedule>  sodium chloride 0.9% Bolus 500 milliLiter(s) IV Bolus once    MEDICATIONS  (PRN):  acetaminophen     Tablet .. 650 milliGRAM(s) Oral every 6 hours PRN Temp greater or equal to 38C (100.4F), Mild Pain (1 - 3)  aluminum hydroxide/magnesium hydroxide/simethicone Suspension 30 milliLiter(s) Oral every 4 hours PRN Dyspepsia  artificial  tears Solution 1 Drop(s) Both EYES four times a day PRN Dry Eyes  hydrocortisone 1% Topical Cream - Peds 1 Application(s) Topical three times a day PRN Itching  meclizine 25 milliGRAM(s) Oral three times a day PRN Dizziness      Vital Signs Last 24 Hrs  T(C): 36.6 (18 Feb 2022 08:30), Max: 37.2 (18 Feb 2022 06:09)  T(F): 97.9 (18 Feb 2022 08:30), Max: 98.9 (18 Feb 2022 06:09)  HR: 92 (18 Feb 2022 06:00) (69 - 96)  BP: 108/69 (18 Feb 2022 06:00) (95/61 - 129/92)  BP(mean): 78 (18 Feb 2022 06:00) (55 - 100)  RR: --  SpO2: 94% (18 Feb 2022 06:00) (89% - 98%)    I&O's Summary    PHYSICAL EXAM:  Constitutional: NAD, awake and alert, seated in bedside chair  Respiratory: Breath sounds are clear bilaterally, No wheezing, rales or rhonchi  Cardiovascular: Irregular, normal rate  Gastrointestinal: Bowel Sounds present, soft, nontender.   Extremities: No edema.     LABS:                              15.3   9.04  )-----------( 184      ( 18 Feb 2022 07:12 )             46.6     02-18    144  |  114<H>  |  15  ----------------------------<  120<H>  4.1   |  23  |  1.19    Ca    8.4<L>      18 Feb 2022 07:12                02-12 Chol 144 LDL -- HDL 50 Trig 69    12 Lead ECG (02.11.22 @ 16:22):  Atrial fibrillation with premature ventricular or aberrantly conducted complexes  Left axis deviation  Pulmonary disease pattern  Incomplete right bundle branch block    TTE Echo Complete w/o Contrast w/ Doppler (02.12.22 @ 11:55) >   The left ventricle is normal in size and wall thickness.   Wall motion abnormalities are noted with preserved LV systolic function.   Estimated left ventricular ejection fraction is 50-55 %.   The left atrium is severely dilated.   The right atrium appears moderately dilated.   A PFO is noted with color doppler.   Mild (1+) aortic regurgitation.   Mild (1+) mitral regurgitation.   Moderate (2+) tricuspid valve regurgitation is present.    Monitor afib with CVR PVCS  non signficant pauses ,PVCS   episode of tachycardia with physical activity

## 2022-02-18 NOTE — PROGRESS NOTE ADULT - ASSESSMENT
Pt is a 86y old Male with hx of  AAA s/p EVAR 2021, AFIB on ASA, [place], HTN, PD, patent PFO came to ED for evaluation for difficulty speaking and swallowing; he called his doctor, who advised him to go to the ED. Since then, the patient on admission was noted to have minor acute * L frontal-parietal infarcts (not a TPA candidate); the risk factor was identified atrial fibrillation (not on AC due to hemorrhagic shock last year, only year on ASA for three weeks). At that time, we discussed care with Dr. Ochoa - no contraindication for AC from a surgical perspective. Care was discussed with Dr. Rasheed. Patient on 2/15 developed upper extremity shakes -  workup demonstrated New hemorrhage left frontal precentral gyrus. Palliative medicine consulted to help establish GOC and advance care planning.     1) Acute CVA L frontoparietal on admission complicated with New hemorrhage left frontal precentral gyrus  - was not a tpa candidate   - neuro surgery consult appreciated - No neurosurgery intervention  - Holding AC for now   - neurology consult appreciated   - EEG ordered   - neuro checks as per neurology - q4hrs right now     2) debility   - PT consult when able to participate   - supportive care   - fall risk     3) Afib   - holding AC for now   - cardiology consult   - monitor     4) s/p covid   - treated with monoclonal antibodies   - no signs of infection now     5) advance care planning   - patient appears to have capacity to make decisions   - will reach out to patients wife to schedule GOC meeting   - full code   - please see note

## 2022-02-18 NOTE — PROGRESS NOTE ADULT - PROBLEM SELECTOR PLAN 1
Acute small CVAs + SAH; neurologist notes reviewed -- stable appearance on serial CTs; cleared to resume aspirin.
Acute small CVAs + SAH; neurologist notes reviewed -- stable appearance on serial CTs; cleared to resume aspirin.
Anticoagulation held. Pt will need evaluation for possible watchman  once his neurologic status improves , there is high risk of recurrent stroke ,

## 2022-02-18 NOTE — PROGRESS NOTE ADULT - REASON FOR ADMISSION
acute CVA

## 2022-02-18 NOTE — PROGRESS NOTE ADULT - ASSESSMENT
86 year old man presented with complaints of 5 days of dysarthria, difficulty swallowing but weakness.   In the ED, NIHSS was 3. He was not a tpa candidate since symptoms had been present for several days.  He reports that he had been started on aspirin about two weeks earlier.   He has a history of atrial fibrillation. Outpatient cardiology note from December 2021 states that he was off Coumadin due to hematuria and recent surgeries.    Stroke  -History of a-fib  -Was off AC due to history of hemorrhagic complications post-op. Eliquis started during this admission.  -On 2/14/22 developed abnormal movements in right upper extremity and dysmetria. Repeat head CT showed SAH and Eliquis stopped  -High risk for repeat stroke. Cardiology eval appreciated. Can discuss Watchman after he recovers.  -telemetry  -PT  -Cleared to restart aspirin 81 mg/day    Subarachnoid hemorrhage  -Repeat head CTs have shown that hemorrhage is stable.    -Keep SBP < 140      Abnormal movements of right arm  -? focal seizures in the setting of SAH  -EEG did not show epileptiform activity  -Continue Keppra. Since he is having drowsiness in AM, will change dosing to 250 mg in the AM and 750 mg at night.   -Will repeat EEG in office after hemorrhage is resolved    Parkinson's  -Has some chronic dysarthria and possible dysphagia  -Eats soft foods at home  -Continue ropinirole 1 mg in the AM, 0.5 mg in the afternoon and 0.5 mg in the PM    Plan is for d/c today.  Patient can f/u with me in the office.

## 2022-02-18 NOTE — DISCHARGE NOTE PROVIDER - CARE PROVIDERS DIRECT ADDRESSES
,[email or web address] Medical [email or web address]

## 2022-02-18 NOTE — PROGRESS NOTE ADULT - SUBJECTIVE AND OBJECTIVE BOX
Interval History:  2/18/22: No new complaints    MEDICATIONS  (STANDING):  aspirin  chewable 81 milliGRAM(s) Oral daily  atorvastatin 80 milliGRAM(s) Oral at bedtime  digoxin     Tablet 125 MICROGram(s) Oral daily  fluticasone propionate 50 MICROgram(s)/spray Nasal Spray 1 Spray(s) Both Nostrils two times a day  levETIRAcetam 250 milliGRAM(s) Oral daily  levETIRAcetam 750 milliGRAM(s) Oral at bedtime  meclizine 25 milliGRAM(s) Oral daily  melatonin 3 milliGRAM(s) Oral at bedtime  metoprolol succinate ER 25 milliGRAM(s) Oral every 12 hours  rOPINIRole 1 milliGRAM(s) Oral <User Schedule>  rOPINIRole 0.5 milliGRAM(s) Oral <User Schedule>  sodium chloride 0.9% Bolus 500 milliLiter(s) IV Bolus once    MEDICATIONS  (PRN):  acetaminophen     Tablet .. 650 milliGRAM(s) Oral every 6 hours PRN Temp greater or equal to 38C (100.4F), Mild Pain (1 - 3)  aluminum hydroxide/magnesium hydroxide/simethicone Suspension 30 milliLiter(s) Oral every 4 hours PRN Dyspepsia  artificial  tears Solution 1 Drop(s) Both EYES four times a day PRN Dry Eyes  hydrocortisone 1% Topical Cream - Peds 1 Application(s) Topical three times a day PRN Itching  meclizine 25 milliGRAM(s) Oral three times a day PRN Dizziness      Allergies    penicillin (Rash)    Intolerances        PHYSICAL EXAM:  Vital Signs Last 24 Hrs  T(F): 97 (02-18-22 @ 12:30)  HR: 85 (02-18-22 @ 10:00)  BP: 96/58 (02-18-22 @ 10:00)  RR: --    GENERAL: NAD, well-groomed  HEAD:  Atraumatic, Normocephalic  Neuro:  Awake, alert, no aphasia  CN: PERRL, EOMI, no nystagmus, no facial weakness, tongue protrudes in the midline, mild dysarthria  motor: No drift 5-/5 in RUE, 5/5 in LUE, 5/5 in b/l lower extremities  sensory: intact to light touch  coordination: Mild dysmetria on finger to nose on right, intact on left  DTRs: symmetric, plantar responses flexor bilaterally        LABS:                        15.3   9.04  )-----------( 184      ( 18 Feb 2022 07:12 )             46.6     02-18    144  |  114<H>  |  15  ----------------------------<  120<H>  4.1   |  23  |  1.19    Ca    8.4<L>      18 Feb 2022 07:12          RADIOLOGY & ADDITIONAL STUDIES:  CT head, CTA head and neck 2/11/22:  *  VERY SMALL POSTERIOR LEFT FRONTAL INFARCT WHICH IS OCCURRED IN THE   INTERIM FROM 12/22/2021  *  NO HEMODYNAMIC SIGNIFICANT NARROWING WITHIN THE NECK.  *  NO PROXIMAL LARGE VESSEL OCCLUSION INTRACRANIALLY.  *  PERFUSION IMAGING DEMONSTRATES REGIONS OF HYPOPERFUSION/ISCHEMIA   INVOLVING THE BILATERAL TEMPORAL OCCIPITAL REGIONS AND RIGHT PARIETAL   LOBE WITH A VOLUME OF 32 ML. NO CORE INFARCT DEMONSTRATED.    MRI head 2/11/22:  FEW SMALL ACUTE LEFT FRONTAL PARIETAL INFARCTS. NO ASSOCIATED BLOOD   PRODUCTS. NO SIGNIFICANT MASS EFFECT    TTE Echo Complete w/o Contrast w/ Doppler (02.12.22 @ 11:55)      Summary     Estimated left ventricular ejection fraction is 50-55 %.   The left ventricle is normal in size and wall thickness.    CT head 2/14/22:  Age-appropriate involutional and ischemic gliotic changes.   New hemorrhage left frontal precentral gyrus.    CT head 2/15/22:  No significant interval change.    EEG 2/15/22:  Normal EEG, afib    CT head 2/16/22:    Acute subarachnoid hemorrhage within a sulcus of the left frontal lobe   appears unchanged since prior exam. No new hemorrhage present.

## 2022-02-18 NOTE — PROGRESS NOTE ADULT - SUBJECTIVE AND OBJECTIVE BOX
HPI: patient seen and examined with no family at bedside, patient resting comfortably at this time would like to go home.  GOC meeting held today with patient wife.     PAIN: ( )Yes   (x )No  patient denies   DYSPNEA: ( ) Yes  (x ) No  Level:    Review of Systems:  Fatigue-yes  Weakness-yes    All other systems reviewed and negative    PHYSICAL EXAM:    Vital Signs Last 24 Hrs  T(C): 36.6 (2022 08:30), Max: 37.2 (2022 06:09)  T(F): 97.9 (2022 08:30), Max: 98.9 (2022 06:09)  HR: 85 (2022 10:00) (69 - 96)  BP: 96/58 (2022 10:00) (95/61 - 129/92)  BP(mean): 67 (2022 10:00) (55 - 100)  SpO2: 94% (2022 06:00) (89% - 98%)   Daily Weight in k.5 (2022 05:07)    PPSV2: 40  %    General: elderly gentleman in bed, NAD   Mental Status: alert and oriented, + dysarthria, some periods of confusion or forgetfulness   HEENT: mmm   Lungs: diminished breath sounds b/l   Cardiac: irregular S1S2   GI: nontender, non distended + BS   : no suprapubic tenderness   Ext: mild edema   Neuro:  weakness on right side     LABS:                        15.3   9.04  )-----------( 184      ( 2022 07:12 )             46.6         144  |  114<H>  |  15  ----------------------------<  120<H>  4.1   |  23  |  1.19    Ca    8.4<L>      2022 07:12    Albumin: Albumin, Serum: 2.7 g/dL (02-15 @ 05:44)      Allergies    penicillin (Rash)    Intolerances      MEDICATIONS  (STANDING):  aspirin  chewable 81 milliGRAM(s) Oral daily  atorvastatin 80 milliGRAM(s) Oral at bedtime  digoxin     Tablet 125 MICROGram(s) Oral daily  fluticasone propionate 50 MICROgram(s)/spray Nasal Spray 1 Spray(s) Both Nostrils two times a day  levETIRAcetam 250 milliGRAM(s) Oral daily  levETIRAcetam 750 milliGRAM(s) Oral at bedtime  meclizine 25 milliGRAM(s) Oral daily  melatonin 3 milliGRAM(s) Oral at bedtime  metoprolol succinate ER 25 milliGRAM(s) Oral every 12 hours  rOPINIRole 1 milliGRAM(s) Oral <User Schedule>  rOPINIRole 0.5 milliGRAM(s) Oral <User Schedule>  sodium chloride 0.9% Bolus 500 milliLiter(s) IV Bolus once    MEDICATIONS  (PRN):  acetaminophen     Tablet .. 650 milliGRAM(s) Oral every 6 hours PRN Temp greater or equal to 38C (100.4F), Mild Pain (1 - 3)  aluminum hydroxide/magnesium hydroxide/simethicone Suspension 30 milliLiter(s) Oral every 4 hours PRN Dyspepsia  artificial  tears Solution 1 Drop(s) Both EYES four times a day PRN Dry Eyes  hydrocortisone 1% Topical Cream - Peds 1 Application(s) Topical three times a day PRN Itching  meclizine 25 milliGRAM(s) Oral three times a day PRN Dizziness      RADIOLOGY:    ACC: [id number] EXAM:  CT BRAIN                        PROCEDURE DATE:  2022    INTERPRETATION:  Exam Date: 2022 9:02 AM  CT head without IV contrast  CLINICAL INFORMATION:  f/u SAH  TECHNIQUE: Contiguous axial sections were obtained through the head.     Coronal and sagittal reformats were obtained.  COMPARISON: CT head 2/15/2022    FINDINGS:  Acute subarachnoid hemorrhage within a sulcus of the left frontal lobe   appears unchanged since prior exam. No new hemorrhage present.   There is   no CT evidence of large vessel acute infarct. No mass effect is found in   the brain.  No evidence of midline shift or herniation pattern.  The ventricles, sulci and basal cisterns appear unremarkable.  Visualized paranasalsinuses are clear.    IMPRESSION:  Acute subarachnoid hemorrhage within a sulcus of the left frontal lobe   appears unchanged since prior exam. No new hemorrhage present.

## 2022-02-18 NOTE — PROGRESS NOTE ADULT - PROBLEM SELECTOR PLAN 2
Rate is satisfactory controlled (at rest); continue metoprolol.  If it is not feasible to resume anticoagulation in the near future then Watchman is an option -- I discussed with patient and he is reluctant but plans to discuss further with Dr Tamayo in outpatient setting.
Rate is satisfactory controlled (at rest); continue metoprolol.  If it is not feasible to resume anticoagulation in the near future then Watchman is an option -- I discussed with patient and he is reluctant but plans to discuss further with Dr Tamayo in outpatient setting.
Rate improved. Continue metoprolol.

## 2022-02-18 NOTE — DISCHARGE NOTE PROVIDER - CARE PROVIDER_API CALL
Palla, Venugopal R (MD)  Cardiovascular Disease; Internal Medicine  [address], [place] [id number]  Phone: [phone]  Fax: [phone]  Follow Up Time:     Michelle Rasheed)  Clinical Neurophysiology; EEGEpilepsy; Neurology; Sleep Medicine  [address] [place] [address], [address] [place] [address]  Phone: [phone]  Fax: [phone]  Follow Up Time:

## 2022-02-18 NOTE — PROGRESS NOTE ADULT - CONVERSATION DETAILS
ff I met with the patient and his wife at the bedside; the patient is clear that he would like to go home and be open to home care services. The patient explained his medical history and would like to follow up with his Cardiologist that he has seen for the past 20+ years. The patient wife is concerned about the patient coming home as she works three jobs. One of her jobs is helping care for a gentleman who had a stroke 15 years ago, so the patient stated that she could take care of him instead.   Reviewed MOLST form at this time no limits set, the patient stated his wife knows what his wishes are, but that he wouldn't want to live on a ventilator or if there was no quality of life. We discussed his quality of life; he stated that if he could still be part of a conversation, he would want everything done.    Emotional Support provided will sign off at this time.

## 2022-02-18 NOTE — PROGRESS NOTE ADULT - PROVIDER SPECIALTY LIST ADULT
Hospitalist
Neurology
Palliative Care
Hospitalist
Hospitalist
Neurology
Cardiology
Hospitalist
Neurology
Neurology
Neurosurgery
Electrophysiology
Electrophysiology
Neurology
Cardiology
Cardiology
Palliative Care

## 2022-02-23 ENCOUNTER — NON-APPOINTMENT (OUTPATIENT)
Age: 87
End: 2022-02-23

## 2022-02-28 ENCOUNTER — NON-APPOINTMENT (OUTPATIENT)
Age: 87
End: 2022-02-28

## 2022-03-02 DIAGNOSIS — R29.810 FACIAL WEAKNESS: ICD-10-CM

## 2022-03-02 DIAGNOSIS — I10 ESSENTIAL (PRIMARY) HYPERTENSION: ICD-10-CM

## 2022-03-02 DIAGNOSIS — Z98.890 OTHER SPECIFIED POSTPROCEDURAL STATES: ICD-10-CM

## 2022-03-02 DIAGNOSIS — Z86.19 PERSONAL HISTORY OF OTHER INFECTIOUS AND PARASITIC DISEASES: ICD-10-CM

## 2022-03-02 DIAGNOSIS — H91.90 UNSPECIFIED HEARING LOSS, UNSPECIFIED EAR: ICD-10-CM

## 2022-03-02 DIAGNOSIS — R13.10 DYSPHAGIA, UNSPECIFIED: ICD-10-CM

## 2022-03-02 DIAGNOSIS — R47.81 SLURRED SPEECH: ICD-10-CM

## 2022-03-02 DIAGNOSIS — Z79.82 LONG TERM (CURRENT) USE OF ASPIRIN: ICD-10-CM

## 2022-03-02 DIAGNOSIS — R47.1 DYSARTHRIA AND ANARTHRIA: ICD-10-CM

## 2022-03-02 DIAGNOSIS — U07.1 COVID-19: ICD-10-CM

## 2022-03-02 DIAGNOSIS — Z51.5 ENCOUNTER FOR PALLIATIVE CARE: ICD-10-CM

## 2022-03-02 DIAGNOSIS — Z86.73 PERSONAL HISTORY OF TRANSIENT ISCHEMIC ATTACK (TIA), AND CEREBRAL INFARCTION WITHOUT RESIDUAL DEFICITS: ICD-10-CM

## 2022-03-02 DIAGNOSIS — T45.515A ADVERSE EFFECT OF ANTICOAGULANTS, INITIAL ENCOUNTER: ICD-10-CM

## 2022-03-02 DIAGNOSIS — R29.703 NIHSS SCORE 3: ICD-10-CM

## 2022-03-02 DIAGNOSIS — I60.9 NONTRAUMATIC SUBARACHNOID HEMORRHAGE, UNSPECIFIED: ICD-10-CM

## 2022-03-02 DIAGNOSIS — Z86.16 PERSONAL HISTORY OF COVID-19: ICD-10-CM

## 2022-03-02 DIAGNOSIS — I48.20 CHRONIC ATRIAL FIBRILLATION, UNSPECIFIED: ICD-10-CM
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
## 2022-03-02 DIAGNOSIS — G20 PARKINSON'S DISEASE: ICD-10-CM

## 2022-03-02 DIAGNOSIS — I63.9 CEREBRAL INFARCTION, UNSPECIFIED: ICD-10-CM

## 2022-03-11 ENCOUNTER — APPOINTMENT (OUTPATIENT)
Dept: FAMILY MEDICINE | Facility: CLINIC | Age: 87
End: 2022-03-11
Payer: MEDICARE

## 2022-03-11 VITALS
WEIGHT: 184 LBS | TEMPERATURE: 98 F | BODY MASS INDEX: 26.34 KG/M2 | RESPIRATION RATE: 17 BRPM | DIASTOLIC BLOOD PRESSURE: 80 MMHG | SYSTOLIC BLOOD PRESSURE: 116 MMHG | HEIGHT: 70 IN | OXYGEN SATURATION: 95 % | HEART RATE: 72 BPM

## 2022-03-11 PROCEDURE — 99495 TRANSJ CARE MGMT MOD F2F 14D: CPT

## 2022-03-11 RX ORDER — METHYLPREDNISOLONE 4 MG/1
4 TABLET ORAL
Qty: 1 | Refills: 0 | Status: COMPLETED | COMMUNITY
Start: 2022-01-24 | End: 2022-03-11

## 2022-03-11 RX ORDER — DEXAMETHASONE 6 MG/1
6 TABLET ORAL DAILY
Qty: 10 | Refills: 0 | Status: COMPLETED | COMMUNITY
Start: 2022-02-02 | End: 2022-03-11

## 2022-03-11 RX ORDER — CLOTRIMAZOLE AND BETAMETHASONE DIPROPIONATE 10; .5 MG/G; MG/G
1-0.05 CREAM TOPICAL TWICE DAILY
Qty: 1 | Refills: 1 | Status: COMPLETED | COMMUNITY
Start: 2020-08-05 | End: 2022-03-11

## 2022-03-11 RX ORDER — AZITHROMYCIN 250 MG/1
250 TABLET, FILM COATED ORAL
Qty: 1 | Refills: 0 | Status: COMPLETED | COMMUNITY
Start: 2022-01-24 | End: 2022-03-11

## 2022-03-11 NOTE — ASSESSMENT
[FreeTextEntry1] : Acute stroke\par CVA\par Seen by Cardiologist Dr Nick, stopped aspirin.\par Dr Rasheed spoke to Dr. Martinez regarding patient \par  subarachnoid hemorrhage and not a hemorrhagic conversion of his stroke. \par Therefore, it is unclear if he would have another spontaneous bleed if restarted on AC.\par Dr. Martinez will be referring him to a specialist to at least discuss a Watchman procedure.\par Rx given for PT, Wife requesting PT script according to patient.\par Will discuss medication with Cardiologist

## 2022-03-11 NOTE — HISTORY OF PRESENT ILLNESS
[Post-hospitalization from ___ Hospital] : Post-hospitalization from [unfilled] Hospital [Admitted on: ___] : The patient was admitted on [unfilled] [Discharged on ___] : discharged on [unfilled] [Discharge Summary] : discharge summary [Pertinent Labs] : pertinent labs [Discharge Med List] : discharge medication list [Med Reconciliation] : medication reconciliation has been completed [Patient Contacted By: ____] : and contacted by [unfilled] [FreeTextEntry2] : Patient admitted with stroke. Started on Anticoagulation, had a SAH.\par Stopped medication.\par Weakness resolved\par

## 2022-03-11 NOTE — PHYSICAL EXAM
[Normal Sclera/Conjunctiva] : normal sclera/conjunctiva [Normal Rate] : normal rate  [Normal S1, S2] : normal S1 and S2 [Normal Gait] : normal gait [Normal] : affect was normal and insight and judgment were intact [de-identified] : a fib

## 2022-03-16 NOTE — DISCHARGE NOTE PROVIDER - NSDCQMMRS_NEU_ALL_CORE
5-Fu Counseling: 5-Fluorouracil Counseling:  I discussed with the patient the risks of 5-fluorouracil including but not limited to erythema, scaling, itching, weeping, crusting, and pain. 1 - No significant disability. Able to carry out all usual activities, despite some symptoms

## 2022-03-23 ENCOUNTER — APPOINTMENT (OUTPATIENT)
Dept: NEUROLOGY | Facility: CLINIC | Age: 87
End: 2022-03-23
Payer: MEDICARE

## 2022-03-23 VITALS
BODY MASS INDEX: 25.05 KG/M2 | WEIGHT: 175 LBS | SYSTOLIC BLOOD PRESSURE: 115 MMHG | HEART RATE: 59 BPM | TEMPERATURE: 97.9 F | HEIGHT: 70 IN | DIASTOLIC BLOOD PRESSURE: 73 MMHG

## 2022-03-23 PROCEDURE — 99214 OFFICE O/P EST MOD 30 MIN: CPT

## 2022-03-23 NOTE — DISCUSSION/SUMMARY
[FreeTextEntry1] : Mr. Hendricks is an 87 year old man who initially presented with right arm and jaw tremor.\par His exam is suggestive of Parkinson's Disease.\par He had a recent stroke and nontraumatic subarachnoid hemorrhage.\par \par Parkinsonism:\par He took Sinemet 25/100 once and felt strange so discontinued it.\par He is tolerating Ropinirole and it seems to be providing benefit.\par Can increase Ropinirole to 1mg in the AM, 1 mg in the afternoon and 0.5 mg in the evening  x 1 week and then 1 mg TID\par Stay well hydrated.\par Stand slowly\par Daily exercise\par \par Stroke\par -Recent stroke, likely embolic.\par -Had spontaneous SAH after Eliquis was started. Radiology felt it was more a SAH and not a hemorrhagic conversion of his stroke (although in similar region).\par -Cardiology considering Watchman procedure. If he is a candidate he can see Dr. Dewitt.\par -He was on Apixaban 5 mg BID. May be able to tolerate 2.5 mg BID. \par -Continue atorvastatin\par -Aspirin stopped as it will not provide benefit in prevention of embolic stroke\par \par Subarachnoid Hemorrahge\par -Occurred after starting Eliquis\par -He was on apixaban 5 mg BID.\par -Will repeat CT head.\par -Will repeat EEG.\par -If CT head shows resolution of hemorrhage and EEG is normal will reduce Keppra to 250-500.\par \par f/u 2-3 months, sooner if needed.\par \par Discussed with Dr. Martinez.

## 2022-03-23 NOTE — HISTORY OF PRESENT ILLNESS
[FreeTextEntry1] : Mr. Hendricks was last seen in the office on 1/11/21.\par Since that time he has had multiple medical issues.\par Most recently he was hospitalized on 2/11/22 with five days of dysarthria, difficulty swallowing and generalized weakness. Notably he recently had covid. The hospital chart was reviewed.\par In the ED, NIHSS was 3. 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 Repeat head CT showed SAH and Eliquis was stopped. He was started on Keppra 500 mg BID. This was later changed to 250 mg in the AM and 750 mg at night due to sedation in the morning.\par Repeat imaging was stable and he was restarted on aspirin (which had only recently been started a few weeks prior).\par \par He was discharged on 2/18/22.\par \par \par 3/23/22:\par He reports that he feels drowsy and feels "blah".\par He goes to bed at 3-4 AM and sleeps until 11 PM.\par He reports that his wife has a virus, is coughing like crazy and keeps him up.\par He typically goes to bed at 12 AM and sleeps until 10 AM.\par He denies depression/mood disturbance.\par \par He reports tremors in his right arm. \par Sometimes when using a fork he has some jerking of his arm. He feels it is related to weakness.\par He has not had any uncontrollable jerky movements.\par \par He would like to increase his ropinirole.\par \par He says that his speech is reasonable.\par \par He denies having any headache.\par \par He says that he is walking well.\par \par Aspirin was stopped by Dr. Nick. \par \par

## 2022-03-23 NOTE — PHYSICAL EXAM
[FreeTextEntry1] : Examination:\par Constitutional: normal, no apparent distress\par Eyes: normal conjunctiva b/l, no ptosis, visual fields full\par Respiratory: no respiratory distress, normal effort, normal auscultation\par Cardiovascular: normal rate, rhythm, no murmurs\par Neck: supple, no masses\par Vascular: carotids normal\par Skin: patchy raised areas on lower abdomen and back\par Psych: normal mood, affect\par \par Neurological:\par Memory: normal memory, oriented to person, place, time\par Language intact/no aphasia\par Cranial Nerves: Pupils equally round and reactive to light, ocular muscles/movements intact, no ptosis, no facial weakness, tongue protrudes normally in the midline, hypophonia, mild masked facies. mild dysarthria, Gravelly voice.\par Motor: + bradykinesia, no pronator drift, full strength in all four extremities in the proximal and distal muscle groups\par Coordination: minimal rest tremor on right, slight cogwheeling, + bradykinesia, decreased amplitude of rapid alternating movements, + intention tremor, right more than left\par Sensory: intact to light touch\par DTRs: symmetric, normal\par Gait: good arm swing and stride length\par

## 2022-03-23 NOTE — CONSULT LETTER
[Dear  ___] : Dear  [unfilled], [Consult Letter:] : I had the pleasure of evaluating your patient, [unfilled]. [Please see my note below.] : Please see my note below. [Consult Closing:] : Thank you very much for allowing me to participate in the care of this patient.  If you have any questions, please do not hesitate to contact me. [FreeTextEntry2] : Rachid Martinez [FreeTextEntry3] : Sincerely,\par \par \par Michelle Rasheed MD\par Diplomate, [employer] Academy of [employer] and Neu[employer]\par Board Certified in the Subspecialty of Clinical Neurophysiology\par Board Certified in the Subspecialty of Sleep Medicine\par Board Certified in the Subspecialty of Epilepsy\par

## 2022-03-23 NOTE — DATA REVIEWED
[de-identified] : MRI brain 8/20/19:\par IMPRESSION: mild periventricular, deep and subcortical white matter ischemia. 5 mm anterior \par medial RIGHT frontal meningioma noted, unchanged. Tiny old lacunar infarction seen in the RIGHT \par cerebellum. 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 Global atrophy.\par \par MRI head 2/11/22:\par FEW SMALL ACUTE LEFT FRONTAL PARIETAL INFARCTS. NO ASSOCIATED BLOOD \par PRODUCTS. NO SIGNIFICANT MASS EFFECT\par \par  [de-identified] : Routine EEG 8/20/19: normal\par \par EEG 2/15/22:\par Normal EEG, afib [de-identified] : CT brain 1/5/19:\par No acute intracranial hemorrhage, mass effect, or CT evidence of a large \par vascular territory infarct. 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 \par Unchanged sub-cm sella/suprasellar lesion, likely pituitary adenoma. \par Follow-up with MRI as clinically indicated. \par \par \par CT head, CTA head and neck 2/11/22:\par *  VERY SMALL POSTERIOR LEFT FRONTAL INFARCT WHICH IS OCCURRED IN THE \par INTERIM FROM 12/22/2021\par *  NO HEMODYNAMIC SIGNIFICANT NARROWING WITHIN THE NECK.\par *  NO PROXIMAL LARGE VESSEL OCCLUSION INTRACRANIALLY.\par *  PERFUSION IMAGING DEMONSTRATES REGIONS OF HYPOPERFUSION/ISCHEMIA \par INVOLVING THE BILATERAL TEMPORAL OCCIPITAL REGIONS AND RIGHT PARIETAL \par LOBE WITH A VOLUME OF 32 ML. NO CORE INFARCT DEMONSTRATED.\par \par \par TTE 2/12/22:\par  Estimated left ventricular ejection fraction is 50-55 %.\par  The left ventricle is normal in size and wall thickness.\par \par CT head 2/14/22:\par Age-appropriate involutional and ischemic gliotic changes. \par New hemorrhage left frontal precentral gyrus.\par \par CT head 2/15/22:\par No significant interval change.\par \par CT head 2/16/22:\par Acute subarachnoid hemorrhage within a sulcus of the left frontal lobe \par appears unchanged since prior exam. No new hemorrhage present.\par \par

## 2022-03-28 ENCOUNTER — APPOINTMENT (OUTPATIENT)
Dept: NEUROLOGY | Facility: CLINIC | Age: 87
End: 2022-03-28
Payer: MEDICARE

## 2022-03-28 PROCEDURE — 95816 EEG AWAKE AND DROWSY: CPT

## 2022-03-31 ENCOUNTER — OUTPATIENT (OUTPATIENT)
Dept: OUTPATIENT SERVICES | Facility: HOSPITAL | Age: 87
LOS: 1 days | End: 2022-03-31
Payer: MEDICARE

## 2022-03-31 ENCOUNTER — APPOINTMENT (OUTPATIENT)
Dept: CT IMAGING | Facility: CLINIC | Age: 87
End: 2022-03-31
Payer: MEDICARE

## 2022-03-31 DIAGNOSIS — Z98.890 OTHER SPECIFIED POSTPROCEDURAL STATES: Chronic | ICD-10-CM

## 2022-03-31 DIAGNOSIS — Z90.49 ACQUIRED ABSENCE OF OTHER SPECIFIED PARTS OF DIGESTIVE TRACT: Chronic | ICD-10-CM

## 2022-03-31 DIAGNOSIS — I60.9 NONTRAUMATIC SUBARACHNOID HEMORRHAGE, UNSPECIFIED: ICD-10-CM

## 2022-03-31 PROCEDURE — 70450 CT HEAD/BRAIN W/O DYE: CPT

## 2022-03-31 PROCEDURE — 70450 CT HEAD/BRAIN W/O DYE: CPT | Mod: 26,MH

## 2022-04-04 ENCOUNTER — NON-APPOINTMENT (OUTPATIENT)
Age: 87
End: 2022-04-04

## 2022-04-11 PROBLEM — Z11.59 SCREENING FOR VIRAL DISEASE: Status: ACTIVE | Noted: 2020-06-19

## 2022-05-12 ENCOUNTER — APPOINTMENT (OUTPATIENT)
Dept: NEUROLOGY | Facility: CLINIC | Age: 87
End: 2022-05-12
Payer: MEDICARE

## 2022-05-12 VITALS
WEIGHT: 190 LBS | HEIGHT: 70 IN | DIASTOLIC BLOOD PRESSURE: 80 MMHG | BODY MASS INDEX: 27.2 KG/M2 | HEART RATE: 65 BPM | SYSTOLIC BLOOD PRESSURE: 145 MMHG | TEMPERATURE: 97.7 F

## 2022-05-12 PROCEDURE — 99214 OFFICE O/P EST MOD 30 MIN: CPT

## 2022-05-12 RX ORDER — LEVETIRACETAM 750 MG/1
750 TABLET, FILM COATED ORAL
Refills: 0 | Status: DISCONTINUED | COMMUNITY
End: 2022-05-12

## 2022-05-12 NOTE — HISTORY OF PRESENT ILLNESS
[FreeTextEntry1] : Mr. Hendricks was last seen in the office on 1/11/21.\par Since that time he has had multiple medical issues.\par Most recently he was hospitalized on 2/11/22 with five days of dysarthria, difficulty swallowing and generalized weakness. Notably he recently had covid. The hospital chart was reviewed.\par In the ED, NIHSS was 3. 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 Repeat head CT showed SAH and Eliquis was stopped. He was started on Keppra 500 mg BID. This was later changed to 250 mg in the AM and 750 mg at night due to sedation in the morning.\par Repeat imaging was stable and he was restarted on aspirin (which had only recently been started a few weeks prior).\par \par He was discharged on 2/18/22.\par \par \par 3/23/22:\par He reports that he feels drowsy and feels "blah".\par He goes to bed at 3-4 AM and sleeps until 11 PM.\par He reports that his wife has a virus, is coughing like crazy and keeps him up.\par He typically goes to bed at 12 AM and sleeps until 10 AM.\par He denies depression/mood disturbance.\par \par He reports tremors in his right arm. \par Sometimes when using a fork he has some jerking of his arm. He feels it is related to weakness.\par He has not had any uncontrollable jerky movements.\par \par He would like to increase his ropinirole.\par \par He says that his speech is reasonable.\par \par He denies having any headache.\par \par He says that he is walking well.\par \par Aspirin was stopped by Dr. Nick. \par \par 5/12/22:\par Once in a while he feels more off balance. He has not had any falls.\par He has slurred speech once in a while.\par He denies new stroke like symptoms.\par He notes slight improvement in right hand strength\par Tremors are stable. \par He has not had any seizure like activity.\par He is still sleepy.\par \par \par \par

## 2022-05-12 NOTE — DATA REVIEWED
[de-identified] : MRI brain 8/20/19:\par IMPRESSION: mild periventricular, deep and subcortical white matter ischemia. 5 mm anterior \par medial RIGHT frontal meningioma noted, unchanged. Tiny old lacunar infarction seen in the RIGHT \par cerebellum. 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 Global atrophy.\par \par MRI head 2/11/22:\par FEW SMALL ACUTE LEFT FRONTAL PARIETAL INFARCTS. NO ASSOCIATED BLOOD \par PRODUCTS. NO SIGNIFICANT MASS EFFECT\par \par  [de-identified] : Routine EEG 8/20/19: normal\par \par EEG 2/15/22:\par Normal EEG, afib\par \par EEG 3/28/22: normal. Afib is seen on EKG [de-identified] : CT brain 1/5/19:\par No acute intracranial hemorrhage, mass effect, or CT evidence of a large \par vascular territory infarct. 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 \par Unchanged sub-cm sella/suprasellar lesion, likely pituitary adenoma. \par Follow-up with MRI as clinically indicated. \par \par \par CT head, CTA head and neck 2/11/22:\par *  VERY SMALL POSTERIOR LEFT FRONTAL INFARCT WHICH IS OCCURRED IN THE \par INTERIM FROM 12/22/2021\par *  NO HEMODYNAMIC SIGNIFICANT NARROWING WITHIN THE NECK.\par *  NO PROXIMAL LARGE VESSEL OCCLUSION INTRACRANIALLY.\par *  PERFUSION IMAGING DEMONSTRATES REGIONS OF HYPOPERFUSION/ISCHEMIA \par INVOLVING THE BILATERAL TEMPORAL OCCIPITAL REGIONS AND RIGHT PARIETAL \par LOBE WITH A VOLUME OF 32 ML. NO CORE INFARCT DEMONSTRATED.\par \par \par TTE 2/12/22:\par  Estimated left ventricular ejection fraction is 50-55 %.\par  The left ventricle is normal in size and wall thickness.\par \par CT head 2/14/22:\par Age-appropriate involutional and ischemic gliotic changes. \par New hemorrhage left frontal precentral gyrus.\par \par CT head 2/15/22:\par No significant interval change.\par \par CT head 2/16/22:\par Acute subarachnoid hemorrhage within a sulcus of the left frontal lobe \par appears unchanged since prior exam. No new hemorrhage present.\par \par CT head 3/31/22:\par Interval resolution of subarachnoid hemorrhage previously noted in the BILATERAL frontal regions at the convexity. Mild periventricular white matter ischemia again noted.    Mild global atrophy.\par \par \par \par \par \par

## 2022-06-20 ENCOUNTER — APPOINTMENT (OUTPATIENT)
Dept: FAMILY MEDICINE | Facility: CLINIC | Age: 87
End: 2022-06-20

## 2022-07-06 NOTE — ED STATDOCS - PRO INTERPRETER NEED 2
[address] [facility], [address] [place] [address] Dr  [address]  [facility] [id number] [phone]    7/6/2022    Warner Looney  [address] [facility] [address] [facility] [address] [facility],Third Floor      Dear Trav Ryder,     I hope you are doing well. It has been sometime since I have heard from you and I want to reach out to see if you need additional services. Please contact me at [phone] if you would like to schedule another appointment. If I do not hear from you by 8/8/22 (30 days from the date of this letter), I will assume you no longer desire my services and this episode of care will be closed. However, you may call me at any time and we will be happy to see you again. You may also choose another provider in the community. We have included a list of other possible resources. [facility], [address] [place] [address] and [facility] [phone]    If you have questions or concerns with this letter, please contact me directly to discuss at [phone]. Again, we would welcome the chance to work with you again at any point in the future.       Take Care,        ROOSEVELT Gonzales
English

## 2022-09-08 ENCOUNTER — RX RENEWAL (OUTPATIENT)
Age: 87
End: 2022-09-08

## 2022-09-27 ENCOUNTER — APPOINTMENT (OUTPATIENT)
Dept: FAMILY MEDICINE | Facility: CLINIC | Age: 87
End: 2022-09-27

## 2022-10-11 ENCOUNTER — RX RENEWAL (OUTPATIENT)
Age: 87
End: 2022-10-11

## 2022-10-12 ENCOUNTER — APPOINTMENT (OUTPATIENT)
Dept: FAMILY MEDICINE | Facility: CLINIC | Age: 87
End: 2022-10-12

## 2022-10-12 VITALS
HEIGHT: 70 IN | OXYGEN SATURATION: 97 % | SYSTOLIC BLOOD PRESSURE: 124 MMHG | BODY MASS INDEX: 23.77 KG/M2 | RESPIRATION RATE: 16 BRPM | DIASTOLIC BLOOD PRESSURE: 84 MMHG | TEMPERATURE: 97.9 F | HEART RATE: 69 BPM | WEIGHT: 166 LBS

## 2022-10-12 PROCEDURE — 90662 IIV NO PRSV INCREASED AG IM: CPT

## 2022-10-12 PROCEDURE — G0008: CPT

## 2022-10-12 PROCEDURE — 99214 OFFICE O/P EST MOD 30 MIN: CPT | Mod: 25

## 2022-10-12 PROCEDURE — 36415 COLL VENOUS BLD VENIPUNCTURE: CPT

## 2022-10-12 RX ORDER — LEVETIRACETAM 250 MG/1
250 TABLET, FILM COATED ORAL
Qty: 60 | Refills: 1 | Status: DISCONTINUED | COMMUNITY
Start: 2022-05-12 | End: 2022-06-12

## 2022-10-12 NOTE — PHYSICAL EXAM
[Normal Sclera/Conjunctiva] : normal sclera/conjunctiva [Normal Rate] : normal rate  [Normal S1, S2] : normal S1 and S2 [Normal Gait] : normal gait [Normal] : affect was normal and insight and judgment were intact [de-identified] : a fib

## 2022-10-12 NOTE — ASSESSMENT
[FreeTextEntry1] : Acute stroke\par \par CVA\par Seen by Cardiologist Dr Nick, stopped aspirin.\par Dr Rasheed spoke to Dr. Martinez regarding patient \par Subarachnoid hemorrhage and not a hemorrhagic conversion of his stroke. \par Therefore, it is unclear if he would have another spontaneous bleed if restarted on AC.\par Dr. Martinez will be referring him to a specialist to at least discuss a Watchman procedure.\par \par Parkinsons/ Stroke\par Seen by Neurology in may Ropinirole was adjusted and Keppra was advised to taper off. \par He is not sure he did. WIll check his meds at home.\par Not seen by dermatology recently. States that he is using a topical from [place]. His wife recently went there.\par \par A Fib on metoprolol.\par Off anticoagulants because he had rectal bleed.\par \par Parkinsons on Ropinirole. \par Schedule annual\par

## 2022-10-12 NOTE — REVIEW OF SYSTEMS
[Negative] : Psychiatric [Shortness Of Breath] : no shortness of breath [Wheezing] : no wheezing [Cough] : no cough [Dyspnea on Exertion] : not dyspnea on exertion [Skin Rash] : no skin rash [Easy Bleeding] : no easy bleeding [Easy Bruising] : no easy bruising

## 2022-10-12 NOTE — HISTORY OF PRESENT ILLNESS
Mom reports breastfeeding is going well and denies any questions or concerns at this time. Lactation support services explained and offered to mom. Encouraged mom to call for Lactation assistance as needed.  Discussed postpartum/breastfeeding booklet with lactation phone number for support at home. Shannan ALANIZN,RNC,CLC   [FreeTextEntry1] : Follow up [de-identified] : Mr. AURORA SIBLEY is a 85 year old male presenting for a follow up\par Seen by Neurology in may Roperinolone was adjusted and Keppra was advised to taper off. He is not sure he did. WIll check his meds at home.\par Not seen by dermatology recently. States that he is using a topical from [place]. His wife recently went there.\par A Fib on metoprolol.\par Off anticoagulants because he had rectal bleed.\par Parkinsons on Ropinirole.

## 2022-10-17 LAB
ALBUMIN SERPL ELPH-MCNC: 4.1 G/DL
ALP BLD-CCNC: 103 U/L
ALT SERPL-CCNC: 17 U/L
ANION GAP SERPL CALC-SCNC: 10 MMOL/L
AST SERPL-CCNC: 23 U/L
BASOPHILS # BLD AUTO: 0.06 K/UL
BASOPHILS NFR BLD AUTO: 0.9 %
BILIRUB SERPL-MCNC: 1.6 MG/DL
BUN SERPL-MCNC: 10 MG/DL
CALCIUM SERPL-MCNC: 9.4 MG/DL
CHLORIDE SERPL-SCNC: 102 MMOL/L
CHOLEST SERPL-MCNC: 84 MG/DL
CO2 SERPL-SCNC: 31 MMOL/L
CREAT SERPL-MCNC: 1.07 MG/DL
DIGOXIN SERPL-MCNC: 0.5 NG/ML
EGFR: 68 ML/MIN/1.73M2
EOSINOPHIL # BLD AUTO: 0.21 K/UL
EOSINOPHIL NFR BLD AUTO: 3.3 %
GLUCOSE SERPL-MCNC: 106 MG/DL
HCT VFR BLD CALC: 38.4 %
HDLC SERPL-MCNC: 41 MG/DL
HGB BLD-MCNC: 12.4 G/DL
IMM GRANULOCYTES NFR BLD AUTO: 0.3 %
LDLC SERPL CALC-MCNC: 34 MG/DL
LYMPHOCYTES # BLD AUTO: 0.64 K/UL
LYMPHOCYTES NFR BLD AUTO: 10.1 %
MAN DIFF?: NORMAL
MCHC RBC-ENTMCNC: 30.7 PG
MCHC RBC-ENTMCNC: 32.3 GM/DL
MCV RBC AUTO: 95 FL
MONOCYTES # BLD AUTO: 0.61 K/UL
MONOCYTES NFR BLD AUTO: 9.6 %
NEUTROPHILS # BLD AUTO: 4.81 K/UL
NEUTROPHILS NFR BLD AUTO: 75.8 %
NONHDLC SERPL-MCNC: 44 MG/DL
PLATELET # BLD AUTO: 190 K/UL
POTASSIUM SERPL-SCNC: 3.7 MMOL/L
PROT SERPL-MCNC: 6.9 G/DL
RBC # BLD: 4.04 M/UL
RBC # FLD: 15.4 %
SODIUM SERPL-SCNC: 142 MMOL/L
TRIGL SERPL-MCNC: 51 MG/DL
TSH SERPL-ACNC: 1.41 UIU/ML
WBC # FLD AUTO: 6.35 K/UL

## 2022-10-19 ENCOUNTER — APPOINTMENT (OUTPATIENT)
Dept: NEUROLOGY | Facility: CLINIC | Age: 87
End: 2022-10-19

## 2022-10-19 VITALS
WEIGHT: 166 LBS | BODY MASS INDEX: 24.59 KG/M2 | HEIGHT: 69 IN | SYSTOLIC BLOOD PRESSURE: 145 MMHG | HEART RATE: 77 BPM | DIASTOLIC BLOOD PRESSURE: 75 MMHG

## 2022-10-19 PROCEDURE — 99213 OFFICE O/P EST LOW 20 MIN: CPT

## 2022-10-19 NOTE — HISTORY OF PRESENT ILLNESS
[FreeTextEntry1] : Mr. Hendricks was last seen in the office on 1/11/21.\par Since that time he has had multiple medical issues.\par Most recently he was hospitalized on 2/11/22 with five days of dysarthria, difficulty swallowing and generalized weakness. Notably he recently had covid. The hospital chart was reviewed.\par In the ED, NIHSS was 3. 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 Repeat head CT showed SAH and Eliquis was stopped. He was started on Keppra 500 mg BID. This was later changed to 250 mg in the AM and 750 mg at night due to sedation in the morning.\par Repeat imaging was stable and he was restarted on aspirin (which had only recently been started a few weeks prior).\par \par He was discharged on 2/18/22.\par \par \par 3/23/22:\par He reports that he feels drowsy and feels "blah".\par He goes to bed at 3-4 AM and sleeps until 11 PM.\par He reports that his wife has a virus, is coughing like crazy and keeps him up.\par He typically goes to bed at 12 AM and sleeps until 10 AM.\par He denies depression/mood disturbance.\par \par He reports tremors in his right arm. \par Sometimes when using a fork he has some jerking of his arm. He feels it is related to weakness.\par He has not had any uncontrollable jerky movements.\par \par He would like to increase his ropinirole.\par \par He says that his speech is reasonable.\par \par He denies having any headache.\par \par He says that he is walking well.\par \par Aspirin was stopped by Dr. Nick. \par \par 5/12/22:\par Once in a while he feels more off balance. He has not had any falls.\par He has slurred speech once in a while.\par He denies new stroke like symptoms.\par He notes slight improvement in right hand strength\par Tremors are stable. \par He has not had any seizure like activity.\par He is still sleepy.\par \par 10/19/22:\par He has discontinued Keppra.\par He has not had any events suspicious for seizures..\par He is not aware of any new stroke like symptoms.\par He thinks that the tremors have improved.\par He is taking ropinirole 1 mg BID.\par He says that when he was taking this TID he had difficulty urinating.\par He denies recent falls. \par He denies acting out his dreams.\par He says that once in a while he has a visual hallucination. The hallucination differs each time and is not disturbing\par He sleeps from 2 AM until 11 AM. He is on this schedule because his wife is a night owl. \par \par \par \par

## 2022-10-19 NOTE — DISCUSSION/SUMMARY
[FreeTextEntry1] : Mr. Hendricks is an 87 year old man who initially presented with right arm and jaw tremor.\par His exam is suggestive of Parkinson's Disease.\par He had a recent stroke and nontraumatic subarachnoid hemorrhage.\par \par Parkinsonism:\par He took Sinemet 25/100 once and felt strange so discontinued it.\par He is tolerating Ropinirole and it seems to be providing benefit.\par He had some difficulty with urination at a higher dose but is doing well now with Ropinirole 1 mg BID.\par Stay well hydrated.\par Stand slowly\par Daily exercise\par He has rare hallucinations. I would not add more medication at this time. He knows they are not real and they are not upsetting to him.\par \par Stroke\par -Recent stroke, likely embolic.\par -Had spontaneous SAH after Eliquis was started. Radiology felt it was more a SAH and not a hemorrhagic conversion of his stroke (although in similar region).\par -Cardiology considering Watchman procedure. If he is a candidate he can see Dr. Dewitt.\par -He was on Apixaban 5 mg BID. May be able to tolerate 2.5 mg BID. This will have to be discussed with cardiology.\par -Continue atorvastatin\par -Aspirin stopped as it will not provide benefit in prevention of embolic stroke\par \par Subarachnoid Hemorrhage\par -Occurred after starting Eliquis\par -He was on apixaban 5 mg BID which he is no longer on.\par -CT head 3/31/22 showed resolution of hemorrhage.\par -He has been weaned off Keppra and has not had any symptoms suggestive of seizures.\par \par Vertigo\par -Can use meclizine 12.5 mg prn vertigo.\par \par f/u 6 months, sooner if needed.\par \par  \par

## 2022-10-19 NOTE — DATA REVIEWED
[de-identified] : MRI brain 8/20/19:\par IMPRESSION: mild periventricular, deep and subcortical white matter ischemia. 5 mm anterior \par medial RIGHT frontal meningioma noted, unchanged. Tiny old lacunar infarction seen in the RIGHT \par cerebellum. 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 Global atrophy.\par \par MRI head 2/11/22:\par FEW SMALL ACUTE LEFT FRONTAL PARIETAL INFARCTS. NO ASSOCIATED BLOOD \par PRODUCTS. NO SIGNIFICANT MASS EFFECT\par \par  [de-identified] : Routine EEG 8/20/19: normal\par \par EEG 2/15/22:\par Normal EEG, afib\par \par EEG 3/28/22: normal. Afib is seen on EKG [de-identified] : CT brain 1/5/19:\par No acute intracranial hemorrhage, mass effect, or CT evidence of a large \par vascular territory infarct. 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 \par Unchanged sub-cm sella/suprasellar lesion, likely pituitary adenoma. \par Follow-up with MRI as clinically indicated. \par \par \par CT head, CTA head and neck 2/11/22:\par *  VERY SMALL POSTERIOR LEFT FRONTAL INFARCT WHICH IS OCCURRED IN THE \par INTERIM FROM 12/22/2021\par *  NO HEMODYNAMIC SIGNIFICANT NARROWING WITHIN THE NECK.\par *  NO PROXIMAL LARGE VESSEL OCCLUSION INTRACRANIALLY.\par *  PERFUSION IMAGING DEMONSTRATES REGIONS OF HYPOPERFUSION/ISCHEMIA \par INVOLVING THE BILATERAL TEMPORAL OCCIPITAL REGIONS AND RIGHT PARIETAL \par LOBE WITH A VOLUME OF 32 ML. NO CORE INFARCT DEMONSTRATED.\par \par \par TTE 2/12/22:\par  Estimated left ventricular ejection fraction is 50-55 %.\par  The left ventricle is normal in size and wall thickness.\par \par CT head 2/14/22:\par Age-appropriate involutional and ischemic gliotic changes. \par New hemorrhage left frontal precentral gyrus.\par \par CT head 2/15/22:\par No significant interval change.\par \par CT head 2/16/22:\par Acute subarachnoid hemorrhage within a sulcus of the left frontal lobe \par appears unchanged since prior exam. No new hemorrhage present.\par \par CT head 3/31/22:\par Interval resolution of subarachnoid hemorrhage previously noted in the BILATERAL frontal regions at the convexity. Mild periventricular white matter ischemia again noted.    Mild global atrophy.\par \par \par \par \par \par

## 2022-10-25 NOTE — ED PROVIDER NOTE - SKIN [+], MLM
ABRASION/LACERATION Fusiform Excision Additional Text (Leave Blank If You Do Not Want): The margin was drawn around the clinically apparent lesion.  A fusiform shape was then drawn on the skin incorporating the lesion and margins.  Incisions were then made along these lines to the appropriate tissue plane and the lesion was extirpated.

## 2022-11-28 ENCOUNTER — APPOINTMENT (OUTPATIENT)
Dept: FAMILY MEDICINE | Facility: CLINIC | Age: 87
End: 2022-11-28

## 2022-11-28 VITALS
TEMPERATURE: 97.7 F | BODY MASS INDEX: 25.03 KG/M2 | RESPIRATION RATE: 16 BRPM | HEIGHT: 69 IN | WEIGHT: 169 LBS | HEART RATE: 79 BPM | SYSTOLIC BLOOD PRESSURE: 124 MMHG | OXYGEN SATURATION: 98 % | DIASTOLIC BLOOD PRESSURE: 86 MMHG

## 2022-11-28 DIAGNOSIS — L30.9 DERMATITIS, UNSPECIFIED: ICD-10-CM

## 2022-11-28 PROCEDURE — 99214 OFFICE O/P EST MOD 30 MIN: CPT

## 2022-11-28 RX ORDER — TRIAMCINOLONE ACETONIDE 1 MG/G
0.1 CREAM TOPICAL
Qty: 1 | Refills: 0 | Status: ACTIVE | COMMUNITY
Start: 2018-11-23 | End: 1900-01-01

## 2022-11-28 RX ORDER — HYDROCORTISONE 1 %
12 CREAM (GRAM) TOPICAL
Qty: 1 | Refills: 3 | Status: ACTIVE | COMMUNITY
Start: 2020-12-23 | End: 1900-01-01

## 2022-11-28 NOTE — ASSESSMENT
[FreeTextEntry1] : Dermatitis/ Dry skin\par Rx triamcinolone for 1 week then prn.\par Advised to avoid overuse of steroid cream\par Follow up with dermatologist

## 2022-11-28 NOTE — PHYSICAL EXAM
[Normal Sclera/Conjunctiva] : normal sclera/conjunctiva [Normal Rate] : normal rate  [Normal S1, S2] : normal S1 and S2 [Normal Gait] : normal gait [Normal] : affect was normal and insight and judgment were intact [de-identified] : a fib [de-identified] : maculopapular rash with excoriations in arm and legs

## 2022-11-28 NOTE — REVIEW OF SYSTEMS
[Negative] : Psychiatric [Shortness Of Breath] : no shortness of breath [Wheezing] : no wheezing [Cough] : no cough [Dyspnea on Exertion] : not dyspnea on exertion [Skin Rash] : no skin rash [Easy Bleeding] : no easy bleeding [Easy Bruising] : no easy bruising [de-identified] : see hpi

## 2022-11-28 NOTE — HISTORY OF PRESENT ILLNESS
[Rash (Location) ___] : rash on [unfilled] [___ Weeks ago] :  [unfilled] weeks ago [Constant] : constant [Stable] : stable [FreeTextEntry8] : states he was using OTC eczema cream which helped

## 2023-01-11 NOTE — ED STATDOCS - EKG #1 DATE/TIME
13-Jan-2021 19:36 Consent (Near Eyelid Margin)/Introductory Paragraph: The rationale for Mohs was explained to the patient and consent was obtained. The risks, benefits and alternatives to therapy were discussed in detail. Specifically, the risks of ectropion or eyelid deformity, infection, scarring, bleeding, prolonged wound healing, incomplete removal, allergy to anesthesia, nerve injury and recurrence were addressed. Prior to the procedure, the treatment site was clearly identified and confirmed by the patient. All components of Universal Protocol/PAUSE Rule completed.

## 2023-02-06 NOTE — ED ADULT NURSE NOTE - CAS DISCH CONDITION
Occupational Therapy Visit    Visit Type: Daily Treatment Note  Visit: 10  Referring Provider: Paulino Guerrero MD  Medical Diagnosis (from order): Diagnosis Information    Diagnosis  719.42 (ICD-9-CM) - M25.521 (ICD-10-CM) - Right elbow pain         SUBJECTIVE                                                                                                               She feels that her last session was very helpful. Most of her pain is still with elbow extended however she feels this is improved.  Pain can still go up to a 7-8 if she tries to lift with an extended arm.   She feels that last time she was needled the burning went away and she had a bit more ease of movement. She would like to trial this again.   She did not feel that the tape was particularly helpful.     Pain / Symptoms  - Pain rating (out of 10): Current: 2      OBJECTIVE                                                                                                                                       Treatment    Attended Electrical-Stimulation (54820)  - Purpose: pain relief and reduction of muscle spasm  - Delivery via: needles  -   Attended Electrical-Stimulation/TENS (34970): (Transcutaneous Electrical Nerve Stimulation)   Patient has been made aware of potential contraindications and possible risks associated with the use of modality and has agreed.  Electrical stimulation applied during dry needling treatment to extensor carpi radialis brevis, extensor digitorum communis, triceps, biceps muscles to promote muscle function and reduce trigger points  Mode chosen: Pointer 2 handheld estim unit pre-set with varied frequencies and intensity to patient tolerance/muscle contraction   Results: decreased pain; no adverse reaction to treatment      Dry Needling:  - Consent signed: yes  - Dry needling used to/for: pain relief, reduced myofascial dysfunction/restriction and improve range of motion  - Education about indications, contraindications  and potential side effects completed with patient.  Screen Completed    - Precautions: local skin lesions, lyme disease, local lymphedema, severe hyperalgesia/allodynia, metal allergies: nickel and chromium, abnormal bleeding tendency, immunodeficiency and/or compromised immune system, second or third trimester of pregnancy, vascular disease, history of spontaneous pneumothorax   - Contraindications: local or systemic infections including the flu, over implants, active cancer, area of lymphatic compromise, area of lumpectomy/mastectomy, first trimester of pregnancy     • Location: Right extensor carpi radialis brevis  Needle size: 40 Quantity: 1  • Location: Right extensor digitorum communis  Needle size: 40 Quantity: 1  •• Location: Right supinator Needle size: 50 Quantity: 1  • Location: Right anconeus Needle size: 40 Quantity: 1   - Location: Right biceps  Needle size: 60 Quantity: 1   - Location: Right triceps Needle size: 75 Quantity: 2  Total Needles Inserted: 7 Removed: 7      Skilled input: tactile instruction/cues, verbal instruction/cues and as detailed above    Writer verbally educated and received verbal consent for hand placement, positioning of patient, and techniques to be performed today from patient for therapist position for techniques and hand placement and palpation for techniques as described above and how they are pertinent to the patient's plan of care.        ASSESSMENT                                                                                                            Sweta is electing dry needling treatment today and her tissue was assessed for trigger point and tight bands and needled for such. Nice releases noted in all muscles but specifically twitch responses were noted in supinator and extensor carpi radialis brevis.  Estim was also used with needling at noted sites.  A poor muscle rhythm was initially noted, which did improve with treatment. The full effect of the treatment to  be assessed at the next session as gains can be realized up to 7 days.    Education:   - Results of above outlined education: Verbalizes understanding and Demonstrates understanding       Therapy procedure time and total treatment time can be found documented on the Time Entry flowsheet   Stable

## 2023-02-27 ENCOUNTER — NON-APPOINTMENT (OUTPATIENT)
Age: 88
End: 2023-02-27

## 2023-03-06 ENCOUNTER — APPOINTMENT (OUTPATIENT)
Dept: FAMILY MEDICINE | Facility: CLINIC | Age: 88
End: 2023-03-06

## 2023-04-12 ENCOUNTER — RX RENEWAL (OUTPATIENT)
Age: 88
End: 2023-04-12

## 2023-04-21 ENCOUNTER — RX RENEWAL (OUTPATIENT)
Age: 88
End: 2023-04-21

## 2023-04-25 ENCOUNTER — RX RENEWAL (OUTPATIENT)
Age: 88
End: 2023-04-25

## 2023-05-09 ENCOUNTER — APPOINTMENT (OUTPATIENT)
Dept: FAMILY MEDICINE | Facility: CLINIC | Age: 88
End: 2023-05-09
Payer: MEDICARE

## 2023-05-09 VITALS
SYSTOLIC BLOOD PRESSURE: 134 MMHG | TEMPERATURE: 97.6 F | DIASTOLIC BLOOD PRESSURE: 67 MMHG | OXYGEN SATURATION: 95 % | WEIGHT: 166 LBS | RESPIRATION RATE: 16 BRPM | HEIGHT: 69 IN | BODY MASS INDEX: 24.59 KG/M2 | HEART RATE: 77 BPM

## 2023-05-09 DIAGNOSIS — Z13.820 ENCOUNTER FOR SCREENING FOR OSTEOPOROSIS: ICD-10-CM

## 2023-05-09 PROCEDURE — 90677 PCV20 VACCINE IM: CPT

## 2023-05-09 PROCEDURE — G0009: CPT

## 2023-05-09 PROCEDURE — 36415 COLL VENOUS BLD VENIPUNCTURE: CPT

## 2023-05-09 PROCEDURE — 99214 OFFICE O/P EST MOD 30 MIN: CPT | Mod: 25

## 2023-05-09 NOTE — HISTORY OF PRESENT ILLNESS
[FreeTextEntry1] : Follow up [de-identified] : Mr. AURORA SIBLEY is a 88 year old male presenting for a follow up\par was last seen in the office on 1/11/22. Sick visit.\par He was contacted by Neuro to reduce ropiranole because he was gambling and Roperinol can cause loss of impulse control. He states he has reduced it to 2 times a day previously on it 3 times a day.\par \par HTN BP okay at home\par On Olmesartan\par A fib rate controlled on Metoprolol.\par Off Coumadin after he had bleeding.\par Prev Hx: he was hospitalized on 2/11/22 with five days of dysarthria, difficulty swallowing and generalized weakness. \par In the ED, NIHSS was 3. 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 Repeat head CT showed SAH and Eliquis was stopped. He was started on Keppra 500 mg BID. This was later changed to 250 mg in the AM and 750 mg at night due to sedation in the morning.\par Repeat imaging was stable and he was restarted on aspirin (which had only recently been started a few weeks prior).\par \par He was discharged on 2/18/22.\par \par Aspirin was stopped by Dr. Nick. \par \par 10/19/22:\par He has discontinued Keppra.\par  taking ropinirole 1 mg BID.\par He says that when he was taking this TID he had gambling more.\par \par

## 2023-05-09 NOTE — ASSESSMENT
[FreeTextEntry1] : was last seen in the office on 1/11/22. Sick visit.\par He was contacted by Neuro to reduce ropiranole because he was gambling and Roperinol can cause loss of impulse control. He states he has reduced it to 2 times a day previously on it 3 times a day.\par \par HTN BP okay at home\par On Olmesartan\par \par HLD\par -c/w  on Atorvastatin 80 mg \par Rx renewed\par Check labs today\par \par A fib \par -rate controlled on Metoprolol.\par -Off Coumadin after he had bleeding.\par \par Declines all vaccine\par Prevnar 20 administered today\par \par Follow up in 3 months\par Schedule Annual

## 2023-05-09 NOTE — PHYSICAL EXAM
[Normal Sclera/Conjunctiva] : normal sclera/conjunctiva [Normal Rate] : normal rate  [Normal S1, S2] : normal S1 and S2 [Normal Gait] : normal gait [Normal] : affect was normal and insight and judgment were intact [de-identified] : a fib [de-identified] : maculopapular rash with excoriations in arm and legs

## 2023-05-15 LAB
ALBUMIN SERPL ELPH-MCNC: 3.9 G/DL
ALP BLD-CCNC: 89 U/L
ALT SERPL-CCNC: 16 U/L
ANION GAP SERPL CALC-SCNC: 9 MMOL/L
AST SERPL-CCNC: 21 U/L
BASOPHILS # BLD AUTO: 0.05 K/UL
BASOPHILS NFR BLD AUTO: 1.1 %
BILIRUB SERPL-MCNC: 0.9 MG/DL
BUN SERPL-MCNC: 13 MG/DL
CALCIUM SERPL-MCNC: 9.5 MG/DL
CHLORIDE SERPL-SCNC: 105 MMOL/L
CHOLEST SERPL-MCNC: 97 MG/DL
CO2 SERPL-SCNC: 29 MMOL/L
CREAT SERPL-MCNC: 1.04 MG/DL
DIGOXIN SERPL-MCNC: 0.8 NG/ML
EGFR: 70 ML/MIN/1.73M2
EOSINOPHIL # BLD AUTO: 0.34 K/UL
EOSINOPHIL NFR BLD AUTO: 7.6 %
ESTIMATED AVERAGE GLUCOSE: 111 MG/DL
FERRITIN SERPL-MCNC: 149 NG/ML
GLUCOSE SERPL-MCNC: 94 MG/DL
HBA1C MFR BLD HPLC: 5.5 %
HCT VFR BLD CALC: 38.8 %
HDLC SERPL-MCNC: 50 MG/DL
HGB BLD-MCNC: 12.6 G/DL
IMM GRANULOCYTES NFR BLD AUTO: 0.2 %
IRON SATN MFR SERPL: 19 %
IRON SERPL-MCNC: 50 UG/DL
LDLC SERPL CALC-MCNC: 35 MG/DL
LYMPHOCYTES # BLD AUTO: 0.77 K/UL
LYMPHOCYTES NFR BLD AUTO: 17.2 %
MAN DIFF?: NORMAL
MCHC RBC-ENTMCNC: 31.1 PG
MCHC RBC-ENTMCNC: 32.5 GM/DL
MCV RBC AUTO: 95.8 FL
MONOCYTES # BLD AUTO: 0.46 K/UL
MONOCYTES NFR BLD AUTO: 10.3 %
NEUTROPHILS # BLD AUTO: 2.85 K/UL
NEUTROPHILS NFR BLD AUTO: 63.6 %
NONHDLC SERPL-MCNC: 46 MG/DL
PLATELET # BLD AUTO: 131 K/UL
POTASSIUM SERPL-SCNC: 4.3 MMOL/L
PROT SERPL-MCNC: 6.8 G/DL
RBC # BLD: 4.05 M/UL
RBC # FLD: 16.2 %
SODIUM SERPL-SCNC: 143 MMOL/L
TIBC SERPL-MCNC: 266 UG/DL
TRIGL SERPL-MCNC: 55 MG/DL
TSH SERPL-ACNC: 2.07 UIU/ML
UIBC SERPL-MCNC: 215 UG/DL
VIT B12 SERPL-MCNC: 288 PG/ML
WBC # FLD AUTO: 4.48 K/UL

## 2023-05-19 ENCOUNTER — NON-APPOINTMENT (OUTPATIENT)
Age: 88
End: 2023-05-19

## 2023-06-02 ENCOUNTER — APPOINTMENT (OUTPATIENT)
Dept: NEUROLOGY | Facility: CLINIC | Age: 88
End: 2023-06-02
Payer: MEDICARE

## 2023-06-02 VITALS
DIASTOLIC BLOOD PRESSURE: 71 MMHG | BODY MASS INDEX: 25.11 KG/M2 | HEIGHT: 67 IN | SYSTOLIC BLOOD PRESSURE: 130 MMHG | HEART RATE: 76 BPM | WEIGHT: 160 LBS | TEMPERATURE: 97.8 F

## 2023-06-02 DIAGNOSIS — R42 DIZZINESS AND GIDDINESS: ICD-10-CM

## 2023-06-02 PROCEDURE — 99213 OFFICE O/P EST LOW 20 MIN: CPT

## 2023-06-02 NOTE — DATA REVIEWED
[de-identified] : MRI brain 8/20/19:\par IMPRESSION: mild periventricular, deep and subcortical white matter ischemia. 5 mm anterior \par medial RIGHT frontal meningioma noted, unchanged. Tiny old lacunar infarction seen in the RIGHT \par cerebellum. 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 Global atrophy.\par \par MRI head 2/11/22:\par FEW SMALL ACUTE LEFT FRONTAL PARIETAL INFARCTS. NO ASSOCIATED BLOOD \par PRODUCTS. NO SIGNIFICANT MASS EFFECT\par \par  [de-identified] : CT brain 1/5/19:\par No acute intracranial hemorrhage, mass effect, or CT evidence of a large \par vascular territory infarct. 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 \par Unchanged sub-cm sella/suprasellar lesion, likely pituitary adenoma. \par Follow-up with MRI as clinically indicated. \par \par \par CT head, CTA head and neck 2/11/22:\par *  VERY SMALL POSTERIOR LEFT FRONTAL INFARCT WHICH IS OCCURRED IN THE \par INTERIM FROM 12/22/2021\par *  NO HEMODYNAMIC SIGNIFICANT NARROWING WITHIN THE NECK.\par *  NO PROXIMAL LARGE VESSEL OCCLUSION INTRACRANIALLY.\par *  PERFUSION IMAGING DEMONSTRATES REGIONS OF HYPOPERFUSION/ISCHEMIA \par INVOLVING THE BILATERAL TEMPORAL OCCIPITAL REGIONS AND RIGHT PARIETAL \par LOBE WITH A VOLUME OF 32 ML. NO CORE INFARCT DEMONSTRATED.\par \par \par TTE 2/12/22:\par  Estimated left ventricular ejection fraction is 50-55 %.\par  The left ventricle is normal in size and wall thickness.\par \par CT head 2/14/22:\par Age-appropriate involutional and ischemic gliotic changes. \par New hemorrhage left frontal precentral gyrus.\par \par CT head 2/15/22:\par No significant interval change.\par \par CT head 2/16/22:\par Acute subarachnoid hemorrhage within a sulcus of the left frontal lobe \par appears unchanged since prior exam. No new hemorrhage present.\par \par CT head 3/31/22:\par Interval resolution of subarachnoid hemorrhage previously noted in the BILATERAL frontal regions at the convexity. Mild periventricular white matter ischemia again noted.    Mild global atrophy.\par \par \par \par \par \par  [de-identified] : Routine EEG 8/20/19: normal\par \par EEG 2/15/22:\par Normal EEG, afib\par \par EEG 3/28/22: normal. Afib is seen on EKG

## 2023-06-02 NOTE — DISCUSSION/SUMMARY
[FreeTextEntry1] : Mr. Hendricks is an 88 year old man who initially presented with right arm and jaw tremor.\par His exam is suggestive of Parkinson's Disease.\par He had a recent stroke and nontraumatic subarachnoid hemorrhage.\par \par Parkinsonism:\par He took Sinemet 25/100 once and felt strange so discontinued it.\par He is tolerating Ropinirole and it seems to be providing benefit.\par He had some difficulty with urination at a higher dose but is doing well now with Ropinirole 1 mg BID.\par Advised to move bedtime dose to the afternoon or early evening.\par Stay well hydrated.\par Stand slowly\par Daily exercise\par -He needs to have an easy to chew diet. as recommended when he had a swallow evaluation in the hospital in 2022. \par \par Stroke\par -History of stroke likely embolic.\par -Had spontaneous SAH after Eliquis was started. Radiology felt it was more a SAH and not a hemorrhagic conversion of his stroke (although in similar region).\par -Cardiology considering Watchman procedure. If he is a candidate he can see Dr. Dewitt.\par -He was on Apixaban 5 mg BID. May be able to tolerate 2.5 mg BID. This will have to be discussed with cardiology.\par -Continue atorvastatin\par -Aspirin stopped as it will not provide benefit in prevention of embolic stroke\par -He should f/u with cardiology. ? Watchman\par \par Subarachnoid Hemorrhage\par -Occurred after starting Eliquis\par -He was on apixaban 5 mg BID which he is no longer on.\par -CT head 3/31/22 showed resolution of hemorrhage.\par \par Vertigo\par -Can use meclizine 12.5 mg prn vertigo.\par \par f/u 6 months, sooner if needed.\par If he moves to [facility], will provide prescriptions prior to his departure.\par  \par

## 2023-06-02 NOTE — PHYSICAL EXAM
[FreeTextEntry1] : Examination:\par Constitutional: normal, no apparent distress\par Eyes: normal conjunctiva b/l, no ptosis, visual fields full\par Respiratory: no respiratory distress, normal effort, normal auscultation\par Cardiovascular: normal rate, rhythm, no murmurs\par Neck: supple, no masses\par Vascular: carotids normal\par Skin: patchy raised areas on lower abdomen and back\par Psych: normal mood, affect\par \par Neurological:\par Memory: normal memory, oriented to person, place, time\par Language intact/no aphasia\par Cranial Nerves: Pupils equally round and reactive to light, ocular muscles/movements intact, no ptosis, no facial weakness, tongue protrudes normally in the midline, hypophonia, mild masked facies. mild dysarthria, Gravelly voice.\par Motor: + bradykinesia, no pronator drift, full strength in all four extremities in the proximal and distal muscle groups\par Coordination: minimal rest tremor on right, slight cogwheeling, + bradykinesia, decreased amplitude of rapid alternating movements, + intention tremor, right more than left\par Sensory: intact to light touch\par DTRs: symmetric, normal\par Gait: good arm swing and reasonable stride length\par

## 2023-06-02 NOTE — HISTORY OF PRESENT ILLNESS
[FreeTextEntry1] : Mr. Hendricks was last seen in the office on 1/11/21.\par Since that time he has had multiple medical issues.\par Most recently he was hospitalized on 2/11/22 with five days of dysarthria, difficulty swallowing and generalized weakness. Notably he recently had covid. The hospital chart was reviewed.\par In the ED, NIHSS was 3. 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 Repeat head CT showed SAH and Eliquis was stopped. He was started on Keppra 500 mg BID. This was later changed to 250 mg in the AM and 750 mg at night due to sedation in the morning.\par Repeat imaging was stable and he was restarted on aspirin (which had only recently been started a few weeks prior).\par \par He was discharged on 2/18/22.\par \par \par 3/23/22:\par He reports that he feels drowsy and feels "blah".\par He goes to bed at 3-4 AM and sleeps until 11 PM.\par He reports that his wife has a virus, is coughing like crazy and keeps him up.\par He typically goes to bed at 12 AM and sleeps until 10 AM.\par He denies depression/mood disturbance.\par \par He reports tremors in his right arm. \par Sometimes when using a fork he has some jerking of his arm. He feels it is related to weakness.\par He has not had any uncontrollable jerky movements.\par \par He would like to increase his ropinirole.\par \par He says that his speech is reasonable.\par \par He denies having any headache.\par \par He says that he is walking well.\par \par Aspirin was stopped by Dr. Nick. \par \par 5/12/22:\par Once in a while he feels more off balance. He has not had any falls.\par He has slurred speech once in a while.\par He denies new stroke like symptoms.\par He notes slight improvement in right hand strength\par Tremors are stable. \par He has not had any seizure like activity.\par He is still sleepy.\par \par 10/19/22:\par He has discontinued Keppra.\par He has not had any events suspicious for seizures..\par He is not aware of any new stroke like symptoms.\par He thinks that the tremors have improved.\par He is taking ropinirole 1 mg BID.\par He says that when he was taking this TID he had difficulty urinating.\par He denies recent falls. \par He denies acting out his dreams.\par He says that once in a while he has a visual hallucination. The hallucination differs each time and is not disturbing\par He sleeps from 2 AM until 11 AM. He is on this schedule because his wife is a night owl. \par \par 6/2/23:\par He reports that he fell three times. He says that it was poor judgement on his part. He was called to look at something and he turned rapidly. The momentum caught up with him and he fell.\par He had no loss of consciousness.\par \par He walks on most days. \par \par He denies any episodes suspicious for seizures.\par \par He reports some short term memory difficulties. \par His wife brought him mannitol and mucuna powder from [place].\par \par He is taking ropinirole 1 mg BID.\par Once in a while he buys a scratch off. Otherwise he is not gambling.\par \par He reports gagging when he swallows any dry food. \par \par He has dizziness once in a while.\par \par

## 2023-06-19 NOTE — DISCHARGE NOTE NURSING/CASE MANAGEMENT/SOCIAL WORK - NSTRANSFERDENTURES_GEN_A_NUR
full Tissue Cultured Epidermal Autograft Text: The defect edges were debeveled with a #15 scalpel blade.  Given the location of the defect, shape of the defect and the proximity to free margins a tissue cultured epidermal autograft was deemed most appropriate.  The graft was then trimmed to fit the size of the defect.  The graft was then placed in the primary defect and oriented appropriately.

## 2023-06-22 ENCOUNTER — NON-APPOINTMENT (OUTPATIENT)
Age: 88
End: 2023-06-22

## 2023-06-26 ENCOUNTER — RX RENEWAL (OUTPATIENT)
Age: 88
End: 2023-06-26

## 2023-07-14 NOTE — PATIENT PROFILE ADULT - TOBACCO USE
GASTROENTEROLOGY CONSULTATION    Josselyn Brewster  [id number]   [facility] [address]  45 year old male    Admission Date/Time: 7/13/2023  Primary Care Provider: Jessica, [facility]    We were asked to see the patient in consultation by Dr. Castro for evaluation of recurrent pancreatitis with pseudocyst.    HPI: Josselyn Brewster is a 45 year old male with PMH including recent diagnosis of pancreatitis and DM type 2 who presents with recurrent pain found to have pancreatitis with new pseudocyst.     He was admitted to [facility] 6/28/23-7/3/23 with new diagnosis of DM, DKA and acute pancreatitis suspected to be due to hypertriglyceridemia. He was treated with insulin gtt and then transitioned to gemfibrozil.     After his last admission, he felt well at discharge. They started looking at different diets for his diabetes and looked at keto recipes. He started to develop pain Wednesday (2-3/10). They ate turkey tacos for dinner last night and pain worsened (7/10), prompting him to come in. He was also having some nausea. No new medications aside from what was prescribed at the hospital. No alcohol use since last admission. Reports social use prior to that.     Labs on admission: Total bili 0.3, alk phos 104, ALT 75, AST 56, Ca 10.4. Creat 0.86. WBC 5.7. Hgb 12.7. Lipase 72. Triglycerides 240. No fevers. Vital stable.     CT scan showed mild acute interstitial edematous pancreatitis involving the pancreatic head/uncinate process Mildly thick-walled 3.6 cm pseudocyst along the pancreatic head and duodenum related to the recent episode of pancreatitis. Diffuse hepatic steatosis.     No family hx of GI related disease.     ROS: A comprehensive ten point review of systems was negative aside from those in mentioned in the HPI.      MEDICATIONS: No current facility-administered medications on file prior to encounter.  acetaminophen (TYLENOL) 500 MG tablet, Take 500-1,000 mg by mouth every 6 hours as  needed for mild pain  citalopram (CELEXA) 40 MG tablet, Take 40 mg by mouth daily  folic acid (FOLVITE) 1 MG tablet, Take 1 tablet (1 mg) by mouth daily for 30 days  gemfibrozil (LOPID) 600 MG tablet, Take 1 tablet (600 mg) by mouth 2 times daily (before meals) for 60 days  ibuprofen (ADVIL/MOTRIN) 200 MG tablet, Take 200-600 mg by mouth every 4 hours as needed for pain  insulin aspart (NOVOLOG PEN) 100 UNIT/ML pen, Inject 1-7 Units Subcutaneous 3 times daily (before meals) Blood Sugar Low Dose: 60 - 110  No insulin; 111 - 150 2 units; 151 - 200 4 units; 201 - 250 6 units; 251 - 300 8 units; 301 - 350 10 units; >350 12 units (call physician)  insulin glargine (LANTUS PEN) 100 UNIT/ML pen, Inject 15 Units Subcutaneous At Bedtime  multivitamin, therapeutic (THERA-VIT) TABS tablet, Take 1 tablet by mouth daily for 30 days  thiamine (B-1) 100 MG tablet, Take 1 tablet (100 mg) by mouth daily for 30 days  Alcohol Swabs PADS, Use to swab the area of the injection or oliver as directed Per insurance coverage  blood glucose (NO BRAND SPECIFIED) lancets standard, To use to test glucose level in the blood Use to test blood sugar  4  times daily as directed. To accompany glucose monitor brands per insurance coverage.  blood glucose (NO BRAND SPECIFIED) test strip, To use to test glucose level in the blood Use to test blood sugar  4 times daily as directed. To accompany glucose monitor brands per insurance coverage.  blood glucose calibration (NO BRAND SPECIFIED) solution, Used to calibrate the blood glucose monitor as needed and as directed.  To accompany  blood glucose brands per insurance coverage  blood glucose monitoring (NO BRAND SPECIFIED) meter device kit, Use as directed Per insurance coverage  Sharps Container MISC, Use as directed to dispose of needles, lancets and other sharps        ALLERGIES:   Allergies   Allergen Reactions     Levofloxacin Hives     Morphine Other (See Comments)     Other reaction(s): Urinary  Retention  Other reaction(s): Urinary Retention       Past Medical History:   Diagnosis Date     NONSPECIFIC MEDICAL HISTORY     spinal facet  disorder- chonic       Past Surgical History:   Procedure Laterality Date     IR LUMBAR EPIDURAL STEROID INJECTION  6/13/2006     IR LUMBAR EPIDURAL STEROID INJECTION  6/13/2006     IR LUMBAR EPIDURAL STEROID INJECTION  8/1/2006     [email or web address] NONSPECIFIC PROCEDURE      laminectoomy  1999       SOCIAL HISTORY:  Social History     Tobacco Use     Smoking status: Never   Substance Use Topics     Alcohol use: Yes     Alcohol/week: 0.8 - 1.7 standard drinks of alcohol     Types: 1 - 2 drink(s) per week       FAMILY HISTORY:  No family history on file.    PHYSICAL EXAM:   /75   Pulse 64   Temp 97.9  F (36.6  C) (Oral)   Resp 16   SpO2 96%    Constitutional: alert, no acute distress  Cardiovascular: regular rate and rhythm  Respiratory: clear to auscultation bilaterally  Psychiatric: normal pleasant affect  ENT: no scleral icterus   Abdomen: soft, non-tender, non-distended, normally active bowel sound  Neuro: Neurologically intact grossly  Skin: warm, dry, no rashes are noted      LABORATORY WORK  Recent Labs   Lab Test 07/13/23 1914 07/01/23  0634 06/30/23  0539   WBC 5.7 5.7 5.4   HGB 12.7* 11.2* 11.5*   MCV 80 82 80   * 208 207     Recent Labs   Lab Test 07/14/23  0754 07/13/23 1914 07/03/23  0630 07/02/23  0700    137  --  133*   POTASSIUM 4.0 4.2 4.5 4.5   CHLORIDE 104 102  --  98   CO2 24 23  --  24   BUN 19.8 18.0  --  1.6*   CR 0.79 0.86  --  0.88   ANIONGAP 10 12  --  11   DEYA 8.9 10.4*  --  9.1     Recent Labs   Lab Test 07/14/23  0447 07/13/23 1914 07/02/23  0700 07/01/23  0634 06/30/23  0546 06/29/23  1643 06/28/23  0757 06/28/23  0234 06/28/23  0234   ALBUMIN  --  5.0 3.5 3.9 3.7  --   --   --  3.6   BILITOTAL  --  0.3 0.7 0.9 0.6  --   --   --  0.5   DBIL  --  <0.20  --   --   --   --   --   --  <0.20   ALT  --  75* 52 40 32  --   --   --  21   AST   --  56* 52* 46* 38  --   --    < >  --    ALKPHOS  --  104 80 75 77  --   --   --  78   PROTEIN 20*  --   --   --   --  Negative  --   --   --    LIPASE  --  72*  --   --  56  --  176*  --   --     < > = values in this interval not displayed.       IMAGING   EXAM: US ABDOMEN LIMITED  LOCATION: [facility]  DATE: 6/28/2023  IMPRESSION:  1.  Hepatic steatosis.  2.  Normal gallbladder.      EXAM: CT ABDOMEN PELVIS W CONTRAST  LOCATION: [facility]  DATE: 7/13/2023                                                                   IMPRESSION:      1.  Mild acute interstitial edematous pancreatitis involving the pancreatic head/uncinate process.  2.  Mildly thick-walled 3.6 cm pseudocyst along the pancreatic head and duodenum related to the recent episode of pancreatitis. This could be sterile or infected.  3.  No other peripancreatic fluid collections.  4.  Diffuse hepatic steatosis.    CONSULTATION ASSESSMENT AND PLAN  45 year old male with PMH including recent diagnosis of pancreatitis due to hypertriglyceridemia and DM type 2 who presents with recurrent pain found to have recurrent mild pancreatitis with new pseudocyst. No sign of infection - WBC normal, afebrile. Recent US negative for gallstones. No new meds or alcohol use since last admission. Triglycerides were 3,500 6/28, in 200s now.     Plan    -NPO/sips of clears until pain improves  -Pain control per hospitalist   -Continue IV fluids  -Will need repeat imaging to follow up on pseudocyst (6-8 weeks, pending clinical course)  -Outpatient follow up (he has appt scheduled in August)   -Agree with nutrition consult - diabetic/low fat diet  -Continue Gemfibrozil    -Avoid alcohol      I discussed the patient plan with Dr. Arguelles. Thank you for asking us to participate in the care of this patient.  Total time:  45 minutes     Tiff Bond CNP  [facility]  Cell: [phone]   Office: [phone]     Former smoker

## 2023-07-24 NOTE — ED ADULT NURSE NOTE - ALCOHOL PRE SCREEN (AUDIT - C)
[facility] [place] [address]  Phone: [phone]  Fax: [phone]  Primary Provider: Charlene Ref-Primary, Physician  Pre-op Performing Provider: CRISTHIAN TAVAREZ      PREOPERATIVE EVALUATION:  Today's date: 7/24/2023    Rambo Monique is a 83 year old male who presents for a preoperative evaluation.      7/24/2023    10:35 AM   Additional Questions   Roomed by Ana Rosa RECIO   Accompanied by self     Surgical Information:  Surgery/Procedure: Cataract Surgery, [place]  Surgery Location: [facility]  Surgeon: Mack Palm  Surgery Date: 8/7/2023 and 8/21/2023  Time of Surgery: ?  Where patient plans to recover: At home with family  Fax number for surgical facility: [phone].    Assessment & Plan     The proposed surgical procedure is considered LOW risk.    Preop general physical exam    - CBC with Platelets & Differential; Future  - Lab Blood Morphology Pathologist Review; Future  - EKG 12-lead, tracing only  - Comprehensive metabolic panel (BMP + Alb, Alk Phos, ALT, AST, Total. Bili, TP); Future  - Lab Blood Morphology Pathologist Review  - Comprehensive metabolic panel (BMP + Alb, Alk Phos, ALT, AST, Total. Bili, TP)    Age-related cataract of both eyes, unspecified age-related cataract type  Scheduled for surgery.    Benign non-nodular prostatic hyperplasia with lower urinary tract symptoms    - tamsulosin (FLOMAX) 0.4 MG capsule; Take 1 capsule (0.4 mg) by mouth daily    Thrombocytopenia (H)    - Adult Oncology/Hematology  Referral; Future    Essential hypertension    - Albumin Random Urine Quantitative with Creat Ratio  - UA Macroscopic with reflex to Microscopic and Culture            - No identified additional risk factors other than previously addressed    Antiplatelet or Anticoagulation Medication Instructions:   - Patient is on no antiplatelet or anticoagulation medications.    Additional Medication Instructions:      RECOMMENDATION:  APPROVAL  GIVEN to proceed with proposed procedure pending review of diagnostic evaluation.  Reviewed risks (not limited to bleeding,infection,pain,un-anticipated response to anesthesia ecc) and benefits (diagnostic and therapeutic) of surgery with patient, he understands the risks of the procedure and would like to proceed.  Patient's active problems diagnostically and therapeutically optimized for planned procedure with, Local or General anesthesia    Review of external notes as documented elsewhere in note  40 minutes spent by me on the date of the encounter doing chart review, review of outside records, review of test results, interpretation of tests, patient visit, and documentation   Progressive decreased vision, seen by ophthalmologist and diagnosed with bilateral cataract, and deemed to be a good candidate for surgical treatment.  Mild prostate hypertrophy unless controlled with finasteride and Flomax.  Chronic thrombocytopenia of unclear etiology.    Subjective       HPI related to upcoming procedure:  Progressive decreased vision, seen by ophthalmologist and diagnosed with bilateral cataract, and deemed to be a good candidate for surgical treatment.  Mild prostate hypertrophy unless controlled with finasteride and Flomax.  Chronic thrombocytopenia of unclear etiology.        7/24/2023    10:43 AM   Preop Questions   1. Have you ever had a heart attack or stroke? No   2. Have you ever had surgery on your heart or blood vessels, such as a stent placement, a coronary artery bypass, or surgery on an artery in your head, neck, heart, or legs? No   3. Do you have chest pain with activity? No   4. Do you have a history of  heart failure? No   5. Do you currently have a cold, bronchitis or symptoms of other infection? No   6. Do you have a cough, shortness of breath, or wheezing? No   7. Do you or anyone in your family have previous history of blood clots? No   8. Do you or does anyone in your family have a serious bleeding  problem such as prolonged bleeding following surgeries or cuts? No   9. Have you ever had problems with anemia or been told to take iron pills? No   10. Have you had any abnormal blood loss such as black, tarry or bloody stools? No   11. Have you ever had a blood transfusion? No   12. Are you willing to have a blood transfusion if it is medically needed before, during, or after your surgery? Yes   13. Have you or any of your relatives ever had problems with anesthesia? No   14. Do you have sleep apnea, excessive snoring or daytime drowsiness? No   15. Do you have any artifical heart valves or other implanted medical devices like a pacemaker, defibrillator, or continuous glucose monitor? No   16. Do you have artificial joints? No   17. Are you allergic to latex? No       Health Care Directive:  Patient does not have a Health Care Directive or Living Will: Discussed advance care planning with patient; information given to patient to review.    Preoperative Review of :   reviewed - no record of controlled substances prescribed.      Status of Chronic Conditions:  See problem list for active medical problems.  Problems all longstanding and stable, except as noted/documented.  See ROS for pertinent symptoms related to these conditions.    Review of Systems  Constitutional, neuro, ENT, endocrine, pulmonary, cardiac, gastrointestinal, genitourinary, musculoskeletal, integument and psychiatric systems are negative, except as otherwise noted.    Patient Active Problem List    Diagnosis Date Noted    Benign non-nodular prostatic hyperplasia with lower urinary tract symptoms 08/24/2016     Priority: Medium    Essential hypertension 06/12/2015     Priority: Medium      No past medical history on file.  No past surgical history on file.  Current Outpatient Medications   Medication Sig Dispense Refill    amLODIPine (NORVASC) 10 MG tablet Take 1 tablet (10 mg) by mouth daily Take 1 tablet by mouth every morning 90 tablet 3  "   finasteride (PROSCAR) 5 MG tablet Take 1 tablet (5 mg) by mouth every morning 90 tablet 3    tamsulosin (FLOMAX) 0.4 MG capsule Take 1 capsule (0.4 mg) by mouth daily 90 capsule 1       No Known Allergies     Social History     Tobacco Use    Smoking status: Never    Smokeless tobacco: Never   Substance Use Topics    Alcohol use: Not on file     No family history on file.  History   Drug Use Not on file         Objective     /72 (BP Location: Right arm, Patient Position: Sitting, Cuff Size: Adult Large)   Pulse 65   Temp 97.7  F (36.5  C) (Temporal)   Resp 16   Ht 1.823 m (5' 11.77\")   Wt 79.4 kg (175 lb)   SpO2 98%   BMI 23.89 kg/m      Physical Exam    GENERAL APPEARANCE: healthy, alert and no distress     EYES: Bilateral lens opacification, otherwise extraocular movements are intact.     HENT: ear canals and TM's normal and nose and mouth without ulcers or lesions     NECK: no adenopathy, no asymmetry, masses, or scars and thyroid normal to palpation     RESP: lungs clear to auscultation - no rales, rhonchi or wheezes     CV: regular rates and rhythm, normal S1 S2, no S3 or S4 and no murmur, click or rub     ABDOMEN:  soft, nontender, no HSM or masses and bowel sounds normal     MS: extremities normal- no gross deformities noted, no evidence of inflammation in joints, FROM in all extremities.     SKIN: no suspicious lesions or rashes     NEURO: Normal strength and tone, sensory exam grossly normal, mentation intact and speech normal     PSYCH: mentation appears normal. and affect normal/bright     LYMPHATICS: No cervical adenopathy    Recent Labs   Lab Test 04/27/23  1129   HGB 14.8   *      POTASSIUM 4.2   CR 0.99   A1C 6.0*        Diagnostics:  Recent Results (from the past 48 hour(s))   EKG 12-lead, tracing only    Collection Time: 07/24/23 11:49 AM   Result Value Ref Range    Systolic Blood Pressure  mmHg    Diastolic Blood Pressure  mmHg    Ventricular Rate 60 BPM    Atrial Rate " 60 BPM    CO Interval 200 ms    QRS Duration 100 ms     ms    QTc 418 ms    P Axis 43 degrees    R AXIS 22 degrees    T Axis 29 degrees    Interpretation ECG       Sinus rhythm  Normal ECG  No previous ECGs available  Confirmed by GURMEET PAEZ MD LOC:[facility] ([id number]) on 7/24/2023 2:00:32 PM     Comprehensive metabolic panel (BMP + Alb, Alk Phos, ALT, AST, Total. Bili, TP)    Collection Time: 07/24/23 11:59 AM   Result Value Ref Range    Sodium 137 136 - 145 mmol/L    Potassium 4.0 3.4 - 5.3 mmol/L    Chloride 101 98 - 107 mmol/L    Carbon Dioxide (CO2) 27 22 - 29 mmol/L    Anion Gap 9 7 - 15 mmol/L    Urea Nitrogen 12.9 8.0 - 23.0 mg/dL    Creatinine 0.92 0.67 - 1.17 mg/dL    Calcium 9.1 8.8 - 10.2 mg/dL    Glucose 100 (H) 70 - 99 mg/dL    Alkaline Phosphatase 65 40 - 129 U/L    AST 26 0 - 45 U/L    ALT 21 0 - 70 U/L    Protein Total 7.7 6.4 - 8.3 g/dL    Albumin 4.3 3.5 - 5.2 g/dL    Bilirubin Total 0.4 <=1.2 mg/dL    GFR Estimate 84 >60 mL/min/1.73m2   Bld morphology pathology review    Collection Time: 07/24/23 11:59 AM   Result Value Ref Range    Final Diagnosis       Peripheral blood  - Thrombocytopenia      Comment       The clinical history has been reviewed.       Clinical Information       Chronic low platelet count      Performing Labs       The technical component of this testing was completed at the [facility] and [facility]      CBC with platelets and differential    Collection Time: 07/24/23 11:59 AM   Result Value Ref Range    WBC Count 5.0 4.0 - 11.0 10e3/uL    RBC Count 5.09 4.40 - 5.90 10e6/uL    Hemoglobin 14.9 13.3 - 17.7 g/dL    Hematocrit 45.4 40.0 - 53.0 %    MCV 89 78 - 100 fL    MCH 29.3 26.5 - 33.0 pg    MCHC 32.8 31.5 - 36.5 g/dL    RDW 13.2 10.0 - 15.0 %    Platelet Count 130 (L) 150 - 450 10e3/uL    % Neutrophils 43 %    % Lymphocytes 40 %    % Monocytes 10 %    % Eosinophils 6 %    % Basophils 1 %    % Immature Granulocytes 0 %     NRBCs per 100 WBC 0 <1 /100    Absolute Neutrophils 2.2 1.6 - 8.3 10e3/uL    Absolute Lymphocytes 2.0 0.8 - 5.3 10e3/uL    Absolute Monocytes 0.5 0.0 - 1.3 10e3/uL    Absolute Eosinophils 0.3 0.0 - 0.7 10e3/uL    Absolute Basophils 0.0 0.0 - 0.2 10e3/uL    Absolute Immature Granulocytes 0.0 <=0.4 10e3/uL    Absolute NRBCs 0.0 10e3/uL   Reticulocyte count    Collection Time: 07/24/23 11:59 AM   Result Value Ref Range    % Reticulocyte 1.6 0.8 - 2.7 %    Absolute Reticulocyte 0.079 0.010 - 0.110 10e6/uL   Albumin Random Urine Quantitative with Creat Ratio    Collection Time: 07/24/23 12:04 PM   Result Value Ref Range    Creatinine Urine mg/dL 62.6 mg/dL    Albumin Urine mg/L <12.0 mg/L    Albumin Urine mg/g Cr     UA Macroscopic with reflex to Microscopic and Culture    Collection Time: 07/24/23 12:04 PM    Specimen: Urine, Midstream   Result Value Ref Range    Color Urine Yellow Colorless, Straw, Light Yellow, Yellow    Appearance Urine Clear Clear    Glucose Urine Negative Negative mg/dL    Bilirubin Urine Negative Negative    Ketones Urine Negative Negative mg/dL    Specific Gravity Urine 1.015 1.005 - 1.030    Blood Urine Negative Negative    pH Urine 7.5 5.0 - 8.0    Protein Albumin Urine Negative Negative mg/dL    Urobilinogen Urine 0.2 0.2, 1.0 E.U./dL    Nitrite Urine Negative Negative    Leukocyte Esterase Urine Negative Negative      EKG: appears normal, NSR, normal axis, normal intervals, no acute ST/T changes c/w ischemia, no LVH by voltage criteria, there are no prior tracings available    Revised Cardiac Risk Index (RCRI):  The patient has the following serious cardiovascular risks for perioperative complications:   - No serious cardiac risks = 0 points     RCRI Interpretation: 0 points: Class I (very low risk - 0.4% complication rate)         Signed Electronically by: Boy Toro MD  Copy of this evaluation report is provided to requesting physician.       Statement Selected

## 2023-07-31 NOTE — ED PROVIDER NOTE - COVID-19  TEST TYPE
"Subjective   History was provided by the mother.  Bharath Marroquin is a 16 y.o. male who is here for this well child visit.  Immunization History   Administered Date(s) Administered    DTaP vaccine, pediatric  (INFANRIX) 01/14/2010, 07/21/2010, 08/23/2010, 10/27/2010, 09/16/2014    Hepatitis A vaccine, pediatric/adolescent (HAVRIX, VAQTA) 01/27/2011, 08/31/2011    Influenza Whole 08/31/2011    MMR and varicella combined vaccine, subcutaneous (PROQUAD) 09/16/2014    MMR vaccine, subcutaneous (MMR II) 01/27/2011    Meningococcal ACWY vaccine (MENVEO) 06/08/2021    Pfizer Purple Cap SARS-CoV-2 08/04/2021, 09/02/2021    Pneumococcal conjugate vaccine, 13-valent (PREVNAR 13) 01/27/2011    Poliovirus vaccine, subcutaneous (IPOL) 09/16/2014    Tdap vaccine, age 10 years and older (BOOSTRIX) 06/08/2021    Varicella vaccine, subcutaneous (VARIVAX) 02/23/2011, 09/16/2014     History of previous adverse reactions to immunizations? no  The following portions of the patient's history were reviewed by a provider in this encounter and updated as appropriate:  Allergies  Meds  Problems       Well Child Assessment:    Dental  The patient has a dental home. The patient brushes teeth regularly. Last dental exam was 6-12 months ago.   Elimination  There is no bed wetting.   Sleep  The patient does not snore. There are no sleep problems.   Safety  There is no smoking in the home.   School  There are no signs of learning disabilities. Child is doing well in school.   Screening  There are no risk factors for vision problems. There are no risk factors related to diet. There are no risk factors related to alcohol. There are no risk factors related to relationships.       Objective   Vitals:    07/31/23 1448   BP: 108/70   Pulse: 66   Weight: 51 kg   Height: 1.575 m (5' 2\")     Growth parameters are noted and are appropriate for age.  Physical Exam  Constitutional:       Appearance: Normal appearance. He is normal weight.   HENT:      Head: " Normocephalic and atraumatic.      Right Ear: Tympanic membrane, ear canal and external ear normal.      Left Ear: Tympanic membrane, ear canal and external ear normal.      Nose: Nose normal.      Mouth/Throat:      Mouth: Mucous membranes are moist.      Pharynx: Oropharynx is clear.   Eyes:      Extraocular Movements: Extraocular movements intact.      Conjunctiva/sclera: Conjunctivae normal.      Pupils: Pupils are equal, round, and reactive to light.   Cardiovascular:      Rate and Rhythm: Normal rate and regular rhythm.   Pulmonary:      Effort: Pulmonary effort is normal.      Breath sounds: Normal breath sounds.   Abdominal:      General: Abdomen is flat. Bowel sounds are normal.      Palpations: Abdomen is soft.   Genitourinary:     Penis: Normal.       Testes: Normal.   Musculoskeletal:         General: Normal range of motion.      Cervical back: Normal range of motion and neck supple.   Skin:     General: Skin is warm.      Capillary Refill: Capillary refill takes less than 2 seconds.   Neurological:      Mental Status: He is alert and oriented to person, place, and time. Mental status is at baseline.   Psychiatric:         Mood and Affect: Mood normal.         Behavior: Behavior normal.         Thought Content: Thought content normal.         Assessment/Plan   Well adolescent.  Overall doing well.  He eats well.  He is very active.  Adolescent depression screen normal.  For next well child visit, or sooner as needed.       MOLECULAR PCR

## 2023-08-01 ENCOUNTER — APPOINTMENT (OUTPATIENT)
Dept: FAMILY MEDICINE | Facility: CLINIC | Age: 88
End: 2023-08-01
Payer: MEDICARE

## 2023-08-01 VITALS
BODY MASS INDEX: 25.11 KG/M2 | OXYGEN SATURATION: 96 % | HEART RATE: 67 BPM | DIASTOLIC BLOOD PRESSURE: 63 MMHG | SYSTOLIC BLOOD PRESSURE: 110 MMHG | WEIGHT: 160 LBS | HEIGHT: 67 IN

## 2023-08-01 DIAGNOSIS — Z00.00 ENCOUNTER FOR GENERAL ADULT MEDICAL EXAMINATION W/OUT ABNORMAL FINDINGS: ICD-10-CM

## 2023-08-01 PROCEDURE — G0405: CPT | Mod: 59

## 2023-08-01 PROCEDURE — G0439: CPT

## 2023-08-01 RX ORDER — METOPROLOL TARTRATE 25 MG/1
25 TABLET, FILM COATED ORAL
Qty: 135 | Refills: 1 | Status: DISCONTINUED | COMMUNITY
Start: 2021-01-22 | End: 2023-08-01

## 2023-08-01 RX ORDER — DIGOXIN 125 UG/1
125 TABLET ORAL DAILY
Qty: 90 | Refills: 0 | Status: DISCONTINUED | COMMUNITY
Start: 2023-01-20 | End: 2023-08-01

## 2023-08-03 NOTE — ASSESSMENT
[FreeTextEntry1] : Annual SBIRT negative Depression screen declines screening labs in chart 5/23 EKG Sinus bradycardia @ 40  Vaccines  Adacel declined Prevnar 20 5/23 Shingrix declines Flu 10/22 COVID booster declines  Hx of stroke/ CVA Seen by Cardiologist Dr Nick, stopped aspirin. Dr Rasheed spoke to Dr. Martinez regarding patient  Subarachnoid hemorrhage and not a hemorrhagic conversion of his stroke.  Therefore, it is unclear if he would have another spontaneous bleed if restarted on AC. Dr. Martinez will be referring him to a specialist to at least discuss a Watchman procedure.  Parkinsons Seen by Neurology in June Ropinirole continued  Dermatitis:  Not seen by dermatology recently.  States that he is using a topical from [place]. His wife recently went there.  A Fib on metoprolol and digoxin Pulse 40, d/c digoxin. Change metoprolol to succinate 25 mg Prev on Tartrate 1 am and 1/2 tab pm daily Reduce to 1 a day. Off anticoagulants because he had rectal bleed. Follow up in 3-4 weeks.

## 2023-08-03 NOTE — HISTORY OF PRESENT ILLNESS
[FreeTextEntry1] : Annual [de-identified] : Mr. AURORA SIBLEY is a 88 year old male presenting for an annual. States he is having financial concerns, has not seen the cardiologist in 2-3 years. Having some hallucinations with Parkinsons states he has discussed with Neurologist, has follow up appointment.  He was contacted by Neuro to reduce ropiranole because he was gambling and Roperinol can cause loss of impulse control. He states he has reduced it to 2 times a day previously on it 3 times a day.  HTN BP okay at home On Olmesartan A fib rate controlled on Metoprolol. Off Coumadin after he had bleeding. Prev Hx: he was hospitalized on 2/11/22 with five days of dysarthria, difficulty swallowing and generalized weakness.  In the ED, NIHSS was 3. He was not a tpa candidate since symptoms had been present for several days. He has a history of atrial fibrillation but had been of anticoagulation due to hematuria and surgery within the past year that was complicated by post-op hemorrhage. MRI brain showed a few small left frontal parietal acute infarcts. He was restarted on anticoagulation after being cleared by surgery. On 2/14 he developed jerky movements of his right arm and dysmetria. Repeat head CT showed SAH and Eliquis was stopped. He was started on Keppra 500 mg BID. This was later changed to 250 mg in the AM and 750 mg at night due to sedation in the morning. Repeat imaging was stable and he was restarted on aspirin (which had only recently been started a few weeks prior).  He was discharged on 2/18/22.  Aspirin was stopped by Dr. Nick.   10/19/22: He has discontinued Keppra.  taking ropinirole 1 mg BID. He says that when he was taking this TID he had gambling more.

## 2023-08-03 NOTE — PHYSICAL EXAM
[Normal Sclera/Conjunctiva] : normal sclera/conjunctiva [Normal Rate] : normal rate  [Normal S1, S2] : normal S1 and S2 [Normal Gait] : normal gait [Normal] : affect was normal and insight and judgment were intact [de-identified] : a fib [de-identified] : maculopapular rash with excoriations in arm and legs

## 2023-08-03 NOTE — REVIEW OF SYSTEMS
[Negative] : Psychiatric [Shortness Of Breath] : no shortness of breath [Wheezing] : no wheezing [Cough] : no cough [Dyspnea on Exertion] : not dyspnea on exertion [Skin Rash] : no skin rash [Easy Bleeding] : no easy bleeding [Easy Bruising] : no easy bruising [de-identified] : see hpi

## 2023-08-03 NOTE — HEALTH RISK ASSESSMENT
[Fair] :  ~his/her~ mood as fair [Yes] : Yes [Monthly or less (1 pt)] : Monthly or less (1 point) [1 or 2 (0 pts)] : 1 or 2 (0 points) [Never (0 pts)] : Never (0 points) [No] : In the past 12 months have you used drugs other than those required for medical reasons? No [No falls in past year] : Patient reported no falls in the past year [Patient refused screening] : Patient refused screening [Behavioral] : behavioral [Transportation] : transportation [Financial] : financial [With Family] : lives with family [Retired] : retired [] :  [Fully functional (bathing, dressing, toileting, transferring, walking, feeding)] : Fully functional (bathing, dressing, toileting, transferring, walking, feeding) [Fully functional (using the telephone, shopping, preparing meals, housekeeping, doing laundry, using] : Fully functional and needs no help or supervision to perform IADLs (using the telephone, shopping, preparing meals, housekeeping, doing laundry, using transportation, managing medications and managing finances) [Reports changes in hearing] : Reports changes in hearing [Reports changes in vision] : Reports changes in vision [Name: ___] : Health Care Proxy's Name: [unfilled]  [Relationship: ___] : Relationship: [unfilled] [Former] : Former [10-14] : 10-14 [> 15 Years] : > 15 Years [Little interest or pleasure doing things] : 1) Little interest or pleasure doing things [0] : 1) Little interest or pleasure doing things: Not at all (0) [Feeling down, depressed, or hopeless] : 2) Feeling down, depressed, or hopeless [1] : 2) Feeling down, depressed, or hopeless for several days (1) [PHQ-2 Negative - No further assessment needed] : PHQ-2 Negative - No further assessment needed [Audit-CScore] : 1 [[id number]] : 1 [Reports changes in dental health] : Reports no changes in dental health [AdvancecareDate] : 8/2023 [de-identified] : 1967

## 2023-08-21 ENCOUNTER — APPOINTMENT (OUTPATIENT)
Dept: FAMILY MEDICINE | Facility: CLINIC | Age: 88
End: 2023-08-21
Payer: MEDICARE

## 2023-08-21 VITALS
TEMPERATURE: 98.1 F | BODY MASS INDEX: 25.58 KG/M2 | HEART RATE: 78 BPM | WEIGHT: 163 LBS | OXYGEN SATURATION: 96 % | SYSTOLIC BLOOD PRESSURE: 116 MMHG | RESPIRATION RATE: 16 BRPM | HEIGHT: 67 IN | DIASTOLIC BLOOD PRESSURE: 77 MMHG

## 2023-08-21 DIAGNOSIS — R00.1 BRADYCARDIA, UNSPECIFIED: ICD-10-CM

## 2023-08-21 DIAGNOSIS — R29.898 OTHER SYMPTOMS AND SIGNS INVOLVING THE MUSCULOSKELETAL SYSTEM: ICD-10-CM

## 2023-08-21 PROCEDURE — 99214 OFFICE O/P EST MOD 30 MIN: CPT

## 2023-08-21 NOTE — REVIEW OF SYSTEMS
[Shortness Of Breath] : no shortness of breath [Wheezing] : no wheezing [Cough] : no cough [Dyspnea on Exertion] : not dyspnea on exertion [Skin Rash] : no skin rash [Easy Bleeding] : no easy bleeding [Easy Bruising] : no easy bruising [Negative] : Psychiatric [de-identified] : see hpi

## 2023-08-21 NOTE — HISTORY OF PRESENT ILLNESS
[FreeTextEntry1] : Follow up [de-identified] : Mr. AURORA SIBLEY is a 88 year old male presenting for a follow up Doing well on lower dose of metoprolol and has discontinued Digoxin. He states hallucinations have resolved since then. States he is having financial concerns, has not seen the cardiologist in 2-3 years. Previously noted some hallucinations with Parkinsons states he has discussed with Neurologist, has follow up appointment.  He was contacted by Neuro to reduce ropiranole because he was gambling and Roperinol can cause loss of impulse control. He states he has reduced it to 2 times a day previously on it 3 times a day.  HTN BP okay at home On Olmesartan A fib rate controlled on Metoprolol. Off Coumadin after he had bleeding. Prev Hx: he was hospitalized on 2/11/22 with five days of dysarthria, difficulty swallowing and generalized weakness.  In the ED, NIHSS was 3. He was not a tpa candidate since symptoms had been present for several days. He has a history of atrial fibrillation but had been of anticoagulation due to hematuria and surgery within the past year that was complicated by post-op hemorrhage. MRI brain showed a few small left frontal parietal acute infarcts. He was restarted on anticoagulation after being cleared by surgery. On 2/14 he developed jerky movements of his right arm and dysmetria. Repeat head CT showed SAH and Eliquis was stopped. He was started on Keppra 500 mg BID. This was later changed to 250 mg in the AM and 750 mg at night due to sedation in the morning. Repeat imaging was stable and he was restarted on aspirin (which had only recently been started a few weeks prior).  He was discharged on 2/18/22.  Aspirin was stopped by Dr. Nick.   10/19/22: He has discontinued Keppra.  taking ropinirole 1 mg BID. He says that when he was taking this TID he had gambling more.

## 2023-08-21 NOTE — PHYSICAL EXAM
[Normal Sclera/Conjunctiva] : normal sclera/conjunctiva [Normal Rate] : normal rate  [Normal S1, S2] : normal S1 and S2 [Normal Gait] : normal gait [Normal] : affect was normal and insight and judgment were intact [de-identified] : a fib [de-identified] : maculopapular rash with excoriations in arm and legs

## 2023-08-21 NOTE — ASSESSMENT
[FreeTextEntry1] : Bradycardia resolved after reducing dose of Metoprolol Off Digoxin No complaints States hallucinations have resolved too.  Hx of stroke/ CVA Seen by Cardiologist Dr Nick, stopped aspirin. Dr Rasheed spoke to Dr. Martinez regarding patient  Subarachnoid hemorrhage and not a hemorrhagic conversion of his stroke.  Therefore, it is unclear if he would have another spontaneous bleed if restarted on AC. Dr. Martinez will be referring him to a specialist to at least discuss a Watchman procedure.  Parkinsons Seen by Neurology in June Ropinirole continued  A Fib on metoprolol succinate 25 mg PO qd Pulse 70s today. Off anticoagulants because he had rectal bleed. Follow up 12/23 has appointment.

## 2023-08-22 NOTE — ASU PREOP CHECKLIST - INTERNAL PROSTHESES
Encourage frequent hand washing, avoid raw seafood, report temperature > 100.4, wash fresh fruits and vegetables    no

## 2023-08-28 ENCOUNTER — APPOINTMENT (OUTPATIENT)
Dept: FAMILY MEDICINE | Facility: CLINIC | Age: 88
End: 2023-08-28

## 2023-09-28 NOTE — HISTORY OF PRESENT ILLNESS
H&P reviewed. The patient was examined and there are no changes to the H&P.        
[de-identified] : For wound care.

## 2023-09-29 ENCOUNTER — APPOINTMENT (OUTPATIENT)
Dept: NEUROLOGY | Facility: CLINIC | Age: 88
End: 2023-09-29
Payer: MEDICARE

## 2023-09-29 VITALS
SYSTOLIC BLOOD PRESSURE: 118 MMHG | DIASTOLIC BLOOD PRESSURE: 80 MMHG | WEIGHT: 153 LBS | HEIGHT: 67 IN | HEART RATE: 80 BPM | TEMPERATURE: 97.6 F | BODY MASS INDEX: 24.01 KG/M2

## 2023-09-29 PROCEDURE — 99213 OFFICE O/P EST LOW 20 MIN: CPT

## 2023-10-31 NOTE — ED PROVIDER NOTE - QUALITY
Chief Complaint   Patient presents with   • Medication Management     Currently non fasting.    • Abdominal Pain     Patient states stomach hurts.         [place]       Hypertension follow-up.  Compliant with medication.  Non-compliant with exercise recommendations.  Non-compliant with well balanced low salt diet.  Non-compliant with weight loss efforts.  Patient denies medication side effects, chest pain, dyspnea, syncope, dizziness or edema.      ROS  General: Negative.  Eyes: Negative  ENT: Negative  Cardiovascular: Negative  Respiratory: Negative  Gastrointestinal: Negative  Genitourinary: Negative  Musculoskeletal: Negative  Skin: Negative  Neurologic: negative  Psychiatric: Negative  Endocrine: Negative  HemeLymphatic: Negative  Allergic/Immunologic: Negative    Past Medical History:   Diagnosis Date   • Allergic rhinitis 05/03/2022   • Anemia of renal disease 12/14/2022   • Arthritis 07/05/2022   • Atherosclerosis of renal artery (CMD) 2009    Renal artery stenosis By CTA. IMPRESSION:   • Benign prostatic hyperplasia without lower urinary tract symptoms 05/03/2022   • Chronic kidney disease    • Chronic kidney disease, stage 4 (severe) (CMD) 05/03/2022   • COVID-19 02/24/2021   • Depression    • Diverticulosis 05/03/2022   • Edema 12/14/2022   • Elevated PSA 05/03/2022   • Essential (primary) hypertension    • Essential hypertension 05/03/2022   • Frozen shoulder 05/03/2022   • Gastroesophageal reflux disease    • GERD (gastroesophageal reflux disease) 05/03/2022   • H pylori ulcer 05/03/2022   • Hearing loss 05/03/2022   • Iron deficiency anemia 07/07/2022   • Kidney stones 05/03/2022   • Lipoma     Left upper arm   • Lumbar stenosis 07/05/2022   • Polyp of gallbladder 05/03/2022        Past Surgical History:   Procedure Laterality Date   • Carpal tunnel release Left 06/27/2021    Surgeon: Dr. Baird. Hospital: [facility]   • Colonoscopy  2013    Colon Ulceration per Dr. Payan,  repeat colonoscopy refused,   • Ct abdomen and pelvis w/out contrast  11/21/2022    Diverticulosis.  Distended gallbladder without gallstones stable.  Mild prostatomegaly diffused lumbar degenerative disc disease.  Degenerative changes of left hip.  Mild deformity of right 10th rib.  Stable mild thickening of urinary bladder wall with diverticula   • Cystoscopy  12/13/2022    Bladder diverticulum, Dr. Mcdonald   • Epidural steroid injection           No family history on file.     Immunization History   Administered Date(s) Administered   • COVID Moderna 0.5 mL 12Y+ 02/26/2021, 03/26/2021, 10/23/2021, 04/06/2022   • COVID Pfizer Bivalent 12Y+ 10/01/2022   • Influenza A Monovalent (H5N1), Adjuvanted 2013 10/28/2013, 10/06/2014, 10/07/2015   • Influenza, High Dose quadrivalent, preserve-free 10/17/2022, 09/28/2023   • Influenza, high dose seasonal, preservative-free 10/14/2016, 09/18/2017, 10/22/2018, 10/24/2019, 09/25/2020, 10/04/2021   • Pneumococcal Conjugate 13 Valent Vacc (Prevnar 13) 11/14/2016, 08/29/2018   • Pneumococcal Polysaccharide Vacc (Pneumovax 23) 03/09/2021   • Shingrix (Shingles Zoster) 12/18/2019, 02/22/2020        Social History     Socioeconomic History   • Marital status:      Spouse name: Not on file   • Number of children: 3   • Years of education: Not on file   • Highest education level: Not on file   Occupational History   • Occupation: retired   Tobacco Use   • Smoking status: Never   • Smokeless tobacco: Never   Vaping Use   • Vaping Use: never used   Substance and Sexual Activity   • Alcohol use: Not Currently   • Drug use: Never   • Sexual activity: Not Currently     Partners: Female   Other Topics Concern   • Not on file   Social History Narrative    Social History Comments: Daughter- Sugar, son Wesley. Living situation: with son Wesley Occupation: retired Alcohol ( Drinks per week): 0 C- Neg A- Neg G- Neg E- Neg Rec. drugs: Denies Diet: Healthy, well balanced. Aerobic exercise  (workouts per week): walks daily with cane for balance..            ===        Notify patient of laboratory results over the phone.Severe kidney function appears to be stable.  There is some anemia due to kidney disease this should be discussed in detail with Dr. Maradiaga in the near future.    Send copy results to ordering physician Dr. Jeffry Maradiaga.    Results: Creatinine 2.97, GFR 19.  Hemoglobin 9.2, hematocrit 29.2.  MCV/MCH normal.    Jeffrey Earl MD    06/23/23          Laboratory results show stable, severe kidney disease.  There is mild to moderate anemia of chronic disease.  The patient should follow-up with Dr. Maradiaga for recommendations and management.    Send copy of results to Dr. Maradiaga.    Results:Creatinine 2.87, GFR 20, glucose 106, CO2 20, chloride 114.  Hemoglobin 9.8, hematocrit 30.6,    Jeffrey Earl MD    03/21/23              Laboratory results show decreased carbon dioxide which is stable, Jeffrey Earl MD    Laboratory results show mild iron deficiency and stable normocytic anemia.  The patient should take a over-the-counter iron supplement every other day to restore iron storage.renal panel pending.  Send results to Dr. Maradiaga.    Results: Ferritin 138, iron 49, TIBC 232, saturation 21.  B12/folate normal.  Hemoglobin 10.1, hematocrit 30.8. creatinine 3.17, GFR 18.    Jeffrey Earl MD    12/15/22        Laboratory results show stable kidney disease and stable anemia of chronic disease.  Fax results to Dr. Maradiaga and advise patient of results..  Creatinine 2.87, GFR 20.  Hemoglobin 10.6, hematocrit 33.1, MCV/MCH normal.    Jeffrey Verduzco MD     8/21/2022         Laboratory results show moderate kidney disease with proteinuria mild anemia chronic disease without iron deficiency but are otherwise within normal limits.  Continue current treatment plan and follow-up instructions.    Results: Microalbumin positive, creatinine 2.64, GFR 22, iron panel normal, hemoglobin  10.1, hematocrit 32.0, MCV/MCH normal.    Conor Verduzco MD     7/11/2022  6:49 PM CDT             PTH elevated -144. Normal calcium. Send results to Dr Maradiaga's office. NOAH Tenorio ( YOVANA LOOMIS 04/18/2022        The patient's chronic kidney disease stage IV is stable. His anemia of chronic disease is stable. Normal iron levels. Cholesterol well controlled. Send results to his nephrologist, Dr. Maradiaga. Continue previous instructions and follow up as planned in 2-4 weeks non fasting medication check, bring all medications he is currently taking to this visit. Results: LDL 69, BUN 66, Cr 2.63, GFR 20, RBC 3.07, Hgb 9.7, hematocrit 28.3. NOAH Tenorio 04/18/2022        Lab results show progression of chronic kidney disease with secondary anemia. Low calcium level. Send results to Dr Maradiaga's office and patient to follow up with Dr Maradiaga as planned for management. Labs- GFR 17, creatinine 3.05 BUN 60 calcium 8.4 chloride 111 hemoglobin 9.5 RBC 3.06 hematocrit 28.8. NOAH Tenorio 02/17/2022        The patient's chronic kidney disease stage IV is stable. His anemia of chronic disease is stable to improved. Send results to his nephrologist, Dr. Maradiaga. Results: Creatinine 3.1, GFR 17, hemoglobin 10.8, hematocrit 33.5, MCV/MCH normal. Urinalysis-pH greater than 8.5, occult blood 2+, protein 2+, leukocyte esterase 3+, WBC 20-40, RBCs 0-2. CONOR VERDUZCO 08/11/2021        The patient's parathyroid hormone is elevated. This is due to his chronic kidney disease. This should be discussed with his nephrologist regarding treatment options. Forward results to Dr. Maradiaga. Results: . CONOR VERDUZCO 08/12/2021        Follow up with Dr Baird on 8/4/2021, s/p carpal release 7/29/2021 on the right wrist. Incision clean and dry. Improved range of motion and reduced numbness. Continue OT and follow up as needed. NOAH Tenorio 08/06/2021        Lab  results show stable anemia of chronic disease and renal disease. No changes in labs since last visit in April 2021. He is cleared for surgery and results can be sent to surgeon's office. Follow up post op as planned. Lab results: BUN 50, Cr 2.9, GFR 20, RBC 3.30, hemoglobin 10.0, Hct 32.5 Jona JHONKUNAL YOVANA VLADISLAV 06/07/2021        Advise patient of results and forward results to Dr. Maradiaga. Laboratory results show stable anemia of chronic disease and stable impaired kidney function. Results: Hemoglobin 10.3, hematocrit 32.8, MCV/MCH normal. Urinalysis negative blood and negative protein positive leukocyte esterase positive squamous epithelial cells. Creatinine 3.2, GFR 18, glucose 109, CO2 19, anion gap 18. CONOR STAFFORD 04/12/2021        Lab results show slight worsening anemia and severe impairment in renal function. GFR - 19, creatinine 3.1, BUN 65. He does have low vitamin b12 level but likely the renal impairment is the cause of his anemia. Stop any over the counter medications, NSAIDs, and hold multivitamin that might be contributing to kidney disease. He needs to follow up promptly with nephrologist, Dr Maradiaga to discuss management of anemia and kidney function. A new referral was placed in chart and can be sent to patient. No iron deficiency present. Cholesterol well controlled. Metabolic panel showing slight shift in chloride and CO2. He should first follow up with nephrology. Recommend follow up in our office in 1 month to repeat labs and monitor for trend. Lab results: RBC 2.9, Hgb 9.1, HCt 29.3%, .00, MChC 31, B12 167, chloride 109, CO2 21. NetopreethijesúsJHON YOVANA VLADISLAV 03/10/2021        Lab results show continued normocytic anemia of chronic disease that is stable. Due to the degree of anemia, he should follow up with Dr Maradiaga from nephrology to discuss plan of care to treat anemia. Send results to Dr Maradiaga's office. Normal iron panel and reticulocyte count. PSA 3.26, which is  within normal limits for his age range. Labs: RBC: 3.0 Hemoglobin: 9.5 Hematocrit:28.2. Follow up with Dr Verduzco in 3 months. NOAH Tenorio YOVANA VLADISLAV 12/22/2020        10/16/2020 Laboratory results show worsening of normocytic anemia of chronic disease. The patient would likely benefit from Neupogen injections. He should follow up with his nephrologist, Dr. Maradiaga for further evaluation. Send these results to Dr. Maradiaga for his review. Blood sugars elevated, although fasting status at time of blood draw not documented. Kidney function remains moderately impaired. Thyroid function is adequate. The patient should follow-up with Dr. Verduzco in 1 month. At that time, order CBC, iron panel, reticulocyte count, hemoglobin A1c. Results: Hemoglobin 8.9, hematocrit 28.1, , MCH 32, LDL 60, glucose 124, creatinine 2.6, GFR 25, calcium 8.6, total protein 5.8, amylase/lipase normal. Thyroid functions within normal limits other than borderline decrease in total T3 CONOR VERDUZCO 10/16/2020        GI consult 10/8/2020, Dr. Payan. Recommendations include CT scan abdomen and pelvis, pantoprazole 40 mg twice daily, sucralfate 1 g 3 times daily, follow-up in 3-4 weeks, consider repeating upper endoscopy. 10/8/2020 barium upper GI x-ray-small sliding hiatal hernia, mildly prominent esophageal be ranging without significant narrowing, duodenal diverticulum at the 3 measuring 3.5 cm. Smaller diverticula at D1 and 4 possibly representing ulcers possibly incompetent sphincter Tc evidenced by contrast reflux into the common bile duct. CONOR VERDUZCO 10/08/2020        he renal arteries appear to be normal. This rules out renal artery stenosis as cause of patient's hypertension. Continue current treatment plan and medications. Follow-up in 3 months as planned. Results: 8/31/2020 renal artery duplex, normal study, no stenosis. CONOR VERDUZCO 09/02/2020        Renal ultrasound within normal limits except for  small kidneys. No other abnormalities detected. A renal artery duplex was also ordered but no results yet. Please confirm this test was performed, and if not have it performed, otherwise get results to Dr Verduzco. Evaluation for renal artery stenosis as a secondary cause of patient's hypertension needed to complete evaluation. CONOR VERDUZCO 07/14/2020        Laboratory results show ongoing normocytic anemia, ongoing moderate/severe kidney impairment with well controlled cholesterol. Continue current treatment plan and follow-up instructions. Results: Urinalysis-protein 30, blood small. Hemoglobin 10.7 hematocrit 34.7 MCV/MCH normal. Creatinine 2.9, GFR 22 LDL 82 microalbumin positive. CONOR VERDUZCO 03/03/2020        Laboratory results show stable anemia of chronic disease. There is mild vitamin D deficiency. Stable, severe renal insufficiency. The patient should continue current treatment plan. The patient should follow-up with his nephrologist, Dr. Maradiaga in the near future. The patient should emphasizing vitamin D rich foods in his diet and consider taking a daily over-the-counter vitamin D supplement. Send results to Dr. Maradiaga. If not already done so the patient should schedule a Medicare wellness examination. Consider bringing the patient's son to that visit self-insured the patient's medical needs are optimized. The patient should bring all medication bottles in his house to that visit for reconciliation and adjustment. Results should be given to the patient and his son, Wesley. Results: Hemoglobin 11.1 hematocrit 36.0 /MCH 31. Phosphorus 3.4, creatinine 2.8, GFR 23 vitamin D 25.9 CONOR VERDUZCO 11/19/2019     Social Determinants of Health     Financial Resource Strain: Low Risk  (4/15/2021)    Financial Resource Strain    • Social Determinants: Financial Resource Strain: None   Food Insecurity: No Food Insecurity (4/15/2021)    Food Insecurity    • Social Determinants: Food Insecurity: Never    Transportation Needs: No Transportation Needs (4/15/2021)    PRAPARE - Transportation    • Lack of Transportation (Medical): No    • Lack of Transportation (Non-Medical): No   Physical Activity: Insufficiently Active (4/15/2021)    Exercise Vital Sign    • Days of Exercise per Week: 2 days    • Minutes of Exercise per Session: 10 min   Stress: Low Risk  (4/15/2021)    Stress    • Social Determinants: Stress: Not at all   Social Connections: Socially Integrated (4/15/2021)    Social Connections    • Social Determinants: Social Connections: 5 or more times a week   Intimate Partner Violence: Not At Risk (7/28/2021)    Intimate Partner Violence    • Social Determinants: Intimate Partner Violence Past Fear: No    • Social Determinants: Intimate Partner Violence Current Fear: No       PHYSICAL EXAM     Visit Vitals  /62 (BP Location: RUE - Right upper extremity)   Pulse (!) 57   Temp 97.6 °F (36.4 °C) (Oral)   Ht 5' 0.8\" (1.544 m)   Wt 53.8 kg (118 lb 11.5 oz)   BMI 22.58 kg/m²        Physical Exam  Constitutional:       General: He is not in acute distress.     Appearance: Normal appearance. He is well-developed.   HENT:      Head: Normocephalic and atraumatic.      Jaw: There is normal jaw occlusion.      Right Ear: Hearing, tympanic membrane, ear canal and external ear normal.      Left Ear: Hearing, tympanic membrane, ear canal and external ear normal.      Nose: No nasal deformity, mucosal edema or congestion.      Mouth/Throat:      Lips: Pink. No lesions.      Mouth: Mucous membranes are moist. No oral lesions.      Dentition: Normal dentition.      Tongue: No lesions.      Pharynx: Oropharynx is clear. Uvula midline.      Tonsils: No tonsillar exudate.      Neck: Normal, full passive range of motion without pain and normal range of motion. No rigidity.   Eyes:      General: Lids are normal. Vision grossly intact.      Extraocular Movements: Extraocular movements intact.      Conjunctiva/sclera:  Conjunctivae normal.   Neck:      Thyroid: No thyromegaly.      Trachea: No tracheal deviation.   Cardiovascular:      Rate and Rhythm: Normal rate and regular rhythm.      Pulses: Normal pulses.      Heart sounds: S1 normal and S2 normal. No murmur heard.     No friction rub. No S3 or S4 sounds.   Pulmonary:      Breath sounds: Normal breath sounds and air entry. No wheezing, rhonchi or rales.   Abdominal:      General: Bowel sounds are normal.      Palpations: Abdomen is soft. There is no hepatomegaly or splenomegaly.      Tenderness: There is no abdominal tenderness.   Musculoskeletal:      Thoracic back: Normal.      Lumbar back: Normal.      Right lower leg: Normal. No edema.      Left lower leg: Normal. No edema.      Comments: Full range of motion ×4 extremities.  No joint swelling or erythema   Lymphadenopathy:      Cervical: No cervical adenopathy.   Skin:     General: Skin is warm and dry.      Findings: No rash.   Neurological:      General: No focal deficit present.      Mental Status: He is alert and oriented to person, place, and time.   Psychiatric:         Attention and Perception: Attention normal.         Mood and Affect: Mood and affect normal.         Speech: Speech normal.         Behavior: Behavior is cooperative.         Thought Content: Thought content normal.          Current Outpatient Medications   Medication Sig Dispense Refill   • amLODIPine (NORVASC) 10 MG tablet TAKE 1 TABLET BY MOUTH DAILY 90 tablet 0   • finasteride (PROSCAR) 5 MG tablet TAKE ONE TABLET BY MOUTH DAILY 30 tablet 0   • lisinopril (ZESTRIL) 10 MG tablet TAKE ONE TABLET BY MOUTH DAILY 90 tablet 0   • furosemide (LASIX) 40 MG tablet Take 40 mg by mouth daily.     • FeroSul 325 (65 Fe) MG tablet Take 1 tablet by mouth daily.     • tamsulosin (FLOMAX) 0.4 MG Cap Take 1 capsule by mouth daily after a meal. Indications: Benign Enlargement of Prostate 30 capsule 3   • acetaminophen (TYLENOL) 500 MG tablet 2 tablets     •  darbepoetin izzy (Aranesp, Albumin Free,) 40 MCG/0.4ML injection 0.4 ml       No current facility-administered medications for this visit.          She has ASSESSMENT AND PLAN    1. Essential hypertension  Well-controlled.  Continue current medication.  Importance of taking medication as prescribed was stressed to the patient.  Patient advised of the modifiable causes, clinical significance, and possible outcomes of hypertension.  Patient advised to follow a low sodium diet, exercise regularly,  achieve and maintain a normal weight, and avoid tobacco use.  Patient advised excessive alcohol intake can also raise blood pressure so limit alcohol intake to less than 15 drinks per week for men, and less than 7 drinks per week for women.  Annual labs/EKG:     -     CBC with Automated Differential  -     Comprehensive Metabolic Panel  -     Thyroid Stimulating Hormone Reflex  -     Urinalysis & Reflex Microscopy  -     Microalbumin Urine Random  2. Chronic kidney disease, stage 4 (severe) (CMD)  Monitor for signs of progression.  Follow-up with Dr. Maradiaga per his orders.  -     CBC with Automated Differential  -     Phosphorus  3. Secondary hyperparathyroidism of renal origin (CMD)  Monitor for signs of progression.  Follow-up with Dr. Maradiaga per his orders.  -     Parathyroid Hormone Intact Without Calcium  4. Other iron deficiency anemia  Is unclear based on patient report whether he is currently taking iron supplementation.  He was advised to review his medication list with his medication bottles at home.  Check iron studies and further recommendations pending results.    -     Ferritin  -     Iron And total Iron Binding Capacity  5. Primary osteoarthritis of left knee  The patient reports he is status post 1 injection into his left knee with minimal response.  He has follow-up planned with orthopedics for another injection.    Follow-up in 6 months fasting for medication management and Medicare wellness exam.            numb/pressure

## 2023-11-22 NOTE — ED ADULT TRIAGE NOTE - WEIGHT METHOD
[place] Cervical Cancer Screening Gap Report.  Chart reviewed, immunization record updated.  No new results noted on [facility] or [facility] web site.  Care Everywhere updated.   Patient care coordination note  Upcoming PCP visit updated.  Next PCP visit 2/8/2024.  Left detailed message for patient to return call to discuss Cervical & Colorectal Cancer Screening.  
stated

## 2023-11-30 ENCOUNTER — RX RENEWAL (OUTPATIENT)
Age: 88
End: 2023-11-30

## 2023-12-15 ENCOUNTER — APPOINTMENT (OUTPATIENT)
Dept: FAMILY MEDICINE | Facility: CLINIC | Age: 88
End: 2023-12-15
Payer: MEDICARE

## 2023-12-15 VITALS
OXYGEN SATURATION: 95 % | HEIGHT: 67 IN | SYSTOLIC BLOOD PRESSURE: 115 MMHG | RESPIRATION RATE: 16 BRPM | WEIGHT: 154 LBS | BODY MASS INDEX: 24.17 KG/M2 | TEMPERATURE: 97.7 F | HEART RATE: 74 BPM | DIASTOLIC BLOOD PRESSURE: 78 MMHG

## 2023-12-15 DIAGNOSIS — D50.0 IRON DEFICIENCY ANEMIA SECONDARY TO BLOOD LOSS (CHRONIC): ICD-10-CM

## 2023-12-15 DIAGNOSIS — F41.9 ANXIETY DISORDER, UNSPECIFIED: ICD-10-CM

## 2023-12-15 DIAGNOSIS — F32.A ANXIETY DISORDER, UNSPECIFIED: ICD-10-CM

## 2023-12-15 PROCEDURE — 36415 COLL VENOUS BLD VENIPUNCTURE: CPT

## 2023-12-15 PROCEDURE — G0008: CPT

## 2023-12-15 PROCEDURE — 99214 OFFICE O/P EST MOD 30 MIN: CPT | Mod: 25

## 2023-12-15 PROCEDURE — 90662 IIV NO PRSV INCREASED AG IM: CPT

## 2023-12-15 RX ORDER — ATORVASTATIN CALCIUM 80 MG/1
80 TABLET, FILM COATED ORAL
Qty: 90 | Refills: 1 | Status: ACTIVE | COMMUNITY
Start: 2023-01-20 | End: 1900-01-01

## 2023-12-15 RX ORDER — MECLIZINE HYDROCHLORIDE 25 MG/1
25 TABLET ORAL
Qty: 180 | Refills: 0 | Status: DISCONTINUED | COMMUNITY
Start: 2020-02-15 | End: 2023-07-12

## 2023-12-15 NOTE — PHYSICAL EXAM
[Normal Sclera/Conjunctiva] : normal sclera/conjunctiva [Normal Rate] : normal rate  [Normal S1, S2] : normal S1 and S2 [Normal Gait] : normal gait [Normal] : affect was normal and insight and judgment were intact [de-identified] : a fib [de-identified] : maculopapular rash with excoriations in arm and legs

## 2023-12-15 NOTE — DATA REVIEWED
[FreeTextEntry1] : MRI brain 8/20/19 mild periventricular, deep and subcortical white matter ischemia. 5 mm anterior medial RIGHT frontal meningioma noted, unchanged. Tiny old lacunar infarction seen in the RIGHT cerebellum. 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 Global atrophy.  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 NO ASSOCIATED BLOOD PRODUCTS. NO SIGNIFICANT MASS EFFECT   EEG Finding: Routine  EEG 8/20/19: normal EEG 2/15/22: Normal EEG EEG 3/28/22: normal.  CT brain 1/5/19: No acute intracranial hemorrhage, mass effect, or CT evidence of a large vascular territory infarct. Stable 5.5 mm mass containing calcifications in right frontal extra-axial region, likely calcified meningioma. Unchanged sub-cm sella/suprasellar lesion, likely pituitary adenoma.

## 2023-12-15 NOTE — HISTORY OF PRESENT ILLNESS
[FreeTextEntry1] : Follow up [de-identified] : Mr. AURORA SIBLEY is a 88-year-old male presenting for a follow up. States wife is struggling with health issues. Doing well on lower dose of metoprolol and has discontinued Digoxin. He states hallucinations have resolved since then. States he is having financial concerns, has not seen the cardiologist in 2-3 years. Previously noted some hallucinations with Parkinsons states he has discussed with Neurologist, has follow up appointment.  He was contacted by Neuro to reduce ropiranole because he was gambling and Roperinol can cause loss of impulse control. He states he has reduced it to 2 times a day previously on it 3 times a day.  HTN BP okay at home On Olmesartan A fib rate controlled on Metoprolol. Off Coumadin after he had bleeding. Prev Hx: he was hospitalized on 2/11/22 with five days of dysarthria, difficulty swallowing and generalized weakness.  In the ED, NIHSS was 3. He was not a tpa candidate since symptoms had been present for several days. He has a history of atrial fibrillation but had been of anticoagulation due to hematuria and surgery within the past year that was complicated by post-op hemorrhage. MRI brain showed a few small left frontal parietal acute infarcts. He was restarted on anticoagulation after being cleared by surgery. On 2/14 he developed jerky movements of his right arm and dysmetria. Repeat head CT showed SAH and Eliquis was stopped. He was started on Keppra 500 mg BID. This was later changed to 250 mg in the AM and 750 mg at night due to sedation in the morning. Repeat imaging was stable and he was restarted on aspirin (which had only recently been started a few weeks prior).  He was discharged on 2/18/22.  Aspirin was stopped by Dr. Nick.   10/19/22: He has discontinued Keppra.  taking ropinirole 1 mg BID. He says that when he was taking this TID he had gambling more.

## 2023-12-15 NOTE — ASSESSMENT
[FreeTextEntry1] : Bradycardia resolved after reducing dose of Metoprolol Off Digoxin No complaints States hallucinations have resolved too.  Parkinsons Seen by Neurologist Dr Rasheed On Ropinirole, reasonable control of symptoms.  may be the cause of his increased gambling (can cause compulsive behavior). He took Sinemet 25/100 once and felt strange so discontinued it. He only took one pill. Neurologist Dr Rasheed suggested taking Sinemet again.25/100 - 1/2 tablet po bid x 1 week and then increase to 1/2 tablet po tid states he does not tolerate Sinemet, he will discuss with neurologist.  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 spontaneous SAH after Eliquis was started.  Radiology felt it was more a SAH and not a hemorrhagic conversion of his stroke (although in similar region). Seen by Cardiologist Dr Nick, stopped aspirin. Dr Rasheed spoke to Dr. Martinez regarding patient Subarachnoid hemorrhage and not a hemorrhagic conversion of his stroke. Therefore, it is unclear if he would have another spontaneous bleed if restarted on AC. Dr. Martinez will be referring him to a specialist to at least discuss a Watchman procedure. Due for cardiology follow up Continue atorvastatin. Was suggesting Watchman procedure.  Subarachnoid Hemorrhage CT head 3/31/22 showed resolution of hemorrhage.  A Fib on metoprolol succinate 25 mg PO qd Pulse 70s today. Off anticoagulants because he had rectal bleed. Due for cardiology follow up  advised to schedule.  Follow up in 2-3 months Annual 8/24.

## 2023-12-19 LAB
ALBUMIN SERPL ELPH-MCNC: 4.1 G/DL
ALP BLD-CCNC: 97 U/L
ALT SERPL-CCNC: 26 U/L
ANION GAP SERPL CALC-SCNC: 11 MMOL/L
AST SERPL-CCNC: 36 U/L
BASOPHILS # BLD AUTO: 0.07 K/UL
BASOPHILS NFR BLD AUTO: 1.3 %
BILIRUB SERPL-MCNC: 1 MG/DL
BUN SERPL-MCNC: 13 MG/DL
CALCIUM SERPL-MCNC: 9.5 MG/DL
CHLORIDE SERPL-SCNC: 104 MMOL/L
CHOLEST SERPL-MCNC: 95 MG/DL
CO2 SERPL-SCNC: 27 MMOL/L
CREAT SERPL-MCNC: 1.09 MG/DL
EGFR: 66 ML/MIN/1.73M2
EOSINOPHIL # BLD AUTO: 0.21 K/UL
EOSINOPHIL NFR BLD AUTO: 3.9 %
ESTIMATED AVERAGE GLUCOSE: 105 MG/DL
GLUCOSE SERPL-MCNC: 106 MG/DL
HBA1C MFR BLD HPLC: 5.3 %
HCT VFR BLD CALC: 41.6 %
HDLC SERPL-MCNC: 60 MG/DL
HGB BLD-MCNC: 13.2 G/DL
IMM GRANULOCYTES NFR BLD AUTO: 0.2 %
LDLC SERPL CALC-MCNC: 24 MG/DL
LYMPHOCYTES # BLD AUTO: 0.82 K/UL
LYMPHOCYTES NFR BLD AUTO: 15.3 %
MAN DIFF?: NORMAL
MCHC RBC-ENTMCNC: 30.8 PG
MCHC RBC-ENTMCNC: 31.7 GM/DL
MCV RBC AUTO: 97 FL
MONOCYTES # BLD AUTO: 0.48 K/UL
MONOCYTES NFR BLD AUTO: 8.9 %
NEUTROPHILS # BLD AUTO: 3.78 K/UL
NEUTROPHILS NFR BLD AUTO: 70.4 %
NONHDLC SERPL-MCNC: 36 MG/DL
PLATELET # BLD AUTO: 150 K/UL
POTASSIUM SERPL-SCNC: 4.9 MMOL/L
PROT SERPL-MCNC: 7 G/DL
RBC # BLD: 4.29 M/UL
RBC # FLD: 15.5 %
SODIUM SERPL-SCNC: 142 MMOL/L
TRIGL SERPL-MCNC: 44 MG/DL
TSH SERPL-ACNC: 1.9 UIU/ML
WBC # FLD AUTO: 5.37 K/UL

## 2024-01-22 ENCOUNTER — APPOINTMENT (OUTPATIENT)
Dept: NEUROLOGY | Facility: CLINIC | Age: 89
End: 2024-01-22
Payer: MEDICARE

## 2024-01-22 VITALS
BODY MASS INDEX: 23.54 KG/M2 | HEIGHT: 67 IN | SYSTOLIC BLOOD PRESSURE: 118 MMHG | WEIGHT: 150 LBS | HEART RATE: 77 BPM | TEMPERATURE: 98.2 F | DIASTOLIC BLOOD PRESSURE: 78 MMHG

## 2024-01-22 VITALS — OXYGEN SATURATION: 97 %

## 2024-01-22 PROCEDURE — 99213 OFFICE O/P EST LOW 20 MIN: CPT

## 2024-01-22 PROCEDURE — G2211 COMPLEX E/M VISIT ADD ON: CPT

## 2024-01-22 RX ORDER — CARBIDOPA AND LEVODOPA 25; 100 MG/1; MG/1
25-100 TABLET ORAL
Qty: 90 | Refills: 3 | Status: DISCONTINUED | COMMUNITY
Start: 2023-09-29 | End: 2024-01-22

## 2024-01-22 RX ORDER — ROPINIROLE 1 MG/1
1 TABLET, FILM COATED ORAL
Qty: 180 | Refills: 3 | Status: ACTIVE | COMMUNITY
Start: 2020-03-23 | End: 1900-01-01

## 2024-01-22 NOTE — HISTORY OF PRESENT ILLNESS
[FreeTextEntry1] : Mr. Hendricks was last seen in the office on 1/11/21. Since that time he has had multiple medical issues. Most recently he was hospitalized on 2/11/22 with five days of dysarthria, difficulty swallowing and generalized weakness. Notably he recently had covid. The hospital chart was reviewed. In the ED, NIHSS was 3. He was not a tpa candidate since symptoms had been present for several days. He has a history of atrial fibrillation but had been of anticoagulation due to hematuria and surgery within the past year that was complicated by post-op hemorrhage. MRI brain showed a few small left frontal parietal acute infarcts. He was restarted on anticoagulation after being cleared by surgery. On 2/14 he developed jerky movements of his right arm and dysmetria. Repeat head CT showed SAH and Eliquis was stopped. He was started on Keppra 500 mg BID. This was later changed to 250 mg in the AM and 750 mg at night due to sedation in the morning. Repeat imaging was stable and he was restarted on aspirin (which had only recently been started a few weeks prior).  He was discharged on 2/18/22.   3/23/22: He reports that he feels drowsy and feels "blah". He goes to bed at 3-4 AM and sleeps until 11 PM. He reports that his wife has a virus, is coughing like crazy and keeps him up. He typically goes to bed at 12 AM and sleeps until 10 AM. He denies depression/mood disturbance.  He reports tremors in his right arm.  Sometimes when using a fork he has some jerking of his arm. He feels it is related to weakness. He has not had any uncontrollable jerky movements.  He would like to increase his ropinirole.  He says that his speech is reasonable.  He denies having any headache.  He says that he is walking well.  Aspirin was stopped by Dr. Nick.   5/12/22: Once in a while he feels more off balance. He has not had any falls. He has slurred speech once in a while. He denies new stroke like symptoms. He notes slight improvement in right hand strength Tremors are stable.  He has not had any seizure like activity. He is still sleepy.  10/19/22: He has discontinued Keppra. He has not had any events suspicious for seizures.. He is not aware of any new stroke like symptoms. He thinks that the tremors have improved. He is taking ropinirole 1 mg BID. He says that when he was taking this TID he had difficulty urinating. He denies recent falls.  He denies acting out his dreams. He says that once in a while he has a visual hallucination. The hallucination differs each time and is not disturbing He sleeps from 2 AM until 11 AM. He is on this schedule because his wife is a night owl.   6/2/23: He reports that he fell three times. He says that it was poor judgement on his part. He was called to look at something and he turned rapidly. The momentum caught up with him and he fell. He had no loss of consciousness.  He walks on most days.   He denies any episodes suspicious for seizures.  He reports some short term memory difficulties.  His wife brought him mannitol and mucuna powder from [place].  He is taking ropinirole 1 mg BID. Once in a while he buys a scratch off. Otherwise he is not gambling.  He reports gagging when he swallows any dry food.   He has dizziness once in a while.  9/29/23: He was late due to an accident. He has not followed up with cardiology. He has not had any new stroke like symptoms. In terms of Parkinson's, he is "satisfied" with the stability of his symptoms. He does need to be careful with swallowing. He is taking ropinirole 1 mg BID.  He reports problems with gambling. He says that he buys as many scratch offs as he can afford.  He says that he always liked to hernandez but did not do so for ten years. Now he feels that he cannot stop himself. He denies recent falls. He denies dream enactment. He has not had any recent seizures.  He is going to the gym twice a week.  1/22/24: He tested positive for covid ~ 3 weeks ago. He says that he had a very mild course. He is not aware of tremors. Once in a while he feels off balance.  He has not had any recent falls other than one in the airport when he lifted a heavy piece of luggage and lost his balance.  He reported adverse effects with carbidopa/levodopa. He is again taking ropinirole 1 mg BID.  He says that he can control the gambling.  He denies dream enactment behavior. He occasionally has visual hallucinations but does not find it to be upsetting. He says that he coughs and sneezes when swallowing and has to eat soft foods. He denies having any stroke like symptoms.

## 2024-01-22 NOTE — DATA REVIEWED
[de-identified] : MRI brain 8/20/19:\par  IMPRESSION: mild periventricular, deep and subcortical white matter ischemia. 5 mm anterior \par  medial RIGHT frontal meningioma noted, unchanged. Tiny old lacunar infarction seen in the RIGHT \par  cerebellum. 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 Global atrophy.\par  \par  MRI head 2/11/22:\par  FEW SMALL ACUTE LEFT FRONTAL PARIETAL INFARCTS. NO ASSOCIATED BLOOD \par  PRODUCTS. NO SIGNIFICANT MASS EFFECT\par  \par   [de-identified] : Routine EEG 8/20/19: normal\par  \par  EEG 2/15/22:\par  Normal EEG, afib\par  \par  EEG 3/28/22: normal. Afib is seen on EKG [de-identified] : CT brain 1/5/19:\par  No acute intracranial hemorrhage, mass effect, or CT evidence of a large \par  vascular territory infarct. Stable 5.5 mm mass containing calcifications \par  in right frontal extra-axial region, likely calcified meningioma. \par  Unchanged sub-cm sella/suprasellar lesion, likely pituitary adenoma. \par  Follow-up with MRI as clinically indicated. \par  \par  \par  CT head, CTA head and neck 2/11/22:\par  *  VERY SMALL POSTERIOR LEFT FRONTAL INFARCT WHICH IS OCCURRED IN THE \par  INTERIM FROM 12/22/2021\par  *  NO HEMODYNAMIC SIGNIFICANT NARROWING WITHIN THE NECK.\par  *  NO PROXIMAL LARGE VESSEL OCCLUSION INTRACRANIALLY.\par  *  PERFUSION IMAGING DEMONSTRATES REGIONS OF HYPOPERFUSION/ISCHEMIA \par  INVOLVING THE BILATERAL TEMPORAL OCCIPITAL REGIONS AND RIGHT PARIETAL \par  LOBE WITH A VOLUME OF 32 ML. NO CORE INFARCT DEMONSTRATED.\par  \par  \par  TTE 2/12/22:\par   Estimated left ventricular ejection fraction is 50-55 %.\par   The left ventricle is normal in size and wall thickness.\par  \par  CT head 2/14/22:\par  Age-appropriate involutional and ischemic gliotic changes. \par  New hemorrhage left frontal precentral gyrus.\par  \par  CT head 2/15/22:\par  No significant interval change.\par  \par  CT head 2/16/22:\par  Acute subarachnoid hemorrhage within a sulcus of the left frontal lobe \par  appears unchanged since prior exam. No new hemorrhage present.\par  \par  CT head 3/31/22:\par  Interval resolution of subarachnoid hemorrhage previously noted in the BILATERAL frontal regions at the convexity. Mild periventricular white matter ischemia again noted.    Mild global atrophy.\par  \par  \par  \par  \par  \par

## 2024-01-22 NOTE — DISCUSSION/SUMMARY
[FreeTextEntry1] : Mr. Hendricks is an 88 year old man with Parkinson's Disease, history of stroke, subarachnoid hemorrhage.  Parkinsonism: He is currently taking ropinirole with reasonable control of symptoms. Carbidopa/levodopa was tried again but he again reported adverse effects. Continue ropinirole 1 mg BID. He will continue to watch for gambling/compulsive behavior. Referral to speech therapy for dysphagia. Continue with an easy to chew diet with small pieces. Increase exercise Stay well hydrated, stand slowly.     Stroke -History of stroke likely embolic. -Had spontaneous SAH after Eliquis was started. Radiology felt it was more a SAH and not a hemorrhagic conversion of his stroke (although in similar region). -He has not returned to see cardiology. -Continue atorvastatin -Watchman was briefly discussed   Subarachnoid Hemorrhage -Occurred after starting Eliquis -He was on apixaban 5 mg BID which he is no longer on. -CT head 3/31/22 showed resolution of hemorrhage.   Vertigo -Can use meclizine 12.5 mg prn vertigo.  Advise to f/u with PCP for shortness of breath.  f/u 4-6 months, sooner if needed.

## 2024-01-22 NOTE — PHYSICAL EXAM
[FreeTextEntry1] : Examination: Constitutional: normal, no apparent distress Eyes: normal conjunctiva b/l, no ptosis, visual fields full Respiratory: no respiratory distress, normal effort, + wheezing Cardiovascular: normal rate, rhythm, no murmurs Neck: supple, no masses Vascular: carotids normal Skin: patchy raised areas on lower abdomen and back Psych: normal mood, affect   Neurological: Memory: oriented to person, place, time Language intact/no aphasia Cranial Nerves: Pupils equally round and reactive to light, ocular muscles/movements intact, no ptosis, no facial weakness, tongue protrudes normally in the midline, hypophonia, mild masked facies. mild dysarthria, Gravelly voice. Motor: + bradykinesia, no pronator drift, full strength in all four extremities in the proximal and distal muscle groups Coordination: + rest tremor on right, slight cogwheeling, + bradykinesia, decreased amplitude of rapid alternating movements, + intention tremor, right more than left Sensory: intact to light touch DTRs: symmetric, normal Gait: good arm swing and reasonable stride length

## 2024-01-23 ENCOUNTER — APPOINTMENT (OUTPATIENT)
Dept: FAMILY MEDICINE | Facility: CLINIC | Age: 89
End: 2024-01-23
Payer: MEDICARE

## 2024-01-23 VITALS
BODY MASS INDEX: 23.07 KG/M2 | DIASTOLIC BLOOD PRESSURE: 76 MMHG | RESPIRATION RATE: 16 BRPM | SYSTOLIC BLOOD PRESSURE: 124 MMHG | OXYGEN SATURATION: 99 % | HEART RATE: 77 BPM | WEIGHT: 147 LBS | TEMPERATURE: 97.7 F | HEIGHT: 67 IN

## 2024-01-23 DIAGNOSIS — R05.9 COUGH, UNSPECIFIED: ICD-10-CM

## 2024-01-23 DIAGNOSIS — R13.10 DYSPHAGIA, UNSPECIFIED: ICD-10-CM

## 2024-01-23 DIAGNOSIS — I60.9 NONTRAUMATIC SUBARACHNOID HEMORRHAGE, UNSPECIFIED: ICD-10-CM

## 2024-01-23 DIAGNOSIS — U07.1 COVID-19: ICD-10-CM

## 2024-01-23 PROCEDURE — 99214 OFFICE O/P EST MOD 30 MIN: CPT

## 2024-01-23 PROCEDURE — G2211 COMPLEX E/M VISIT ADD ON: CPT

## 2024-01-23 RX ORDER — GUAIFENESIN 600 MG/1
600 TABLET, EXTENDED RELEASE ORAL TWICE DAILY
Qty: 30 | Refills: 0 | Status: ACTIVE | COMMUNITY
Start: 2024-01-23

## 2024-01-23 NOTE — ASSESSMENT
[FreeTextEntry1] : Swallowing difficulties referred to speech therapy by neurologist has not scheduled appointment yet. Advised to schedule.  Congestion/cough/COVID-19 infection diagnosed 3 weeks ago Advised symptomatic treatment Allegra/Flonase and Mucinex. Chest PT Mucus plugging. POx 99% today. Return to office if symptoms not improved or worsens.  Bradycardia resolved after reducing dose of Metoprolol Off Digoxin, States hallucinations have resolved too.  Parkinsons Seen by Neurologist Dr Rasheed 1/22/24 On Ropinirole, reasonable control of symptoms. may be the cause of his increased gambling (can cause compulsive behavior). He took Sinemet 25/100 once and felt strange so discontinued it. He only took one pill. Neurologist Dr Rasheed suggested taking Sinemet again.25/100 - 1/2 tablet po bid x 1 week and then increase to 1/2 tablet po tid Carbidopa/levodopa was tried again but he again reported adverse effects. Continue ropinirole 1 mg BID. He will continue to watch for gambling/compulsive behavior. Referral to speech therapy for dysphagia. Continue with an easy to chew diet with small pieces. Increase exercise Stay well hydrated, stand slowly.  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 spontaneous SAH after Eliquis was started. Radiology felt it was more a SAH and not a hemorrhagic conversion of his stroke (although in similar region). Seen by Cardiologist Dr Nick, stopped aspirin. Dr Rasheed spoke to Dr. Martinez regarding patient Subarachnoid hemorrhage and not a hemorrhagic conversion of his stroke. Therefore, it is unclear if he would have another spontaneous bleed if restarted on AC. Dr. Martinez will be referring him to a specialist to at least discuss a Watchman procedure. Due for cardiology follow up Continue atorvastatin. Was suggesting Watchman procedure.  Subarachnoid Hemorrhage CT head 3/31/22 showed resolution of hemorrhage.  A Fib on metoprolol succinate 25 mg PO qd Pulse 70s today. Off anticoagulants because he had rectal bleed. Due for cardiology follow up advised to schedule.  Vertigo  meclizine 12.5 mg prn vertigo.

## 2024-01-23 NOTE — PHYSICAL EXAM
[Normal Sclera/Conjunctiva] : normal sclera/conjunctiva [Normal Rate] : normal rate  [Normal S1, S2] : normal S1 and S2 [Normal Gait] : normal gait [Normal] : affect was normal and insight and judgment were intact [de-identified] : a fib [de-identified] : maculopapular rash with excoriations in arm and legs

## 2024-01-23 NOTE — HISTORY OF PRESENT ILLNESS
[FreeTextEntry8] : tested positive for covid ~ 3 weeks ago.  he had a very mild symptom. Once in a while he feels off balance. - has not had any recent falls other than one in the airport when he lifted a heavy piece of luggage and lost his balance. -reported adverse effects with carbidopa/levodopa. -taking ropinirole 1 mg BID. -states that he can control the gambling. - denies dream enactment behavior. --occasionally has visual hallucinations but does not find it to be upsetting. - coughs and sneezes when swallowing and has to eat soft foods. -denies having any stroke like symptoms.

## 2024-01-31 NOTE — PATIENT PROFILE ADULT - NUMBER OF YRS
Called Canelo. Discussed monitor and plan. She  verbalized understanding of information discussed.         Reviewed with Dr. Major. LULA Major:  Sooner f/u to discuss PVC's       Noted by MD on 1/26/24   0

## 2024-03-05 ENCOUNTER — APPOINTMENT (OUTPATIENT)
Dept: FAMILY MEDICINE | Facility: CLINIC | Age: 89
End: 2024-03-05
Payer: MEDICARE

## 2024-03-05 VITALS
DIASTOLIC BLOOD PRESSURE: 95 MMHG | OXYGEN SATURATION: 97 % | HEIGHT: 67 IN | RESPIRATION RATE: 16 BRPM | WEIGHT: 158 LBS | SYSTOLIC BLOOD PRESSURE: 167 MMHG | BODY MASS INDEX: 24.8 KG/M2 | HEART RATE: 81 BPM | TEMPERATURE: 97.3 F

## 2024-03-05 VITALS — SYSTOLIC BLOOD PRESSURE: 130 MMHG | DIASTOLIC BLOOD PRESSURE: 78 MMHG
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
## 2024-03-05 DIAGNOSIS — R26.89 OTHER ABNORMALITIES OF GAIT AND MOBILITY: ICD-10-CM

## 2024-03-05 PROCEDURE — 99214 OFFICE O/P EST MOD 30 MIN: CPT

## 2024-03-05 PROCEDURE — G2211 COMPLEX E/M VISIT ADD ON: CPT

## 2024-03-05 RX ORDER — FLUTICASONE PROPIONATE 50 UG/1
50 SPRAY, METERED NASAL
Qty: 16 | Refills: 2 | Status: ACTIVE | COMMUNITY
Start: 2019-10-31 | End: 1900-01-01

## 2024-03-05 RX ORDER — OLMESARTAN MEDOXOMIL 40 MG/1
40 TABLET, FILM COATED ORAL
Qty: 90 | Refills: 0 | Status: ACTIVE | COMMUNITY
Start: 2023-11-30 | End: 1900-01-01

## 2024-03-05 RX ORDER — METOPROLOL SUCCINATE 25 MG/1
25 TABLET, EXTENDED RELEASE ORAL DAILY
Qty: 90 | Refills: 1 | Status: ACTIVE | COMMUNITY
Start: 2023-08-01 | End: 1900-01-01

## 2024-03-05 NOTE — ASSESSMENT
[FreeTextEntry1] : Swallowing difficulties referred to speech therapy by neurologist has not scheduled appointment yet. Advised to schedule.  Balance problems Status post fall no injuries Advised to take fall precautions Rx given for cane Consider physical therapy.  Patient states he has financial restraints and cannot afford the co-pay Advised to avoid use of meclizine.  Bradycardia resolved after reducing dose of Metoprolol Off Digoxin, States hallucinations have resolved too.  Parkinsons Seen by Neurologist Dr Rasheed 1/22/24 On Ropinirole, reasonable control of symptoms. may be the cause of his increased gambling (can cause compulsive behavior). He took Sinemet 25/100 once and felt strange so discontinued it. He only took one pill. Neurologist Dr Rasheed suggested taking Sinemet again.25/100 - 1/2 tablet po bid x 1 week and then increase to 1/2 tablet po tid Carbidopa/levodopa was tried again but he again reported adverse effects. Continue ropinirole 1 mg BID. He will continue to watch for gambling/compulsive behavior. Referral to speech therapy for dysphagia. Continue with an easy to chew diet with small pieces. Increase exercise Stay well hydrated, stand slowly.  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 spontaneous SAH after Eliquis was started. Radiology felt it was more a SAH and not a hemorrhagic conversion of his stroke (although in similar region). Seen by Cardiologist Dr Nick, stopped aspirin. Dr Rasheed spoke to Dr. Martinez regarding patient Subarachnoid hemorrhage and not a hemorrhagic conversion of his stroke. Therefore, it is unclear if he would have another spontaneous bleed if restarted on AC. Dr. Martinez will be referring him to a specialist to at least discuss a Watchman procedure. Due for cardiology follow up Continue atorvastatin. Was suggesting Watchman procedure.  Subarachnoid Hemorrhage CT head 3/31/22 showed resolution of hemorrhage.  A Fib on metoprolol succinate 25 mg PO qd Pulse 81 today. Off anticoagulants because he had rectal bleed. Due for cardiology follow up advised to schedule.

## 2024-03-05 NOTE — HISTORY OF PRESENT ILLNESS
[de-identified] : Mr. AURORA SIBLEY is a 88-year-old male presenting for a follow up. Patient states that he tripped on the curb at the [facility] today fell forward no injuries. He is feeling a little bit low because his wife wants to move back to [facility] and he has to go with her.  He states that he is worried leaving his son behind. States wife is struggling with health issues. Doing well on lower dose of metoprolol and has discontinued Digoxin. He states hallucinations have resolved since then. States he is having financial concerns, has not seen the cardiologist in 2-3 years. Previously noted some hallucinations with Parkinsons states he has discussed with Neurologist, has follow up appointment.  He was contacted by Neuro to reduce ropiranole because he was gambling and Roperinol can cause loss of impulse control. He states he has reduced it to 2 times a day previously on it 3 times a day.  HTN BP okay at home On Olmesartan A fib rate controlled on Metoprolol. Off Coumadin after he had bleeding. Prev Hx: he was hospitalized on 2/11/22 with five days of dysarthria, difficulty swallowing and generalized weakness.  In the ED, NIHSS was 3. He was not a tpa candidate since symptoms had been present for several days. He has a history of atrial fibrillation but had been of anticoagulation due to hematuria and surgery within the past year that was complicated by post-op hemorrhage. MRI brain showed a few small left frontal parietal acute infarcts. He was restarted on anticoagulation after being cleared by surgery. On 2/14 he developed jerky movements of his right arm and dysmetria. Repeat head CT showed SAH and Eliquis was stopped. He was started on Keppra 500 mg BID. This was later changed to 250 mg in the AM and 750 mg at night due to sedation in the morning. Repeat imaging was stable and he was restarted on aspirin (which had only recently been started a few weeks prior).  He was discharged on 2/18/22.  Aspirin was stopped by Dr. Nick.   10/19/22: He has discontinued Keppra.  taking ropinirole 1 mg BID. He says that when he was taking this TID he had gambling more.   [FreeTextEntry1] : Follow up fever

## 2024-03-05 NOTE — REVIEW OF SYSTEMS
[Shortness Of Breath] : no shortness of breath [Wheezing] : no wheezing [Cough] : no cough [Dyspnea on Exertion] : not dyspnea on exertion [Skin Rash] : no skin rash [Easy Bleeding] : no easy bleeding [Easy Bruising] : no easy bruising [Negative] : Psychiatric

## 2024-03-05 NOTE — PHYSICAL EXAM
[Normal Sclera/Conjunctiva] : normal sclera/conjunctiva [Normal Rate] : normal rate  [Normal S1, S2] : normal S1 and S2 [Normal] : affect was normal and insight and judgment were intact [de-identified] : a fib [de-identified] : Shuffling gait

## 2024-05-05 NOTE — DISCHARGE NOTE PROVIDER - NSDCFUSCHEDAPPT_GEN_ALL_CORE_FT
Geno lives in [place] and daughter Veronica bell in [place] and daughter Kat lives in [place]. AURORA SIBLEY ; 02/08/2021 ; NPP Urology [address] AURORA Chaudhry ; 02/08/2021 ; NP Urology [address]  AURORA SIBLEY ; 02/09/2021 ; [facility] [address] [place] [address] AURORA Chaudhry ; 02/11/2021 ; NP Cardio [address] AURORA Moore ; 02/11/2021 ; NP Cardio [address] AURORA Moore ; 02/11/2021 ; [address] ; 03/15/2021 ; [facility] [address]

## 2024-06-11 ENCOUNTER — APPOINTMENT (OUTPATIENT)
Dept: FAMILY MEDICINE | Facility: CLINIC | Age: 89
End: 2024-06-11

## 2024-06-17 NOTE — ED ADULT TRIAGE NOTE - NS ED NURSE BANDS TYPE
Allen's growth chart and exam appears be consistent with being a late tarsha however we will evaluate by completing xray of hand first and considering if any additional testing should be done. He is otherwise asymptomatic. Follow up to be determined based results.   Name band;

## 2024-07-02 ENCOUNTER — APPOINTMENT (OUTPATIENT)
Dept: INTERNAL MEDICINE | Facility: CLINIC | Age: 89
End: 2024-07-02

## 2024-07-02 VITALS
HEART RATE: 78 BPM | HEIGHT: 67 IN | OXYGEN SATURATION: 98 % | DIASTOLIC BLOOD PRESSURE: 80 MMHG | RESPIRATION RATE: 15 BRPM | TEMPERATURE: 97.9 F | BODY MASS INDEX: 23.54 KG/M2 | SYSTOLIC BLOOD PRESSURE: 122 MMHG | WEIGHT: 150 LBS

## 2024-07-02 DIAGNOSIS — G20.C PARKINSONISM, UNSPECIFIED: ICD-10-CM

## 2024-07-02 DIAGNOSIS — I10 ESSENTIAL (PRIMARY) HYPERTENSION: ICD-10-CM

## 2024-07-02 DIAGNOSIS — I63.9 CEREBRAL INFARCTION, UNSPECIFIED: ICD-10-CM

## 2024-07-02 DIAGNOSIS — N47.1 PHIMOSIS: ICD-10-CM

## 2024-07-02 DIAGNOSIS — I48.91 UNSPECIFIED ATRIAL FIBRILLATION: ICD-10-CM

## 2024-07-02 DIAGNOSIS — N48.1 BALANITIS: ICD-10-CM

## 2024-07-02 PROCEDURE — 99214 OFFICE O/P EST MOD 30 MIN: CPT

## 2024-07-02 RX ORDER — CLOTRIMAZOLE AND BETAMETHASONE DIPROPIONATE 10; .5 MG/G; MG/G
1-0.05 CREAM TOPICAL
Qty: 1 | Refills: 0 | Status: ACTIVE | COMMUNITY
Start: 2024-07-02 | End: 1900-01-01

## 2024-08-05 ENCOUNTER — LABORATORY RESULT (OUTPATIENT)
Age: 89
End: 2024-08-05

## 2024-08-05 ENCOUNTER — APPOINTMENT (OUTPATIENT)
Dept: INTERNAL MEDICINE | Facility: CLINIC | Age: 89
End: 2024-08-05

## 2024-08-05 PROBLEM — R19.7 DIARRHEA, UNSPECIFIED TYPE: Status: ACTIVE | Noted: 2024-08-05

## 2024-08-05 PROBLEM — R13.10 DYSPHAGIA: Status: ACTIVE | Noted: 2024-08-05

## 2024-08-05 PROBLEM — Z00.00 PREVENTATIVE HEALTH CARE: Status: ACTIVE | Noted: 2024-08-05

## 2024-08-05 PROCEDURE — G0439: CPT

## 2024-08-05 PROCEDURE — 99215 OFFICE O/P EST HI 40 MIN: CPT | Mod: 25

## 2024-08-08 PROBLEM — I95.9 LOW BP: Status: ACTIVE | Noted: 2024-08-08

## 2024-08-08 PROBLEM — R63.4 WEIGHT LOSS: Status: ACTIVE | Noted: 2024-08-08

## 2024-08-08 PROBLEM — T30.0 THERMAL BURN: Status: ACTIVE | Noted: 2024-08-08

## 2024-08-08 NOTE — REVIEW OF SYSTEMS
[Recent Change In Weight] : ~T recent weight change [Diarrhea] : diarrhea [Negative] : Heme/Lymph [FreeTextEntry7] : decreased appetite.  Trouble swallowing [de-identified] : Thermal burn on left hand 2nd finger

## 2024-08-08 NOTE — PHYSICAL EXAM
[No Acute Distress] : no acute distress [Well Nourished] : well nourished [Well Developed] : well developed [Well-Appearing] : well-appearing [Normal Sclera/Conjunctiva] : normal sclera/conjunctiva [PERRL] : pupils equal round and reactive to light [EOMI] : extraocular movements intact [Normal Outer Ear/Nose] : the outer ears and nose were normal in appearance [Normal Oropharynx] : the oropharynx was normal [No JVD] : no jugular venous distention [No Lymphadenopathy] : no lymphadenopathy [Supple] : supple [Thyroid Normal, No Nodules] : the thyroid was normal and there were no nodules present [No Respiratory Distress] : no respiratory distress  [No Accessory Muscle Use] : no accessory muscle use [Clear to Auscultation] : lungs were clear to auscultation bilaterally [Normal Rate] : normal rate  [Regular Rhythm] : with a regular rhythm [Normal S1, S2] : normal S1 and S2 [No Murmur] : no murmur heard [No Carotid Bruits] : no carotid bruits [No Abdominal Bruit] : a ~M bruit was not heard ~T in the abdomen [No Varicosities] : no varicosities [Pedal Pulses Present] : the pedal pulses are present [No Edema] : there was no peripheral edema [No Palpable Aorta] : no palpable aorta [No Extremity Clubbing/Cyanosis] : no extremity clubbing/cyanosis [Soft] : abdomen soft [Non Tender] : non-tender [Non-distended] : non-distended [No Masses] : no abdominal mass palpated [No HSM] : no HSM [Normal Bowel Sounds] : normal bowel sounds [Normal Posterior Cervical Nodes] : no posterior cervical lymphadenopathy [Normal Anterior Cervical Nodes] : no anterior cervical lymphadenopathy [No CVA Tenderness] : no CVA  tenderness [No Spinal Tenderness] : no spinal tenderness [No Joint Swelling] : no joint swelling [Grossly Normal Strength/Tone] : grossly normal strength/tone [Coordination Grossly Intact] : coordination grossly intact [No Focal Deficits] : no focal deficits [Normal Gait] : normal gait [Deep Tendon Reflexes (DTR)] : deep tendon reflexes were 2+ and symmetric [Normal Affect] : the affect was normal [Normal Insight/Judgement] : insight and judgment were intact [de-identified] : left hand 2nd finger with approx. 1 week old thermal burn healing well.

## 2024-08-08 NOTE — ASSESSMENT
[FreeTextEntry1] : , , Preventative: Counseled on health promotion and disease prevention. Gastroenterology    Dysphagia: Gastro referral for swallow eval / EGD Following Neuro already s/p CVA Speech therapy  Weight loss: Lab testing today Gastro follow-up   Diarrhea: BRAT diet [place] PO fluids avoid dairy, fattening foods and alcohol.   Stool studies ordered.   Thermal Burn: healing well.  no sign of infection Consider Silvadene creme  HTN history.  Currently Low BP: suspected secondary to diarrhea / mild dehydration increase fluids / electrolytes [place] closely lower dose of BP meds if BP does not stabilize.   Atrial Fibrillation: reviewed Cardio reports  Metoprolol, Lipitor   HLD: Lipitor Rx Counseled on diet, exercise and lifestyle modifications. Advised a diet centered around whole plant based foods.  Vegetables, fruits, legumes, grains, nuts.  Advised limiting intake of refined processed foods (refined white flour products, refined sugar, soda, juice).  Limit intake of meat, dairy, eggs, oil.  CVA / Parkinsonism Hx: Reviewed Neurology reports On Ropinirole.

## 2024-08-08 NOTE — PHYSICAL EXAM
[No Acute Distress] : no acute distress [Well Nourished] : well nourished [Well Developed] : well developed [Well-Appearing] : well-appearing [Normal Sclera/Conjunctiva] : normal sclera/conjunctiva [PERRL] : pupils equal round and reactive to light [EOMI] : extraocular movements intact [Normal Outer Ear/Nose] : the outer ears and nose were normal in appearance [Normal Oropharynx] : the oropharynx was normal [No JVD] : no jugular venous distention [No Lymphadenopathy] : no lymphadenopathy [Supple] : supple [Thyroid Normal, No Nodules] : the thyroid was normal and there were no nodules present [No Respiratory Distress] : no respiratory distress  [No Accessory Muscle Use] : no accessory muscle use [Clear to Auscultation] : lungs were clear to auscultation bilaterally [Normal Rate] : normal rate  [Regular Rhythm] : with a regular rhythm [Normal S1, S2] : normal S1 and S2 [No Murmur] : no murmur heard [No Carotid Bruits] : no carotid bruits [No Abdominal Bruit] : a ~M bruit was not heard ~T in the abdomen [No Varicosities] : no varicosities [Pedal Pulses Present] : the pedal pulses are present [No Edema] : there was no peripheral edema [No Palpable Aorta] : no palpable aorta [No Extremity Clubbing/Cyanosis] : no extremity clubbing/cyanosis [Soft] : abdomen soft [Non Tender] : non-tender [Non-distended] : non-distended [No Masses] : no abdominal mass palpated [No HSM] : no HSM [Normal Bowel Sounds] : normal bowel sounds [Normal Posterior Cervical Nodes] : no posterior cervical lymphadenopathy [Normal Anterior Cervical Nodes] : no anterior cervical lymphadenopathy [No CVA Tenderness] : no CVA  tenderness [No Spinal Tenderness] : no spinal tenderness [No Joint Swelling] : no joint swelling [Grossly Normal Strength/Tone] : grossly normal strength/tone [Coordination Grossly Intact] : coordination grossly intact [No Focal Deficits] : no focal deficits [Normal Gait] : normal gait [Deep Tendon Reflexes (DTR)] : deep tendon reflexes were 2+ and symmetric [Normal Affect] : the affect was normal [Normal Insight/Judgement] : insight and judgment were intact [de-identified] : left hand 2nd finger with approx. 1 week old thermal burn healing well.

## 2024-08-08 NOTE — HISTORY OF PRESENT ILLNESS
[FreeTextEntry1] : AW [de-identified] : trouble swallowing and losing weight.  referral to Gastro watery stool last 2 days. possible mild dehydration. check labs BP low.  monitor closely and cut down dose if not improving.   burn on the left hand 2nd finger last week.  healing well.

## 2024-08-08 NOTE — REVIEW OF SYSTEMS
[Recent Change In Weight] : ~T recent weight change [Diarrhea] : diarrhea [Negative] : Heme/Lymph [FreeTextEntry7] : decreased appetite.  Trouble swallowing [de-identified] : Thermal burn on left hand 2nd finger

## 2024-08-08 NOTE — HISTORY OF PRESENT ILLNESS
[FreeTextEntry1] : AW [de-identified] : trouble swallowing and losing weight.  referral to Gastro watery stool last 2 days. possible mild dehydration. check labs BP low.  monitor closely and cut down dose if not improving.   burn on the left hand 2nd finger last week.  healing well.

## 2024-08-08 NOTE — ADDENDUM
[FreeTextEntry1] : Time spent on the additional evaluation and management service excludes the annual wellness visit and separately reportable procedures

## 2024-08-08 NOTE — ASSESSMENT
[FreeTextEntry1] : , , Preventative: Counseled on health promotion and disease prevention. Gastroenterology    Dysphagia: Gastro referral for swallow eval / EGD Following Neuro already s/p CVA Speech therapy  Weight loss: Lab testing today Gastro follow-up   Diarrhea: BRAT diet [place] PO fluids avoid dairy, fattening foods and alcohol.   Stool studies ordered.   Thermal Burn: healing well.  no sign of infection Consider Silvadene creme  HTN history.  Currently Low BP: suspected secondary to diarrhea / mild dehydration increase fluids / electrolytes [place] closely lower dose of BP meds if BP does not stabilize.   Atrial Fibrillation: reviewed Cardio reports  Metoprolol, Lipitor   HLD: Lipitor Rx Counseled on diet, exercise and lifestyle modifications. Advised a diet centered around whole plant based foods.  Vegetables, fruits, legumes, grains, nuts.  Advised limiting intake of refined processed foods (refined white flour products, refined sugar, soda, juice).  Limit intake of meat, dairy, eggs, oil.  CVA / Parkinsonism Hx: Reviewed Neurology reports On Ropinirole.

## 2024-08-21 ENCOUNTER — APPOINTMENT (OUTPATIENT)
Dept: NEUROLOGY | Facility: CLINIC | Age: 89
End: 2024-08-21
Payer: MEDICARE

## 2024-08-21 VITALS
TEMPERATURE: 98.2 F | WEIGHT: 140 LBS | DIASTOLIC BLOOD PRESSURE: 72 MMHG | HEART RATE: 82 BPM | HEIGHT: 67 IN | SYSTOLIC BLOOD PRESSURE: 108 MMHG | BODY MASS INDEX: 21.97 KG/M2

## 2024-08-21 DIAGNOSIS — R13.10 DYSPHAGIA, UNSPECIFIED: ICD-10-CM

## 2024-08-21 DIAGNOSIS — G20.A1 PARKINSON'S DISEASE WITHOUT DYSKINESIA, WITHOUT MENTION OF FLUCTUATIONS: ICD-10-CM

## 2024-08-21 PROCEDURE — G2211 COMPLEX E/M VISIT ADD ON: CPT

## 2024-08-21 PROCEDURE — 99213 OFFICE O/P EST LOW 20 MIN: CPT

## 2024-08-21 NOTE — DISCUSSION/SUMMARY
[FreeTextEntry1] : Mr. Hendricks is an 89 year old man with Parkinson's Disease, history of stroke, subarachnoid hemorrhage.  Parkinsonism: He is currently taking ropinirole with reasonable control of symptoms. Carbidopa/levodopa was tried again but he again reported adverse effects. Continue ropinirole 1 mg BID. He will continue to watch for compulsive behavior. Continue to limit gambling (he says he was gambling prior to starting medication). Referral to speech therapy for dysphagia has been made int he past but he did not follow through. Continue with an easy to chew diet with small pieces. Will refer for modified barium swallow. Increase exercise Stay well hydrated, stand slowly.  f/u 6 months

## 2024-08-21 NOTE — HISTORY OF PRESENT ILLNESS
[FreeTextEntry1] : 1/22/24: Mr. Hendricks was last seen in the office on 1/11/21. Since that time he has had multiple medical issues. Most recently he was hospitalized on 2/11/22 with five days of dysarthria, difficulty swallowing and generalized weakness. Notably he recently had covid. The hospital chart was reviewed. In the ED, NIHSS was 3. He was not a tpa candidate since symptoms had been present for several days. He has a history of atrial fibrillation but had been of anticoagulation due to hematuria and surgery within the past year that was complicated by post-op hemorrhage. MRI brain showed a few small left frontal parietal acute infarcts. He was restarted on anticoagulation after being cleared by surgery. On 2/14 he developed jerky movements of his right arm and dysmetria. Repeat head CT showed SAH and Eliquis was stopped. He was started on Keppra 500 mg BID. This was later changed to 250 mg in the AM and 750 mg at night due to sedation in the morning. Repeat imaging was stable and he was restarted on aspirin (which had only recently been started a few weeks prior).  He was discharged on 2/18/22.   3/23/22: He reports that he feels drowsy and feels "blah". He goes to bed at 3-4 AM and sleeps until 11 PM. He reports that his wife has a virus, is coughing like crazy and keeps him up. He typically goes to bed at 12 AM and sleeps until 10 AM. He denies depression/mood disturbance.  He reports tremors in his right arm.  Sometimes when using a fork he has some jerking of his arm. He feels it is related to weakness. He has not had any uncontrollable jerky movements.  He would like to increase his ropinirole.  He says that his speech is reasonable.  He denies having any headache.  He says that he is walking well.  Aspirin was stopped by Dr. Nick.   5/12/22: Once in a while he feels more off balance. He has not had any falls. He has slurred speech once in a while. He denies new stroke like symptoms. He notes slight improvement in right hand strength Tremors are stable.  He has not had any seizure like activity. He is still sleepy.  10/19/22: He has discontinued Keppra. He has not had any events suspicious for seizures.. He is not aware of any new stroke like symptoms. He thinks that the tremors have improved. He is taking ropinirole 1 mg BID. He says that when he was taking this TID he had difficulty urinating. He denies recent falls.  He denies acting out his dreams. He says that once in a while he has a visual hallucination. The hallucination differs each time and is not disturbing He sleeps from 2 AM until 11 AM. He is on this schedule because his wife is a night owl.   6/2/23: He reports that he fell three times. He says that it was poor judgement on his part. He was called to look at something and he turned rapidly. The momentum caught up with him and he fell. He had no loss of consciousness.  He walks on most days.   He denies any episodes suspicious for seizures.  He reports some short term memory difficulties.  His wife brought him mannitol and mucuna powder from [place].  He is taking ropinirole 1 mg BID. Once in a while he buys a scratch off. Otherwise he is not gambling.  He reports gagging when he swallows any dry food.   He has dizziness once in a while.  9/29/23: He was late due to an accident. He has not followed up with cardiology. He has not had any new stroke like symptoms. In terms of Parkinson's, he is "satisfied" with the stability of his symptoms. He does need to be careful with swallowing. He is taking ropinirole 1 mg BID.  He reports problems with gambling. He says that he buys as many scratch offs as he can afford.  He says that he always liked to hernandez but did not do so for ten years. Now he feels that he cannot stop himself. He denies recent falls. He denies dream enactment. He has not had any recent seizures.  He is going to the gym twice a week.  1/22/24: He tested positive for covid ~ 3 weeks ago. He says that he had a very mild course. He is not aware of tremors. Once in a while he feels off balance.  He has not had any recent falls other than one in the airport when he lifted a heavy piece of luggage and lost his balance.  He reported adverse effects with carbidopa/levodopa. He is again taking ropinirole 1 mg BID.  He says that he can control the gambling.  He denies dream enactment behavior. He occasionally has visual hallucinations but does not find it to be upsetting. He says that he coughs and sneezes when swallowing and has to eat soft foods. He denies having any stroke like symptoms.  8/21/24: He reports difficulty swallowing certain textures. Water and "slippery foods" are okay.  He needs to cut meat up into very small pieces.  He says that he has a sore throat all of the time.  He does not think his mouth is dry. He reports increased drooling.  He says that walking is slow but steady.  He has lost weight.  He states that he has lost weight.  He is taking ropinirole 1 mg BID. His legs burn at night. He uses lotion (ammonium lactate) which helps.

## 2024-08-21 NOTE — DATA REVIEWED
[de-identified] : MRI brain 8/20/19:\par  IMPRESSION: mild periventricular, deep and subcortical white matter ischemia. 5 mm anterior \par  medial RIGHT frontal meningioma noted, unchanged. Tiny old lacunar infarction seen in the RIGHT \par  cerebellum. 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 Global atrophy.\par  \par  MRI head 2/11/22:\par  FEW SMALL ACUTE LEFT FRONTAL PARIETAL INFARCTS. NO ASSOCIATED BLOOD \par  PRODUCTS. NO SIGNIFICANT MASS EFFECT\par  \par   [de-identified] : Routine EEG 8/20/19: normal\par  \par  EEG 2/15/22:\par  Normal EEG, afib\par  \par  EEG 3/28/22: normal. Afib is seen on EKG [de-identified] : CT brain 1/5/19:\par  No acute intracranial hemorrhage, mass effect, or CT evidence of a large \par  vascular territory infarct. Stable 5.5 mm mass containing calcifications \par  in right frontal extra-axial region, likely calcified meningioma. \par  Unchanged sub-cm sella/suprasellar lesion, likely pituitary adenoma. \par  Follow-up with MRI as clinically indicated. \par  \par  \par  CT head, CTA head and neck 2/11/22:\par  *  VERY SMALL POSTERIOR LEFT FRONTAL INFARCT WHICH IS OCCURRED IN THE \par  INTERIM FROM 12/22/2021\par  *  NO HEMODYNAMIC SIGNIFICANT NARROWING WITHIN THE NECK.\par  *  NO PROXIMAL LARGE VESSEL OCCLUSION INTRACRANIALLY.\par  *  PERFUSION IMAGING DEMONSTRATES REGIONS OF HYPOPERFUSION/ISCHEMIA \par  INVOLVING THE BILATERAL TEMPORAL OCCIPITAL REGIONS AND RIGHT PARIETAL \par  LOBE WITH A VOLUME OF 32 ML. NO CORE INFARCT DEMONSTRATED.\par  \par  \par  TTE 2/12/22:\par   Estimated left ventricular ejection fraction is 50-55 %.\par   The left ventricle is normal in size and wall thickness.\par  \par  CT head 2/14/22:\par  Age-appropriate involutional and ischemic gliotic changes. \par  New hemorrhage left frontal precentral gyrus.\par  \par  CT head 2/15/22:\par  No significant interval change.\par  \par  CT head 2/16/22:\par  Acute subarachnoid hemorrhage within a sulcus of the left frontal lobe \par  appears unchanged since prior exam. No new hemorrhage present.\par  \par  CT head 3/31/22:\par  Interval resolution of subarachnoid hemorrhage previously noted in the BILATERAL frontal regions at the convexity. Mild periventricular white matter ischemia again noted.    Mild global atrophy.\par  \par  \par  \par  \par  \par

## 2024-08-21 NOTE — DATA REVIEWED
[de-identified] : MRI brain 8/20/19:\par  IMPRESSION: mild periventricular, deep and subcortical white matter ischemia. 5 mm anterior \par  medial RIGHT frontal meningioma noted, unchanged. Tiny old lacunar infarction seen in the RIGHT \par  cerebellum. 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 Global atrophy.\par  \par  MRI head 2/11/22:\par  FEW SMALL ACUTE LEFT FRONTAL PARIETAL INFARCTS. NO ASSOCIATED BLOOD \par  PRODUCTS. NO SIGNIFICANT MASS EFFECT\par  \par   [de-identified] : Routine EEG 8/20/19: normal\par  \par  EEG 2/15/22:\par  Normal EEG, afib\par  \par  EEG 3/28/22: normal. Afib is seen on EKG [de-identified] : CT brain 1/5/19:\par  No acute intracranial hemorrhage, mass effect, or CT evidence of a large \par  vascular territory infarct. Stable 5.5 mm mass containing calcifications \par  in right frontal extra-axial region, likely calcified meningioma. \par  Unchanged sub-cm sella/suprasellar lesion, likely pituitary adenoma. \par  Follow-up with MRI as clinically indicated. \par  \par  \par  CT head, CTA head and neck 2/11/22:\par  *  VERY SMALL POSTERIOR LEFT FRONTAL INFARCT WHICH IS OCCURRED IN THE \par  INTERIM FROM 12/22/2021\par  *  NO HEMODYNAMIC SIGNIFICANT NARROWING WITHIN THE NECK.\par  *  NO PROXIMAL LARGE VESSEL OCCLUSION INTRACRANIALLY.\par  *  PERFUSION IMAGING DEMONSTRATES REGIONS OF HYPOPERFUSION/ISCHEMIA \par  INVOLVING THE BILATERAL TEMPORAL OCCIPITAL REGIONS AND RIGHT PARIETAL \par  LOBE WITH A VOLUME OF 32 ML. NO CORE INFARCT DEMONSTRATED.\par  \par  \par  TTE 2/12/22:\par   Estimated left ventricular ejection fraction is 50-55 %.\par   The left ventricle is normal in size and wall thickness.\par  \par  CT head 2/14/22:\par  Age-appropriate involutional and ischemic gliotic changes. \par  New hemorrhage left frontal precentral gyrus.\par  \par  CT head 2/15/22:\par  No significant interval change.\par  \par  CT head 2/16/22:\par  Acute subarachnoid hemorrhage within a sulcus of the left frontal lobe \par  appears unchanged since prior exam. No new hemorrhage present.\par  \par  CT head 3/31/22:\par  Interval resolution of subarachnoid hemorrhage previously noted in the BILATERAL frontal regions at the convexity. Mild periventricular white matter ischemia again noted.    Mild global atrophy.\par  \par  \par  \par  \par  \par

## 2024-08-21 NOTE — PHYSICAL EXAM
[FreeTextEntry1] : Examination: Constitutional: normal, no apparent distress Eyes: normal conjunctiva b/l, no ptosis, visual fields full Respiratory: no respiratory distress, normal effort, + wheezing Cardiovascular: normal rate, rhythm, no murmurs Neck: supple, no masses Vascular: carotids normal Skin: patchy raised areas on lower abdomen and back Psych: normal mood, affect   Neurological: Memory: oriented to person, place, time Language intact/no aphasia Cranial Nerves: Pupils equally round and reactive to light, ocular muscles/movements intact, no ptosis, no facial weakness, tongue protrudes normally in the midline, hypophonia, mild masked facies. mild dysarthria, [place] voice. Motor: + bradykinesia, no pronator drift, full strength in all four extremities in the proximal and distal muscle groups Coordination: + rest tremor on right, slight cogwheeling, + bradykinesia, decreased amplitude of rapid alternating movements, + intention tremor, right more than left Sensory: intact to light touch DTRs: symmetric, normal Gait: good arm swing and reasonable stride length.

## 2024-09-18 NOTE — PROGRESS NOTE ADULT - SUBJECTIVE AND OBJECTIVE BOX
Reason for Admission: acute CVA  History of Present Illness:   86 year old male w hx AAA s/p EVAR 2021, AFIB on ASA, [place], HTN, PD, patent PFO came to ED for evaluation for difficulty speaking    Today he called his doctor who advised him to go to the ED  + some difficulty swallowing;  usually has to take small portions     COVID+ was given monoclonal AB 02-    care discussed with neurology as patient has a multiple strokes on an MRI. Patient has an underlying atrial fibrillation -  not AC. Previous hospitalization significant for     Care discussed with Dr. Ochoa, as patient had a large hemorrhagic shock last year -  from his perspective no contraindication for starting AC 2/12.            REVIEW OF SYSTEMS:  General: NAD, hemodynamically stable   HEENT:  Eyes:  No visual loss, blurred vision, double vision or yellow sclerae. Ears, Nose, Throat:  No hearing loss, sneezing, congestion, runny nose or sore throat.  SKIN:  No rash or itching.  CARDIOVASCULAR:  No chest pain, chest pressure or chest discomfort. No palpitations or edema.  RESPIRATORY:  No shortness of breath, cough or sputum.  GASTROINTESTINAL:  No anorexia, nausea, vomiting or diarrhea. No abdominal pain or blood.  NEUROLOGICAL:  No headache, dizziness, syncope, paralysis, ataxia, numbness or tingling in the extremities. No change in bowel or bladder control.  MUSCULOSKELETAL:  No muscle, back pain, joint pain or stiffness.  HEMATOLOGIC:  No anemia, bleeding or bruising.  LYMPHATICS:  No enlarged nodes. No history of splenectomy.  ENDOCRINOLOGIC:  No reports of sweating, cold or heat intolerance. No polyuria or polydipsia.  ALLERGIES:  No history of asthma, hives, eczema or rhinitis.    Physical Exam:   GENERAL APPEARANCE:  NAD, hemodynamically stable  T(C): 36.5 (02-12-22 @ 09:05), Max: 37.1 (02-11-22 @ 21:27)  HR: 64 (02-12-22 @ 09:05) (62 - 80)  BP: 136/94 (02-12-22 @ 09:05) (130/84 - 157/89)  RR: 18 (02-12-22 @ 09:05) (16 - 18)  SpO2: 95% (02-12-22 @ 09:05) (95% - 99%)  HEENT:  Head is normocephalic    Skin:  Warm and dry without any rash   NECK:  Supple without lymphadenopathy.   HEART:  Regular rate and rhythm. normal S1 and S2, No M/R/G  LUNGS:  Good ins/exp effort, no W/R/R/C  ABDOMEN:  Soft, nontender, nondistended with good bowel sounds heard  EXTREMITIES:  Without cyanosis, clubbing or edema.   NEUROLOGICAL:  Gross nonfocal       CBC Full  -  ( 12 Feb 2022 07:50 )  WBC Count : 8.35 K/uL  RBC Count : 4.92 M/uL  Hemoglobin : 14.8 g/dL  Hematocrit : 44.8 %  Platelet Count - Automated : 244 K/uL    PT/INR - ( 11 Feb 2022 15:32 )   PT: 12.4 sec;   INR: 1.07 ratio         PTT - ( 12 Feb 2022 07:50 )  PTT:>200.0 sec    02-12    139  |  105  |  22  ----------------------------<  217<H>  4.1   |  27  |  1.18    Ca    8.8      12 Feb 2022 07:50    TPro  7.5  /  Alb  3.2<L>  /  TBili  0.7  /  DBili  x   /  AST  23  /  ALT  43  /  AlkPhos  64  02-11      86 year old male w hx AAA s/p EVAR 2021, AFIB on ASA, [place], HTN, PD, patent PFO came to ED for evaluation for difficulty speaking      # Acute CVA L frontoparietal   - was not a tpa candidate since he was outside window w symptoms over past 3 days  - since 12- interim small post, L frontal CVA  - surgery notes of no contraindication for anticoagulation  - monitor HH  - start Eliquis today    # AFIB - not on AC  - started on Eliquis  - asa 81 mg / heparin drip  - metorpolol 25 mg q AM w parameters  - metoprolol 12.5 q PM w parameters  - surgery eval    # Patent PFO hx  - tele monitoring  - neuro checks q 4 hrs  - neuro consult read and appreciated  - IV heparin as per protocol  - speech and swallow  - OT/PT    # COVID+  - clear CXR  - s/p monoclonal AB  - scattered wheezes ANNEMARIE  - finished 9 days of steroids    # PD  1. continue home meds    #VTE on IV heparin       Reason for Admission: acute CVA  History of Present Illness:   86 year old male w hx AAA s/p EVAR 2021, AFIB on ASA, [place], HTN, PD, patent PFO came to ED for evaluation for difficulty speaking    Today he called his doctor who advised him to go to the ED  + some difficulty swallowing;  usually has to take small portions     COVID+ was given monoclonal AB 02-    care discussed with neurology as patient has a multiple strokes on an MRI. Patient has an underlying atrial fibrillation -  not AC. Previous hospitalization significant for     Care discussed with Dr. Ochoa, as patient had a large hemorrhagic shock last year -  from his perspective no contraindication for starting AC 2/12.            REVIEW OF SYSTEMS:  General: NAD, hemodynamically stable   HEENT:  Eyes:  No visual loss, blurred vision, double vision or yellow sclerae. Ears, Nose, Throat:  No hearing loss, sneezing, congestion, runny nose or sore throat.  SKIN:  No rash or itching.  CARDIOVASCULAR:  No chest pain, chest pressure or chest discomfort. No palpitations or edema.  RESPIRATORY:  No shortness of breath, cough or sputum.  GASTROINTESTINAL:  No anorexia, nausea, vomiting or diarrhea. No abdominal pain or blood.  NEUROLOGICAL:  No headache, dizziness, syncope, paralysis, ataxia, numbness or tingling in the extremities. No change in bowel or bladder control.  MUSCULOSKELETAL:  No muscle, back pain, joint pain or stiffness.  HEMATOLOGIC:  No anemia, bleeding or bruising.  LYMPHATICS:  No enlarged nodes. No history of splenectomy.  ENDOCRINOLOGIC:  No reports of sweating, cold or heat intolerance. No polyuria or polydipsia.  ALLERGIES:  No history of asthma, hives, eczema or rhinitis.    Physical Exam:   GENERAL APPEARANCE:  NAD, hemodynamically stable  T(C): 36.5 (02-12-22 @ 09:05), Max: 37.1 (02-11-22 @ 21:27)  HR: 64 (02-12-22 @ 09:05) (62 - 80)  BP: 136/94 (02-12-22 @ 09:05) (130/84 - 157/89)  RR: 18 (02-12-22 @ 09:05) (16 - 18)  SpO2: 95% (02-12-22 @ 09:05) (95% - 99%)  HEENT:  Head is normocephalic    Skin:  Warm and dry without any rash   NECK:  Supple without lymphadenopathy.   HEART:  Regular rate and rhythm. normal S1 and S2, No M/R/G  LUNGS:  Good ins/exp effort, no W/R/R/C  ABDOMEN:  Soft, nontender, nondistended with good bowel sounds heard  EXTREMITIES:  Without cyanosis, clubbing or edema.   NEUROLOGICAL:  Gross nonfocal       CBC Full  -  ( 12 Feb 2022 07:50 )  WBC Count : 8.35 K/uL  RBC Count : 4.92 M/uL  Hemoglobin : 14.8 g/dL  Hematocrit : 44.8 %  Platelet Count - Automated : 244 K/uL    PT/INR - ( 11 Feb 2022 15:32 )   PT: 12.4 sec;   INR: 1.07 ratio         PTT - ( 12 Feb 2022 07:50 )  PTT:>200.0 sec    02-12    139  |  105  |  22  ----------------------------<  217<H>  4.1   |  27  |  1.18    Ca    8.8      12 Feb 2022 07:50    TPro  7.5  /  Alb  3.2<L>  /  TBili  0.7  /  DBili  x   /  AST  23  /  ALT  43  /  AlkPhos  64  02-11      86 year old male w hx AAA s/p EVAR 2021, AFIB on ASA, [place], HTN, PD, patent PFO came to ED for evaluation for difficulty speaking      # Acute CVA L frontoparietal   - was not a tpa candidate since he was outside window w symptoms over past 3 days  - since 12- interim small post, L frontal CVA  - surgery notes of no contraindication for anticoagulation  - monitor HH  - start Eliquis today    # AFIB - not on AC  - started on eliquis  - metorpolol 25 mg q AM w parameters  - metoprolol 12.5 q PM w parameters  - surgery eval    # Patent PFO hx  - tele monitoring  - neuro checks q 4 hrs  - neuro consult read and appreciated  - IV heparin as per protocol  - speech and swallow  - OT/PT    # COVID+  - clear CXR  - s/p monoclonal AB  - scattered wheezes ANNEMARIE  - finished 9 days of steroids    # PD  1. continue home meds    #VTE on IV heparin     (E4) spontaneous

## 2024-09-24 NOTE — ED STATDOCS - MDM PATIENT STATEMENT FOR ADDL TREATMENT
Celine Roa tolerated IV hydration well with no complications.      Celine Roa is aware of future appt on 09/27/24 at 0930.     AVS declined.     
Patient with one or more new problems requiring additional work-up/treatment.

## 2024-10-13 NOTE — H&P ADULT - ENDOCRINE
Patient paged me on October 13, 2024.  She states she started her menses on September 22, 2024 that was regular, then resumed 20 days later.  She is having gushes and clots.  No significant cramps.  No lightheadedness or dizziness.  History of tubal ligation, has no concerns for pregnancy.  Her prior menses before September was 3 months ago.  No fevers or chills, no dysuria or change in bowel habits.  We discussed TXA and Motrin 600 mg, this is ordered to the [facility] pharmacy.  I recommend starting a multivitamin with iron or a iron supplement.   I did also order a pelvic ultrasound and FSH/LH, prolactin.  Her CBC and TSH were recently normal.  
negative

## 2024-12-10 ENCOUNTER — APPOINTMENT (OUTPATIENT)
Dept: NEUROLOGY | Facility: CLINIC | Age: 89
End: 2024-12-10

## 2024-12-10 ENCOUNTER — NON-APPOINTMENT (OUTPATIENT)
Age: 89
End: 2024-12-10

## 2024-12-24 PROBLEM — F10.90 ALCOHOL USE: Status: ACTIVE | Noted: 2019-04-17

## 2025-01-24 ENCOUNTER — TRANSCRIPTION ENCOUNTER (OUTPATIENT)
Age: 89
End: 2025-01-24

## 2025-01-25 ENCOUNTER — TRANSCRIPTION ENCOUNTER (OUTPATIENT)
Age: 89
End: 2025-01-25

## 2025-01-25 ENCOUNTER — NON-APPOINTMENT (OUTPATIENT)
Age: 89
End: 2025-01-25

## 2025-01-27 ENCOUNTER — OUTPATIENT (OUTPATIENT)
Dept: OUTPATIENT SERVICES | Facility: HOSPITAL | Age: 89
LOS: 1 days | End: 2025-01-27
Payer: MEDICARE

## 2025-01-27 ENCOUNTER — APPOINTMENT (OUTPATIENT)
Dept: RADIOLOGY | Facility: CLINIC | Age: 89
End: 2025-01-27
Payer: MEDICARE

## 2025-01-27 ENCOUNTER — APPOINTMENT (OUTPATIENT)
Dept: INTERNAL MEDICINE | Facility: CLINIC | Age: 89
End: 2025-01-27
Payer: MEDICARE

## 2025-01-27 VITALS
HEART RATE: 84 BPM | WEIGHT: 136 LBS | BODY MASS INDEX: 21.35 KG/M2 | DIASTOLIC BLOOD PRESSURE: 74 MMHG | RESPIRATION RATE: 15 BRPM | SYSTOLIC BLOOD PRESSURE: 112 MMHG | OXYGEN SATURATION: 98 % | HEIGHT: 67 IN

## 2025-01-27 DIAGNOSIS — J18.9 PNEUMONIA, UNSPECIFIED ORGANISM: ICD-10-CM

## 2025-01-27 DIAGNOSIS — Z90.49 ACQUIRED ABSENCE OF OTHER SPECIFIED PARTS OF DIGESTIVE TRACT: Chronic | ICD-10-CM

## 2025-01-27 DIAGNOSIS — R05.9 COUGH, UNSPECIFIED: ICD-10-CM

## 2025-01-27 PROCEDURE — 87804 INFLUENZA ASSAY W/OPTIC: CPT | Mod: QW

## 2025-01-27 PROCEDURE — 87811 SARS-COV-2 COVID19 W/OPTIC: CPT | Mod: QW

## 2025-01-27 PROCEDURE — 71046 X-RAY EXAM CHEST 2 VIEWS: CPT

## 2025-01-27 PROCEDURE — 71046 X-RAY EXAM CHEST 2 VIEWS: CPT | Mod: 26

## 2025-01-27 PROCEDURE — 99214 OFFICE O/P EST MOD 30 MIN: CPT

## 2025-01-27 RX ORDER — DOXYCYCLINE 100 MG/1
100 TABLET, FILM COATED ORAL
Qty: 14 | Refills: 0 | Status: ACTIVE | COMMUNITY
Start: 2025-01-27 | End: 1900-01-01

## 2025-01-28 ENCOUNTER — TRANSCRIPTION ENCOUNTER (OUTPATIENT)
Age: 89
End: 2025-01-28

## 2025-01-28 LAB
RAPID RVP RESULT: NOT DETECTED
SARS-COV-2 RNA RESP QL NAA+PROBE: NOT DETECTED

## 2025-01-29 ENCOUNTER — APPOINTMENT (OUTPATIENT)
Dept: INTERNAL MEDICINE | Facility: CLINIC | Age: 89
End: 2025-01-29

## 2025-01-29 VITALS
HEIGHT: 67 IN | HEART RATE: 59 BPM | BODY MASS INDEX: 21.35 KG/M2 | OXYGEN SATURATION: 90 % | SYSTOLIC BLOOD PRESSURE: 114 MMHG | TEMPERATURE: 98.1 F | DIASTOLIC BLOOD PRESSURE: 70 MMHG | WEIGHT: 136 LBS | RESPIRATION RATE: 15 BRPM

## 2025-01-29 DIAGNOSIS — R09.89 OTHER SPECIFIED SYMPTOMS AND SIGNS INVOLVING THE CIRCULATORY AND RESPIRATORY SYSTEMS: ICD-10-CM

## 2025-01-29 DIAGNOSIS — R13.10 DYSPHAGIA, UNSPECIFIED: ICD-10-CM

## 2025-01-29 DIAGNOSIS — R53.1 WEAKNESS: ICD-10-CM

## 2025-01-29 PROCEDURE — 99214 OFFICE O/P EST MOD 30 MIN: CPT

## 2025-01-29 RX ORDER — CETIRIZINE HYDROCHLORIDE 5 MG/1
5 TABLET ORAL DAILY
Qty: 10 | Refills: 0 | Status: ACTIVE | COMMUNITY
Start: 2025-01-29 | End: 1900-01-01

## 2025-01-30 ENCOUNTER — EMERGENCY (EMERGENCY)
Facility: HOSPITAL | Age: 89
LOS: 0 days | Discharge: HOME | End: 2025-01-30
Attending: EMERGENCY MEDICINE
Payer: MEDICARE

## 2025-01-30 VITALS
DIASTOLIC BLOOD PRESSURE: 94 MMHG | RESPIRATION RATE: 20 BRPM | HEART RATE: 86 BPM | SYSTOLIC BLOOD PRESSURE: 133 MMHG | WEIGHT: 128.75 LBS | OXYGEN SATURATION: 98 % | TEMPERATURE: 98 F

## 2025-01-30 VITALS
TEMPERATURE: 98 F | DIASTOLIC BLOOD PRESSURE: 84 MMHG | OXYGEN SATURATION: 99 % | HEART RATE: 88 BPM | RESPIRATION RATE: 18 BRPM | SYSTOLIC BLOOD PRESSURE: 145 MMHG

## 2025-01-30 DIAGNOSIS — I10 ESSENTIAL (PRIMARY) HYPERTENSION: ICD-10-CM

## 2025-01-30 DIAGNOSIS — Z90.49 ACQUIRED ABSENCE OF OTHER SPECIFIED PARTS OF DIGESTIVE TRACT: Chronic | ICD-10-CM

## 2025-01-30 DIAGNOSIS — Z98.890 OTHER SPECIFIED POSTPROCEDURAL STATES: Chronic | ICD-10-CM

## 2025-01-30 DIAGNOSIS — J06.9 ACUTE UPPER RESPIRATORY INFECTION, UNSPECIFIED: ICD-10-CM

## 2025-01-30 DIAGNOSIS — R05.1 ACUTE COUGH: ICD-10-CM

## 2025-01-30 DIAGNOSIS — B97.89 OTHER VIRAL AGENTS AS THE CAUSE OF DISEASES CLASSIFIED ELSEWHERE: ICD-10-CM

## 2025-01-30 DIAGNOSIS — Z88.0 ALLERGY STATUS TO PENICILLIN: ICD-10-CM

## 2025-01-30 DIAGNOSIS — Z79.82 LONG TERM (CURRENT) USE OF ASPIRIN: ICD-10-CM

## 2025-01-30 DIAGNOSIS — G20.A1 PARKINSON'S DISEASE WITHOUT DYSKINESIA, WITHOUT MENTION OF FLUCTUATIONS: ICD-10-CM

## 2025-01-30 DIAGNOSIS — R06.02 SHORTNESS OF BREATH: ICD-10-CM

## 2025-01-30 DIAGNOSIS — H91.90 UNSPECIFIED HEARING LOSS, UNSPECIFIED EAR: ICD-10-CM

## 2025-01-30 DIAGNOSIS — I48.91 UNSPECIFIED ATRIAL FIBRILLATION: ICD-10-CM

## 2025-01-30 DIAGNOSIS — Z86.73 PERSONAL HISTORY OF TRANSIENT ISCHEMIC ATTACK (TIA), AND CEREBRAL INFARCTION WITHOUT RESIDUAL DEFICITS: ICD-10-CM

## 2025-01-30 LAB
ALBUMIN SERPL ELPH-MCNC: 3.5 G/DL — SIGNIFICANT CHANGE UP (ref 3.3–5)
ALP SERPL-CCNC: 59 U/L — SIGNIFICANT CHANGE UP (ref 40–120)
ALT FLD-CCNC: 16 U/L — SIGNIFICANT CHANGE UP (ref 12–78)
ANION GAP SERPL CALC-SCNC: 5 MMOL/L — SIGNIFICANT CHANGE UP (ref 5–17)
AST SERPL-CCNC: 34 U/L — SIGNIFICANT CHANGE UP (ref 15–37)
BASE EXCESS BLDA CALC-SCNC: 2 MMOL/L — SIGNIFICANT CHANGE UP (ref -2–3)
BASE EXCESS BLDV CALC-SCNC: 3.3 MMOL/L — HIGH (ref -2–3)
BASOPHILS # BLD AUTO: 0.04 K/UL — SIGNIFICANT CHANGE UP (ref 0–0.2)
BASOPHILS NFR BLD AUTO: 0.5 % — SIGNIFICANT CHANGE UP (ref 0–2)
BILIRUB SERPL-MCNC: 1.2 MG/DL — SIGNIFICANT CHANGE UP (ref 0.2–1.2)
BLOOD GAS COMMENTS ARTERIAL: SIGNIFICANT CHANGE UP
BUN SERPL-MCNC: 38 MG/DL — HIGH (ref 7–23)
CALCIUM SERPL-MCNC: 10 MG/DL — SIGNIFICANT CHANGE UP (ref 8.5–10.1)
CHLORIDE SERPL-SCNC: 107 MMOL/L — SIGNIFICANT CHANGE UP (ref 96–108)
CO2 SERPL-SCNC: 29 MMOL/L — SIGNIFICANT CHANGE UP (ref 22–31)
CREAT SERPL-MCNC: 1.15 MG/DL — SIGNIFICANT CHANGE UP (ref 0.5–1.3)
EGFR: 62 ML/MIN/1.73M2 — SIGNIFICANT CHANGE UP
EOSINOPHIL # BLD AUTO: 0.07 K/UL — SIGNIFICANT CHANGE UP (ref 0–0.5)
EOSINOPHIL NFR BLD AUTO: 0.9 % — SIGNIFICANT CHANGE UP (ref 0–6)
FLUAV AG NPH QL: SIGNIFICANT CHANGE UP
FLUBV AG NPH QL: SIGNIFICANT CHANGE UP
GLUCOSE SERPL-MCNC: 101 MG/DL — HIGH (ref 70–99)
HCO3 BLDA-SCNC: 28 MMOL/L — SIGNIFICANT CHANGE UP (ref 21–28)
HCO3 BLDV-SCNC: 31 MMOL/L — HIGH (ref 22–29)
HCT VFR BLD CALC: 37.6 % — LOW (ref 39–50)
HGB BLD-MCNC: 12.4 G/DL — LOW (ref 13–17)
IMM GRANULOCYTES NFR BLD AUTO: 0.1 % — SIGNIFICANT CHANGE UP (ref 0–0.9)
LYMPHOCYTES # BLD AUTO: 0.63 K/UL — LOW (ref 1–3.3)
LYMPHOCYTES # BLD AUTO: 8.4 % — LOW (ref 13–44)
MCHC RBC-ENTMCNC: 31.3 PG — SIGNIFICANT CHANGE UP (ref 27–34)
MCHC RBC-ENTMCNC: 33 G/DL — SIGNIFICANT CHANGE UP (ref 32–36)
MCV RBC AUTO: 94.9 FL — SIGNIFICANT CHANGE UP (ref 80–100)
MONOCYTES # BLD AUTO: 0.85 K/UL — SIGNIFICANT CHANGE UP (ref 0–0.9)
MONOCYTES NFR BLD AUTO: 11.4 % — SIGNIFICANT CHANGE UP (ref 2–14)
NEUTROPHILS # BLD AUTO: 5.86 K/UL — SIGNIFICANT CHANGE UP (ref 1.8–7.4)
NEUTROPHILS NFR BLD AUTO: 78.7 % — HIGH (ref 43–77)
PCO2 BLDA: 47 MMHG — SIGNIFICANT CHANGE UP (ref 35–48)
PCO2 BLDV: 58 MMHG — HIGH (ref 42–55)
PH BLDA: 7.38 — SIGNIFICANT CHANGE UP (ref 7.35–7.45)
PH BLDV: 7.33 — SIGNIFICANT CHANGE UP (ref 7.32–7.43)
PLATELET # BLD AUTO: 186 K/UL — SIGNIFICANT CHANGE UP (ref 150–400)
PO2 BLDA: 104 MMHG — SIGNIFICANT CHANGE UP (ref 83–108)
PO2 BLDV: 49 MMHG — HIGH (ref 25–45)
POTASSIUM SERPL-MCNC: 3.8 MMOL/L — SIGNIFICANT CHANGE UP (ref 3.5–5.3)
POTASSIUM SERPL-SCNC: 3.8 MMOL/L — SIGNIFICANT CHANGE UP (ref 3.5–5.3)
PROT SERPL-MCNC: 7.4 GM/DL — SIGNIFICANT CHANGE UP (ref 6–8.3)
RBC # BLD: 3.96 M/UL — LOW (ref 4.2–5.8)
RBC # FLD: 14.6 % — HIGH (ref 10.3–14.5)
RSV RNA NPH QL NAA+NON-PROBE: SIGNIFICANT CHANGE UP
SAO2 % BLDA: 99 % — HIGH (ref 94–98)
SAO2 % BLDV: 82 % — SIGNIFICANT CHANGE UP (ref 67–88)
SARS-COV-2 RNA SPEC QL NAA+PROBE: SIGNIFICANT CHANGE UP
SODIUM SERPL-SCNC: 141 MMOL/L — SIGNIFICANT CHANGE UP (ref 135–145)
TROPONIN I, HIGH SENSITIVITY RESULT: 21.53 NG/L — SIGNIFICANT CHANGE UP
WBC # BLD: 7.46 K/UL — SIGNIFICANT CHANGE UP (ref 3.8–10.5)
WBC # FLD AUTO: 7.46 K/UL — SIGNIFICANT CHANGE UP (ref 3.8–10.5)

## 2025-01-30 PROCEDURE — 71045 X-RAY EXAM CHEST 1 VIEW: CPT

## 2025-01-30 PROCEDURE — 94640 AIRWAY INHALATION TREATMENT: CPT

## 2025-01-30 PROCEDURE — 71045 X-RAY EXAM CHEST 1 VIEW: CPT | Mod: 26

## 2025-01-30 PROCEDURE — 80053 COMPREHEN METABOLIC PANEL: CPT

## 2025-01-30 PROCEDURE — 84484 ASSAY OF TROPONIN QUANT: CPT

## 2025-01-30 PROCEDURE — 82803 BLOOD GASES ANY COMBINATION: CPT

## 2025-01-30 PROCEDURE — 99285 EMERGENCY DEPT VISIT HI MDM: CPT | Mod: 25

## 2025-01-30 PROCEDURE — 99284 EMERGENCY DEPT VISIT MOD MDM: CPT

## 2025-01-30 PROCEDURE — 0241U: CPT

## 2025-01-30 PROCEDURE — 36000 PLACE NEEDLE IN VEIN: CPT

## 2025-01-30 PROCEDURE — 85025 COMPLETE CBC W/AUTO DIFF WBC: CPT

## 2025-01-30 PROCEDURE — 36415 COLL VENOUS BLD VENIPUNCTURE: CPT

## 2025-01-30 PROCEDURE — 36600 WITHDRAWAL OF ARTERIAL BLOOD: CPT

## 2025-01-30 PROCEDURE — 93005 ELECTROCARDIOGRAM TRACING: CPT

## 2025-01-30 PROCEDURE — 93010 ELECTROCARDIOGRAM REPORT: CPT | Mod: 76

## 2025-01-30 RX ORDER — ALBUTEROL SULFATE 90 UG/1
2 INHALANT RESPIRATORY (INHALATION) ONCE
Refills: 0 | Status: COMPLETED | OUTPATIENT
Start: 2025-01-30 | End: 2025-01-30

## 2025-01-30 RX ORDER — DEXAMETHASONE SODIUM PHOSPHATE 4 MG/ML
10 VIAL (ML) INJECTION ONCE
Refills: 0 | Status: COMPLETED | OUTPATIENT
Start: 2025-01-30 | End: 2025-01-30

## 2025-01-30 RX ORDER — IPRATROPIUM BROMIDE AND ALBUTEROL SULFATE .5; 2.5 MG/3ML; MG/3ML
3 SOLUTION RESPIRATORY (INHALATION) ONCE
Refills: 0 | Status: COMPLETED | OUTPATIENT
Start: 2025-01-30 | End: 2025-01-30

## 2025-01-30 RX ADMIN — Medication 10 MILLIGRAM(S): at 19:31

## 2025-01-30 RX ADMIN — ALBUTEROL SULFATE 2 PUFF(S): 90 INHALANT RESPIRATORY (INHALATION) at 19:30

## 2025-01-30 RX ADMIN — IPRATROPIUM BROMIDE AND ALBUTEROL SULFATE 3 MILLILITER(S): .5; 2.5 SOLUTION RESPIRATORY (INHALATION) at 16:26

## 2025-01-30 NOTE — ED PROVIDER NOTE - CLINICAL SUMMARY MEDICAL DECISION MAKING FREE TEXT BOX
pt with a moist cough for 3 days was informed to go to er if o2sat less than 90, his family member states it iwas 76% he is comfortable, not dyspneic or sob, afebrile, will give albuterol neb for cough and re evaluate if approves, xray chest r/o pna although doubt, viral swab r/o influenza, rsv and lab r/o abnormality, if all relatively normal pt may be dc home. pt is not septic

## 2025-01-30 NOTE — ED ADULT TRIAGE NOTE - SOURCE OF INFORMATION
CM called Claudio Dominguez with [facility] and left vm that speech is off of hc orders. He has epic access for orders and discharge paperwork    CM updated simona and updated RN. Patient discharged 3/13/2023 to home with [facility] home care services. All discharge needs met per case management.     Oli Prieto RN, BSN  [phone] Patient

## 2025-01-30 NOTE — ED PROVIDER NOTE - PHYSICAL EXAMINATION
Gen:  Well appearing in NAD  Head:  NC/AT  HEENT: pupils perrl,no pharyngeal erythema, uvula midline  Cardiac: S1S2, RRR  Abd: Soft, non tender  Resp: No distress, rhochorous breathe sounds   musculoskeletal:: no deformities, no swelling, strength +5/+5  Skin: warm and dry as visualized, no rashes  Neuro: cyril HOLLIDAYo x 4  Psych:alert, cooperative, appropriate mood and affect for situation

## 2025-01-30 NOTE — ED ADULT NURSE NOTE - OBJECTIVE STATEMENT
Pt is a 88 y/o male who presents to the ED with c/o difficulty breathing. Pt wife report o2 in 70's at home. Pt sat in room 98. Pt reports feeling fine but has a cough..  pt was recently seen by MD and told to come to ED if oxygen dropped below 90% RA.  Wife at  bedside

## 2025-01-30 NOTE — ED PROVIDER NOTE - PROGRESS NOTE DETAILS
pt abg good, no pna on xray chest, will dc pt home with albuterol inhaler and give decadron 10mg po OWEN Jones DO

## 2025-01-30 NOTE — ED PROVIDER NOTE - OBJECTIVE STATEMENT
86 year old male w hx AAA s/p EVAR 2021, AFIB on ASA, [place], HTN, PD, patent PFO, CVA pw hypoxia of 76% on home pulse oximeter 96% here on RA. 86 year old male w hx AAA s/p EVAR 2021, AFIB on ASA, [place], HTN, PD, patent PFO, CVA pw hypoxia of 76% on home pulse oximeter 98% here on RA. No dyspnea, SOB pt has moist cough and has had moist cough for 3 days. Saw his PMD yesterday and was told to come to er if o2 sat was less than 90%.

## 2025-01-30 NOTE — ED ADULT TRIAGE NOTE - CHIEF COMPLAINT QUOTE
pt complaining of low oxygen.  pt was recently seen by MD and told to come to ED if oxygen dropped below 90% RA.  pt states it was 76% RA at home, currently 96% RA.

## 2025-01-30 NOTE — ED PROVIDER NOTE - NSICDXPASTMEDICALHX_GEN_ALL_CORE_FT
PAST MEDICAL HISTORY:  AAA (abdominal aortic aneurysm)     Anxiety     Atrial fibrillation     Bruise to bilateral upper extremity    Eczema     H/O candidiasis of mouth     H/O scarlet fever     History of shingles     [place] (hard of hearing)     HTN (hypertension)     Mucocele, appendix     Parkinson disease     Patent foramen ovale dilated left artrium severe echo 1/2021    Renal calculi     Seasonal allergies     Vertigo

## 2025-01-30 NOTE — ED ADULT TRIAGE NOTE - NS ED TRIAGE AVPU SCALE
Alert-The patient is alert, awake and responds to voice. The patient is oriented to time, place, and person. The triage nurse is able to obtain subjective information.
Patient refused

## 2025-01-30 NOTE — ED PROVIDER NOTE - NSFOLLOWUPINSTRUCTIONS_ED_ALL_ED_FT
Upper Respiratory Infection, Adult    you do not have pneumonia on your xray  stay very well hydrated    use the albuterol inhaler every 4 - 6 hours for cough  An upper respiratory infection (URI) is a common viral infection of the nose, throat, and upper air passages that lead to the lungs. The most common type of URI is the common cold. URIs usually get better on their own, without medical treatment.    What are the causes?  A URI is caused by a virus. You may catch a virus by:  Breathing in droplets from an infected person's cough or sneeze.  Touching something that has been exposed to the virus (is contaminated) and then touching your mouth, nose, or eyes.  What increases the risk?  You are more likely to get a URI if:  You are very young or very old.  You have close contact with others, such as at work, school, or a health care facility.  You smoke.  You have long-term (chronic) heart or lung disease.  You have a weakened disease-fighting system (immune system).  You have nasal allergies or asthma.  You are experiencing a lot of stress.  You have poor nutrition.  What are the signs or symptoms?  A URI usually involves some of the following symptoms:  Runny or stuffy (congested) nose.  Cough.  Sneezing.  Sore throat.  Headache.  Fatigue.  Fever.  Loss of appetite.  Pain in your forehead, behind your eyes, and over your cheekbones (sinus pain).  Muscle aches.  Redness or irritation of the eyes.  Pressure in the ears or face.  How is this diagnosed?  This condition may be diagnosed based on your medical history and symptoms, and a physical exam. Your health care provider may use a swab to take a mucus sample from your nose (nasal swab). This sample can be tested to determine what virus is causing the illness.    How is this treated?  URIs usually get better on their own within 7–10 days. Medicines cannot cure URIs, but your health care provider may recommend certain medicines to help relieve symptoms, such as:  Over-the-counter cold medicines.  Cough suppressants. Coughing is a type of defense against infection that helps to clear the respiratory system, so take these medicines only as recommended by your health care provider.  Fever-reducing medicines.  Follow these instructions at home:  Activity    Rest as needed.  If you have a fever, stay home from work or school until your fever is gone or until your health care provider says your URI cannot spread to other people (is no longer contagious). Your health care provider may have you wear a face mask to prevent your infection from spreading.  Relieving symptoms    Gargle with a mixture of salt and water 3–4 times a day or as needed. To make salt water, completely dissolve ½–1 tsp (3–6 g) of salt in 1 cup (237 mL) of warm water.  Use a cool-mist humidifier to add moisture to the air. This can help you breathe more easily.  Eating and drinking    A comparison of three sample cups showing dark yellow, yellow, and pale yellow urine.  Drink enough fluid to keep your urine pale yellow.  Eat soups and other clear broths.  General instructions    A sign showing that a person should not smoke.  Take over-the-counter and prescription medicines only as told by your health care provider. These include cold medicines, fever reducers, and cough suppressants.  Do not use any products that contain nicotine or tobacco. These products include cigarettes, chewing tobacco, and vaping devices, such as e-cigarettes. If you need help quitting, ask your health care provider.  Stay away from secondhand smoke.  Stay up to date on all immunizations, including the yearly (annual) flu vaccine.  Keep all follow-up visits. This is important.  How to prevent the spread of infection to others    Washing hands with soap and water.  URIs can be contagious. To prevent the infection from spreading:  Wash your hands with soap and water for at least 20 seconds. If soap and water are not available, use hand .  Avoid touching your mouth, face, eyes, or nose.  Cough or sneeze into a tissue or your sleeve or elbow instead of into your hand or into the air.  Contact a health care provider if:  You are getting worse instead of better.  You have a fever or chills.  Your mucus is brown or red.  You have yellow or brown discharge coming from your nose.  You have pain in your face, especially when you bend forward.  You have swollen neck glands.  You have pain while swallowing.  You have white areas in the back of your throat.  Get help right away if:  You have shortness of breath that gets worse.  You have severe or persistent:  Headache.  Ear pain.  Sinus pain.  Chest pain.  You have chronic lung disease along with any of the following:  Making high-pitched whistling sounds when you breathe, most often when you breathe out (wheezing).  Prolonged cough (more than 14 days).  Coughing up blood.  A change in your usual mucus.  You have a stiff neck.  You have changes in your:  Vision.  Hearing.  Thinking.  Mood.  These symptoms may be an emergency. Get help right away. Call 911.  Do not wait to see if the symptoms will go away.  Do not drive yourself to the hospital.  Summary  An upper respiratory infection (URI) is a common infection of the nose, throat, and upper air passages that lead to the lungs.  A URI is caused by a virus.  URIs usually get better on their own within 7–10 days.  Medicines cannot cure URIs, but your health care provider may recommend certain medicines to help relieve symptoms.  This information is not intended to replace advice given to you by your health care provider. Make sure you discuss any questions you have with your health care provider.    Document Revised: 07/20/2022 Document Reviewed: 07/20/2022  [employer] Patient Education © 2024 [employer]  [employer] logo  Terms and Conditions  Privacy Policy  Editorial Policy  All content on this site: Copyright © 2025 [employer], its licensors, and contributors. All rights are reserved, including those for text and data mining, AI training, and similar technologies. For all open access content, the Creative Commons licensing terms apply.  Cookies are used by this site. To decline or devyn

## 2025-01-30 NOTE — ED PROVIDER NOTE - PATIENT PORTAL LINK FT
You can access the FollowMyHealth Patient Portal offered by [facility] by registering at the following website: [email or web address]. By joining [employer]’s FollowMyHealth portal, you will also be able to view your health information using other applications (apps) compatible with our system.

## 2025-01-30 NOTE — ED ADULT NURSE REASSESSMENT NOTE - NS ED NURSE REASSESS COMMENT FT1
report given by MICHELLE funes. pt to be medicated and d/c, vitals stable, no other complaints at this time. safety and comfort measures maintained

## 2025-01-30 NOTE — ED ADULT NURSE NOTE - NSICDXPASTMEDICALHX_GEN_ALL_CORE_FT
PAST MEDICAL HISTORY:  AAA (abdominal aortic aneurysm)     Anxiety     Atrial fibrillation     Bruise to bilateral upper extremity    Eczema     H/O candidiasis of mouth     H/O scarlet fever     History of shingles     [place] (hard of hearing)     HTN (hypertension)     Mucocele, appendix     Parkinson disease     Patent foramen ovale dilated left artrium severe echo 1/2021    Renal calculi     Seasonal allergies     Vertigo

## 2025-02-03 ENCOUNTER — APPOINTMENT (OUTPATIENT)
Dept: INTERNAL MEDICINE | Facility: CLINIC | Age: 89
End: 2025-02-03

## 2025-02-04 ENCOUNTER — APPOINTMENT (OUTPATIENT)
Dept: INTERNAL MEDICINE | Facility: CLINIC | Age: 89
End: 2025-02-04

## 2025-02-04 VITALS
OXYGEN SATURATION: 92 % | WEIGHT: 136 LBS | HEART RATE: 60 BPM | BODY MASS INDEX: 21.35 KG/M2 | SYSTOLIC BLOOD PRESSURE: 110 MMHG | TEMPERATURE: 98.3 F | RESPIRATION RATE: 15 BRPM | HEIGHT: 67 IN | DIASTOLIC BLOOD PRESSURE: 60 MMHG

## 2025-02-04 DIAGNOSIS — J40 BRONCHITIS, NOT SPECIFIED AS ACUTE OR CHRONIC: ICD-10-CM

## 2025-02-04 PROCEDURE — G2211 COMPLEX E/M VISIT ADD ON: CPT

## 2025-02-04 PROCEDURE — 99214 OFFICE O/P EST MOD 30 MIN: CPT

## 2025-02-04 RX ORDER — METHYLPREDNISOLONE 4 MG/1
4 TABLET ORAL
Qty: 1 | Refills: 0 | Status: ACTIVE | COMMUNITY
Start: 2025-02-04 | End: 1900-01-01

## 2025-02-07 ENCOUNTER — APPOINTMENT (OUTPATIENT)
Dept: INTERNAL MEDICINE | Facility: CLINIC | Age: 89
End: 2025-02-07

## 2025-02-07 VITALS
WEIGHT: 136 LBS | HEART RATE: 60 BPM | DIASTOLIC BLOOD PRESSURE: 70 MMHG | HEIGHT: 67 IN | SYSTOLIC BLOOD PRESSURE: 114 MMHG | BODY MASS INDEX: 21.35 KG/M2 | RESPIRATION RATE: 15 BRPM | OXYGEN SATURATION: 98 % | TEMPERATURE: 98.1 F

## 2025-02-07 PROBLEM — R53.1 WEAKNESS: Status: ACTIVE | Noted: 2025-02-07

## 2025-02-07 PROCEDURE — 99213 OFFICE O/P EST LOW 20 MIN: CPT

## 2025-02-10 ENCOUNTER — NON-APPOINTMENT (OUTPATIENT)
Age: 89
End: 2025-02-10

## 2025-02-10 ENCOUNTER — APPOINTMENT (OUTPATIENT)
Dept: NEUROLOGY | Facility: CLINIC | Age: 89
End: 2025-02-10
Payer: MEDICARE

## 2025-02-10 VITALS
HEART RATE: 125 BPM | WEIGHT: 130 LBS | HEIGHT: 68 IN | SYSTOLIC BLOOD PRESSURE: 121 MMHG | TEMPERATURE: 98.2 F | DIASTOLIC BLOOD PRESSURE: 86 MMHG | BODY MASS INDEX: 19.7 KG/M2

## 2025-02-10 DIAGNOSIS — R06.83 SNORING: ICD-10-CM

## 2025-02-10 DIAGNOSIS — R09.89 OTHER SPECIFIED SYMPTOMS AND SIGNS INVOLVING THE CIRCULATORY AND RESPIRATORY SYSTEMS: ICD-10-CM

## 2025-02-10 PROCEDURE — 99214 OFFICE O/P EST MOD 30 MIN: CPT

## 2025-02-10 PROCEDURE — G2211 COMPLEX E/M VISIT ADD ON: CPT

## 2025-02-11 NOTE — DISCUSSION/SUMMARY
Anticoagulation Progress Note      Comment: Spoke with patient  CPM  INR 3/11/25    Assessment  Yes No   []  [] Missed doses:   []  [] Extra doses:   []  [] Significant medication changes (RX, OTC, Herbal):   []  [] High-risk maintenance medications:                []  [] Vitamin K / dietary changes (Vitamin K goal: ___ cups/week):   []  [] Bleeding / bruising:   []  [] Falls / injury:   []  [] Acute illness:    []  [] Alcohol intake:   []  [] Procedures / hospitalization / ER visits:   []  [] Other:    Plan     Anticoagulation Summary  As of 2025      INR goal:  2.0-3.0   TTR:  82.1% (6 y)   INR used for dosin.4 (2025)   Warfarin maintenance plan:  2.5 mg (5 mg x 0.5) every Jeff, W, F; 5 mg (5 mg x 1) all other days   Weekly warfarin total:  27.5 mg   Plan last modified:  Gaurang Foley LPN (2024)   Next INR check:  --   Priority:  Follow-Up - 4 Weeks   Target end date:  Indefinite    Indications    Chronic atrial fibrillation  (CMD) [I48.20]  Long term (current) use of anticoagulants (Resolved) [Z79.01]  Long term (current) use of anticoagulants [Z79.01]  Anticoagulated on Coumadin [Z79.01]                 Anticoagulation Episode Summary       INR check location:  --    Preferred lab:  --    Send INR reminders to:  [facility] [address] T2 2  POOL    Comments:  *          Anticoagulation Care Providers       Provider Role Specialty Phone number    Arnoldo Raygoza MD Responsible Internal Medicine - Clinical Cardiac Electrophysiology [phone]                 
[FreeTextEntry1] : Mr. Hendricks is an 87 year old man who initially presented with right arm and jaw tremor.\par His exam is suggestive of Parkinson's Disease.\par He had a recent stroke and nontraumatic subarachnoid hemorrhage.\par \par Parkinsonism:\par He took Sinemet 25/100 once and felt strange so discontinued it.\par He is tolerating Ropinirole and it seems to be providing benefit.\par Increase Ropinirole to 1mg three times per day for simplicity. Change to a 1 mg pill.\par Stay well hydrated.\par Stand slowly\par Daily exercise\par \par Stroke\par -Recent stroke, likely embolic.\par -Had spontaneous SAH after Eliquis was started. Radiology felt it was more a SAH and not a hemorrhagic conversion of his stroke (although in similar region).\par -Cardiology considering Watchman procedure. If he is a candidate he can see Dr. Dewitt.\par -He was on Apixaban 5 mg BID. May be able to tolerate 2.5 mg BID. This will have to be discussed with cardiology.\par -Continue atorvastatin\par -Aspirin stopped as it will not provide benefit in prevention of embolic stroke\par \par Subarachnoid Hemorrhage\par -Occurred after starting Eliquis\par -He was on apixaban 5 mg BID.\par -Will repeat CT head.\par -repeat EEG normal\par -CT head 3/31/22 shows resolution of hemorrhage.\par -Keppra has been decreased to 250-500.\par Can decrease to 250 mg BID x 1 month, then 250 mg qhs x 2 weeks and then stop\par Call with any seizure like activity.\par \par f/u 3 months, sooner if needed.\par \par

## 2025-02-13 ENCOUNTER — APPOINTMENT (OUTPATIENT)
Dept: OTOLARYNGOLOGY | Facility: CLINIC | Age: 89
End: 2025-02-13
Payer: MEDICARE

## 2025-02-13 VITALS
HEIGHT: 68 IN | HEART RATE: 87 BPM | BODY MASS INDEX: 17.13 KG/M2 | WEIGHT: 113 LBS | DIASTOLIC BLOOD PRESSURE: 49 MMHG | SYSTOLIC BLOOD PRESSURE: 76 MMHG | OXYGEN SATURATION: 96 %

## 2025-02-13 PROCEDURE — 99204 OFFICE O/P NEW MOD 45 MIN: CPT | Mod: 25

## 2025-02-13 PROCEDURE — 31575 DIAGNOSTIC LARYNGOSCOPY: CPT

## 2025-02-13 RX ORDER — DOCUSATE SODIUM 100 MG/1
100 CAPSULE, LIQUID FILLED ORAL
Qty: 180 | Refills: 0 | Status: ACTIVE | COMMUNITY
Start: 2024-07-26

## 2025-02-18 ENCOUNTER — APPOINTMENT (OUTPATIENT)
Dept: INTERNAL MEDICINE | Facility: CLINIC | Age: 89
End: 2025-02-18

## 2025-02-18 ENCOUNTER — APPOINTMENT (OUTPATIENT)
Dept: INTERNAL MEDICINE | Facility: CLINIC | Age: 89
End: 2025-02-18
Payer: MEDICARE

## 2025-02-18 VITALS
SYSTOLIC BLOOD PRESSURE: 91 MMHG | DIASTOLIC BLOOD PRESSURE: 59 MMHG | TEMPERATURE: 97.9 F | OXYGEN SATURATION: 98 % | HEIGHT: 68 IN | BODY MASS INDEX: 17.13 KG/M2 | HEART RATE: 73 BPM | WEIGHT: 113 LBS | RESPIRATION RATE: 15 BRPM

## 2025-02-18 DIAGNOSIS — G20.A1 PARKINSON'S DISEASE WITHOUT DYSKINESIA, WITHOUT MENTION OF FLUCTUATIONS: ICD-10-CM

## 2025-02-18 DIAGNOSIS — B35.1 TINEA UNGUIUM: ICD-10-CM

## 2025-02-18 DIAGNOSIS — R26.89 OTHER ABNORMALITIES OF GAIT AND MOBILITY: ICD-10-CM

## 2025-02-18 PROCEDURE — 99204 OFFICE O/P NEW MOD 45 MIN: CPT

## 2025-02-19 ENCOUNTER — NON-APPOINTMENT (OUTPATIENT)
Age: 89
End: 2025-02-19

## 2025-02-19 PROBLEM — B35.1 FUNGAL INFECTION OF NAIL: Status: ACTIVE | Noted: 2025-02-19

## 2025-02-22 ENCOUNTER — NON-APPOINTMENT (OUTPATIENT)
Age: 89
End: 2025-02-22

## 2025-02-22 RX ORDER — CARBIDOPA AND LEVODOPA 25; 100 MG/1; MG/1
25-100 TABLET ORAL
Qty: 90 | Refills: 3 | Status: ACTIVE | COMMUNITY
Start: 2025-02-22 | End: 1900-01-01

## 2025-02-26 ENCOUNTER — NON-APPOINTMENT (OUTPATIENT)
Age: 89
End: 2025-02-26

## 2025-03-04 ENCOUNTER — NON-APPOINTMENT (OUTPATIENT)
Age: 89
End: 2025-03-04

## 2025-03-06 ENCOUNTER — RESULT REVIEW (OUTPATIENT)
Age: 89
End: 2025-03-06

## 2025-03-06 ENCOUNTER — OUTPATIENT (OUTPATIENT)
Dept: OUTPATIENT SERVICES | Facility: HOSPITAL | Age: 89
LOS: 1 days | End: 2025-03-06
Payer: MEDICARE

## 2025-03-06 DIAGNOSIS — R13.10 DYSPHAGIA, UNSPECIFIED: ICD-10-CM

## 2025-03-06 DIAGNOSIS — Z98.890 OTHER SPECIFIED POSTPROCEDURAL STATES: Chronic | ICD-10-CM

## 2025-03-06 DIAGNOSIS — Z90.49 ACQUIRED ABSENCE OF OTHER SPECIFIED PARTS OF DIGESTIVE TRACT: Chronic | ICD-10-CM

## 2025-03-06 PROCEDURE — 74230 X-RAY XM SWLNG FUNCJ C+: CPT | Mod: 26

## 2025-03-06 PROCEDURE — 92611 MOTION FLUOROSCOPY/SWALLOW: CPT | Mod: GN

## 2025-03-06 PROCEDURE — 74230 X-RAY XM SWLNG FUNCJ C+: CPT

## 2025-03-06 NOTE — SWALLOW VFSS/MBS ASSESSMENT ADULT - PHARYNGEAL PHASE COMMENTS
Triggering of pharyngeal swallow was timely to mildly latent. Hyo laryngeal excursion and epiglottic retroflexion during the swallow were moderately reduced which compromised prandial airway protection at times. Pharyngeal motility was moderately decreased as well. Cricopharyngeal sphincter readily opened but was negatively impacted by decreased propulsive pressure within the Pharynx. SILENT ASPIRATION OCCURRED DURING THE SWALLOW WITH THIN LIQUIDS, DESPITE CUES. WET DYSPHONIA NOTED WHEN ASPIRATION OCCURRED BUT HE DID NOT COUGH UNTIL THIN LIQUID ACTUALLY ENTERED HIS TRACHEA. NO LARYNGEAL PENETRATION, OR ASPIRATION WERE DEMONSTRATED WITH MILDLY THICK LIQUIDS, PUREE FOODS, EASY TO CHEW FOODS, & REGULAR TEXTURE SOLIDS. HOWEVER, A MILD TO MODERATE AMOUNT OF POST PRANDIAL PHARYNGEAL RETENTION WAS VISUALIZED IN TH VALLECULAE/PYRIFORM SINUSES, WITH THE AMOUNT OF RESIDUE INCREASING AS THE VISCOSITY OF GIVEN FOOD CONSISTENCIES INCREASED. OF NOTE IS THAT EMPLOYMENT OF A DRY SWALLOW AFTER PRIMARY SWALLOWING TRIALS WAS MODERATELY EFFECTIVE IN CLEARING THE AMOUNT OF PHARYNGEAL RESIDUE. Triggering of pharyngeal swallow was timely to mildly latent. Hyo laryngeal excursion and epiglottic retroflexion during the swallow were moderately reduced which compromised prandial airway protection at times. Pharyngeal motility was moderately decreased as well. Cricopharyngeal sphincter readily opened but was negatively impacted by decreased propulsive pressure within the Pharynx. SILENT ASPIRATION OCCURRED DURING THE SWALLOW WITH THIN LIQUIDS, DESPITE CUES. WET DYSPHONIA WAS NOTED WHEN ASPIRATION OCCURRED BUT HE DID NOT COUGH UNTIL THIN LIQUIDS ACTUALLY ENTERED HIS TRACHEA. NO LARYNGEAL PENETRATION, OR ASPIRATION WERE DEMONSTRATED WITH MILDLY THICK LIQUIDS, PUREE FOODS, EASY TO CHEW FOODS, & REGULAR TEXTURE SOLIDS. HOWEVER, A MILD TO MODERATE AMOUNT OF POST PRANDIAL PHARYNGEAL RETENTION WAS VISUALIZED IN THE VALLECULAE/PYRIFORM SINUSES, WITH THE AMOUNT OF RESIDUE INCREASING AS THE VISCOSITY OF GIVEN FOOD CONSISTENCIES INCREASED. OF NOTE IS THAT EMPLOYMENT OF A DRY SWALLOW AFTER PRIMARY SWALLOWING TRIALS WAS MODERATELY EFFECTIVE IN CLEARING THE AMOUNT OF PHARYNGEAL RESIDUE.

## 2025-03-06 NOTE — SWALLOW VFSS/MBS ASSESSMENT ADULT - CONSISTENCIES ADMINISTERED
thin liquid/mildly thick/pureed/easy to chew/regular solid All PO was either coated or injected with barium for contrast purposes./thin liquid/mildly thick/pureed/easy to chew/regular solid

## 2025-03-06 NOTE — SWALLOW VFSS/MBS ASSESSMENT ADULT - COMMENTS
The patient was referred for an outpatient MBS by Dr Land. The patient was previously hospitalized at this facility in 2/22 with difficulties speaking. 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 Other selected prior medical history is remarkable for anxiety, hearing loss, seasonal allergies,  A-Fib, PFO, AAA s/p repair, HTN, vertigo, hearing loss, anxiety, eczema, nephrolithiasis, mucocele in Appendix, prior right colectomy NOS, past appy, previous Scarlet Fever, and prior Shingles. Additionally, the pt has a longstanding history of Parkinson's with chronic functional Dysarthria/Dysphonia as well as functional Oral Dysphagia for which speech pathology intervention has been received in the past. The patient stated that he is on a regular texture diet without a liquid consistency restriction but favors softer food types PTH. The patient was referred for an outpatient MBS by Dr Land. The patient was previously hospitalized at this facility in 2/22 with difficulties speaking. 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 Other selected prior medical history is remarkable for anxiety, hearing loss, seasonal allergies,  A-Fib, PFO, AAA s/p repair, HTN, vertigo, hearing loss, anxiety, eczema, nephrolithiasis, mucocele in Appendix, prior right colectomy NOS, past appy, previous Scarlet Fever, and prior Shingles. Additionally, the pt has a longstanding history of Parkinson's with chronic functional Dysarthria/Dysphonia as well as functional Oral Dysphagia for which speech pathology intervention has been received in the past. The patient stated that he is on a regular texture diet without a liquid consistency restriction but favors softer food types PTH. He indicated that he randomly coughs as well as sneezes when eating. The pt also admits to significant unintentional weight loss. The patient was referred for an outpatient MBS by Dr Land. The patient was previously hospitalized at this facility in 2/22 with difficulties speaking. 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 Other selected prior medical history is remarkable for anxiety, hearing loss, seasonal allergies, A-Fib, PFO, AAA s/p repair, HTN, vertigo, hearing loss, anxiety, eczema, nephrolithiasis, mucocele in Appendix, prior right colectomy NOS, past appy, previous Scarlet Fever, and prior Shingles. Additionally, the pt has a longstanding history of Parkinson's with chronic functional Dysarthria/Dysphonia as well as functional Oral Dysphagia for which speech pathology intervention has been received in the past. The patient stated that he is on a regular texture diet without a liquid consistency restriction but favors softer food types PTH. He indicated that he randomly coughs as well as sneezes when eating. The pt also admits to significant unintentional weight loss a well as sometimes waking up from sleep due to coughing.

## 2025-03-06 NOTE — SWALLOW VFSS/MBS ASSESSMENT ADULT - ORAL PHASE COMMENTS
Bolus formation/transfer were achieved via mildly to moderately prolonged but mechanically functional lingual pumping actions and mildly to moderately prolonged but mechanically functional munch rotary masticatory motions. Piecemeal deglutition was evident. Mild tongue debris noted with coarse solids. Tongue base retraction was decreased. Premature loss of all PO to the Valleculae, with spillover of Thin liquids into the Pyriform Sinuses, was demonstrated prior to triggering of pharyngeal swallowing trials.

## 2025-03-06 NOTE — SWALLOW VFSS/MBS ASSESSMENT ADULT - ADDITIONAL INFORMATION
The patient was postured upright in CHIKA chair for testing and studied tin the lateral plane. A cervical kyphosis was apparent. Asymmetry of right/left Valleculae as well Pyriform Sinuses was visualized upon preliminary fluoroscopy. CLINICAL NON RADIOGRAPHIC FINDINGS ARE REMARKABLE FOR A MARKED LOSS OF BULK IN STRAP MUSCLE REGIONS AS WELL AS WET DYSPHONIA WHEN ATTEMPTING TO SPEAK WITH THIS CLINICIAN. WET DYSPHONIA LIKELY DUE TO ASPIRATION OF SALIVA/MUCOUS. The patient was postured upright in CHIKA chair for testing and studied tin the lateral plane. A cervical kyphosis was apparent. Asymmetry of right/left Valleculae as well Pyriform Sinuses was visualized upon preliminary fluoroscopy. CLINICAL NON RADIOGRAPHIC FINDINGS ARE REMARKABLE FOR A MARKED LOSS OF BULK IN STRAP MUSCLE REGIONS AS WELL AS WET DYSPHONIA WHEN ATTEMPTING TO SPEAK WITH THIS CLINICIAN. WET DYSPHONIA IS LIKELY DUE TO ASPIRATION OF SALIVA/MUCOUS.

## 2025-03-06 NOTE — SWALLOW VFSS/MBS ASSESSMENT ADULT - ORAL PREP COMMENTS
The patient willingly accepted barium altered food materials. He tended to accept barium altered food materials in small volumes but demonstrated functional labial grading on utensils.

## 2025-03-06 NOTE — SWALLOW VFSS/MBS ASSESSMENT ADULT - ADDITIONAL RECOMMENDATIONS
1) SUGGEST AN ORAL DIET THAT CONSISTS OF SOFTER FOOD TEXTURES(I.E PASTA, FISH, ETC) WITH ALL LIQUIDS BEING MILDLY THICKENED TO A NECTAR CONSISTENCY. NO THIN LIQUIDS SHOULD BE PROVIDED DUE TO ASPIRATION. PT'S WIFE AND PATIENT WERE COUNSELED WITH REGARDS TO EMPLOYMENT OF BEVERAGE THICKENER WHEN DRINKING THIN LIQUIDS(I.E WATER, FABY, COFFEE, SODA, ETC.). PT'S WIFE INDICATED THAT SHE ALREADY PURCHASED BEVERAGE THICKENER ON [employer]. I PREFER GEL THICKENER OVER POWDERED THICKENER BASED ON FEEDBACK FROM MY PATIENTS. A PRESCRIPTION IS NOT NEEDED TO PURCHASE BEVERAGE THICKENER.     2) THE PATIENT SHOULD BE POSTURED UPRIGHT DURING AND AT LEAST 20 MINUTES AFTER EATING. THE PATIENT IS ALSO ENCOURAGED TO PERIODICALLY EMPLOY A DRY SWALLOW AFTER PRIMARY SWALLOWING TRIALS AS THESE STRATEGIES ENHANCE SWALLOWING SAFETY.     3) I SUGGESTED TO PT'S WIFE THAT SHE PURCHASE A WEDGE PILLOW FOR WHEN HE LAYS DOWN AS I DO FEEL THAT THIS CAN HELP MITIGATE PT'S RISKS FOR NOCTURNAL SALIVA ASPIRATION. PT'S WIFE WAS REVIEWING PILLOW WEDGE OPTIONS.     4) SUGGEST A TRIAL OF SWALLOWING THERAPY WITH SPEECH PATHOLOGY. REFERRAL TO [facility] WITH RUTH RAND SUGGESTED DUE TO MOBILITY ISSUES, THE OPTIONS OF POSSIBLE SWALLOW THERAPY AT HOME OR VIA ZOOM WAS APPEALING TO PATIENT. [facility] TELEPHONE NUMBER  [phone]. MY CARD PROVIDED TO PT'S WIFE AS WELL.      5) I EXPRESSED CONCERN TO PATIENT'S WIFE ABOUT HIS REPORTED SIGNIFICANT UNINTENTIONAL WEIGHT LOSS. THE PATIENT REPORTEDLY DOES TAKE NUTRITION SUPPLEMENTS BUT I SUSPECT THAT HIS CALORIE INTAKE IS COMPROMISED. FURTHER, I WOULD EXPECT FEEDING TO TAKE INCREASED TIME WHICH CAN CONTRIBUTE TO WEIGHT LOSS. SMALLER MEALS MORE FREQUENTLY MAY HELP PATIENT MAINTAIN HIS WEIGHT.     6) RECONSULT PRN     * REPORT WILL BE SENT TO DR BARRETT AND PATIENT     DESIRAE COLLADO MA, [facility]  , SPEECH PATHOLOGY   [phone]

## 2025-03-06 NOTE — SWALLOW VFSS/MBS ASSESSMENT ADULT - DIAGNOSTIC IMPRESSIONS
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 PHARYNGEAL DEFICITS ARE MORE PROMINENT THAN ORAL DEFICITS WHICH IS RATHER ATYPICAL IN PARKINSON'S. I FEEL THAT WHILE PARKINSON'S DISEASE IS DEFINITELY CONTRIBUTORY TO HIS DYSPHAGIC SEQUEL, MUSCLE WASTING FROM MALNUTRITION IS LIKELY ALSO CONTRIBUTING TO HIS DYSPHAGIA. SILENT ASPIRATION OCCURRED DURING THE SWALLOW WITH THIN LIQUIDS, DESPITE CUES. WET DYSPHONIA WAS NOTED WHEN ASPIRATION OCCURRED BUT HE DID NOT COUGH UNTIL THIN LIQUIDS ACTUALLY ENTERED HIS TRACHEA. SILENT ASPIRATION IS INDICATIVE OF DECREASED LARYNGEAL SENSATION. NO LARYNGEAL PENETRATION, OR ASPIRATION WERE DEMONSTRATED WITH MILDLY THICK LIQUIDS, PUREE FOODS, EASY TO CHEW FOODS, & REGULAR TEXTURE SOLIDS. HOWEVER, A MILD TO MODERATE AMOUNT OF POST PRANDIAL PHARYNGEAL RETENTION WAS VISUALIZED IN THE VALLECULAE/PYRIFORM SINUSES, WITH THE AMOUNT OF RESIDUE INCREASING AS THE VISCOSITY OF GIVEN FOOD CONSISTENCIES INCREASED. OF NOTE IS THAT EMPLOYMENT OF A DRY SWALLOW AFTER PRIMARY SWALLOWING TRIALS WAS MODERATELY EFFECTIVE IN CLEARING THE AMOUNT OF PHARYNGEAL RESIDUE.

## 2025-03-07 DIAGNOSIS — R13.10 DYSPHAGIA, UNSPECIFIED: ICD-10-CM

## 2025-03-10 ENCOUNTER — NON-APPOINTMENT (OUTPATIENT)
Age: 89
End: 2025-03-10

## 2025-03-12 ENCOUNTER — APPOINTMENT (OUTPATIENT)
Dept: INTERNAL MEDICINE | Facility: CLINIC | Age: 89
End: 2025-03-12
Payer: MEDICARE

## 2025-03-12 VITALS
TEMPERATURE: 98 F | DIASTOLIC BLOOD PRESSURE: 60 MMHG | RESPIRATION RATE: 15 BRPM | OXYGEN SATURATION: 95 % | HEART RATE: 76 BPM | SYSTOLIC BLOOD PRESSURE: 96 MMHG | BODY MASS INDEX: 20.31 KG/M2 | WEIGHT: 134 LBS | HEIGHT: 68 IN

## 2025-03-12 DIAGNOSIS — I95.9 HYPOTENSION, UNSPECIFIED: ICD-10-CM

## 2025-03-12 DIAGNOSIS — R13.10 DYSPHAGIA, UNSPECIFIED: ICD-10-CM

## 2025-03-12 DIAGNOSIS — I63.9 CEREBRAL INFARCTION, UNSPECIFIED: ICD-10-CM

## 2025-03-12 DIAGNOSIS — G20.C PARKINSONISM, UNSPECIFIED: ICD-10-CM

## 2025-03-12 DIAGNOSIS — I48.91 UNSPECIFIED ATRIAL FIBRILLATION: ICD-10-CM

## 2025-03-12 DIAGNOSIS — K59.00 CONSTIPATION, UNSPECIFIED: ICD-10-CM

## 2025-03-12 PROCEDURE — G2211 COMPLEX E/M VISIT ADD ON: CPT

## 2025-03-12 PROCEDURE — 99214 OFFICE O/P EST MOD 30 MIN: CPT

## 2025-03-12 RX ORDER — POLYETHYLENE GLYCOL 3350 17 G/17G
17 POWDER, FOR SOLUTION ORAL DAILY
Qty: 1 | Refills: 0 | Status: ACTIVE | COMMUNITY
Start: 2025-03-12 | End: 1900-01-01

## 2025-05-28 ENCOUNTER — APPOINTMENT (OUTPATIENT)
Dept: INTERNAL MEDICINE | Facility: CLINIC | Age: 89
End: 2025-05-28
Payer: MEDICARE

## 2025-05-28 VITALS
BODY MASS INDEX: 20.92 KG/M2 | HEIGHT: 68 IN | TEMPERATURE: 98.1 F | WEIGHT: 138 LBS | HEART RATE: 97 BPM | SYSTOLIC BLOOD PRESSURE: 104 MMHG | RESPIRATION RATE: 14 BRPM | OXYGEN SATURATION: 100 % | DIASTOLIC BLOOD PRESSURE: 68 MMHG

## 2025-05-28 DIAGNOSIS — L30.4 ERYTHEMA INTERTRIGO: ICD-10-CM

## 2025-05-28 DIAGNOSIS — I48.91 UNSPECIFIED ATRIAL FIBRILLATION: ICD-10-CM

## 2025-05-28 DIAGNOSIS — I63.9 CEREBRAL INFARCTION, UNSPECIFIED: ICD-10-CM

## 2025-05-28 DIAGNOSIS — N47.8 OTHER DISORDERS OF PREPUCE: ICD-10-CM

## 2025-05-28 DIAGNOSIS — I95.9 HYPOTENSION, UNSPECIFIED: ICD-10-CM

## 2025-05-28 PROCEDURE — 99214 OFFICE O/P EST MOD 30 MIN: CPT

## 2025-05-28 PROCEDURE — G2211 COMPLEX E/M VISIT ADD ON: CPT

## 2025-05-28 RX ORDER — KETOCONAZOLE 20 MG/G
2 CREAM TOPICAL TWICE DAILY
Qty: 1 | Refills: 2 | Status: ACTIVE | COMMUNITY
Start: 2025-05-28 | End: 1900-01-01

## 2025-06-02 ENCOUNTER — APPOINTMENT (OUTPATIENT)
Dept: UROLOGY | Facility: CLINIC | Age: 89
End: 2025-06-02
Payer: MEDICARE

## 2025-06-02 VITALS
HEART RATE: 97 BPM | WEIGHT: 138 LBS | BODY MASS INDEX: 20.92 KG/M2 | HEIGHT: 68 IN | SYSTOLIC BLOOD PRESSURE: 108 MMHG | DIASTOLIC BLOOD PRESSURE: 73 MMHG

## 2025-06-02 DIAGNOSIS — N47.1 PHIMOSIS: ICD-10-CM

## 2025-06-02 PROCEDURE — 99203 OFFICE O/P NEW LOW 30 MIN: CPT

## 2025-06-20 NOTE — ED ADULT TRIAGE NOTE - ARRIVAL FROM
Medication passed protocol.     Medication: venlafaxine passed protocol.   Last office visit date: 06/03/25  Next appointment scheduled?: Yes      Home

## 2025-06-23 ENCOUNTER — APPOINTMENT (OUTPATIENT)
Dept: UROLOGY | Facility: CLINIC | Age: 89
End: 2025-06-23
Payer: MEDICARE

## 2025-06-23 VITALS
RESPIRATION RATE: 15 BRPM | TEMPERATURE: 97.8 F | WEIGHT: 130 LBS | HEIGHT: 68 IN | SYSTOLIC BLOOD PRESSURE: 96 MMHG | DIASTOLIC BLOOD PRESSURE: 61 MMHG | HEART RATE: 81 BPM | BODY MASS INDEX: 19.7 KG/M2 | OXYGEN SATURATION: 97 %

## 2025-06-23 PROCEDURE — 99213 OFFICE O/P EST LOW 20 MIN: CPT

## 2025-07-07 ENCOUNTER — APPOINTMENT (OUTPATIENT)
Dept: UROLOGY | Facility: CLINIC | Age: 89
End: 2025-07-07

## 2025-07-15 ENCOUNTER — APPOINTMENT (OUTPATIENT)
Dept: INTERNAL MEDICINE | Facility: CLINIC | Age: 89
End: 2025-07-15

## 2025-07-15 VITALS
DIASTOLIC BLOOD PRESSURE: 70 MMHG | BODY MASS INDEX: 21.22 KG/M2 | RESPIRATION RATE: 15 BRPM | WEIGHT: 140 LBS | OXYGEN SATURATION: 95 % | TEMPERATURE: 98 F | SYSTOLIC BLOOD PRESSURE: 106 MMHG | HEART RATE: 76 BPM | HEIGHT: 68 IN

## 2025-07-15 PROCEDURE — 99214 OFFICE O/P EST MOD 30 MIN: CPT

## 2025-07-15 PROCEDURE — G2211 COMPLEX E/M VISIT ADD ON: CPT

## 2025-08-06 ENCOUNTER — APPOINTMENT (OUTPATIENT)
Dept: INTERNAL MEDICINE | Facility: CLINIC | Age: 89
End: 2025-08-06

## 2025-08-06 ENCOUNTER — NON-APPOINTMENT (OUTPATIENT)
Age: 89
End: 2025-08-06

## 2025-08-07 ENCOUNTER — RX RENEWAL (OUTPATIENT)
Age: 89
End: 2025-08-07

## 2025-08-07 ENCOUNTER — APPOINTMENT (OUTPATIENT)
Dept: NEUROLOGY | Facility: CLINIC | Age: 89
End: 2025-08-07
Payer: MEDICARE

## 2025-08-07 VITALS
RESPIRATION RATE: 16 BRPM | DIASTOLIC BLOOD PRESSURE: 77 MMHG | WEIGHT: 140 LBS | SYSTOLIC BLOOD PRESSURE: 125 MMHG | HEART RATE: 84 BPM | HEIGHT: 68 IN | BODY MASS INDEX: 21.22 KG/M2

## 2025-08-07 DIAGNOSIS — G20.A1 PARKINSON'S DISEASE WITHOUT DYSKINESIA, WITHOUT MENTION OF FLUCTUATIONS: ICD-10-CM

## 2025-08-07 PROCEDURE — G2212 PROLONG OUTPT/OFFICE VIS: CPT

## 2025-08-07 PROCEDURE — 99215 OFFICE O/P EST HI 40 MIN: CPT

## 2025-09-03 ENCOUNTER — LABORATORY RESULT (OUTPATIENT)
Age: 89
End: 2025-09-03

## 2025-09-03 ENCOUNTER — TRANSCRIPTION ENCOUNTER (OUTPATIENT)
Age: 89
End: 2025-09-03

## 2025-09-03 ENCOUNTER — APPOINTMENT (OUTPATIENT)
Dept: INTERNAL MEDICINE | Facility: CLINIC | Age: 89
End: 2025-09-03
Payer: MEDICARE

## 2025-09-03 VITALS
HEIGHT: 68 IN | WEIGHT: 136 LBS | DIASTOLIC BLOOD PRESSURE: 71 MMHG | TEMPERATURE: 97.3 F | HEART RATE: 98 BPM | RESPIRATION RATE: 15 BRPM | BODY MASS INDEX: 20.61 KG/M2 | OXYGEN SATURATION: 97 % | SYSTOLIC BLOOD PRESSURE: 108 MMHG

## 2025-09-03 DIAGNOSIS — I63.9 CEREBRAL INFARCTION, UNSPECIFIED: ICD-10-CM

## 2025-09-03 DIAGNOSIS — E87.5 HYPERKALEMIA: ICD-10-CM

## 2025-09-03 DIAGNOSIS — I48.91 UNSPECIFIED ATRIAL FIBRILLATION: ICD-10-CM

## 2025-09-03 DIAGNOSIS — G20.C PARKINSONISM, UNSPECIFIED: ICD-10-CM

## 2025-09-03 DIAGNOSIS — Z00.00 ENCOUNTER FOR GENERAL ADULT MEDICAL EXAMINATION W/OUT ABNORMAL FINDINGS: ICD-10-CM

## 2025-09-03 PROCEDURE — 90662 IIV NO PRSV INCREASED AG IM: CPT

## 2025-09-03 PROCEDURE — G0439: CPT

## 2025-09-03 PROCEDURE — 36415 COLL VENOUS BLD VENIPUNCTURE: CPT

## 2025-09-03 PROCEDURE — 99214 OFFICE O/P EST MOD 30 MIN: CPT | Mod: 25

## 2025-09-03 PROCEDURE — G0008: CPT

## 2025-09-04 ENCOUNTER — APPOINTMENT (OUTPATIENT)
Dept: INTERNAL MEDICINE | Facility: CLINIC | Age: 89
End: 2025-09-04
Payer: MEDICARE

## 2025-09-04 VITALS
BODY MASS INDEX: 20.61 KG/M2 | HEART RATE: 85 BPM | DIASTOLIC BLOOD PRESSURE: 53 MMHG | WEIGHT: 136 LBS | SYSTOLIC BLOOD PRESSURE: 108 MMHG | RESPIRATION RATE: 15 BRPM | HEIGHT: 68 IN | OXYGEN SATURATION: 98 % | TEMPERATURE: 97.9 F

## 2025-09-04 LAB
ALBUMIN SERPL ELPH-MCNC: 4.1 G/DL
ALBUMIN SERPL ELPH-MCNC: 4.3 G/DL
ALP BLD-CCNC: 57 U/L
ALP BLD-CCNC: 57 U/L
ALT SERPL-CCNC: 6 U/L
ALT SERPL-CCNC: 7 U/L
ANION GAP SERPL CALC-SCNC: 12 MMOL/L
ANION GAP SERPL CALC-SCNC: 9 MMOL/L
APPEARANCE: CLEAR
AST SERPL-CCNC: 20 U/L
AST SERPL-CCNC: 26 U/L
BASOPHILS # BLD AUTO: 0.07 K/UL
BASOPHILS NFR BLD AUTO: 1.3 %
BILIRUB SERPL-MCNC: 0.8 MG/DL
BILIRUB SERPL-MCNC: 1.2 MG/DL
BILIRUBIN URINE: NEGATIVE
BLOOD URINE: ABNORMAL
BUN SERPL-MCNC: 16 MG/DL
BUN SERPL-MCNC: 17 MG/DL
CALCIUM SERPL-MCNC: 10.1 MG/DL
CALCIUM SERPL-MCNC: 9.6 MG/DL
CHLORIDE SERPL-SCNC: 103 MMOL/L
CHLORIDE SERPL-SCNC: 103 MMOL/L
CHOLEST SERPL-MCNC: 139 MG/DL
CO2 SERPL-SCNC: 27 MMOL/L
CO2 SERPL-SCNC: 28 MMOL/L
COLOR: YELLOW
CREAT SERPL-MCNC: 1.12 MG/DL
CREAT SERPL-MCNC: 1.24 MG/DL
EGFRCR SERPLBLD CKD-EPI 2021: 56 ML/MIN/1.73M2
EGFRCR SERPLBLD CKD-EPI 2021: 63 ML/MIN/1.73M2
EOSINOPHIL # BLD AUTO: 0.12 K/UL
EOSINOPHIL NFR BLD AUTO: 2.3 %
ESTIMATED AVERAGE GLUCOSE: 103 MG/DL
GLUCOSE QUALITATIVE U: NEGATIVE MG/DL
GLUCOSE SERPL-MCNC: 94 MG/DL
GLUCOSE SERPL-MCNC: 94 MG/DL
HBA1C MFR BLD HPLC: 5.2 %
HCT VFR BLD CALC: 43.1 %
HDLC SERPL-MCNC: 67 MG/DL
HGB BLD-MCNC: 13.8 G/DL
IMM GRANULOCYTES NFR BLD AUTO: 0.2 %
KETONES URINE: NEGATIVE MG/DL
LDLC SERPL-MCNC: 61 MG/DL
LEUKOCYTE ESTERASE URINE: ABNORMAL
LYMPHOCYTES # BLD AUTO: 0.7 K/UL
LYMPHOCYTES NFR BLD AUTO: 13.4 %
MAN DIFF?: NORMAL
MCHC RBC-ENTMCNC: 30.6 PG
MCHC RBC-ENTMCNC: 32 G/DL
MCV RBC AUTO: 95.6 FL
MONOCYTES # BLD AUTO: 0.54 K/UL
MONOCYTES NFR BLD AUTO: 10.3 %
NEUTROPHILS # BLD AUTO: 3.78 K/UL
NEUTROPHILS NFR BLD AUTO: 72.5 %
NITRITE URINE: NEGATIVE
NONHDLC SERPL-MCNC: 72 MG/DL
PH URINE: 5.5
PLATELET # BLD AUTO: 151 K/UL
POTASSIUM SERPL-SCNC: 5.6 MMOL/L
POTASSIUM SERPL-SCNC: 7.3 MMOL/L
PROT SERPL-MCNC: 7.1 G/DL
PROT SERPL-MCNC: 7.4 G/DL
PROTEIN URINE: 30 MG/DL
PSA FREE FLD-MCNC: 28 %
PSA FREE SERPL-MCNC: 0.9 NG/ML
PSA SERPL-MCNC: 3.23 NG/ML
RBC # BLD: 4.51 M/UL
RBC # FLD: 16.4 %
SODIUM SERPL-SCNC: 141 MMOL/L
SODIUM SERPL-SCNC: 142 MMOL/L
SPECIFIC GRAVITY URINE: 1.02
TRIGL SERPL-MCNC: 45 MG/DL
TSH SERPL-ACNC: 2.13 UIU/ML
UROBILINOGEN URINE: 0.2 MG/DL
WBC # FLD AUTO: 5.22 K/UL

## 2025-09-04 PROCEDURE — 99213 OFFICE O/P EST LOW 20 MIN: CPT

## 2025-09-04 PROCEDURE — G2211 COMPLEX E/M VISIT ADD ON: CPT

## 2025-09-05 PROBLEM — E87.5 HYPERKALEMIA: Status: ACTIVE | Noted: 2025-09-04

## 2025-09-08 ENCOUNTER — NON-APPOINTMENT (OUTPATIENT)
Age: 89
End: 2025-09-08

## 2025-09-08 RX ORDER — GABAPENTIN 100 MG/1
100 CAPSULE ORAL
Qty: 90 | Refills: 0 | Status: ACTIVE | COMMUNITY
Start: 2025-09-08 | End: 1900-01-01

## 2025-09-14 ENCOUNTER — EMERGENCY (EMERGENCY)
Facility: HOSPITAL | Age: 89
LOS: 0 days | Discharge: HOME | End: 2025-09-14
Attending: EMERGENCY MEDICINE
Payer: MEDICARE

## 2025-09-14 VITALS
DIASTOLIC BLOOD PRESSURE: 79 MMHG | SYSTOLIC BLOOD PRESSURE: 150 MMHG | TEMPERATURE: 100 F | HEART RATE: 78 BPM | OXYGEN SATURATION: 96 % | RESPIRATION RATE: 19 BRPM

## 2025-09-14 VITALS
DIASTOLIC BLOOD PRESSURE: 92 MMHG | HEART RATE: 88 BPM | RESPIRATION RATE: 20 BRPM | SYSTOLIC BLOOD PRESSURE: 150 MMHG | TEMPERATURE: 98 F | OXYGEN SATURATION: 98 %

## 2025-09-14 DIAGNOSIS — Z90.49 ACQUIRED ABSENCE OF OTHER SPECIFIED PARTS OF DIGESTIVE TRACT: Chronic | ICD-10-CM

## 2025-09-14 DIAGNOSIS — Z88.0 ALLERGY STATUS TO PENICILLIN: ICD-10-CM

## 2025-09-14 DIAGNOSIS — Z98.890 OTHER SPECIFIED POSTPROCEDURAL STATES: Chronic | ICD-10-CM

## 2025-09-14 DIAGNOSIS — I48.91 UNSPECIFIED ATRIAL FIBRILLATION: ICD-10-CM

## 2025-09-14 DIAGNOSIS — R00.2 PALPITATIONS: ICD-10-CM

## 2025-09-14 DIAGNOSIS — N39.0 URINARY TRACT INFECTION, SITE NOT SPECIFIED: ICD-10-CM

## 2025-09-14 LAB
ALBUMIN SERPL ELPH-MCNC: 3.4 G/DL — SIGNIFICANT CHANGE UP (ref 3.3–5)
ALP SERPL-CCNC: 52 U/L — SIGNIFICANT CHANGE UP (ref 40–120)
ALT FLD-CCNC: 20 U/L — SIGNIFICANT CHANGE UP (ref 12–78)
ANION GAP SERPL CALC-SCNC: 3 MMOL/L — LOW (ref 5–17)
APPEARANCE UR: CLEAR — SIGNIFICANT CHANGE UP
APTT BLD: 27.2 SEC — SIGNIFICANT CHANGE UP (ref 26.1–36.8)
AST SERPL-CCNC: 15 U/L — SIGNIFICANT CHANGE UP (ref 15–37)
BACTERIA # UR AUTO: NEGATIVE /HPF — SIGNIFICANT CHANGE UP
BASOPHILS # BLD AUTO: 0.01 K/UL — SIGNIFICANT CHANGE UP (ref 0–0.2)
BASOPHILS NFR BLD AUTO: 0.1 % — SIGNIFICANT CHANGE UP (ref 0–2)
BILIRUB SERPL-MCNC: 0.9 MG/DL — SIGNIFICANT CHANGE UP (ref 0.2–1.2)
BILIRUB UR-MCNC: NEGATIVE — SIGNIFICANT CHANGE UP
BUN SERPL-MCNC: 23 MG/DL — SIGNIFICANT CHANGE UP (ref 7–23)
CALCIUM SERPL-MCNC: 9.1 MG/DL — SIGNIFICANT CHANGE UP (ref 8.5–10.1)
CAST: 0 /LPF — SIGNIFICANT CHANGE UP (ref 0–4)
CHLORIDE SERPL-SCNC: 106 MMOL/L — SIGNIFICANT CHANGE UP (ref 96–108)
CO2 SERPL-SCNC: 31 MMOL/L — SIGNIFICANT CHANGE UP (ref 22–31)
COLOR SPEC: YELLOW — SIGNIFICANT CHANGE UP
CREAT SERPL-MCNC: 1.36 MG/DL — HIGH (ref 0.5–1.3)
DIFF PNL FLD: ABNORMAL
EGFR: 50 ML/MIN/1.73M2 — LOW
EGFR: 50 ML/MIN/1.73M2 — LOW
EOSINOPHIL # BLD AUTO: 0 K/UL — SIGNIFICANT CHANGE UP (ref 0–0.5)
EOSINOPHIL NFR BLD AUTO: 0 % — SIGNIFICANT CHANGE UP (ref 0–6)
GLUCOSE SERPL-MCNC: 120 MG/DL — HIGH (ref 70–99)
GLUCOSE UR QL: NEGATIVE MG/DL — SIGNIFICANT CHANGE UP
HCT VFR BLD CALC: 43 % — SIGNIFICANT CHANGE UP (ref 39–50)
HGB BLD-MCNC: 13.8 G/DL — SIGNIFICANT CHANGE UP (ref 13–17)
IMM GRANULOCYTES # BLD AUTO: 0.05 K/UL — SIGNIFICANT CHANGE UP (ref 0–0.07)
IMM GRANULOCYTES NFR BLD AUTO: 0.7 % — SIGNIFICANT CHANGE UP (ref 0–0.9)
INR BLD: 1.03 RATIO — SIGNIFICANT CHANGE UP (ref 0.85–1.16)
KETONES UR QL: NEGATIVE MG/DL — SIGNIFICANT CHANGE UP
LACTATE SERPL-SCNC: 1.2 MMOL/L — SIGNIFICANT CHANGE UP (ref 0.7–2)
LEUKOCYTE ESTERASE UR-ACNC: ABNORMAL
LYMPHOCYTES # BLD AUTO: 0.33 K/UL — LOW (ref 1–3.3)
LYMPHOCYTES NFR BLD AUTO: 4.9 % — LOW (ref 13–44)
MAGNESIUM SERPL-MCNC: 2.1 MG/DL — SIGNIFICANT CHANGE UP (ref 1.6–2.6)
MANUAL SMEAR VERIFICATION: SIGNIFICANT CHANGE UP
MCHC RBC-ENTMCNC: 30.4 PG — SIGNIFICANT CHANGE UP (ref 27–34)
MCHC RBC-ENTMCNC: 32.1 G/DL — SIGNIFICANT CHANGE UP (ref 32–36)
MCV RBC AUTO: 94.7 FL — SIGNIFICANT CHANGE UP (ref 80–100)
MONOCYTES # BLD AUTO: 0.22 K/UL — SIGNIFICANT CHANGE UP (ref 0–0.9)
MONOCYTES NFR BLD AUTO: 3.2 % — SIGNIFICANT CHANGE UP (ref 2–14)
NEUTROPHILS # BLD AUTO: 6.18 K/UL — SIGNIFICANT CHANGE UP (ref 1.8–7.4)
NEUTROPHILS NFR BLD AUTO: 91.1 % — HIGH (ref 43–77)
NITRITE UR-MCNC: NEGATIVE — SIGNIFICANT CHANGE UP
NRBC # BLD AUTO: 0 K/UL — SIGNIFICANT CHANGE UP (ref 0–0)
NRBC # FLD: 0 K/UL — SIGNIFICANT CHANGE UP (ref 0–0)
NRBC BLD AUTO-RTO: 0 /100 WBCS — SIGNIFICANT CHANGE UP (ref 0–0)
NT-PROBNP SERPL-SCNC: 3372 PG/ML — HIGH (ref 0–450)
PH UR: 5.5 — SIGNIFICANT CHANGE UP (ref 5–8)
PLAT MORPH BLD: NORMAL — SIGNIFICANT CHANGE UP
PLATELET # BLD AUTO: 178 K/UL — SIGNIFICANT CHANGE UP (ref 150–400)
PMV BLD: 8.9 FL — SIGNIFICANT CHANGE UP (ref 7–13)
POTASSIUM SERPL-MCNC: 4.6 MMOL/L — SIGNIFICANT CHANGE UP (ref 3.5–5.3)
POTASSIUM SERPL-SCNC: 4.6 MMOL/L — SIGNIFICANT CHANGE UP (ref 3.5–5.3)
PROT SERPL-MCNC: 7.2 GM/DL — SIGNIFICANT CHANGE UP (ref 6–8.3)
PROT UR-MCNC: NEGATIVE MG/DL — SIGNIFICANT CHANGE UP
PROTHROM AB SERPL-ACNC: 11.9 SEC — SIGNIFICANT CHANGE UP (ref 9.9–13.4)
RBC # BLD: 4.54 M/UL — SIGNIFICANT CHANGE UP (ref 4.2–5.8)
RBC # FLD: 15.7 % — HIGH (ref 10.3–14.5)
RBC BLD AUTO: NORMAL — SIGNIFICANT CHANGE UP
RBC CASTS # UR COMP ASSIST: 10 /HPF — HIGH (ref 0–4)
SODIUM SERPL-SCNC: 140 MMOL/L — SIGNIFICANT CHANGE UP (ref 135–145)
SP GR SPEC: 1.01 — SIGNIFICANT CHANGE UP (ref 1–1.03)
SQUAMOUS # UR AUTO: 0 /HPF — SIGNIFICANT CHANGE UP (ref 0–5)
TROPONIN I, HIGH SENSITIVITY RESULT: 11.55 NG/L — SIGNIFICANT CHANGE UP
TROPONIN I, HIGH SENSITIVITY RESULT: 13.82 NG/L — SIGNIFICANT CHANGE UP
UROBILINOGEN FLD QL: 0.2 MG/DL — SIGNIFICANT CHANGE UP (ref 0.2–1)
WBC # BLD: 6.79 K/UL — SIGNIFICANT CHANGE UP (ref 3.8–10.5)
WBC # FLD AUTO: 6.79 K/UL — SIGNIFICANT CHANGE UP (ref 3.8–10.5)
WBC MORPHOLOGY: NORMAL — SIGNIFICANT CHANGE UP
WBC UR QL: 20 /HPF — HIGH (ref 0–5)

## 2025-09-14 PROCEDURE — 87040 BLOOD CULTURE FOR BACTERIA: CPT

## 2025-09-14 PROCEDURE — 81001 URINALYSIS AUTO W/SCOPE: CPT

## 2025-09-14 PROCEDURE — 83735 ASSAY OF MAGNESIUM: CPT

## 2025-09-14 PROCEDURE — 84484 ASSAY OF TROPONIN QUANT: CPT

## 2025-09-14 PROCEDURE — 83605 ASSAY OF LACTIC ACID: CPT

## 2025-09-14 PROCEDURE — 85610 PROTHROMBIN TIME: CPT

## 2025-09-14 PROCEDURE — 80053 COMPREHEN METABOLIC PANEL: CPT

## 2025-09-14 PROCEDURE — 87086 URINE CULTURE/COLONY COUNT: CPT

## 2025-09-14 PROCEDURE — 99285 EMERGENCY DEPT VISIT HI MDM: CPT | Mod: 25

## 2025-09-14 PROCEDURE — 99285 EMERGENCY DEPT VISIT HI MDM: CPT

## 2025-09-14 PROCEDURE — 96374 THER/PROPH/DIAG INJ IV PUSH: CPT

## 2025-09-14 PROCEDURE — 93010 ELECTROCARDIOGRAM REPORT: CPT

## 2025-09-14 PROCEDURE — 83880 ASSAY OF NATRIURETIC PEPTIDE: CPT

## 2025-09-14 PROCEDURE — 85730 THROMBOPLASTIN TIME PARTIAL: CPT

## 2025-09-14 PROCEDURE — 93005 ELECTROCARDIOGRAM TRACING: CPT

## 2025-09-14 PROCEDURE — 71045 X-RAY EXAM CHEST 1 VIEW: CPT | Mod: 26

## 2025-09-14 PROCEDURE — 71045 X-RAY EXAM CHEST 1 VIEW: CPT

## 2025-09-14 PROCEDURE — 85025 COMPLETE CBC W/AUTO DIFF WBC: CPT

## 2025-09-14 PROCEDURE — 36415 COLL VENOUS BLD VENIPUNCTURE: CPT

## 2025-09-14 RX ORDER — CEPHALEXIN 250 MG/1
1 CAPSULE ORAL
Qty: 21 | Refills: 0
Start: 2025-09-14 | End: 2025-09-20

## 2025-09-14 RX ORDER — CEFTRIAXONE 500 MG/1
1000 INJECTION, POWDER, FOR SOLUTION INTRAMUSCULAR; INTRAVENOUS ONCE
Refills: 0 | Status: DISCONTINUED | OUTPATIENT
Start: 2025-09-14 | End: 2025-09-14

## 2025-09-14 RX ORDER — CEFTRIAXONE 500 MG/1
1000 INJECTION, POWDER, FOR SOLUTION INTRAMUSCULAR; INTRAVENOUS ONCE
Refills: 0 | Status: COMPLETED | OUTPATIENT
Start: 2025-09-14 | End: 2025-09-14

## 2025-09-14 RX ORDER — CIPROFLOXACIN HCL 250 MG
400 TABLET ORAL ONCE
Refills: 0 | Status: DISCONTINUED | OUTPATIENT
Start: 2025-09-14 | End: 2025-09-14

## 2025-09-14 RX ADMIN — CEFTRIAXONE 1000 MILLIGRAM(S): 500 INJECTION, POWDER, FOR SOLUTION INTRAMUSCULAR; INTRAVENOUS at 20:06

## 2025-09-14 RX ADMIN — Medication 1000 MILLILITER(S): at 20:06

## 2025-09-15 LAB
CULTURE RESULTS: SIGNIFICANT CHANGE UP
SPECIMEN SOURCE: SIGNIFICANT CHANGE UP

## 2025-09-20 LAB
CULTURE RESULTS: SIGNIFICANT CHANGE UP
CULTURE RESULTS: SIGNIFICANT CHANGE UP
SPECIMEN SOURCE: SIGNIFICANT CHANGE UP
SPECIMEN SOURCE: SIGNIFICANT CHANGE UP